# Patient Record
Sex: FEMALE | Race: BLACK OR AFRICAN AMERICAN | Employment: UNEMPLOYED | URBAN - METROPOLITAN AREA
[De-identification: names, ages, dates, MRNs, and addresses within clinical notes are randomized per-mention and may not be internally consistent; named-entity substitution may affect disease eponyms.]

---

## 2017-04-02 ENCOUNTER — APPOINTMENT (OUTPATIENT)
Dept: CT IMAGING | Age: 62
DRG: 378 | End: 2017-04-02
Attending: EMERGENCY MEDICINE
Payer: MEDICARE

## 2017-04-02 ENCOUNTER — HOSPITAL ENCOUNTER (INPATIENT)
Age: 62
LOS: 2 days | Discharge: HOME HEALTH | DRG: 378 | End: 2017-04-04
Attending: EMERGENCY MEDICINE | Admitting: INTERNAL MEDICINE
Payer: MEDICARE

## 2017-04-02 DIAGNOSIS — K92.2 GASTROINTESTINAL HEMORRHAGE, UNSPECIFIED GASTROINTESTINAL HEMORRHAGE TYPE: Primary | ICD-10-CM

## 2017-04-02 PROBLEM — Z86.79 H/O CEREBRAL ANEURYSM REPAIR: Status: ACTIVE | Noted: 2017-04-02

## 2017-04-02 PROBLEM — E11.9 TYPE 2 DIABETES MELLITUS (HCC): Status: ACTIVE | Noted: 2017-04-02

## 2017-04-02 PROBLEM — F81.9 INTELLECTUAL DELAY: Status: ACTIVE | Noted: 2017-04-02

## 2017-04-02 PROBLEM — Z98.890 H/O CEREBRAL ANEURYSM REPAIR: Status: ACTIVE | Noted: 2017-04-02

## 2017-04-02 LAB
ALBUMIN SERPL BCP-MCNC: 3.4 G/DL (ref 3.5–5)
ALBUMIN/GLOB SERPL: 0.8 {RATIO} (ref 1.1–2.2)
ALP SERPL-CCNC: 78 U/L (ref 45–117)
ALT SERPL-CCNC: 14 U/L (ref 12–78)
ANION GAP BLD CALC-SCNC: 13 MMOL/L (ref 5–15)
APPEARANCE UR: ABNORMAL
APTT PPP: 22 SEC (ref 22.1–32.5)
AST SERPL W P-5'-P-CCNC: 10 U/L (ref 15–37)
BACTERIA URNS QL MICRO: ABNORMAL /HPF
BASOPHILS # BLD AUTO: 0 K/UL (ref 0–0.1)
BASOPHILS # BLD: 0 % (ref 0–1)
BILIRUB SERPL-MCNC: 0.1 MG/DL (ref 0.2–1)
BILIRUB UR QL CFM: NEGATIVE
BUN SERPL-MCNC: 21 MG/DL (ref 6–20)
BUN/CREAT SERPL: 20 (ref 12–20)
CALCIUM SERPL-MCNC: 9.4 MG/DL (ref 8.5–10.1)
CHLORIDE SERPL-SCNC: 109 MMOL/L (ref 97–108)
CO2 SERPL-SCNC: 22 MMOL/L (ref 21–32)
COLOR UR: ABNORMAL
CREAT SERPL-MCNC: 1.03 MG/DL (ref 0.55–1.02)
EOSINOPHIL # BLD: 0 K/UL (ref 0–0.4)
EOSINOPHIL NFR BLD: 0 % (ref 0–7)
EPITH CASTS URNS QL MICRO: ABNORMAL /LPF
ERYTHROCYTE [DISTWIDTH] IN BLOOD BY AUTOMATED COUNT: 16.6 % (ref 11.5–14.5)
GLOBULIN SER CALC-MCNC: 4.4 G/DL (ref 2–4)
GLUCOSE BLD STRIP.AUTO-MCNC: 99 MG/DL (ref 65–100)
GLUCOSE SERPL-MCNC: 237 MG/DL (ref 65–100)
GLUCOSE UR STRIP.AUTO-MCNC: NEGATIVE MG/DL
HCT VFR BLD AUTO: 35.6 % (ref 35–47)
HEMOCCULT STL QL: POSITIVE
HGB BLD-MCNC: 11 G/DL (ref 11.5–16)
HGB BLD-MCNC: 8.5 G/DL (ref 11.5–16)
HGB UR QL STRIP: ABNORMAL
HYALINE CASTS URNS QL MICRO: ABNORMAL /LPF (ref 0–5)
INR PPP: 1.1 (ref 0.9–1.1)
KETONES UR QL STRIP.AUTO: NEGATIVE MG/DL
LACTATE SERPL-SCNC: 2.4 MMOL/L (ref 0.4–2)
LACTATE SERPL-SCNC: 2.8 MMOL/L (ref 0.4–2)
LACTATE SERPL-SCNC: 4.4 MMOL/L (ref 0.4–2)
LEUKOCYTE ESTERASE UR QL STRIP.AUTO: ABNORMAL
LYMPHOCYTES # BLD AUTO: 25 % (ref 12–49)
LYMPHOCYTES # BLD: 3.6 K/UL (ref 0.8–3.5)
MCH RBC QN AUTO: 26.3 PG (ref 26–34)
MCHC RBC AUTO-ENTMCNC: 30.9 G/DL (ref 30–36.5)
MCV RBC AUTO: 85.2 FL (ref 80–99)
MONOCYTES # BLD: 0.3 K/UL (ref 0–1)
MONOCYTES NFR BLD AUTO: 2 % (ref 5–13)
NEUTS SEG # BLD: 10.4 K/UL (ref 1.8–8)
NEUTS SEG NFR BLD AUTO: 73 % (ref 32–75)
NITRITE UR QL STRIP.AUTO: NEGATIVE
PH UR STRIP: 5 [PH] (ref 5–8)
PLATELET # BLD AUTO: 303 K/UL (ref 150–400)
POTASSIUM SERPL-SCNC: 4.3 MMOL/L (ref 3.5–5.1)
PROT SERPL-MCNC: 7.8 G/DL (ref 6.4–8.2)
PROT UR STRIP-MCNC: 30 MG/DL
PROTHROMBIN TIME: 10.6 SEC (ref 9–11.1)
RBC # BLD AUTO: 4.18 M/UL (ref 3.8–5.2)
RBC #/AREA URNS HPF: ABNORMAL /HPF (ref 0–5)
SERVICE CMNT-IMP: NORMAL
SODIUM SERPL-SCNC: 144 MMOL/L (ref 136–145)
SP GR UR REFRACTOMETRY: >1.03 (ref 1–1.03)
THERAPEUTIC RANGE,PTTT: ABNORMAL SECS (ref 58–77)
UA: UC IF INDICATED,UAUC: ABNORMAL
UROBILINOGEN UR QL STRIP.AUTO: 0.2 EU/DL (ref 0.2–1)
WBC # BLD AUTO: 14.4 K/UL (ref 3.6–11)
WBC URNS QL MICRO: ABNORMAL /HPF (ref 0–4)
YEAST URNS QL MICRO: PRESENT

## 2017-04-02 PROCEDURE — 85018 HEMOGLOBIN: CPT | Performed by: EMERGENCY MEDICINE

## 2017-04-02 PROCEDURE — 36430 TRANSFUSION BLD/BLD COMPNT: CPT

## 2017-04-02 PROCEDURE — 83605 ASSAY OF LACTIC ACID: CPT

## 2017-04-02 PROCEDURE — 81001 URINALYSIS AUTO W/SCOPE: CPT | Performed by: EMERGENCY MEDICINE

## 2017-04-02 PROCEDURE — 74178 CT ABD&PLV WO CNTR FLWD CNTR: CPT

## 2017-04-02 PROCEDURE — 87086 URINE CULTURE/COLONY COUNT: CPT | Performed by: EMERGENCY MEDICINE

## 2017-04-02 PROCEDURE — 65660000000 HC RM CCU STEPDOWN

## 2017-04-02 PROCEDURE — 36415 COLL VENOUS BLD VENIPUNCTURE: CPT

## 2017-04-02 PROCEDURE — P9016 RBC LEUKOCYTES REDUCED: HCPCS

## 2017-04-02 PROCEDURE — 85730 THROMBOPLASTIN TIME PARTIAL: CPT | Performed by: EMERGENCY MEDICINE

## 2017-04-02 PROCEDURE — 96360 HYDRATION IV INFUSION INIT: CPT

## 2017-04-02 PROCEDURE — 74011250636 HC RX REV CODE- 250/636: Performed by: EMERGENCY MEDICINE

## 2017-04-02 PROCEDURE — 85025 COMPLETE CBC W/AUTO DIFF WBC: CPT

## 2017-04-02 PROCEDURE — 74011250636 HC RX REV CODE- 250/636: Performed by: INTERNAL MEDICINE

## 2017-04-02 PROCEDURE — 82962 GLUCOSE BLOOD TEST: CPT

## 2017-04-02 PROCEDURE — 77030029131 HC ADMN ST IV BLD N DEHP ICUM -B

## 2017-04-02 PROCEDURE — 80053 COMPREHEN METABOLIC PANEL: CPT

## 2017-04-02 PROCEDURE — 82270 OCCULT BLOOD FECES: CPT

## 2017-04-02 PROCEDURE — 85610 PROTHROMBIN TIME: CPT | Performed by: EMERGENCY MEDICINE

## 2017-04-02 PROCEDURE — 86900 BLOOD TYPING SEROLOGIC ABO: CPT

## 2017-04-02 PROCEDURE — 83605 ASSAY OF LACTIC ACID: CPT | Performed by: EMERGENCY MEDICINE

## 2017-04-02 PROCEDURE — 74011636320 HC RX REV CODE- 636/320: Performed by: EMERGENCY MEDICINE

## 2017-04-02 PROCEDURE — 77030032490 HC SLV COMPR SCD KNE COVD -B

## 2017-04-02 PROCEDURE — 30233N1 TRANSFUSION OF NONAUTOLOGOUS RED BLOOD CELLS INTO PERIPHERAL VEIN, PERCUTANEOUS APPROACH: ICD-10-PCS | Performed by: INTERNAL MEDICINE

## 2017-04-02 PROCEDURE — 93005 ELECTROCARDIOGRAM TRACING: CPT

## 2017-04-02 PROCEDURE — 99285 EMERGENCY DEPT VISIT HI MDM: CPT

## 2017-04-02 PROCEDURE — 86920 COMPATIBILITY TEST SPIN: CPT

## 2017-04-02 RX ORDER — INSULIN LISPRO 100 [IU]/ML
INJECTION, SOLUTION INTRAVENOUS; SUBCUTANEOUS
Status: DISCONTINUED | OUTPATIENT
Start: 2017-04-02 | End: 2017-04-04 | Stop reason: HOSPADM

## 2017-04-02 RX ORDER — SODIUM CHLORIDE 0.9 % (FLUSH) 0.9 %
5-10 SYRINGE (ML) INJECTION AS NEEDED
Status: DISCONTINUED | OUTPATIENT
Start: 2017-04-02 | End: 2017-04-04 | Stop reason: HOSPADM

## 2017-04-02 RX ORDER — SODIUM CHLORIDE 0.9 % (FLUSH) 0.9 %
5-10 SYRINGE (ML) INJECTION EVERY 8 HOURS
Status: DISCONTINUED | OUTPATIENT
Start: 2017-04-02 | End: 2017-04-04 | Stop reason: HOSPADM

## 2017-04-02 RX ORDER — ACETAMINOPHEN 325 MG/1
325 TABLET ORAL
Status: DISCONTINUED | OUTPATIENT
Start: 2017-04-02 | End: 2017-04-04 | Stop reason: HOSPADM

## 2017-04-02 RX ORDER — MAGNESIUM SULFATE 100 %
4 CRYSTALS MISCELLANEOUS AS NEEDED
Status: DISCONTINUED | OUTPATIENT
Start: 2017-04-02 | End: 2017-04-04 | Stop reason: HOSPADM

## 2017-04-02 RX ORDER — SODIUM CHLORIDE 0.9 % (FLUSH) 0.9 %
10 SYRINGE (ML) INJECTION
Status: COMPLETED | OUTPATIENT
Start: 2017-04-02 | End: 2017-04-02

## 2017-04-02 RX ORDER — SODIUM CHLORIDE 9 MG/ML
50 INJECTION, SOLUTION INTRAVENOUS
Status: COMPLETED | OUTPATIENT
Start: 2017-04-02 | End: 2017-04-02

## 2017-04-02 RX ORDER — MORPHINE SULFATE 2 MG/ML
1 INJECTION, SOLUTION INTRAMUSCULAR; INTRAVENOUS
Status: DISCONTINUED | OUTPATIENT
Start: 2017-04-02 | End: 2017-04-04 | Stop reason: HOSPADM

## 2017-04-02 RX ORDER — NALOXONE HYDROCHLORIDE 0.4 MG/ML
0.4 INJECTION, SOLUTION INTRAMUSCULAR; INTRAVENOUS; SUBCUTANEOUS AS NEEDED
Status: DISCONTINUED | OUTPATIENT
Start: 2017-04-02 | End: 2017-04-04 | Stop reason: HOSPADM

## 2017-04-02 RX ORDER — ONDANSETRON 2 MG/ML
4 INJECTION INTRAMUSCULAR; INTRAVENOUS
Status: DISCONTINUED | OUTPATIENT
Start: 2017-04-02 | End: 2017-04-04 | Stop reason: HOSPADM

## 2017-04-02 RX ORDER — METOPROLOL TARTRATE 5 MG/5ML
5 INJECTION INTRAVENOUS
Status: DISCONTINUED | OUTPATIENT
Start: 2017-04-02 | End: 2017-04-04 | Stop reason: HOSPADM

## 2017-04-02 RX ORDER — DEXTROSE 50 % IN WATER (D50W) INTRAVENOUS SYRINGE
12.5-25 AS NEEDED
Status: DISCONTINUED | OUTPATIENT
Start: 2017-04-02 | End: 2017-04-04 | Stop reason: HOSPADM

## 2017-04-02 RX ORDER — SODIUM CHLORIDE 9 MG/ML
250 INJECTION, SOLUTION INTRAVENOUS AS NEEDED
Status: DISCONTINUED | OUTPATIENT
Start: 2017-04-02 | End: 2017-04-04 | Stop reason: HOSPADM

## 2017-04-02 RX ORDER — HYDROCODONE BITARTRATE AND ACETAMINOPHEN 5; 325 MG/1; MG/1
1 TABLET ORAL
Status: DISCONTINUED | OUTPATIENT
Start: 2017-04-02 | End: 2017-04-04 | Stop reason: HOSPADM

## 2017-04-02 RX ADMIN — IOPAMIDOL 100 ML: 755 INJECTION, SOLUTION INTRAVENOUS at 19:46

## 2017-04-02 RX ADMIN — SODIUM CHLORIDE 1000 ML: 900 INJECTION, SOLUTION INTRAVENOUS at 16:10

## 2017-04-02 RX ADMIN — Medication 10 ML: at 19:46

## 2017-04-02 RX ADMIN — SODIUM CHLORIDE 250 ML: 900 INJECTION, SOLUTION INTRAVENOUS at 23:40

## 2017-04-02 RX ADMIN — Medication 10 ML: at 21:16

## 2017-04-02 RX ADMIN — SODIUM CHLORIDE 50 ML/HR: 900 INJECTION, SOLUTION INTRAVENOUS at 19:46

## 2017-04-02 NOTE — ED NOTES
Patient became tachycardic after walking to restroom but quickly returned to baseline HR. MD morales.

## 2017-04-02 NOTE — ED PROVIDER NOTES
HPI Comments: Ari Zhou is a 62 y.o. female with hx CVA, DM, HTN, who presents via EMS to the ED c/o rectal bleeding x today. Per EMS, pt was found vomiting, without blood, and had a blood streaked bowel movement in the toilet noted by her live-in . Per EMS, pt normally appears pale. Pt states she recalls the incident and denies any bloody bowel movements yesterday. She notes a hx of blood in her stool, and states she is not on blood thinners. She denies hx colonoscopy. She has a hx of cerebral shunt placement in 2008, and is currently oriented and at baseline. She admits to dark stools, dizziness, lightheadedness. She specifically denies abdominal pain, chest pain, nausea, SOB. PCP: Linda Larios  Soc Hx: -tobacco, -EtOH    There are no other complaints, changes or physical findings at this time. The history is provided by the patient and the EMS personnel. Past Medical History:   Diagnosis Date    Diabetes ([address]     Hypertension     Stroke [facility])        Past Surgical History:   Procedure Laterality Date    HX ORTHOPAEDIC  1996    back surgery    NEUROLOGICAL PROCEDURE UNLISTED  2008    shunt placement         History reviewed. No pertinent family history. Social History     Social History    Marital status: SINGLE     Spouse name: N/A    Number of children: N/A    Years of education: N/A     Occupational History    Not on file. Social History Main Topics    Smoking status: Never Smoker    Smokeless tobacco: Not on file    Alcohol use No    Drug use: No    Sexual activity: Not on file     Other Topics Concern    Not on file     Social History Narrative         ALLERGIES: Review of patient's allergies indicates no known allergies. Review of Systems   Constitutional: Negative for fatigue and fever. HENT: Negative. Eyes: Negative. Respiratory: Negative for shortness of breath and wheezing.     Cardiovascular: Negative for chest pain and leg swelling. Gastrointestinal: Positive for anal bleeding, blood in stool and vomiting. Negative for abdominal pain, constipation, diarrhea and nausea. +dark stool   Endocrine: Negative. Genitourinary: Negative for difficulty urinating and dysuria. Musculoskeletal: Negative. Skin: Negative for rash. Allergic/Immunologic: Negative. Neurological: Positive for dizziness and light-headedness. Negative for weakness and numbness. Hematological: Negative. Psychiatric/Behavioral: Negative. All other systems reviewed and are negative. There were no vitals filed for this visit. Physical Exam   Constitutional: She is oriented to person, place, and time. She appears well-developed and well-nourished. No distress. HENT:   Head: Normocephalic and atraumatic. Mouth/Throat: Oropharynx is clear and moist and mucous membranes are normal.   Moist mucus membranes   Eyes: Conjunctivae and EOM are normal.   Neck: Neck supple. No JVD present. No tracheal deviation present. Cardiovascular: Regular rhythm and intact distal pulses. Tachycardia present. Exam reveals no gallop and no friction rub. No murmur heard. Pulmonary/Chest: Effort normal and breath sounds normal. No stridor. No respiratory distress. She has no wheezes. Abdominal: Soft. Bowel sounds are normal. She exhibits no distension and no mass. There is no tenderness. There is no guarding. Musculoskeletal: Normal range of motion. She exhibits no edema or tenderness. No deformity   Neurological: She is alert and oriented to person, place, and time. She has normal strength. No focal deficits   Skin: Skin is warm, dry and intact. No rash noted. There is pallor. Psychiatric: She has a normal mood and affect. Her behavior is normal. Judgment and thought content normal.   Nursing note and vitals reviewed.        MDM  Number of Diagnoses or Management Options  Diagnosis management comments: Pt presenting with BRBPR, has never had a colonoscopy and denies any hx of GI bleeding. DDx includes anemia, hemorrhoids, diverticular bleed, malignancy, kennedy. Will check labs, type and screen, lactic acid. Abdominal exam is benign. Amount and/or Complexity of Data Reviewed  Clinical lab tests: ordered and reviewed  Tests in the medicine section of CPT®: ordered and reviewed  Obtain history from someone other than the patient: yes (EMS)  Review and summarize past medical records: yes  Discuss the patient with other providers: yes (GI, hospitalist)  Independent visualization of images, tracings, or specimens: yes      ED Course       Procedures  EKG interpretation: (Preliminary)  14:19  Rhythm: sinus tachycardia; and regular . Rate (approx.): 105; Axis: normal; NJ interval: normal; QRS interval: normal ; ST/T wave: no ST changes. Procedure Note - Rectal Exam:   2:24 PM  Performed by: Koffi Denney DO  Chaperoned by: Sherrell Burroughs RN (female)  Rectal exam performed. Maroon colored stool was collected. Stool was Hemoccult tested, and found to be heme Positive. No hemorrhoids noted. The procedure took 1-15 minutes, and pt tolerated well. Written by Agustín Bernal, ED Scribe, as dictated by Koffi Denney DO.    CONSULT NOTE:   4:13 PM  Koffi Denney DO spoke with Dr. Marvin Orosco,  Specialty: GI  Discussed pt's hx, disposition, and available diagnostic and imaging results. Reviewed care plans. Consultant agrees with plans as outlined. He recommends a CT angiogram of the abdomen. Written by Agustín Bernal ED Scribe, as dictated by Koffi Denney DO.     CONSULT NOTE:   4:17 PM  Koffi Denney DO spoke with Dr. Nisa Munoz,   Specialty: Hospitalist  Discussed pt's hx, disposition, and available diagnostic and imaging results. Reviewed care plans. Consultant will evaluate pt for admission. Written by Agustín Bernal ED Scribe, as dictated by Koffi Denney DO.     Progress Note:  4:26 PM  Nurse reports that upon standing to go to the bathroom, the patient's HR winston to 140s. BP remained stable and patient denied any chest pain, sob, or lightheadedness. Upon repositioning in bed, pt's HR returned to normal. Rohit Nascimento DO    Procedure Note - Limited Bedside Ultrasound- Peripheral Line Placement   7:20 PM  Performed by: Teresita Moreno DO  Indication: need for venous access for blood work or imaging studies. Interpretation:   A peripheral venous line was placed using dynamic US guidance. Compression of vessel was performed to confirm venous placement. A 18 gauge catheter was placed in the left AC.  1 number of attempts. The procedure took 1-15 minutes, and pt tolerated well. Written by Elmer Barry ED Scribe, as dictated by Teresita Moreno DO    LABORATORY TESTS:  Recent Results (from the past 12 hour(s))   METABOLIC PANEL, COMPREHENSIVE    Collection Time: 04/02/17  2:29 PM   Result Value Ref Range    Sodium 144 136 - 145 mmol/L    Potassium 4.3 3.5 - 5.1 mmol/L    Chloride 109 (H) 97 - 108 mmol/L    CO2 22 21 - 32 mmol/L    Anion gap 13 5 - 15 mmol/L    Glucose 237 (H) 65 - 100 mg/dL    BUN 21 (H) 6 - 20 MG/DL    Creatinine 1.03 (H) 0.55 - 1.02 MG/DL    BUN/Creatinine ratio 20 12 - 20      GFR est AA >60 >60 ml/min/1.73m2    GFR est non-AA 55 (L) >60 ml/min/1.73m2    Calcium 9.4 8.5 - 10.1 MG/DL    Bilirubin, total 0.1 (L) 0.2 - 1.0 MG/DL    ALT (SGPT) 14 12 - 78 U/L    AST (SGOT) 10 (L) 15 - 37 U/L    Alk.  phosphatase 78 45 - 117 U/L    Protein, total 7.8 6.4 - 8.2 g/dL    Albumin 3.4 (L) 3.5 - 5.0 g/dL    Globulin 4.4 (H) 2.0 - 4.0 g/dL    A-G Ratio 0.8 (L) 1.1 - 2.2     CBC WITH AUTOMATED DIFF    Collection Time: 04/02/17  2:29 PM   Result Value Ref Range    WBC 14.4 (H) 3.6 - 11.0 K/uL    RBC 4.18 3.80 - 5.20 M/uL    HGB 11.0 (L) 11.5 - 16.0 g/dL    HCT 35.6 35.0 - 47.0 %    MCV 85.2 80.0 - 99.0 FL    MCH 26.3 26.0 - 34.0 PG    MCHC 30.9 30.0 - 36.5 g/dL    RDW 16.6 (H) 11.5 - 14.5 %    PLATELET 520 545 - 942 K/uL    NEUTROPHILS 73 32 - 75 %    LYMPHOCYTES 25 12 - 49 %    MONOCYTES 2 (L) 5 - 13 %    EOSINOPHILS 0 0 - 7 %    BASOPHILS 0 0 - 1 %    ABS. NEUTROPHILS 10.4 (H) 1.8 - 8.0 K/UL    ABS. LYMPHOCYTES 3.6 (H) 0.8 - 3.5 K/UL    ABS. MONOCYTES 0.3 0.0 - 1.0 K/UL    ABS. EOSINOPHILS 0.0 0.0 - 0.4 K/UL    ABS.  BASOPHILS 0.0 0.0 - 0.1 K/UL   LACTIC ACID, PLASMA    Collection Time: 04/02/17  2:29 PM   Result Value Ref Range    Lactic acid 4.4 (HH) 0.4 - 2.0 MMOL/L   PROTHROMBIN TIME + INR    Collection Time: 04/02/17  2:29 PM   Result Value Ref Range    INR 1.1 0.9 - 1.1      Prothrombin time 10.6 9.0 - 11.1 sec   PTT    Collection Time: 04/02/17  2:29 PM   Result Value Ref Range    aPTT 22.0 (L) 22.1 - 32.5 sec    aPTT, therapeutic range     58.0 - 77.0 SECS   POC FECAL OCCULT BLOOD    Collection Time: 04/02/17  2:33 PM   Result Value Ref Range    Occult blood, stool (POC) POSITIVE (A) NEG     URINALYSIS W/ REFLEX CULTURE    Collection Time: 04/02/17  2:59 PM   Result Value Ref Range    Color YELLOW/STRAW      Appearance CLOUDY (A) CLEAR      Specific gravity >1.030 (H) 1.003 - 1.030    pH (UA) 5.0 5.0 - 8.0      Protein 30 (A) NEG mg/dL    Glucose NEGATIVE  NEG mg/dL    Ketone NEGATIVE  NEG mg/dL    Blood LARGE (A) NEG      Urobilinogen 0.2 0.2 - 1.0 EU/dL    Nitrites NEGATIVE  NEG      Leukocyte Esterase SMALL (A) NEG      WBC 10-20 0 - 4 /hpf    RBC 20-50 0 - 5 /hpf    Epithelial cells MODERATE (A) FEW /lpf    Bacteria 1+ (A) NEG /hpf    UA:UC IF INDICATED URINE CULTURE ORDERED (A) CNI      Hyaline cast 2-5 0 - 5 /lpf    Yeast PRESENT (A) NEG     BILIRUBIN, CONFIRM    Collection Time: 04/02/17  2:59 PM   Result Value Ref Range    Bilirubin UA, confirm NEGATIVE  NEG     TYPE & SCREEN    Collection Time: 04/02/17  3:10 PM   Result Value Ref Range    Crossmatch Expiration 04/05/2017     ABO/Rh(D) O POSITIVE     Antibody screen NEG    LACTIC ACID, PLASMA    Collection Time: 04/02/17  3:11 PM   Result Value Ref Range Lactic acid 2.8 (HH) 0.4 - 2.0 MMOL/L       IMAGING RESULTS:  CT ABD PELV W WO CONT    (Results Pending)       MEDICATIONS GIVEN:  Medications   sodium chloride 0.9 % bolus infusion 1,000 mL (1,000 mL IntraVENous New Bag 4/2/17 1610)   0.9% sodium chloride infusion (not administered)   iopamidol (ISOVUE-370) 76 % injection 100 mL (not administered)   sodium chloride (NS) flush 10 mL (not administered)       IMPRESSION:  No diagnosis found. PLAN:  1. Admit pt to hospitalist care. Admission Note:  5:04 PM  Patient is being admitted to the hospital by Dr. Branden Cornell. The results of their tests and reasons for their admission have been discussed with them and available family. They convey agreement and understanding for the need to be admitted and for their admission diagnosis. Written by Liza Jennings, ED Scribe, as dictated by Cecelia Orona DO. This note is prepared by Liza Jennings acting as Scribe for Cecelia Orona DO. Cecelia Orona DO: The scribe's documentation has been prepared under my direction and personally reviewed by me in its entirety. I confirm that the note above accurately reflects all work, treatment, procedures, and medical decision making performed by me.

## 2017-04-02 NOTE — IP AVS SNAPSHOT
[address] [facility] 
[phone] Patient: Shannon Oconnor MRN: [id number] GMB:[birth year] You are allergic to the following No active allergies Recent Documentation OB Status Smoking Status Postmenopausal Never Smoker Emergency Contacts Name Discharge Info Relation Home Work Mobile Tyrone Player   [phone] Shai Berger  Other Relative [6] [phone] About your hospitalization You were admitted on:  April 2, 2017 You last received care in the:  [facility] 3 RENAL MEDICINE You were discharged on:  April 4, 2017 Unit phone number:  [phone] Why you were hospitalized Your primary diagnosis was:  Not on File Your diagnoses also included:  Acute Lower Gi Bleeding, Type 2 Diabetes Mellitus (Hcc), Intellectual Delay, H/O Cerebral Aneurysm Repair Providers Seen During Your Hospitalizations Provider Role Specialty Primary office phone Brennan Marshall DO Attending Provider Emergency Medicine [phone] Ayush Curran MD Attending Provider Internal Medicine [phone] Sangeeta Langley MD Attending Provider Internal Medicine [phone] Your Primary Care Physician (PCP) Primary Care Physician Office Phone Office Fax Brenda YEBOAH ** None ** ** None ** Follow-up Information Follow up With Details Comments Contact Info Hamilton Bolus In 1 week  [address] [phone] Eleuterio Klinefelter., MD In 2 weeks call office to schedule colonoscopy  [address] [facility] 
[phone] Current Discharge Medication List  
  
START taking these medications Dose & Instructions Dispensing Information Comments Morning Noon Evening Bedtime  
 ascorbic acid (vitamin C) 1,000 mg tablet Commonly known as:  VITAMIN C Your last dose was: Your next dose is:    
   
   
 Dose:  1000 mg Take 1 Tab by mouth daily. Quantity:  14 Tab Refills:  0  
     
   
   
   
  
 ferrous sulfate 325 mg (65 mg iron) tablet Your last dose was: Your next dose is:    
   
   
 Dose:  325 mg Take 1 Tab by mouth Daily (before breakfast). Quantity:  14 Tab Refills:  0  
     
   
   
   
  
 folic acid 1 mg tablet Commonly known as:  Shyam Your last dose was: Your next dose is:    
   
   
 Dose:  1 mg Take 1 Tab by mouth daily. Quantity:  14 Tab Refills:  0 CONTINUE these medications which have CHANGED Dose & Instructions Dispensing Information Comments Morning Noon Evening Bedtime  
 aspirin delayed-release 81 mg tablet What changed:  additional instructions Your last dose was: Your next dose is:    
   
   
 Dose:  81 mg Take 1 Tab by mouth daily. Start taking 4/12/2017 Quantity:  30 Tab Refills:  1 CONTINUE these medications which have NOT CHANGED Dose & Instructions Dispensing Information Comments Morning Noon Evening Bedtime HYDROcodone-acetaminophen 5-325 mg per tablet Commonly known as:  Akhil Francisco Your last dose was: Your next dose is:    
   
   
 Dose:  1 Tab Take 1 Tab by mouth every four (4) hours as needed for Pain. Max Daily Amount: 6 Tabs. Quantity:  20 Tab Refills:  0  
     
   
   
   
  
 lisinopril 5 mg tablet Commonly known as:  Diana Rodriguez Your last dose was: Your next dose is:    
   
   
 Dose:  5 mg Take 1 Tab by mouth daily. Quantity:  30 Tab Refills:  1  
     
   
   
   
  
 penicillin v potassium 500 mg tablet Commonly known as:  VEETID Your last dose was: Your next dose is:    
   
   
 Dose:  500 mg Take 1 Tab by mouth four (4) times daily. Quantity:  40 Tab Refills:  0 Where to Get Your Medications Information on where to get these meds will be given to you by the nurse or doctor. ! Ask your nurse or doctor about these medications  
  ascorbic acid (vitamin C) 1,000 mg tablet  
 aspirin delayed-release 81 mg tablet  
 ferrous sulfate 325 mg (65 mg iron) tablet  
 folic acid 1 mg tablet Discharge Instructions Patient Discharge Instructions Veva Lombard / [id number] : 1955 Admitted 2017 Discharged: 2017 DISCHARGE DIAGNOSIS:  
 
You likely were bleeding from diverticulosis  - you will need colonoscopy to be done as outpatient. Call GI office to schedule. - Hold aspirin for now. Start taking back on 2017 
                                                                      - follow with PCP in 1 week to check anemia numbers - take iron / vit C/ folic acid for 14 days for anemia. Iron can make you constipated Take Home Medications General drug facts If you have a very bad allergy, wear an allergy ID at all times. It is important that you take the medication exactly as they are prescribed. Keep your medication in the bottles provided by the pharmacist. 
Keep a list of all your drugs (prescription, natural products, vitamins, OTC) with you. Give this list to your doctor. Do not take other medications without consulting your doctor. Do not share your drugs with others and do not take anyone else's drugs. Keep all drugs out of the reach of children and pets. Most drugs may be thrown away in household trash after mixing with coffee grounds or ryanne litter and sealing in a plastic bag. Keep a list Call your doctor for help with any side effects.  If in the [place] you may also call the FDA at 7-535-RVA-5936 Talk with the doctor before starting any new drug, including OTC, natural products, or vitamins. What to do at [facility] 1. Recommended diet:high fiber diet 2. Recommended activity: Activity as tolerated and PT/OT per Home Health 3. If you experience any of the following symptoms then please call your primary care physician or return to the emergency room if you cannot get hold of your doctor:blood in stool/ dizziness/ weakness 4. Lab work: with PCP in 1 week 5. Bring these papers with you to your follow up appointments. The papers will help your doctors be sure to continue the care plan from the hospital. 
 
 
Follow-up with:  
PCP: Asael Chappell Follow-up Information Follow up With Details Comments Contact Info Asael Chappell In 1 week  [address] [facility] [phone] Velia Morrissey MD In 2 weeks call office to schedule colonoscopy  [address] [place] [address] 
[phone] Please call for your own appointment Information obtained by : 
I understand that if any problems occur once I am at home I am to contact my physician. I understand and acknowledge receipt of the instructions indicated above. Physician's or R.N.'s Signature                                                                  Date/Time Patient or Representative Signature                                                          Date/Time Discharge Orders None [facility] Announcement  We are excited to announce that we are making your provider's discharge notes available to you in [employer]. You will see these notes when they are completed and signed by the physician that discharged you from your recent hospital stay. If you have any questions or concerns about any information you see in [employer], please call the Health Information Department where you were seen or reach out to your Primary Care Provider for more information about your plan of care. Introducing [facility]! Neris Sylvie introduces [employer] patient portal. Now you can access parts of your medical record, email your doctor's office, and request medication refills online. 1. In your internet browser, go to [email or web address] 2. Click on the First Time User? Click Here link in the Sign In box. You will see the New Member Sign Up page. 3. Enter your [employer] Access Code exactly as it appears below. You will not need to use this code after youve completed the sign-up process. If you do not sign up before the expiration date, you must request a new code. · [employer] Access Code: [email or web address] Expires: 7/1/2017  2:47 PM 
 
4. Enter the last four digits of your Social Security Number (xxxx) and Date of Birth (mm/dd/yyyy) as indicated and click Submit. You will be taken to the next sign-up page. 5. Create a [employer] ID. This will be your [employer] login ID and cannot be changed, so think of one that is secure and easy to remember. 6. Create a [employer] password. You can change your password at any time. 7. Enter your Password Reset Question and Answer. This can be used at a later time if you forget your password. 8. Enter your e-mail address. You will receive e-mail notification when new information is available in [address] 9. Click Sign Up. You can now view and download portions of your medical record. 10. Click the Download Summary menu link to download a portable copy of your medical information. If you have questions, please visit the Frequently Asked Questions section of the MyChart website. Remember, MyChart is NOT to be used for urgent needs. For medical emergencies, dial 911. Now available from your iPhone and Android! General Information Please provide this summary of care documentation to your next provider. Patient Signature:  ____________________________________________________________ Date:  ____________________________________________________________  
  
Cynthea Mates Provider Signature:  ____________________________________________________________ Date:  ____________________________________________________________

## 2017-04-02 NOTE — ED NOTES
IV access was lost in CT, according to CT tech. Several attempts were made to start IV without success. MD aware of need for ultrasound guided IV placement. Patient in no distress, call bell in reach. No change to nursing assessment.

## 2017-04-02 NOTE — ED TRIAGE NOTES
Patient arrives via EMS for vomiting without blood and some rectal bleeding as observed by family members. Patient has a shunt in her brain and she is oriented to her normal baseline at this time which is to person, place, and situation. She lives at home with a live-in .

## 2017-04-02 NOTE — H&P
Hospitalist Admission Note    NAME: Veva Lombard   :  1955   MRN:  [id number]     Date/Time:  2017 4:18 PM    Patient PCP: Mari Arana  ________________________________________________________________________    My assessment of this patient's clinical condition and my plan of care is as follows. Assessment / Plan:  Acute renal failure in setting of bright red blood per rectum, and 2 gm drop in hgb which makes 22% of original blood loss. Gi consulted in ER, CTA of abdomin negative for acute bleeding but diverticulosis seen, [id number] cbc, pt type and screened, iv fluids, give one unit of blood. htn will watch carefully with iv fluid administration . Hold lisinopril for elevated cr. Metoprolol iv as needed. Type 2 dm with proteinuria, insulin sliding scale. H/o Cerebral aneurysm  with s/o  shunt and intellectual delay    H/o stroke    H/o back surgery    H/o facial swelling with subperiosteal abscess related to dental disease about the right maxillary lateral incisor 2016. Niece works and cannot keep up with tid dosing of pcn, and pt only gets it bid. Code Status:  full code. Surrogate Decision Maker: Ayaan Lazcano [phone] mother, niece is power of  [facility] [phone]    DVT Prophylaxis: scd  GI Prophylaxis: not indicated    Baseline: lives alone, but niece will be moving in. Intellect disability after cerebral aneurysm. Subjective:   CHIEF COMPLAINT: blood in stool    HISTORY OF PRESENT ILLNESS:     Silvino Borja is a 62 y.o.  female who presents with above. Pt is poor historian due to cerebral aneurysm history. Pt's niece states pt had bleeding with her bowel movement this am. Pt also had emesis during that time, with no blood. Pt denies pain, diarrhea, or previous bleeding. We were asked to admit for work up and evaluation of the above problems.      Past Medical History:   Diagnosis Date    Diabetes ([facility] [address]  Hypertension     Stroke [facility])         Past Surgical History:   Procedure Laterality Date    HX ORTHOPAEDIC  1996    back surgery    NEUROLOGICAL PROCEDURE UNLISTED  2008    shunt placement       Social History   Substance Use Topics    Smoking status: Never Smoker    Smokeless tobacco: Not on file    Alcohol use No      FMHx mom with diabetes. No Known Allergies     Prior to Admission medications    Medication Sig Start Date End Date Taking? Authorizing Provider   penicillin v potassium (VEETID) 500 mg tablet Take 1 Tab by mouth four (4) times daily. 12/28/16   Maribell Martinez MD   HYDROcodone-acetaminophen [facility]) 5-325 mg per tablet Take 1 Tab by mouth every four (4) hours as needed for Pain. Max Daily Amount: 6 Tabs. 12/28/16   Maribell Martinez MD   aspirin delayed-release 81 mg tablet Take 1 Tab by mouth daily. 11/24/16   Dav Spencer MD   lisinopril (PRINIVIL, ZESTRIL) 5 mg tablet Take 1 Tab by mouth daily. 11/24/16   Dav Spencer MD       REVIEW OF SYSTEMS:     I am not able to complete the review of systems because:    The patient is intubated and sedated    The patient has altered mental status due to his acute medical problems    The patient has baseline aphasia from prior stroke(s)   x The patient has baseline dementia and is not reliable historian    The patient is in acute medical distress and unable to provide information           Total of 12 systems reviewed as follows:       POSITIVE= underlined text  Negative = text not underlined  General:  fever, chills, sweats, generalized weakness, weight loss/gain,      loss of appetite   Eyes:    blurred vision, eye pain, loss of vision, double vision  ENT:    rhinorrhea, pharyngitis   Respiratory:   cough, sputum production, SOB, KELLER, wheezing, pleuritic pain   Cardiology:   chest pain, palpitations, orthopnea, PND, edema, syncope   Gastrointestinal:  abdominal pain , N/V, diarrhea, dysphagia, constipation, bleeding   Genitourinary: frequency, urgency, dysuria, hematuria, incontinence   Muskuloskeletal :  arthralgia, myalgia, back pain  Hematology:  easy bruising, nose or gum bleeding, lymphadenopathy   Dermatological: rash, ulceration, pruritis, color change / jaundice  Endocrine:   hot flashes or polydipsia   Neurological:  headache, dizziness, confusion, focal weakness, paresthesia,     Speech difficulties, memory loss, gait difficulty  Psychological: Feelings of anxiety, depression, agitation    Objective:   VITALS:    Visit Vitals    /90    Pulse (!) 124    Resp 15    SpO2 100%       PHYSICAL EXAM:    General:    Alert, cooperative, no distress, appears stated age, father at bedside. HEENT: Atraumatic, anicteric sclerae, pink conjunctivae  Neck:  No stridor,  symmetrical,  No masses. Lungs:   Clear to auscultation bilaterally. No Wheezing or Rhonchi. No rales. Chest wall:  No tenderness  No Accessory muscle use. Heart:   Regular  rhythm,  No  murmur  bl edema  Abdomen:   Soft, non-tender. Not distended. Bowel sounds normal  Extremities: No cyanosis. No clubbing      Capillary refill normal,  Radial pulse 2+,  DP normal  Skin:     Not pale. Not Jaundiced  No rashes   Psych:  poor insight. Not depressed. Not anxious or agitated. Neurologic: Answers some questions, follows some commands. No facial asymmetry. aphasia and poor enunciation of words at times.       _______________________________________________________________________  Care Plan discussed with:    Comments   Patient x    Family  x Niece via phone   RN x    Care Manager                    Consultant:      _______________________________________________________________________  Expected  Disposition:   Home with Family    HH/PT/OT/RN x   SNF/LTC    ROSENDO    ________________________________________________________________________  TOTAL TIME:  55Minutes    Critical Care Provided     Minutes non procedure based      Comments    x Reviewed previous records >50% of visit spent in counseling and coordination of care x Discussion with patient and family and questions answered       ________________________________________________________________________  Signed: Tanner Lovell DO    Procedures: see electronic medical records for all procedures/Xrays and details which were not copied into this note but were reviewed prior to creation of Plan. LAB DATA REVIEWED:    Recent Results (from the past 24 hour(s))   METABOLIC PANEL, COMPREHENSIVE    Collection Time: 04/02/17  2:29 PM   Result Value Ref Range    Sodium 144 136 - 145 mmol/L    Potassium 4.3 3.5 - 5.1 mmol/L    Chloride 109 (H) 97 - 108 mmol/L    CO2 22 21 - 32 mmol/L    Anion gap 13 5 - 15 mmol/L    Glucose 237 (H) 65 - 100 mg/dL    BUN 21 (H) 6 - 20 MG/DL    Creatinine 1.03 (H) 0.55 - 1.02 MG/DL    BUN/Creatinine ratio 20 12 - 20      GFR est AA >60 >60 ml/min/1.73m2    GFR est non-AA 55 (L) >60 ml/min/1.73m2    Calcium 9.4 8.5 - 10.1 MG/DL    Bilirubin, total 0.1 (L) 0.2 - 1.0 MG/DL    ALT (SGPT) 14 12 - 78 U/L    AST (SGOT) 10 (L) 15 - 37 U/L    Alk. phosphatase 78 45 - 117 U/L    Protein, total 7.8 6.4 - 8.2 g/dL    Albumin 3.4 (L) 3.5 - 5.0 g/dL    Globulin 4.4 (H) 2.0 - 4.0 g/dL    A-G Ratio 0.8 (L) 1.1 - 2.2     CBC WITH AUTOMATED DIFF    Collection Time: 04/02/17  2:29 PM   Result Value Ref Range    WBC 14.4 (H) 3.6 - 11.0 K/uL    RBC 4.18 3.80 - 5.20 M/uL    HGB 11.0 (L) 11.5 - 16.0 g/dL    HCT 35.6 35.0 - 47.0 %    MCV 85.2 80.0 - 99.0 FL    MCH 26.3 26.0 - 34.0 PG    MCHC 30.9 30.0 - 36.5 g/dL    RDW 16.6 (H) 11.5 - 14.5 %    PLATELET 556 274 - 862 K/uL    NEUTROPHILS 73 32 - 75 %    LYMPHOCYTES 25 12 - 49 %    MONOCYTES 2 (L) 5 - 13 %    EOSINOPHILS 0 0 - 7 %    BASOPHILS 0 0 - 1 %    ABS. NEUTROPHILS 10.4 (H) 1.8 - 8.0 K/UL    ABS. LYMPHOCYTES 3.6 (H) 0.8 - 3.5 K/UL    ABS. MONOCYTES 0.3 0.0 - 1.0 K/UL    ABS. EOSINOPHILS 0.0 0.0 - 0.4 K/UL    ABS.  BASOPHILS 0.0 0.0 - 0.1 K/UL   LACTIC ACID, PLASMA    Collection Time: 04/02/17  2:29 PM   Result Value Ref Range    Lactic acid 4.4 (HH) 0.4 - 2.0 MMOL/L   PROTHROMBIN TIME + INR    Collection Time: 04/02/17  2:29 PM   Result Value Ref Range    INR 1.1 0.9 - 1.1      Prothrombin time 10.6 9.0 - 11.1 sec   PTT    Collection Time: 04/02/17  2:29 PM   Result Value Ref Range    aPTT 22.0 (L) 22.1 - 32.5 sec    aPTT, therapeutic range     58.0 - 77.0 SECS   POC FECAL OCCULT BLOOD    Collection Time: 04/02/17  2:33 PM   Result Value Ref Range    Occult blood, stool (POC) POSITIVE (A) NEG     LACTIC ACID, PLASMA    Collection Time: 04/02/17  3:11 PM   Result Value Ref Range    Lactic acid 2.8 (HH) 0.4 - 2.0 MMOL/L

## 2017-04-02 NOTE — IP AVS SNAPSHOT
Current Discharge Medication List  
  
START taking these medications Dose & Instructions Dispensing Information Comments Morning Noon Evening Bedtime  
 ascorbic acid (vitamin C) 1,000 mg tablet Commonly known as:  VITAMIN C Your last dose was: Your next dose is:    
   
   
 Dose:  1000 mg Take 1 Tab by mouth daily. Quantity:  14 Tab Refills:  0  
     
   
   
   
  
 ferrous sulfate 325 mg (65 mg iron) tablet Your last dose was: Your next dose is:    
   
   
 Dose:  325 mg Take 1 Tab by mouth Daily (before breakfast). Quantity:  14 Tab Refills:  0  
     
   
   
   
  
 folic acid 1 mg tablet Commonly known as:  Shyam Your last dose was: Your next dose is:    
   
   
 Dose:  1 mg Take 1 Tab by mouth daily. Quantity:  14 Tab Refills:  0 CONTINUE these medications which have CHANGED Dose & Instructions Dispensing Information Comments Morning Noon Evening Bedtime  
 aspirin delayed-release 81 mg tablet What changed:  additional instructions Your last dose was: Your next dose is:    
   
   
 Dose:  81 mg Take 1 Tab by mouth daily. Start taking 4/12/2017 Quantity:  30 Tab Refills:  1 CONTINUE these medications which have NOT CHANGED Dose & Instructions Dispensing Information Comments Morning Noon Evening Bedtime HYDROcodone-acetaminophen 5-325 mg per tablet Commonly known as:  Nathan Marie Your last dose was: Your next dose is:    
   
   
 Dose:  1 Tab Take 1 Tab by mouth every four (4) hours as needed for Pain. Max Daily Amount: 6 Tabs. Quantity:  20 Tab Refills:  0  
     
   
   
   
  
 lisinopril 5 mg tablet Commonly known as:  Kim Erazo Your last dose was: Your next dose is:    
   
   
 Dose:  5 mg Take 1 Tab by mouth daily. Quantity:  30 Tab Refills:  1 penicillin v potassium 500 mg tablet Commonly known as:  VEETID Your last dose was: Your next dose is:    
   
   
 Dose:  500 mg Take 1 Tab by mouth four (4) times daily. Quantity:  40 Tab Refills:  0 Where to Get Your Medications Information on where to get these meds will be given to you by the nurse or doctor. ! Ask your nurse or doctor about these medications  
  ascorbic acid (vitamin C) 1,000 mg tablet  
 aspirin delayed-release 81 mg tablet  
 ferrous sulfate 325 mg (65 mg iron) tablet  
 folic acid 1 mg tablet

## 2017-04-03 LAB
ABO + RH BLD: NORMAL
ANION GAP BLD CALC-SCNC: 12 MMOL/L (ref 5–15)
ATRIAL RATE: 105 BPM
BLD PROD TYP BPU: NORMAL
BLOOD GROUP ANTIBODIES SERPL: NORMAL
BPU ID: NORMAL
BUN SERPL-MCNC: 15 MG/DL (ref 6–20)
BUN/CREAT SERPL: 22 (ref 12–20)
CALCIUM SERPL-MCNC: 8.8 MG/DL (ref 8.5–10.1)
CALCULATED P AXIS, ECG09: 74 DEGREES
CALCULATED R AXIS, ECG10: 8 DEGREES
CALCULATED T AXIS, ECG11: 52 DEGREES
CHLORIDE SERPL-SCNC: 110 MMOL/L (ref 97–108)
CO2 SERPL-SCNC: 23 MMOL/L (ref 21–32)
CREAT SERPL-MCNC: 0.68 MG/DL (ref 0.55–1.02)
CROSSMATCH RESULT,%XM: NORMAL
DIAGNOSIS, 93000: NORMAL
ERYTHROCYTE [DISTWIDTH] IN BLOOD BY AUTOMATED COUNT: 16.1 % (ref 11.5–14.5)
ERYTHROCYTE [DISTWIDTH] IN BLOOD BY AUTOMATED COUNT: 16.6 % (ref 11.5–14.5)
GLUCOSE BLD STRIP.AUTO-MCNC: 126 MG/DL (ref 65–100)
GLUCOSE BLD STRIP.AUTO-MCNC: 79 MG/DL (ref 65–100)
GLUCOSE BLD STRIP.AUTO-MCNC: 88 MG/DL (ref 65–100)
GLUCOSE BLD STRIP.AUTO-MCNC: 93 MG/DL (ref 65–100)
GLUCOSE BLD STRIP.AUTO-MCNC: 97 MG/DL (ref 65–100)
GLUCOSE SERPL-MCNC: 92 MG/DL (ref 65–100)
HCT VFR BLD AUTO: 29 % (ref 35–47)
HCT VFR BLD AUTO: 31.4 % (ref 35–47)
HGB BLD-MCNC: 10.1 G/DL (ref 11.5–16)
HGB BLD-MCNC: 9.4 G/DL (ref 11.5–16)
MAGNESIUM SERPL-MCNC: 2.1 MG/DL (ref 1.6–2.4)
MCH RBC QN AUTO: 27 PG (ref 26–34)
MCH RBC QN AUTO: 27.3 PG (ref 26–34)
MCHC RBC AUTO-ENTMCNC: 32.2 G/DL (ref 30–36.5)
MCHC RBC AUTO-ENTMCNC: 32.4 G/DL (ref 30–36.5)
MCV RBC AUTO: 84 FL (ref 80–99)
MCV RBC AUTO: 84.3 FL (ref 80–99)
P-R INTERVAL, ECG05: 138 MS
PHOSPHATE SERPL-MCNC: 2.5 MG/DL (ref 2.6–4.7)
PLATELET # BLD AUTO: 225 K/UL (ref 150–400)
PLATELET # BLD AUTO: 254 K/UL (ref 150–400)
POTASSIUM SERPL-SCNC: 3.9 MMOL/L (ref 3.5–5.1)
Q-T INTERVAL, ECG07: 360 MS
QRS DURATION, ECG06: 70 MS
QTC CALCULATION (BEZET), ECG08: 475 MS
RBC # BLD AUTO: 3.44 M/UL (ref 3.8–5.2)
RBC # BLD AUTO: 3.74 M/UL (ref 3.8–5.2)
SERVICE CMNT-IMP: ABNORMAL
SERVICE CMNT-IMP: NORMAL
SODIUM SERPL-SCNC: 145 MMOL/L (ref 136–145)
SPECIMEN EXP DATE BLD: NORMAL
STATUS OF UNIT,%ST: NORMAL
UNIT DIVISION, %UDIV: 0
VENTRICULAR RATE, ECG03: 105 BPM
WBC # BLD AUTO: 12.2 K/UL (ref 3.6–11)
WBC # BLD AUTO: 8.4 K/UL (ref 3.6–11)

## 2017-04-03 PROCEDURE — 97535 SELF CARE MNGMENT TRAINING: CPT

## 2017-04-03 PROCEDURE — 36430 TRANSFUSION BLD/BLD COMPNT: CPT

## 2017-04-03 PROCEDURE — 36415 COLL VENOUS BLD VENIPUNCTURE: CPT | Performed by: INTERNAL MEDICINE

## 2017-04-03 PROCEDURE — G8988 SELF CARE GOAL STATUS: HCPCS

## 2017-04-03 PROCEDURE — 97165 OT EVAL LOW COMPLEX 30 MIN: CPT

## 2017-04-03 PROCEDURE — G8979 MOBILITY GOAL STATUS: HCPCS

## 2017-04-03 PROCEDURE — 82962 GLUCOSE BLOOD TEST: CPT

## 2017-04-03 PROCEDURE — 97161 PT EVAL LOW COMPLEX 20 MIN: CPT

## 2017-04-03 PROCEDURE — 80048 BASIC METABOLIC PNL TOTAL CA: CPT | Performed by: INTERNAL MEDICINE

## 2017-04-03 PROCEDURE — G8978 MOBILITY CURRENT STATUS: HCPCS

## 2017-04-03 PROCEDURE — 65660000000 HC RM CCU STEPDOWN

## 2017-04-03 PROCEDURE — G8987 SELF CARE CURRENT STATUS: HCPCS

## 2017-04-03 PROCEDURE — 74011250636 HC RX REV CODE- 250/636: Performed by: INTERNAL MEDICINE

## 2017-04-03 PROCEDURE — 83735 ASSAY OF MAGNESIUM: CPT | Performed by: INTERNAL MEDICINE

## 2017-04-03 PROCEDURE — 84100 ASSAY OF PHOSPHORUS: CPT | Performed by: INTERNAL MEDICINE

## 2017-04-03 PROCEDURE — 85027 COMPLETE CBC AUTOMATED: CPT | Performed by: INTERNAL MEDICINE

## 2017-04-03 RX ADMIN — SODIUM CHLORIDE 1000 ML: 900 INJECTION, SOLUTION INTRAVENOUS at 10:36

## 2017-04-03 RX ADMIN — Medication 10 ML: at 21:35

## 2017-04-03 RX ADMIN — Medication 10 ML: at 17:34

## 2017-04-03 RX ADMIN — Medication 10 ML: at 06:00

## 2017-04-03 NOTE — PROGRESS NOTES
Hospitalist Progress Note    NAME: Ernie Lange   :  1955   MRN:  [id number]     Assessment / Plan:  Acute blood loss anemia, consider diverticular bleed as source of GI bleed  -following with GI - awaiting input for today as to next steps. Patient is hungry, tolerated NPO yesterday, has had no further bleeding therefore fed today with GI lite and has tolerated well  -CT abd = colonic diverticulosis  -rebleed will do CTA. if pinpoints source will discuss next steps with GI/IR  -PPI  -IVF  -tolerating PO at this time. -repeat lactic in AM - will follow - give IVF x1 now    Diabetes Mellitus 2 Uncontrolled  -insulin sliding scale, POC bs     H/o Cerebral aneurysm  with  shunt and intellectual delay  H/o stroke  H/o back surgery  -resume OP full care as doing once dc to home     H/o facial swelling with subperiosteal abscess related to dental disease about the right maxillary lateral incisor 2016.    -Niece works and cannot keep up with tid dosing of pcn, and pt only gets it bid. -no swelling at this time.        Code Status:  full code. Surrogate Decision Maker: Maura Lemos [phone] mother, niece is power of  Rosa Serrano [phone]     DVT Prophylaxis: scd  GI Prophylaxis: not indicated     Baseline: lives alone, but niece will be moving in. Intellect disability after cerebral aneurysm. Subjective:     Chief Complaint / Reason for Physician Visit  \"i feel great, can I eat? \". Discussed with RN events overnight. No further GI bleeding spells    Review of Systems:  Symptom Y/N Comments  Symptom Y/N Comments   Fever/Chills n   Chest Pain n    Poor Appetite y   Edema     Cough n   Abdominal Pain n    Sputum n   Joint Pain     SOB/KELLER    Pruritis/Rash     Nausea/vomit n   Tolerating PT/OT     Diarrhea n   Tolerating Diet y    Constipation    Other       Could NOT obtain due to:      Objective:     VITALS:   Last 24hrs VS reviewed since prior progress note.  Most recent are:  Patient Vitals for the past 24 hrs:   Temp Pulse Resp BP SpO2   04/03/17 0318 97.9 °F (36.6 °C) 82 18 130/89 98 %   04/03/17 0157 97.9 °F (36.6 °C) 83 18 (!) 119/91 99 %   04/03/17 0100 97.8 °F (36.6 °C) 78 18 107/71 100 %   04/03/17 0030 97.5 °F (36.4 °C) 80 18 110/76 99 %   04/03/17 0005 97.5 °F (36.4 °C) 83 18 139/90 100 %   04/02/17 2350 97.7 °F (36.5 °C) 88 16 131/86 99 %   04/02/17 2325 97.7 °F (36.5 °C) 72 16 130/85 100 %   04/02/17 2134 97.5 °F (36.4 °C) 79 16 134/85 100 %   04/02/17 2100 97.5 °F (36.4 °C) 72 16 123/90 100 %   04/02/17 2030 - - - - 99 %   04/02/17 2028 - 88 14 - 100 %   04/02/17 2000 - 83 17 148/74 100 %   04/02/17 1930 - 77 10 - 100 %   04/02/17 1915 - (!) 101 15 - 100 %   04/02/17 1900 - 82 14 115/82 100 %   04/02/17 1845 - 85 16 127/83 100 %   04/02/17 1830 - 87 14 129/84 100 %   04/02/17 1815 - 88 20 120/81 100 %   04/02/17 1800 - - - - 100 %   04/02/17 1745 - - - (!) 133/94 91 %   04/02/17 1645 - 94 15 123/90 100 %   04/02/17 1630 - 98 17 150/87 100 %   04/02/17 1623 - (!) 102 12 - 100 %   04/02/17 1622 - (!) 112 18 - 99 %   04/02/17 1621 - (!) 113 15 - 100 %   04/02/17 1620 - (!) 121 15 - 100 %   04/02/17 1619 - (!) 144 14 (!) 157/92 -   04/02/17 1600 - (!) 124 15 134/90 -   04/02/17 1545 - 75 19 120/75 -   04/02/17 1530 - 78 12 119/70 100 %   04/02/17 1515 - 89 15 - 99 %   04/02/17 1500 - 83 13 102/79 97 %   04/02/17 1445 - 84 13 127/83 96 %   04/02/17 1430 - 89 13 114/85 96 %   04/02/17 1428 97.7 °F (36.5 °C) 99 15 - 99 %   04/02/17 1426 - - - 120/85 -       Intake/Output Summary (Last 24 hours) at 04/03/17 0745  Last data filed at 04/03/17 0319   Gross per 24 hour   Intake              375 ml   Output              375 ml   Net                0 ml        PHYSICAL EXAM:  General: WD, thin chronically ill appearing bf sitting up in chair asking to eat, Alert, cooperative, no acute distress    EENT:  EOMI. Anicteric sclerae. MM dry  Resp:  CTA bilaterally, no wheezing or rales.   No accessory muscle use  CV:  Regular  rhythm,  No edema  GI:  Soft, Non distended, Non tender.  +Bowel sounds  Neurologic:  Alert and oriented X 3, normal speech  Psych:   Fair insight. Not anxious nor agitated  Skin:  no rashes or ulcers. No jaundice    Reviewed most current lab test results and cultures  YES  Reviewed most current radiology test results   YES  Review and summation of old records today    NO  Reviewed patient's current orders and MAR    YES  PMH/SH reviewed - no change compared to H&P  ________________________________________________________________________  Care Plan discussed with:    Comments   Patient x Discussed poc with patient in room. quesitons answered. 15   Family  x None in room   RN x 5   Care Manager x 5   Consultant:                       Multidiciplinary team rounds were held today with , nursing, pharmacist and clinical coordinator. Patient's plan of care was discussed; medications were reviewed and discharge planning was addressed. ________________________________________________________________________  Total NON critical care TIME:  35   Minutes    Total CRITICAL CARE TIME Spent:   Minutes non procedure based      Comments   >50% of visit spent in counseling and coordination of care x See above   ________________________________________________________________________  Procedures: see electronic medical records for all procedures/Xrays and details which were not copied into this note but were reviewed prior to creation of Plan.       LABS:  Recent Labs      04/03/17   0314  04/02/17 2001 04/02/17   1429   WBC  12.2*   --   14.4*   HGB  10.1*  8.5*  11.0*   HCT  31.4*   --   35.6   PLT  254   --   303     Recent Labs      04/03/17   0314  04/02/17   1429   NA  145  144   K  3.9  4.3   CL  110*  109*   CO2  23  22   BUN  15  21*   CREA  0.68  1.03*   GLU  92  237*   CA  8.8  9.4   MG  2.1   --    PHOS  2.5*   --      Recent Labs      04/02/17   1429   SGOT  10*   ALT  14   AP  78 TBILI  0.1*   TP  7.8   ALB  3.4*   GLOB  4.4*     Recent Labs      04/02/17   1429   INR  1.1   PTP  10.6   APTT  22.0*      No results for input(s): FE, TIBC, PSAT, FERR in the last 72 hours. No results found for: FOL, RBCF   No results for input(s): PH, PCO2, PO2 in the last 72 hours. No results for input(s): PHI, PO2I, PCO2I in the last 72 hours. No results for input(s): CPK, CKNDX, TROIQ in the last 72 hours. No lab exists for component: CPKMB  No results found for: CHOL, CHOLX, CHLST, CHOLV, HDL, LDL, DLDL, LDLC, DLDLP, TGL, TGLX, TRIGL, TRIGP, CHHD, CHHDX  Lab Results   Component Value Date/Time    Glucose (POC) 99 04/02/2017 09:50 PM    Glucose (POC) 89 11/24/2016 11:56 AM    Glucose (POC) 122 11/23/2016 09:29 PM    Glucose (POC) 87 11/23/2016 04:50 PM    Glucose (POC) 110 11/23/2016 12:08 PM     Lab Results   Component Value Date/Time    Color YELLOW/STRAW 04/02/2017 02:59 PM    Appearance CLOUDY 04/02/2017 02:59 PM    Specific gravity >1.030 04/02/2017 02:59 PM    Specific gravity 1.027 11/22/2016 01:28 PM    pH (UA) 5.0 04/02/2017 02:59 PM    Protein 30 04/02/2017 02:59 PM    Glucose NEGATIVE  04/02/2017 02:59 PM    Ketone NEGATIVE  04/02/2017 02:59 PM    Urobilinogen 0.2 04/02/2017 02:59 PM    Nitrites NEGATIVE  04/02/2017 02:59 PM    Leukocyte Esterase SMALL 04/02/2017 02:59 PM    Epithelial cells MODERATE 04/02/2017 02:59 PM    Bacteria 1+ 04/02/2017 02:59 PM    WBC 10-20 04/02/2017 02:59 PM    RBC 20-50 04/02/2017 02:59 PM       RADIOGRAPHIC STUDIES:  CXR Results  (Last 48 hours)    None          CT Results  (Last 48 hours)               04/02/17 1945  CT ABD PELV W WO CONT Final result    Impression:  IMPRESSION: Colonic diverticulosis greatest of the descending colon with mild   wall thickening. No active GI bleeding site identified. Mild amount of fluid cul-de-sac       Atherosclerotic abdominal aorta.                    Narrative:  EXAM:  CT ABD PELV W WO CONT       INDICATION: GI bleed       COMPARISON: None. CONTRAST:  100 mL of Isovue-370. TECHNIQUE:     Multislice helical CT was performed from the abdomen and pelvis without,   arterial with and venous phase with Isovue-370 contrast.  Oral contrast was not   administered. Contiguous 5 mm axial images were reconstructed and lung and soft   tissue windows were generated. Coronal and sagittal reformations were generated. CT dose reduction was achieved through use of a standardized protocol tailored   for this examination and automatic exposure control for dose modulation. FINDINGS:    LUNG BASES: Slight left basilar linear stranding   INCIDENTALLY IMAGED HEART AND MEDIASTINUM: Coronary calcification. LIVER: Unremarkable   GALLBLADDER: Unremarkable. SPLEEN: Normal size. PANCREAS: Unremarkable. ADRENALS: Unremarkable. KIDNEYS: No calculus or hydronephrosis. STOMACH: Unremarkable. SMALL BOWEL: Normal in caliber. COLON: Malrotation with the colon on the left with the cecum in the left pelvis   and with the small bowel on the right. Numerous colonic diverticula greatest in   the descending colon with mild wall thickening. No active GI bleeding site   identified   APPENDIX: Normal in configuration. PERITONEUM:  Mild amount of fluid in lower pelvis/cul-de-sac. No pneumoperitoneum. RETROPERITONEUM: Diffuse atherosclerotic plaquing of the visualized distal   thoracic aorta and abdominal aorta. No lymphadenopathy. REPRODUCTIVE ORGANS: Normal uterine size. URINARY BLADDER: Minimally distended. Unremarkable. BONES: No destructive bone lesion. ADDITIONAL COMMENTS: Left  shunt catheter with the tip in the right mid pelvis                 Echo Results  (Last 48 hours)    None          VENOUS DOPPLER results  (Last 48 hours)    None          CULTURES:    No results found for: SDES No results found for: CULT       Signed:  Raghav Walker MD

## 2017-04-03 NOTE — PROGRESS NOTES
Patient is a 63 y/o single -American female that was admited on 17 due to Gastrointestinal Hemorrage. Acute Lower GI Bleeding. PMH DMII, HTN, CVA, cerebral aneurism. Pt was seen at [facility] at the end of November due to [address].    SW visited patient bedside and reviewed demographic information. Patient alert, oriented to self and general place, slightly blunted affect, extremely soft spoken. Patient unable to state her home address or participate in any meaningful discharge planning discussion. She was able to answer some questions but had some difficulty with comprehension. Pt was very upset because her sister had passed away recently and she thought the  was today. Per the chart the emergency contact is her niece, Toño Townsend [phone]. SW received a consult to discuss discharge plans with family due to this recent loss of the sister that she lived with. Therapy is recommending [place] with 24/7 supervision due to mental disability. SW called Pt's niece, Patiencealecia Goodwinuriia to discuss what the discharge goal is for this Pt. Toño Townsend indicated that the plan is that she is going to move in and live with the Pt and help her at home as needed. Toño Townsend indicated that her Aunt passed away two weeks ago, but that her Mariam Melter was cremated. (No  today)  Toño Townsend indicated that the patient uses a walker around the home. Pt and Toño Townsend indicated to me that she was very involved in her Aunt's Care prior to the sister's death. She handled the financial information and driving to shopping/medical appointments for the Pt. Toño Townsend indicated that back in 2016 that [address] [place] [address] [address] came out for one visit and stated that they did not have a skilled need to do more visits. Toño Cary is on her way up to the hospital this afternoon to check on Pt and see how she is doing.   She seems very supportive of the patient and she will be able to transport her home in the car when Pt is ready to discharge home. CM will continue to monitor discharge plans. Care Management Interventions  PCP Verified by CM:  Yes  Transition of Care Consult (CM Consult): Discharge Planning  Discharge Durable Medical Equipment:  (Pt has a walker and a wheelchair)  Physical Therapy Consult: Yes  Occupational Therapy Consult: Yes  Current Support Network: Own Home, Lives with [address] [place] [address]

## 2017-04-03 NOTE — PROGRESS NOTES
TRANSFER - IN REPORT:    Verbal report received from Dennise Linda (name) on Celira Speaker  being received from ED (unit) for routine progression of care      Report consisted of patients Situation, Background, Assessment and   Recommendations(SBAR). Information from the following report(s) SBAR, Kardex, ED Summary, Intake/Output, MAR, Recent Results and Med Rec Status was reviewed with the receiving nurse. Opportunity for questions and clarification was provided. Assessment completed upon patients arrival to unit and care assumed.

## 2017-04-03 NOTE — CONSULTS
[address]   [address] [facility]       Name:  Irena Tenorio   MR#:  [id number]   :  1955   Account #:  [de-identified]    Date of Consultation:  2017   Date of Adm:  2017       REASON FOR CONSULTATION: Rectal bleeding. REQUESTING PHYSICIAN: Fany Hess. Michael Tariq DO    HISTORY OF PRESENT ILLNESS: The patient is a 61-year-old   woman with a history of CVA, diabetes, hypertension, who was   brought by EMS to the [facility] Department with the   complaint of rectal bleeding. Per the EMS notes, the patient was found   to have some vomiting, but no hematemesis and then had blood-  streaked bowel movement noted by her live-in family member   caretaker. The patient has had no significant abdominal pain. She has   had no further nausea or vomiting since being in the emergency   department, but she did pass a dark blood stool. She does not take   any sort of anticoagulants. She thinks she maybe had a colonoscopy   years ago, but she cannot recall who performed that. She has had a   cerebral shunt placed in  and had some subsequent altered   mental status, but this has apparently returned to her baseline. We   were asked to see her for the GI bleeding. PAST MEDICAL HISTORY: Includes the diabetes, hypertension, CVA. PAST SURGERY: Orthopedic back surgery cerebral shunt. SOCIAL HISTORY: She is not . She does not smoke and   has never smoked. Does not drink alcohol. MEDICATIONS PRIOR TO ADMISSION    1. Aspirin 81 mg daily. 2. Lisinopril 5 mg daily. REVIEW OF SYSTEMS   Otherwise negative. PHYSICAL EXAMINATION   GENERAL: Shows her to be a pleasant woman in no distress. VITAL SIGNS: Stable. She is afebrile. SKIN: There is no rash or jaundice. HEENT: Sclerae are anicteric. Oropharynx is clear. NECK: Supple, without JVD. LUNGS: Clear to auscultation.    CARDIAC: Regular rate and rhythm without murmur, gallop or rub. ABDOMEN: Soft. There are good bowel sounds. No masses,   tenderness, or hepatosplenomegaly. RECTAL: Not repeated. Occult blood by the ER doctor was positive. LABORATORY DATA: WBC 14.4, hemoglobin 11, hematocrit 35.6,   platelet count 230,971. Sodium 144, potassium 4.3, chloride 109, CO2   22, glucose 237, BUN 21, creatinine 1.03. Lactic acid level initially was   4.4, repeat is 2.8. CT scan of the abdomen is pending. IMPRESSION: Painless rectal bleeding associated with a 2 gram drop   in hemoglobin. Could be diverticular bleeding. Less likely would be   ischemic colitis. No prior history of gastrointestinal bleeding. Does take   a daily aspirin. This does not seem like an upper GI bleed. RECOMMENDATIONS   1. Keep the patient n.p.o.   2. Serial CBCs. 3. Proceed with a CT scan of the abdomen with CT angiography   technique to attempt to localize the bleeding and to look for thickening   of the colon wall. 4. Plans regarding colonoscopy will be determined following results of   CT scan. CHRIS Cast MD        / Wolf Oquendo   D:  04/02/2017   19:35   T:  04/02/2017   20:25   Job #:  [id number]

## 2017-04-03 NOTE — PROGRESS NOTES
Problem: Mobility Impaired (Adult and Pediatric)  Goal: *Acute Goals and Plan of Care (Insert Text)  Physical Therapy Goals  Initiated 4/3/2017    2. Patient will transfer from bed to chair and chair to bed with independence using the least restrictive device within 7 day(s). 3. Patient will perform sit to stand with independence within 7 day(s). 4. Patient will ambulate with independence for 300 feet with the least restrictive device within 7 day(s). 5. Patient will ascend/descend 4 stairs with 1 handrail(s) with independence within 7 day(s). PHYSICAL THERAPY EVALUATION  Patient: Lisa Vieyra (23 y.o. female)  Date: 4/3/2017  Primary Diagnosis: Acute lower GI bleeding  Intellectual delay  H/O cerebral aneurysm repair  Type 2 diabetes mellitus ([facility] [address]        Precautions:   Fall      ASSESSMENT :  Based on the objective data described below, the patient presents with generalized weakness, impaired balance and altered gait. PTA pt was living alone. Difficult to obtain history due to pt mental disability, she reported her sister lived with her but then later stated her sister was dead. She said she did not have any equipment, but then stated she used a walker at the end of the session. She was received in supine and cleared by nursing to mobility. Bed mobility was performed independently. Ambulated into the bathroom and into the deleon for a total of 200ft without AD and CGA, noted increased scissoring down the deleon and small LOB. Negotiated 4 steps with B rails and SBA using step over step pattern. She was left sitting up in the chair at the end of the session. Likely only need 1 more session to assess pt safety with RW. Recommending 24/7 care due to intellectual disability. Noted in the chart niece may be moving in with her??     Patient will benefit from skilled intervention to address the above impairments.   Patients rehabilitation potential is considered to be Good  Factors which may influence rehabilitation potential include:   [ ]         None noted  [X]         Mental ability/status  [ ]         Medical condition  [ ]         Home/family situation and support systems  [ ]         Safety awareness  [ ]         Pain tolerance/management  [ ]         Other:        PLAN :  Recommendations and Planned Interventions:  [ ]           Bed Mobility Training             [ ]    Neuromuscular Re-Education  [X]           Transfer Training                   [ ]    Orthotic/Prosthetic Training  [X]           Gait Training                         [ ]    Modalities  [X]           Therapeutic Exercises           [ ]    Edema Management/Control  [X]           Therapeutic Activities            [X]    Patient and Family Training/Education  [ ]           Other (comment):      Frequency/Duration: Patient will be followed by physical therapy  3 times a week to address goals. Discharge Recommendations: Home Health vs none with  care for safety due to mental disability  Further Equipment Recommendations for Discharge: TBD, She states she has RW (need to confirm)       SUBJECTIVE:   Patient stated my sister lives with me, no no no she is dead.       OBJECTIVE DATA SUMMARY:   HISTORY:    Past Medical History:   Diagnosis Date    Diabetes ([facility] [address]      Hypertension      Stroke [facility])       Past Surgical History:   Procedure Laterality Date    HX ORTHOPAEDIC        back surgery    NEUROLOGICAL PROCEDURE UNLISTED        shunt placement     Prior Level of Function/Home Situation: difficult historian. She reports she was living with her sister, but then she said her sister . Also stated the niece was living with her, but then moved out. She said she did not have DME, but then at the end of the session she described a walker that she was using.    Personal factors and/or comorbidities impacting plan of care:      Home Situation  Home Environment: Private residence  One/Two Story Residence: One story  Living Alone: No  Support Systems: Other (comments) (sister.)  Patient Expects to be Discharged to[de-identified] Private residence  Current DME Used/Available at Home: None     EXAMINATION/PRESENTATION/DECISION MAKING:   Critical Behavior:  Neurologic State: Alert  Orientation Level: Oriented to person, Oriented to place  Cognition: Appropriate decision making, Appropriate safety awareness, Appropriate for age attention/concentration, Follows commands     Hearing:   Auditory  Auditory Impairment: None  Skin:  WDL  Edema: WDL  Range Of Motion:  AROM: Within functional limits           PROM: Within functional limits           Strength:    Strength: Generally decreased, functional                    Tone & Sensation:   Tone: Normal              Sensation: Intact               Coordination:  Coordination: Within functional limits  Vision:      Functional Mobility:  Bed Mobility:  Rolling: Independent  Supine to Sit: Independent        Transfers:  Sit to Stand: Supervision  Stand to Sit: Supervision                       Balance:   Sitting: Intact  Standing: Impaired  Standing - Static: Good  Standing - Dynamic : Fair  Ambulation/Gait Training:  Distance (ft): 200 Feet (ft)  Assistive Device: Gait belt  Ambulation - Level of Assistance: Contact guard assistance        Gait Abnormalities: Decreased step clearance;Scissoring;Shuffling gait        Base of Support: Narrowed     Speed/Melissa: Slow                                              Stairs:  Number of Stairs Trained: 4  Stairs - Level of Assistance: Stand-by asssistance              Rail Use: Both           Functional Measure:  Tinetti test:      Sitting Balance: 1  Arises: 2  Attempts to Rise: 2  Immediate Standing Balance: 2  Standing Balance: 2  Nudged: 1  Eyes Closed: 1  Turn 360 Degrees - Continuous/Discontinuous: 0  Turn 360 Degrees - Steady/Unsteady: 0  Sitting Down: 2  Balance Score: 13  Indication of Gait: 1  R Step Length/Height: 1  L Step Length/Height: 1  R Foot Clearance: 1  L Foot Clearance: 1  Step Symmetry: 1  Step Continuity: 1  Path: 1  Trunk: 1  Walking Time: 1  Gait Score: 10  Total Score: 23         Tinetti Test and G-code impairment scale:  Percentage of Impairment CH     0%    CI     1-19% CJ     20-39% CK     40-59% CL     60-79% CM     80-99% CN      100%   Tinetti  Score 0-28 28 23-27 17-22 12-16 6-11 1-5 0          Tinetti Tool Score Risk of Falls  <19 = High Fall Risk  19-24 = Moderate Fall Risk  25-28 = Low Fall Risk  Tinetti ME. Performance-Oriented Assessment of Mobility Problems in Elderly Patients. [address]; [id number]. (Scoring Description: PT Bulletin Feb. 10, 1993)     Older adults: Liz Langley et al, 2009; n = 1000 [facility] elderly evaluated with ABC, KIRILL, ADL, and IADL)  · Mean KIRILL score for males aged 69-68 years = 26.21(3.40)  · Mean KIRILL score for females age 69-68 years = 25.16(4.30)  · Mean KIRILL score for males over 80 years = 23.29(6.02)  · Mean KIRILL score for females over 80 years = 17.20(8.32)            G codes: In compliance with CMSs Claims Based Outcome Reporting, the following G-code set was chosen for this patient based on their primary functional limitation being treated: The outcome measure chosen to determine the severity of the functional limitation was the tinetti with a score of 23/28 which was correlated with the impairment scale.       · Mobility - Walking and Moving Around:               - CURRENT STATUS:    CI - 1%-19% impaired, limited or restricted               - GOAL STATUS:           CH - 0% impaired, limited or restricted               - D/C STATUS:                       ---------------To be determined---------------      Physical Therapy Evaluation Charge Determination   History Examination Presentation Decision-Making   MEDIUM  Complexity : 1-2 comorbidities / personal factors will impact the outcome/ POC  MEDIUM Complexity : 3 Standardized tests and measures addressing body structure, function, activity limitation and / or participation in recreation  LOW Complexity : Stable, uncomplicated  LOW Complexity : FOTO score of       Based on the above components, the patient evaluation is determined to be of the following complexity level: LOW      Pain:  Pain Scale 1: Numeric (0 - 10)  Pain Intensity 1: 0              Activity Tolerance:   VSS  Please refer to the flowsheet for vital signs taken during this treatment. After treatment:   [X]         Patient left in no apparent distress sitting up in chair  [ ]         Patient left in no apparent distress in bed  [X]         Call bell left within reach  [X]         Nursing notified  [ ]         Caregiver present  [ ]         Bed alarm activated      COMMUNICATION/EDUCATION:   The patients plan of care was discussed with: Registered Nurse.  [X]         Fall prevention education was provided and the patient/caregiver indicated understanding. [ ]         Patient/family have participated as able in goal setting and plan of care. [ ]         Patient/family agree to work toward stated goals and plan of care. [ ]         Patient understands intent and goals of therapy, but is neutral about his/her participation. [X]         Patient is unable to participate in goal setting and plan of care.      Thank you for this referral.  José Luis Amin, PT, DPT   Time Calculation: 17 mins

## 2017-04-03 NOTE — PROGRESS NOTES
Primary Nurse Yuesf Rudd RN and Jagjit Saldivar RN performed a dual skin assessment on this patient No impairment noted  Nate score is 21

## 2017-04-03 NOTE — PROGRESS NOTES
Bedside and Verbal shift change report given to Leydi (oncoming nurse) by Bobby Schuler (offgoing nurse). Report included the following information SBAR, Kardex, Intake/Output, MAR, Recent Results and Med Rec Status. Zone Phone for oncoming shift:   [phone]    Shift Summary: Uneventful shift. 1 unit PRBC completed, pt stable per baseline. LDAs               Peripheral IV 04/02/17 Left Antecubital (Active)   Site Assessment Clean, dry, & intact 4/3/2017  3:19 AM   Phlebitis Assessment 0 4/3/2017  3:19 AM   Infiltration Assessment 0 4/3/2017  3:19 AM   Dressing Status Dry; Intact; Old drainage 4/3/2017  3:19 AM   Dressing Type Tape;Transparent 4/3/2017  3:19 AM   Hub Color/Line Status Green;Flushed 4/3/2017  3:19 AM                        Intake & Output   Date 04/02/17 0700 - 04/03/17 0659 04/03/17 0700 - 04/04/17 0659   Shift 9415-3049 0950-5250 24 Hour Total 4821-3128 9161-8857 24 Hour Total   I  N  T  A  K  E   P. O.  50 50         P. O.  50 50       Blood  325 325         Volume (TRANSFUSE PACKED RBC'S)  325 325       Shift Total  375 375      O  U  T  P  U  T   Urine  375 375         Urine Voided  375 375       Shift Total  375 375      NET  0 0      Weight (kg)            Last Bowel Movement Last Bowel Movement Date: 04/02/17   Glucose Checks [] N/A  [x] AC/HS  [] Q6  Concerns:   Nutrition Active Orders   Diet    DIET NPO With Sips of Clear Fluids       Consults [x]PT  [x]OT  []Speech  [x]Case Management   Cardiac Monitoring []N/A [x]Yes Expires:

## 2017-04-03 NOTE — ROUTINE PROCESS
TRANSFER - OUT REPORT:    Verbal report given to Kinjal Darnell RN (name) on [facility]  being transferred to Renal (unit) for routine progression of care       Report consisted of patients Situation, Background, Assessment and   Recommendations(SBAR). Information from the following report(s) ED Summary and MAR was reviewed with the receiving nurse. Lines:   Peripheral IV 04/02/17 Left Antecubital (Active)   Site Assessment Clean, dry, & intact 4/2/2017  7:22 PM   Phlebitis Assessment 0 4/2/2017  7:22 PM   Infiltration Assessment 0 4/2/2017  7:22 PM   Dressing Status Clean, dry, & intact 4/2/2017  7:22 PM        Opportunity for questions and clarification was provided.       Patient transported with:   Koru

## 2017-04-03 NOTE — CDMP QUERY
Dr. Toño Skinner MD :    Please clarify if this patient is being treated/managed for:    =>Acute blood loss anemia in setting of admit Hgb: 11 dropped to 8.5 requiring 1 unit PRBCs with resultant Hgb: 10.1  =>Other Explanation of clinical findings  =>Unable to Determine (no explanation of clinical findings)    The medical record reflects the following clinical findings, treatment, and risk factors:    Risk Factors: 61 BF w/hx: DM, HTN stroke  Clinical Indicators: Admitted with ARF/rectal bleeding with admit Hgb: 11 dropped to 8.5   Treatment: GI consult, 1 unit PRBCs with resultant Hgb 10.1    Please clarify and document your clinical opinion in the progress notes and discharge summary including the definitive and/or presumptive diagnosis, (suspected or probable), related to the above clinical findings. Please include clinical findings supporting your diagnosis.     Thank You,   [phone]

## 2017-04-03 NOTE — PROGRESS NOTES
Gastrointestinal Progress Note    Patient Name: Tyra Ray      : 1955      MRN: [id number]  Admit Date: 2017  Today's Date: 4/3/2017    Subjective:     Patient admitted last night for acute BRBPR. She denies abdominal pain. She has never had anything like this before, with report of large volume BRB without a BM. She has not had a BM today and last saw blood last night.     (Dr. Tae Arreola saw her in consultation in the ER and dictated the note.)    Objective:     Blood pressure 109/62, pulse 76, temperature 98.1 °F (36.7 °C), resp. rate 16, SpO2 99 %. Physical Exam:  General appearance: cooperative, no distress, appears stated age  Skin: Extremities and face reveal no rashes, jaundice or pallor  HEENT: Sclerae anicteric. Extra-occular muscles are intact. No abnormal pigmentation of the lips. Cardiovascular: Regular rate and rhythm. Respiratory: Clear breath sounds  GI: Abdomen nondistended, soft, and nontender. Normal active bowel sounds. Neurological: Gross memory appears intact. Patient is alert and oriented. Psychiatric: No anxiety or agitation.       Data Review:    Recent Results (from the past 24 hour(s))   EKG, 12 LEAD, INITIAL    Collection Time: 17  2:19 PM   Result Value Ref Range    Ventricular Rate 105 BPM    Atrial Rate 105 BPM    P-R Interval 138 ms    QRS Duration 70 ms    Q-T Interval 360 ms    QTC Calculation (Bezet) 475 ms    Calculated P Axis 74 degrees    Calculated R Axis 8 degrees    Calculated T Axis 52 degrees    Diagnosis       Sinus tachycardia  Right atrial enlargement  Junctional ST depression, probably normal  When compared with ECG of 2016 13:22,  No significant change was found     METABOLIC PANEL, COMPREHENSIVE    Collection Time: 17  2:29 PM   Result Value Ref Range    Sodium 144 136 - 145 mmol/L    Potassium 4.3 3.5 - 5.1 mmol/L    Chloride 109 (H) 97 - 108 mmol/L    CO2 22 21 - 32 mmol/L    Anion gap 13 5 - 15 mmol/L    Glucose 237 (H) 65 - 100 mg/dL    BUN 21 (H) 6 - 20 MG/DL    Creatinine 1.03 (H) 0.55 - 1.02 MG/DL    BUN/Creatinine ratio 20 12 - 20      GFR est AA >60 >60 ml/min/1.73m2    GFR est non-AA 55 (L) >60 ml/min/1.73m2    Calcium 9.4 8.5 - 10.1 MG/DL    Bilirubin, total 0.1 (L) 0.2 - 1.0 MG/DL    ALT (SGPT) 14 12 - 78 U/L    AST (SGOT) 10 (L) 15 - 37 U/L    Alk. phosphatase 78 45 - 117 U/L    Protein, total 7.8 6.4 - 8.2 g/dL    Albumin 3.4 (L) 3.5 - 5.0 g/dL    Globulin 4.4 (H) 2.0 - 4.0 g/dL    A-G Ratio 0.8 (L) 1.1 - 2.2     CBC WITH AUTOMATED DIFF    Collection Time: 04/02/17  2:29 PM   Result Value Ref Range    WBC 14.4 (H) 3.6 - 11.0 K/uL    RBC 4.18 3.80 - 5.20 M/uL    HGB 11.0 (L) 11.5 - 16.0 g/dL    HCT 35.6 35.0 - 47.0 %    MCV 85.2 80.0 - 99.0 FL    MCH 26.3 26.0 - 34.0 PG    MCHC 30.9 30.0 - 36.5 g/dL    RDW 16.6 (H) 11.5 - 14.5 %    PLATELET 820 316 - 198 K/uL    NEUTROPHILS 73 32 - 75 %    LYMPHOCYTES 25 12 - 49 %    MONOCYTES 2 (L) 5 - 13 %    EOSINOPHILS 0 0 - 7 %    BASOPHILS 0 0 - 1 %    ABS. NEUTROPHILS 10.4 (H) 1.8 - 8.0 K/UL    ABS. LYMPHOCYTES 3.6 (H) 0.8 - 3.5 K/UL    ABS. MONOCYTES 0.3 0.0 - 1.0 K/UL    ABS. EOSINOPHILS 0.0 0.0 - 0.4 K/UL    ABS.  BASOPHILS 0.0 0.0 - 0.1 K/UL   LACTIC ACID, PLASMA    Collection Time: 04/02/17  2:29 PM   Result Value Ref Range    Lactic acid 4.4 (HH) 0.4 - 2.0 MMOL/L   PROTHROMBIN TIME + INR    Collection Time: 04/02/17  2:29 PM   Result Value Ref Range    INR 1.1 0.9 - 1.1      Prothrombin time 10.6 9.0 - 11.1 sec   PTT    Collection Time: 04/02/17  2:29 PM   Result Value Ref Range    aPTT 22.0 (L) 22.1 - 32.5 sec    aPTT, therapeutic range     58.0 - 77.0 SECS   POC FECAL OCCULT BLOOD    Collection Time: 04/02/17  2:33 PM   Result Value Ref Range    Occult blood, stool (POC) POSITIVE (A) NEG     URINALYSIS W/ REFLEX CULTURE    Collection Time: 04/02/17  2:59 PM   Result Value Ref Range    Color YELLOW/STRAW      Appearance CLOUDY (A) CLEAR      Specific gravity >1.030 (H) 1.003 - 1.030    pH (UA) 5.0 5.0 - 8.0      Protein 30 (A) NEG mg/dL    Glucose NEGATIVE  NEG mg/dL    Ketone NEGATIVE  NEG mg/dL    Blood LARGE (A) NEG      Urobilinogen 0.2 0.2 - 1.0 EU/dL    Nitrites NEGATIVE  NEG      Leukocyte Esterase SMALL (A) NEG      WBC 10-20 0 - 4 /hpf    RBC 20-50 0 - 5 /hpf    Epithelial cells MODERATE (A) FEW /lpf    Bacteria 1+ (A) NEG /hpf    UA:UC IF INDICATED URINE CULTURE ORDERED (A) CNI      Hyaline cast 2-5 0 - 5 /lpf    Yeast PRESENT (A) NEG     BILIRUBIN, CONFIRM    Collection Time: 04/02/17  2:59 PM   Result Value Ref Range    Bilirubin UA, confirm NEGATIVE  NEG     TYPE & SCREEN    Collection Time: 04/02/17  3:10 PM   Result Value Ref Range    Crossmatch Expiration 04/05/2017     ABO/Rh(D) O POSITIVE     Antibody screen NEG     Unit number [id number]     Blood component type RC LR AS3,2     Unit division 00     Status of unit TRANSFUSED     Crossmatch result Compatible    LACTIC ACID, PLASMA    Collection Time: 04/02/17  3:11 PM   Result Value Ref Range    Lactic acid 2.8 (HH) 0.4 - 2.0 MMOL/L   LACTIC ACID, PLASMA    Collection Time: 04/02/17  8:01 PM   Result Value Ref Range    Lactic acid 2.4 (HH) 0.4 - 2.0 MMOL/L   HEMOGLOBIN    Collection Time: 04/02/17  8:01 PM   Result Value Ref Range    HGB 8.5 (L) 11.5 - 16.0 g/dL   GLUCOSE, POC    Collection Time: 04/02/17  9:50 PM   Result Value Ref Range    Glucose (POC) 99 65 - 100 mg/dL    Performed by George Juarez    CBC W/O DIFF    Collection Time: 04/03/17  3:14 AM   Result Value Ref Range    WBC 12.2 (H) 3.6 - 11.0 K/uL    RBC 3.74 (L) 3.80 - 5.20 M/uL    HGB 10.1 (L) 11.5 - 16.0 g/dL    HCT 31.4 (L) 35.0 - 47.0 %    MCV 84.0 80.0 - 99.0 FL    MCH 27.0 26.0 - 34.0 PG    MCHC 32.2 30.0 - 36.5 g/dL    RDW 16.1 (H) 11.5 - 14.5 %    PLATELET 772 052 - 529 K/uL   METABOLIC PANEL, BASIC    Collection Time: 04/03/17  3:14 AM   Result Value Ref Range    Sodium 145 136 - 145 mmol/L    Potassium 3.9 3.5 - 5.1 mmol/L    Chloride 110 (H) 97 - 108 mmol/L    CO2 23 21 - 32 mmol/L    Anion gap 12 5 - 15 mmol/L    Glucose 92 65 - 100 mg/dL    BUN 15 6 - 20 MG/DL    Creatinine 0.68 0.55 - 1.02 MG/DL    BUN/Creatinine ratio 22 (H) 12 - 20      GFR est AA >60 >60 ml/min/1.73m2    GFR est non-AA >60 >60 ml/min/1.73m2    Calcium 8.8 8.5 - 10.1 MG/DL   MAGNESIUM    Collection Time: 04/03/17  3:14 AM   Result Value Ref Range    Magnesium 2.1 1.6 - 2.4 mg/dL   PHOSPHORUS    Collection Time: 04/03/17  3:14 AM   Result Value Ref Range    Phosphorus 2.5 (L) 2.6 - 4.7 MG/DL   GLUCOSE, POC    Collection Time: 04/03/17  8:12 AM   Result Value Ref Range    Glucose (POC) 97 65 - 100 mg/dL    Performed by [facility], P[facility]    Collection Time: 04/03/17 12:18 PM   Result Value Ref Range    Glucose (POC) 79 65 - 100 mg/dL    Performed by Alysha [facility], POC    Collection Time: 04/03/17 12:19 PM   Result Value Ref Range    Glucose (POC) 88 65 - 100 mg/dL    Performed by Darrin Samuel        Imaging Review:    s    CT ABD PELV W WO CONT (Accession [id number]) (Order [id number])         Allergies        No Known Allergies       Result Information      Status Provider Status        Final result (Exam End: 4/2/2017  7:45 PM) Open        Study Result      EXAM: CT ABD PELV W WO CONT     INDICATION: GI bleed     COMPARISON: None.     CONTRAST: 100 mL of Isovue-370.     TECHNIQUE:   Multislice helical CT was performed from the abdomen and pelvis without,  arterial with and venous phase with Isovue-370 contrast. Oral contrast was not  administered. Contiguous 5 mm axial images were reconstructed and lung and soft  tissue windows were generated. Coronal and sagittal reformations were generated.   CT dose reduction was achieved through use of a standardized protocol tailored  for this examination and automatic exposure control for dose modulation.      FINDINGS:   LUNG BASES: Slight left basilar linear stranding  INCIDENTALLY IMAGED HEART AND MEDIASTINUM: Coronary calcification. LIVER: Unremarkable  GALLBLADDER: Unremarkable. SPLEEN: Normal size. PANCREAS: Unremarkable. ADRENALS: Unremarkable. KIDNEYS: No calculus or hydronephrosis. STOMACH: Unremarkable. SMALL BOWEL: Normal in caliber. COLON: Malrotation with the colon on the left with the cecum in the left pelvis  and with the small bowel on the right. Numerous colonic diverticula greatest in  the descending colon with mild wall thickening. No active GI bleeding site  identified  APPENDIX: Normal in configuration. PERITONEUM: Mild amount of fluid in lower pelvis/cul-de-sac. No pneumoperitoneum. RETROPERITONEUM: Diffuse atherosclerotic plaquing of the visualized distal  thoracic aorta and abdominal aorta. No lymphadenopathy. REPRODUCTIVE ORGANS: Normal uterine size. URINARY BLADDER: Minimally distended. Unremarkable. BONES: No destructive bone lesion. ADDITIONAL COMMENTS: Left  shunt catheter with the tip in the right mid pelvis       IMPRESSION: Colonic diverticulosis greatest of the descending colon with mild  wall thickening.     No active GI bleeding site identified.     Mild amount of fluid cul-de-sac     Atherosclerotic abdominal aorta. Assessment / Plan :     Rectal bleeding -- painless with blood loss anemia and mild thickening in an area of diverticula of the descending colon   - I suspect this to be diverticular in nature   - Has not bled since last night. Continue to monitor for recurrence. - Monitor H/H   - Plan will be for OP colonoscopy unless she has a recurrence while here. Patient made aware of this plan and voiced understanding.    - (Of note, malrotation of colon noted on CT)     Patient Active Hospital Problem List:   Active Problems:    Acute lower GI bleeding (4/2/2017)      Type 2 diabetes mellitus ([facility] [address] (4/2/2017)      Intellectual delay (4/2/2017)      H/O cerebral aneurysm repair (4/2/2017)        The above was discussed with Dr. Noemi Hidalgo.  Please await further input from him.

## 2017-04-04 LAB
ANION GAP BLD CALC-SCNC: 10 MMOL/L (ref 5–15)
BACTERIA SPEC CULT: NORMAL
BASOPHILS # BLD AUTO: 0 K/UL (ref 0–0.1)
BASOPHILS # BLD: 0 % (ref 0–1)
BUN SERPL-MCNC: 16 MG/DL (ref 6–20)
BUN/CREAT SERPL: 23 (ref 12–20)
CALCIUM SERPL-MCNC: 8.4 MG/DL (ref 8.5–10.1)
CC UR VC: NORMAL
CHLORIDE SERPL-SCNC: 111 MMOL/L (ref 97–108)
CO2 SERPL-SCNC: 25 MMOL/L (ref 21–32)
CREAT SERPL-MCNC: 0.7 MG/DL (ref 0.55–1.02)
EOSINOPHIL # BLD: 0.1 K/UL (ref 0–0.4)
EOSINOPHIL NFR BLD: 2 % (ref 0–7)
ERYTHROCYTE [DISTWIDTH] IN BLOOD BY AUTOMATED COUNT: 16.6 % (ref 11.5–14.5)
GLUCOSE BLD STRIP.AUTO-MCNC: 117 MG/DL (ref 65–100)
GLUCOSE BLD STRIP.AUTO-MCNC: 120 MG/DL (ref 65–100)
GLUCOSE BLD STRIP.AUTO-MCNC: 144 MG/DL (ref 65–100)
GLUCOSE BLD STRIP.AUTO-MCNC: 87 MG/DL (ref 65–100)
GLUCOSE SERPL-MCNC: 69 MG/DL (ref 65–100)
HCT VFR BLD AUTO: 26.6 % (ref 35–47)
HGB BLD-MCNC: 8.5 G/DL (ref 11.5–16)
LACTATE SERPL-SCNC: 0.7 MMOL/L (ref 0.4–2)
LYMPHOCYTES # BLD AUTO: 51 % (ref 12–49)
LYMPHOCYTES # BLD: 4 K/UL (ref 0.8–3.5)
MAGNESIUM SERPL-MCNC: 2.1 MG/DL (ref 1.6–2.4)
MCH RBC QN AUTO: 26.6 PG (ref 26–34)
MCHC RBC AUTO-ENTMCNC: 32 G/DL (ref 30–36.5)
MCV RBC AUTO: 83.4 FL (ref 80–99)
MONOCYTES # BLD: 0.5 K/UL (ref 0–1)
MONOCYTES NFR BLD AUTO: 6 % (ref 5–13)
NEUTS SEG # BLD: 3.2 K/UL (ref 1.8–8)
NEUTS SEG NFR BLD AUTO: 41 % (ref 32–75)
PHOSPHATE SERPL-MCNC: 2.4 MG/DL (ref 2.6–4.7)
PLATELET # BLD AUTO: 179 K/UL (ref 150–400)
POTASSIUM SERPL-SCNC: 3.8 MMOL/L (ref 3.5–5.1)
RBC # BLD AUTO: 3.19 M/UL (ref 3.8–5.2)
SERVICE CMNT-IMP: ABNORMAL
SERVICE CMNT-IMP: NORMAL
SERVICE CMNT-IMP: NORMAL
SODIUM SERPL-SCNC: 146 MMOL/L (ref 136–145)
WBC # BLD AUTO: 7.9 K/UL (ref 3.6–11)

## 2017-04-04 PROCEDURE — 82962 GLUCOSE BLOOD TEST: CPT

## 2017-04-04 PROCEDURE — 74011636637 HC RX REV CODE- 636/637: Performed by: INTERNAL MEDICINE

## 2017-04-04 PROCEDURE — 84100 ASSAY OF PHOSPHORUS: CPT | Performed by: INTERNAL MEDICINE

## 2017-04-04 PROCEDURE — 80048 BASIC METABOLIC PNL TOTAL CA: CPT | Performed by: INTERNAL MEDICINE

## 2017-04-04 PROCEDURE — 83605 ASSAY OF LACTIC ACID: CPT | Performed by: INTERNAL MEDICINE

## 2017-04-04 PROCEDURE — 85025 COMPLETE CBC W/AUTO DIFF WBC: CPT | Performed by: INTERNAL MEDICINE

## 2017-04-04 PROCEDURE — 36415 COLL VENOUS BLD VENIPUNCTURE: CPT | Performed by: INTERNAL MEDICINE

## 2017-04-04 PROCEDURE — 83735 ASSAY OF MAGNESIUM: CPT | Performed by: INTERNAL MEDICINE

## 2017-04-04 RX ORDER — VITAMIN E 1000 UNIT
1000 CAPSULE ORAL DAILY
Qty: 14 TAB | Refills: 0 | Status: SHIPPED | OUTPATIENT
Start: 2017-04-04 | End: 2020-01-22 | Stop reason: ALTCHOICE

## 2017-04-04 RX ORDER — ASPIRIN 81 MG/1
81 TABLET ORAL DAILY
Qty: 30 TAB | Refills: 1 | Status: SHIPPED
Start: 2017-04-04 | End: 2019-10-24

## 2017-04-04 RX ORDER — FOLIC ACID 1 MG/1
1 TABLET ORAL DAILY
Qty: 14 TAB | Refills: 0 | Status: SHIPPED | OUTPATIENT
Start: 2017-04-04 | End: 2020-01-22 | Stop reason: ALTCHOICE

## 2017-04-04 RX ORDER — LANOLIN ALCOHOL/MO/W.PET/CERES
325 CREAM (GRAM) TOPICAL
Qty: 14 TAB | Refills: 0 | Status: SHIPPED | OUTPATIENT
Start: 2017-04-04 | End: 2020-01-10 | Stop reason: SDUPTHER

## 2017-04-04 RX ADMIN — Medication 10 ML: at 14:24

## 2017-04-04 RX ADMIN — Medication 10 ML: at 06:20

## 2017-04-04 RX ADMIN — INSULIN LISPRO 2 UNITS: 100 INJECTION, SOLUTION INTRAVENOUS; SUBCUTANEOUS at 12:00

## 2017-04-04 NOTE — PROGRESS NOTES
Bedside shift change report given to Yue (oncoming nurse) by Shyann Moreau (offgoing nurse). Report included the following information SBAR, Kardex, Intake/Output, MAR and Recent Results. Zone Phone for oncoming shift:   [phone]    Shift Summary: Pt rested quietly through night. No c/o pain. LDAs               Peripheral IV 04/02/17 Left Antecubital (Active)   Site Assessment Clean, dry, & intact 4/4/2017  4:06 AM   Phlebitis Assessment 0 4/4/2017  4:06 AM   Infiltration Assessment 0 4/4/2017  4:06 AM   Dressing Status Clean, dry, & intact 4/4/2017  4:06 AM   Dressing Type Transparent;Tape 4/4/2017  4:06 AM   Hub Color/Line Status Green;Flushed 4/4/2017  4:06 AM                        Intake & Output   Date 04/03/17 0700 - 04/04/17 0659 04/04/17 0700 - 04/05/17 0659   Shift 0421-7395 1188-3809 24 Hour Total 4798-9468 2488-1343 24 Hour Total   I  N  T  A  K  E   P.O. 140 240 380         P. O. 140 240 380       Shift Total 140 240 380      O  U  T  P  U  T   Urine            Urine Occurrence(s)  1 x 1 x       Shift Total          240 380      Weight (kg)            Last Bowel Movement Last Bowel Movement Date: 04/02/17   Glucose Checks [] N/A  [x] AC/HS  [] Q6  Concerns:   Nutrition Active Orders   Diet    DIET GI LITE (POST SURGICAL)       Consults [x]PT  [x]OT  []Speech  [x]Case Management   Cardiac Monitoring []N/A [x]Yes Expires:48 hrs

## 2017-04-04 NOTE — PROGRESS NOTES
Hospitalist Progress Note    NAME: Carmelita Rodriguez   :  1955   MRN:  [id number]       Interim Hospital Summary: 62 y.o. female whom presented on 2017 with      Assessment / Plan:  Acute blood loss anemia likely due to diverticular bleed as source of GI bleed  Lactic acidosis POA, resolved   Hb baseline 11-12, admission 11.0 --> 8.5 - 10.1,9.4, 8.5   PRBC: 1 on    GI help was appreciated: no blood in stool since admission --> colonoscopy OP  Follow Hb with PCP in 1 week  DC on iron/vit C/folic acid   Diet: tolerating GI lite diet   CT: Colonic diverticulosis greatest of the descending colon with mild wall thickening. No active GI bleeding site identified. Mild amount of fluid cul-de-sac     Diabetes Mellitus 2 Uncontrolled controlled with diet   BS low side 80-90, last 144      H/o Cerebral aneurysm  with  shunt and intellectual delay  H/o stroke  H/o back surgery  -resume OP full care as doing once dc to home      H/o facial swelling with subperiosteal abscess related to dental disease about the right maxillary lateral incisor 2016.    -Niece works and cannot keep up with tid dosing of pcn, and pt only gets it bid. -no swelling at this time.          Code Status: full code. Surrogate Decision Maker: Aleksandra Reese [phone] mother, niece is power of  Ilya Odonnell [phone]  DVT Prophylaxis: SCD       Baseline: lives alone, but niece will be moving in. Intellect disability after cerebral aneurysm. Recommended Disposition: home with [facility] today  Seen by PT/OT: HHC + need / supervision      Subjective:     Chief Complaint / Reason for Physician Visit: following GI bleeding / anemia   \" I am fine\"  No blood in stool since admission  Good PO intake     Discussed with RN events overnight.      Review of Systems:  Symptom Y/N Comments  Symptom Y/N Comments   Fever/Chills n   Chest Pain n    Poor Appetite    Edema     Cough    Abdominal Pain n    Sputum    Joint Pain     SOB/KELLER n Pruritis/Rash     Nausea/vomit    Tolerating PT/OT y    Diarrhea n   Tolerating Diet y    Constipation n   Other       Could NOT obtain due to:      Objective:     VITALS:   Last 24hrs VS reviewed since prior progress note. Most recent are:  Patient Vitals for the past 24 hrs:   Temp Pulse Resp BP SpO2   04/04/17 1141 98 °F (36.7 °C) 85 18 109/71 98 %   04/04/17 0753 97.8 °F (36.6 °C) 69 16 133/89 96 %   04/04/17 0405 97.8 °F (36.6 °C) 72 16 121/69 100 %   04/03/17 2238 98.1 °F (36.7 °C) 88 16 135/70 100 %   04/03/17 1934 97.3 °F (36.3 °C) 91 16 107/70 100 %   04/03/17 1604 97.8 °F (36.6 °C) 79 16 106/58 99 %       Intake/Output Summary (Last 24 hours) at 04/04/17 1428  Last data filed at 04/04/17 1355   Gross per 24 hour   Intake              240 ml   Output              400 ml   Net             -160 ml        PHYSICAL EXAM:  General: WD, WN. Alert, cooperative, no acute distress    EENT:  EOMI. Anicteric sclerae. MMM  Resp:  CTA bilaterally, no wheezing or rales. No accessory muscle use  CV:  Regular  rhythm,  No edema  GI:  Soft, Non distended, Non tender.  +Bowel sounds  Neurologic:  Alert and oriented X 3, normal speech,   Psych:   Good insight. Not anxious nor agitated  Skin:  No rashes. No jaundice    Reviewed most current lab test results and cultures  YES  Reviewed most current radiology test results   YES  Review and summation of old records today    NO  Reviewed patient's current orders and MAR    YES  PMH/SH reviewed - no change compared to H&P  ________________________________________________________________________  Care Plan discussed with:    Comments   Patient y    Family      RN y    Care Manager y    Consultant                        Multidiciplinary team rounds were held today with , nursing, pharmacist and clinical coordinator. Patient's plan of care was discussed; medications were reviewed and discharge planning was addressed. ________________________________________________________________________  Total NON critical care TIME:  35 Minutes    Total CRITICAL CARE TIME Spent:   Minutes non procedure based      Comments   >50% of visit spent in counseling and coordination of care y Dc coordination / planning    ________________________________________________________________________  Vera Cox MD     Procedures: see electronic medical records for all procedures/Xrays and details which were not copied into this note but were reviewed prior to creation of Plan. LABS:  I reviewed today's most current labs and imaging studies.   Pertinent labs include:  Recent Labs      04/04/17   0503  04/03/17   1647  04/03/17   0314   WBC  7.9  8.4  12.2*   HGB  8.5*  9.4*  10.1*   HCT  26.6*  29.0*  31.4*   PLT  179  225  254     Recent Labs      04/04/17   0503  04/03/17   0314  04/02/17   1429   NA  146*  145  144   K  3.8  3.9  4.3   CL  111*  110*  109*   CO2  25  23  22   GLU  69  92  237*   BUN  16  15  21*   CREA  0.70  0.68  1.03*   CA  8.4*  8.8  9.4   MG  2.1  2.1   --    PHOS  2.4*  2.5*   --    ALB   --    --   3.4*   TBILI   --    --   0.1*   SGOT   --    --   10*   ALT   --    --   14   INR   --    --   1.1       Signed: Vera Cox MD

## 2017-04-04 NOTE — PROGRESS NOTES
Home Health Care Discharge Planning: [facility]  Face to Face Encounter      NAME: Iman Allen   :  1955   MRN:  [id number]     Primary Diagnosis: GI bleeding     Date of Face to Face:  2017 2:49 PM                                  Face to Face Encounter findings are related to primary reason for home care:   YES    1. I certify that the patient needs intermittent skilled nursing care, physical therapy and/or speech therapy. I will not be following this patient in the Community and Dr. Mitch Arce will be responsible for signing the [address] 2. Initial Orders for Care: Skilled Nursing, Physical Therapy and Occupational Therapy    3. I certify that this patient is homebound because of illness or injury, need the aid of supportive devices such as crutches, canes, wheelchairs, and walkers; the use of special transportation; or the assistance of another person in order to leave their place of residence. There exists a normal inability to leave home and leaving home requires a considerable and taxing effort. 4. I certify that this patient is under my care and that I had a Face-to-Face Encounter that meets the physician Face-to-Face Encounter requirements. Document the physical findings from the Face-to-Face Encounter that support the need for skilled services: Has new diagnosis that requires skilled nursing teaching and intervention , Needs skilled safety assessment and interventions  and Has new finding of weakness and altered mobility that requires skilled physical/occupational and/or speech therapy services for evaluation and interventions.      Kenyatta Mead MD  Discharging Physician  Office: [phone]  Fax:   [phone]

## 2017-04-04 NOTE — PROGRESS NOTES
Patient discharge home with family. Copy of discharge instructions, prescriptions, and all patient belongings given to patient prior to discharge. IV removed. Patient send to dischargel obby via wheelchair.

## 2017-04-04 NOTE — DISCHARGE INSTRUCTIONS
Patient Discharge Instructions    Amara Murrieta / [id number] : 1955    Admitted 2017 Discharged: 2017         DISCHARGE DIAGNOSIS:     You likely were bleeding from diverticulosis  - you will need colonoscopy to be done as outpatient. Call GI office to schedule. - Hold aspirin for now. Start taking back on 2017                                                                        - follow with PCP in 1 week to check anemia numbers                                                                         - take iron / vit C/ folic acid for 14 days for anemia. Iron can make you constipated        Take Home Medications         General drug facts     If you have a very bad allergy, wear an allergy ID at all times. It is important that you take the medication exactly as they are prescribed. Keep your medication in the bottles provided by the pharmacist.  Keep a list of all your drugs (prescription, natural products, vitamins, OTC) with you. Give this list to your doctor. Do not take other medications without consulting your doctor. Do not share your drugs with others and do not take anyone else's drugs. Keep all drugs out of the reach of children and pets. Most drugs may be thrown away in household trash after mixing with coffee grounds or ryanne litter and sealing in a plastic bag. Keep a list Call your doctor for help with any side effects. If in the [place] you may also call the FDA at 0-282-FDA-0579    Talk with the doctor before starting any new drug, including OTC, natural products, or vitamins. What to do at Home    1. Recommended diet:high fiber diet     2. Recommended activity: Activity as tolerated and PT/OT per Home Health    3.  If you experience any of the following symptoms then please call your primary care physician or return to the emergency room if you cannot get hold of your doctor:blood in stool/ dizziness/ weakness     4. Lab work: with PCP in 1 week     5. Bring these papers with you to your follow up appointments. The papers will help your doctors be sure to continue the care plan from the hospital.      Follow-up with:   PCP: Jennifer Pang  Follow-up Information     Follow up With Details Comments [facility] In 1 week  [address] [place] [address] Street      Kate Crabtree MD In 2 weeks call office to schedule colonoscopy  [address] [facility]  [phone]             Please call for your own appointment        Information obtained by :  I understand that if any problems occur once I am at home I am to contact my physician. I understand and acknowledge receipt of the instructions indicated above.                                                                                                                                            Physician's or R.N.'s Signature                                                                  Date/Time                                                                                                                                              Patient or Representative Signature                                                          Date/Time

## 2017-04-04 NOTE — PROGRESS NOTES
GI PROGRESS NOTE    NAME:             Ernie Lange   :              1955   MRN:              [id number]   Admit Date:     2017  Todays Date:  2017      Subjective:           No further rectal bleeding. Eating solid food. No abd pain.     Medications-reviewed     Current Facility-Administered Medications   Medication Dose Route Frequency    HYDROcodone-acetaminophen (NORCO) 5-325 mg per tablet 1 Tab  1 Tab Oral Q4H PRN    sodium chloride (NS) flush 5-10 mL  5-10 mL IntraVENous Q8H    sodium chloride (NS) flush 5-10 mL  5-10 mL IntraVENous PRN    acetaminophen (TYLENOL) tablet 325 mg  325 mg Oral Q4H PRN    morphine injection 1 mg  1 mg IntraVENous Q4H PRN    naloxone (NARCAN) injection 0.4 mg  0.4 mg IntraVENous PRN    ondansetron (ZOFRAN) injection 4 mg  4 mg IntraVENous Q4H PRN    metoprolol (LOPRESSOR) injection 5 mg  5 mg IntraVENous Q6H PRN    insulin lispro (HUMALOG) injection   SubCUTAneous AC&HS    glucose chewable tablet 16 g  4 Tab Oral PRN    dextrose (D50W) injection syrg 12.5-25 g  12.5-25 g IntraVENous PRN    glucagon (GLUCAGEN) injection 1 mg  1 mg IntraMUSCular PRN    0.9% sodium chloride infusion 250 mL  250 mL IntraVENous PRN        Objective:   Patient Vitals for the past 8 hrs:   BP Temp Pulse Resp SpO2   17 0753 133/89 97.8 °F (36.6 °C) 69 16 96 %   17 0405 121/69 97.8 °F (36.6 °C) 72 16 100 %     701 -  1900  In: -   Out: 300 [Urine:300]  1901 -  0700  In: 755 [P.O.:430]  Out: 375 [Urine:375]    EXAM:     NEURO-a&o   HEENT-wnl   LUNGS-clear   COR-regular rate and rhythym   ABD-soft , no tenderness, no rebound, good bs        LABS:  Recent Labs      17   0503  17   1647   WBC  7.9  8.4   HGB  8.5*  9.4*   HCT  26.6*  29.0*   PLT  179  225     Recent Labs      17   0503  17   0314  17   1429   NA  146*  145  144   K  3.8  3.9  4.3   CL  111*  110*  109*   CO2  25  23  22   BUN  16  15  21*   CREA  0.70 0. 68  1.03*   GLU  69  92  237*   CA  8.4*  8.8  9.4   MG  2.1  2.1   --    PHOS  2.4*  2.5*   --      Recent Labs      04/02/17   1429   SGOT  10*   AP  78   TBILI  0.1*   TP  7.8   ALB  3.4*   GLOB  4.4*   ALT  14     Recent Labs      04/02/17   1429   INR  1.1   PTP  10.6   APTT  22.0*      No results for input(s): FE, TIBC, PSAT, FERR in the last 72 hours. No results found for: FOL, RBCF                         Assessment:   Rectal bleeding -- painless with blood loss anemia and mild thickening in an area of diverticula of the descending colon  - I suspect this to be diverticular in nature  - Has not bled since admission  - Plan will be for OP colonoscopy unless she has a recurrence while here. Patient made aware of this plan and voiced understanding. Active Problems:    Acute lower GI bleeding (4/2/2017)      Type 2 diabetes mellitus ([facility] [address] (4/2/2017)      Intellectual delay (4/2/2017)      H/O cerebral aneurysm repair (4/2/2017)        Plan:   1.  Outpatient colonoscopy

## 2017-04-04 NOTE — DISCHARGE SUMMARY
Hospitalist Discharge Summary     Patient ID:  Deanna Caballero  [id number]  62 y.o.  1955    PCP on record: Park Grene    Admit date: 4/2/2017  Discharge date and time: 4/4/2017      DISCHARGE DIAGNOSIS:    Acute blood loss anemia likely due to diverticular bleed as source of GI bleed  Lactic acidosis POA, resolved   Diabetes Mellitus 2 Uncontrolled controlled with diet   H/o Cerebral aneurysm 2007 with  shunt and intellectual delay  H/o stroke  H/o back surgery  H/o facial swelling with subperiosteal abscess related to dental disease about the right maxillary lateral incisor 12/2016. Code Status: full code. CONSULTATIONS:  IP CONSULT TO GASTROENTEROLOGY    Excerpted HPI from H&P of [facility], DO:    HISTORY OF PRESENT ILLNESS:   Tho Donahue is a 62 y.o.  female who presents with above. Pt is poor historian due to cerebral aneurysm history. Pt's niece states pt had bleeding with her bowel movement this am. Pt also had emesis during that time, with no blood. Pt denies pain, diarrhea, or previous bleeding.      We were asked to admit for work up and evaluation of the above problems.        ______________________________________________________________________  DISCHARGE SUMMARY/HOSPITAL COURSE:  for full details see H&P, daily progress notes, labs, consult notes. Acute blood loss anemia likely due to diverticular bleed as source of GI bleed  Lactic acidosis POA, resolved   Hb baseline 11-12, admission 11.0 --> 8.5, 10.1,9.4, 8.5 ( had IVF yesterday - may be contributing to lower numbers today, hemodynamically stable).    PRBC: 1 on 4/02   GI help was appreciated: no blood in stool since admission --> colonoscopy OP  Follow Hb with PCP in 1 week  DC on iron/vit C/folic acid   Holding ASA, restart 4/12 ( 10 days off from the last bleeding)   Diet: tolerating GI lite diet   CT: Colonic diverticulosis greatest of the descending colon with mild wall thickening. No active GI bleeding site identified. Mild amount of fluid cul-de-sac      Diabetes Mellitus 2 Uncontrolled controlled with diet   BS low side 80-90, last 144      H/o Cerebral aneurysm 2007 with  shunt and intellectual delay  H/o stroke  H/o back surgery  -resume OP full care as doing once dc to home      H/o facial swelling with subperiosteal abscess related to dental disease about the right maxillary lateral incisor 12/2016.    -Niece works and cannot keep up with tid dosing of pcn, and pt only gets it bid. -no swelling at this time.          Code Status: full code. Surrogate Decision Maker: Muara Lemos [phone] mother, niece is power of  Rosa Serrano [phone]  DVT Prophylaxis: SCD       Baseline: lives alone, but niece will be moving in. Intellect disability after cerebral aneurysm. Recommended Disposition: home with Jose Snowden today  Seen by PT/OT: HHC + need 24/7 supervision         _______________________________________________________________________  Patient seen and examined by me on discharge day. PHYSICAL EXAM:  General: WD, WN. Alert, cooperative, no acute distress    EENT:  EOMI. Anicteric sclerae. MMM  Resp:  CTA bilaterally, no wheezing or rales. No accessory muscle use  CV:  Regular rhythm,  No edema  GI:  Soft, Non distended, Non tender.  +Bowel sounds  Neurologic:  Alert and oriented X 3, normal speech,   Psych:   Good insight. Not anxious nor agitated  Skin:  No rashes. No jaundice  _______________________________________________________________________  DISCHARGE MEDICATIONS:   Current Discharge Medication List      START taking these medications    Details   ferrous sulfate 325 mg (65 mg iron) tablet Take 1 Tab by mouth Daily (before breakfast). Qty: 14 Tab, Refills: 0      ascorbic acid, vitamin C, (VITAMIN C) 1,000 mg tablet Take 1 Tab by mouth daily. Qty: 14 Tab, Refills: 0      folic acid (FOLVITE) 1 mg tablet Take 1 Tab by mouth daily.   Qty: 14 Tab, Refills: 0 CONTINUE these medications which have CHANGED    Details   aspirin delayed-release 81 mg tablet Take 1 Tab by mouth daily. Start taking 4/12/2017  Qty: 30 Tab, Refills: 1         CONTINUE these medications which have NOT CHANGED    Details   penicillin v potassium (VEETID) 500 mg tablet Take 1 Tab by mouth four (4) times daily. Qty: 40 Tab, Refills: 0      HYDROcodone-acetaminophen (NORCO) 5-325 mg per tablet Take 1 Tab by mouth every four (4) hours as needed for Pain. Max Daily Amount: 6 Tabs. Qty: 20 Tab, Refills: 0      lisinopril (PRINIVIL, ZESTRIL) 5 mg tablet Take 1 Tab by mouth daily. Qty: 30 Tab, Refills: 1             My Recommended Diet, Activity, Wound Care, and follow-up labs are listed in the patient's Discharge Insturctions which I have personally completed and reviewed.     _______________________________________________________________________  DISPOSITION:    Home with Family:    Home with HH/PT/OT/RN: y   SNF/LTC:    ROSENDO:    OTHER:        Condition at Discharge:  Stable  _______________________________________________________________________  Follow up with:   PCP : Heather Thapa  Follow-up Information     Follow up With Details Comments [facility] In 1 week  [facility] AT [place] [address] [address]      Elsi Cintron MD In 2 weeks call office to schedule colonoscopy  [address]  [phone]                Total time in minutes spent coordinating this discharge (includes going over instructions, follow-up, prescriptions, and preparing report for sign off to her PCP) :  > 30  minutes    Signed:  Arminda Jackson MD

## 2017-04-04 NOTE — PROGRESS NOTES
MD anticipates d/c to home today with AT [place] [address] for nursing, PT, and OT. FOC//IM on chart. 4/6/17 10a  AT [place] [address] can not follow as pt is a [facility] pt. They have sent the orders to [address] [place] [address].

## 2017-04-05 VITALS
SYSTOLIC BLOOD PRESSURE: 127 MMHG | RESPIRATION RATE: 18 BRPM | OXYGEN SATURATION: 98 % | TEMPERATURE: 98.3 F | HEART RATE: 84 BPM | DIASTOLIC BLOOD PRESSURE: 74 MMHG

## 2017-12-22 ENCOUNTER — APPOINTMENT (OUTPATIENT)
Dept: CT IMAGING | Age: 62
End: 2017-12-22
Attending: EMERGENCY MEDICINE
Payer: MEDICARE

## 2017-12-22 ENCOUNTER — HOSPITAL ENCOUNTER (EMERGENCY)
Age: 62
Discharge: HOME | End: 2017-12-22
Attending: EMERGENCY MEDICINE | Admitting: EMERGENCY MEDICINE
Payer: MEDICARE

## 2017-12-22 ENCOUNTER — APPOINTMENT (OUTPATIENT)
Dept: GENERAL RADIOLOGY | Age: 62
End: 2017-12-22
Attending: EMERGENCY MEDICINE
Payer: MEDICARE

## 2017-12-22 VITALS
DIASTOLIC BLOOD PRESSURE: 90 MMHG | BODY MASS INDEX: 25.43 KG/M2 | TEMPERATURE: 98.4 F | HEART RATE: 68 BPM | WEIGHT: 162 LBS | RESPIRATION RATE: 11 BRPM | HEIGHT: 67 IN | SYSTOLIC BLOOD PRESSURE: 184 MMHG | OXYGEN SATURATION: 100 %

## 2017-12-22 DIAGNOSIS — R73.9 HYPERGLYCEMIA: ICD-10-CM

## 2017-12-22 DIAGNOSIS — R10.9 ACUTE ABDOMINAL PAIN: Primary | ICD-10-CM

## 2017-12-22 LAB
ALBUMIN SERPL-MCNC: 3.5 G/DL (ref 3.5–5)
ALBUMIN/GLOB SERPL: 0.8 {RATIO} (ref 1.1–2.2)
ALP SERPL-CCNC: 126 U/L (ref 45–117)
ALT SERPL-CCNC: 16 U/L (ref 12–78)
ANION GAP SERPL CALC-SCNC: 6 MMOL/L (ref 5–15)
APPEARANCE UR: CLEAR
APTT PPP: 29.6 SEC (ref 22.1–32.5)
AST SERPL-CCNC: 9 U/L (ref 15–37)
ATRIAL RATE: 91 BPM
BACTERIA URNS QL MICRO: NEGATIVE /HPF
BASOPHILS # BLD: 0 K/UL (ref 0–0.1)
BASOPHILS NFR BLD: 0 % (ref 0–1)
BILIRUB SERPL-MCNC: 0.4 MG/DL (ref 0.2–1)
BILIRUB UR QL: NEGATIVE
BUN SERPL-MCNC: 9 MG/DL (ref 6–20)
BUN/CREAT SERPL: 9 (ref 12–20)
CALCIUM SERPL-MCNC: 8.7 MG/DL (ref 8.5–10.1)
CALCULATED P AXIS, ECG09: 58 DEGREES
CALCULATED R AXIS, ECG10: -18 DEGREES
CALCULATED T AXIS, ECG11: 37 DEGREES
CHLORIDE SERPL-SCNC: 98 MMOL/L (ref 97–108)
CO2 SERPL-SCNC: 31 MMOL/L (ref 21–32)
COLOR UR: ABNORMAL
CREAT SERPL-MCNC: 0.96 MG/DL (ref 0.55–1.02)
DIAGNOSIS, 93000: NORMAL
EOSINOPHIL # BLD: 0.1 K/UL (ref 0–0.4)
EOSINOPHIL NFR BLD: 2 % (ref 0–7)
EPITH CASTS URNS QL MICRO: ABNORMAL /LPF
ERYTHROCYTE [DISTWIDTH] IN BLOOD BY AUTOMATED COUNT: 16.1 % (ref 11.5–14.5)
GLOBULIN SER CALC-MCNC: 4.2 G/DL (ref 2–4)
GLUCOSE BLD STRIP.AUTO-MCNC: 213 MG/DL (ref 65–100)
GLUCOSE SERPL-MCNC: 323 MG/DL (ref 65–100)
GLUCOSE UR STRIP.AUTO-MCNC: 500 MG/DL
HCT VFR BLD AUTO: 41.7 % (ref 35–47)
HGB BLD-MCNC: 13.1 G/DL (ref 11.5–16)
HGB UR QL STRIP: ABNORMAL
INR PPP: 1 (ref 0.9–1.1)
KETONES UR QL STRIP.AUTO: NEGATIVE MG/DL
LACTATE SERPL-SCNC: 1.6 MMOL/L (ref 0.4–2)
LEUKOCYTE ESTERASE UR QL STRIP.AUTO: NEGATIVE
LIPASE SERPL-CCNC: 103 U/L (ref 73–393)
LYMPHOCYTES # BLD: 2.5 K/UL (ref 0.8–3.5)
LYMPHOCYTES NFR BLD: 35 % (ref 12–49)
MAGNESIUM SERPL-MCNC: 1.8 MG/DL (ref 1.6–2.4)
MCH RBC QN AUTO: 26.2 PG (ref 26–34)
MCHC RBC AUTO-ENTMCNC: 31.4 G/DL (ref 30–36.5)
MCV RBC AUTO: 83.4 FL (ref 80–99)
MONOCYTES # BLD: 0.4 K/UL (ref 0–1)
MONOCYTES NFR BLD: 5 % (ref 5–13)
NEUTS SEG # BLD: 4.3 K/UL (ref 1.8–8)
NEUTS SEG NFR BLD: 58 % (ref 32–75)
NITRITE UR QL STRIP.AUTO: NEGATIVE
P-R INTERVAL, ECG05: 146 MS
PH UR STRIP: 6 [PH] (ref 5–8)
PLATELET # BLD AUTO: 239 K/UL (ref 150–400)
POTASSIUM SERPL-SCNC: 3.1 MMOL/L (ref 3.5–5.1)
PROT SERPL-MCNC: 7.7 G/DL (ref 6.4–8.2)
PROT UR STRIP-MCNC: 100 MG/DL
PROTHROMBIN TIME: 10.3 SEC (ref 9–11.1)
Q-T INTERVAL, ECG07: 390 MS
QRS DURATION, ECG06: 86 MS
QTC CALCULATION (BEZET), ECG08: 479 MS
RBC # BLD AUTO: 5 M/UL (ref 3.8–5.2)
RBC #/AREA URNS HPF: ABNORMAL /HPF (ref 0–5)
SERVICE CMNT-IMP: ABNORMAL
SODIUM SERPL-SCNC: 135 MMOL/L (ref 136–145)
SP GR UR REFRACTOMETRY: 1.02 (ref 1–1.03)
THERAPEUTIC RANGE,PTTT: NORMAL SECS (ref 58–77)
TROPONIN I SERPL-MCNC: <0.04 NG/ML
UA: UC IF INDICATED,UAUC: ABNORMAL
UROBILINOGEN UR QL STRIP.AUTO: 0.2 EU/DL (ref 0.2–1)
VENTRICULAR RATE, ECG03: 91 BPM
WBC # BLD AUTO: 7.3 K/UL (ref 3.6–11)
WBC URNS QL MICRO: ABNORMAL /HPF (ref 0–4)

## 2017-12-22 PROCEDURE — 84484 ASSAY OF TROPONIN QUANT: CPT | Performed by: EMERGENCY MEDICINE

## 2017-12-22 PROCEDURE — 96360 HYDRATION IV INFUSION INIT: CPT

## 2017-12-22 PROCEDURE — 74011250636 HC RX REV CODE- 250/636: Performed by: EMERGENCY MEDICINE

## 2017-12-22 PROCEDURE — 74011636320 HC RX REV CODE- 636/320: Performed by: EMERGENCY MEDICINE

## 2017-12-22 PROCEDURE — 99285 EMERGENCY DEPT VISIT HI MDM: CPT

## 2017-12-22 PROCEDURE — 36415 COLL VENOUS BLD VENIPUNCTURE: CPT | Performed by: EMERGENCY MEDICINE

## 2017-12-22 PROCEDURE — 74177 CT ABD & PELVIS W/CONTRAST: CPT

## 2017-12-22 PROCEDURE — 82962 GLUCOSE BLOOD TEST: CPT

## 2017-12-22 PROCEDURE — 85610 PROTHROMBIN TIME: CPT | Performed by: EMERGENCY MEDICINE

## 2017-12-22 PROCEDURE — 93005 ELECTROCARDIOGRAM TRACING: CPT

## 2017-12-22 PROCEDURE — 83605 ASSAY OF LACTIC ACID: CPT | Performed by: EMERGENCY MEDICINE

## 2017-12-22 PROCEDURE — 80053 COMPREHEN METABOLIC PANEL: CPT | Performed by: EMERGENCY MEDICINE

## 2017-12-22 PROCEDURE — 85025 COMPLETE CBC W/AUTO DIFF WBC: CPT | Performed by: EMERGENCY MEDICINE

## 2017-12-22 PROCEDURE — 96361 HYDRATE IV INFUSION ADD-ON: CPT

## 2017-12-22 PROCEDURE — 81001 URINALYSIS AUTO W/SCOPE: CPT | Performed by: EMERGENCY MEDICINE

## 2017-12-22 PROCEDURE — 83690 ASSAY OF LIPASE: CPT | Performed by: EMERGENCY MEDICINE

## 2017-12-22 PROCEDURE — 71010 XR CHEST PORT: CPT

## 2017-12-22 PROCEDURE — 85730 THROMBOPLASTIN TIME PARTIAL: CPT | Performed by: EMERGENCY MEDICINE

## 2017-12-22 PROCEDURE — 83735 ASSAY OF MAGNESIUM: CPT | Performed by: EMERGENCY MEDICINE

## 2017-12-22 RX ORDER — SODIUM CHLORIDE 0.9 % (FLUSH) 0.9 %
10 SYRINGE (ML) INJECTION
Status: COMPLETED | OUTPATIENT
Start: 2017-12-22 | End: 2017-12-22

## 2017-12-22 RX ORDER — SODIUM CHLORIDE 9 MG/ML
50 INJECTION, SOLUTION INTRAVENOUS
Status: COMPLETED | OUTPATIENT
Start: 2017-12-22 | End: 2017-12-22

## 2017-12-22 RX ADMIN — SODIUM CHLORIDE 1000 ML: 900 INJECTION, SOLUTION INTRAVENOUS at 16:11

## 2017-12-22 RX ADMIN — SODIUM CHLORIDE 50 ML/HR: 900 INJECTION, SOLUTION INTRAVENOUS at 15:21

## 2017-12-22 RX ADMIN — Medication 10 ML: at 15:21

## 2017-12-22 RX ADMIN — IOPAMIDOL 100 ML: 755 INJECTION, SOLUTION INTRAVENOUS at 15:21

## 2017-12-22 RX ADMIN — SODIUM CHLORIDE 1000 ML: 900 INJECTION, SOLUTION INTRAVENOUS at 13:16

## 2017-12-22 NOTE — ED NOTES
Discharge instructions reviewed with pt. Discharge instructions given to pt per Dr. Courtney Ball. Pt able to return/verbalize discharge instructions. Copy of discharge instructions given. Pt condition stable, respiratory status within normal limits, neuro status intact. Pt out of ER via [facility] with ED tech to niece's awaiting car.

## 2017-12-22 NOTE — DISCHARGE INSTRUCTIONS
Abdominal Pain: Care Instructions  Your Care Instructions    Abdominal pain has many possible causes. Some aren't serious and get better on their own in a few days. Others need more testing and treatment. If your pain continues or gets worse, you need to be rechecked and may need more tests to find out what is wrong. You may need surgery to correct the problem. Don't ignore new symptoms, such as fever, nausea and vomiting, urination problems, pain that gets worse, and dizziness. These may be signs of a more serious problem. Your doctor may have recommended a follow-up visit in the next 8 to 12 hours. If you are not getting better, you may need more tests or treatment. The doctor has checked you carefully, but problems can develop later. If you notice any problems or new symptoms, get medical treatment right away. Follow-up care is a key part of your treatment and safety. Be sure to make and go to all appointments, and call your doctor if you are having problems. It's also a good idea to know your test results and keep a list of the medicines you take. How can you care for yourself at home? · Rest until you feel better. · To prevent dehydration, drink plenty of fluids, enough so that your urine is light yellow or clear like water. Choose water and other caffeine-free clear liquids until you feel better. If you have kidney, heart, or liver disease and have to limit fluids, talk with your doctor before you increase the amount of fluids you drink. · If your stomach is upset, eat mild foods, such as rice, dry toast or crackers, bananas, and applesauce. Try eating several small meals instead of two or three large ones. · Wait until 48 hours after all symptoms have gone away before you have spicy foods, alcohol, and drinks that contain caffeine. · Do not eat foods that are high in fat. · Avoid anti-inflammatory medicines such as aspirin, ibuprofen (Advil, Motrin), and naproxen (Aleve).  These can cause stomach upset. Talk to your doctor if you take daily aspirin for another health problem. When should you call for help? Call 911 anytime you think you may need emergency care. For example, call if:  ? · You passed out (lost consciousness). ? · You pass maroon or very bloody stools. ? · You vomit blood or what looks like coffee grounds. ? · You have new, severe belly pain. ?Call your doctor now or seek immediate medical care if:  ? · Your pain gets worse, especially if it becomes focused in one area of your belly. ? · You have a new or higher fever. ? · Your stools are black and look like tar, or they have streaks of blood. ? · You have unexpected vaginal bleeding. ? · You have symptoms of a urinary tract infection. These may include:  ¨ Pain when you urinate. ¨ Urinating more often than usual.  ¨ Blood in your urine. ? · You are dizzy or lightheaded, or you feel like you may faint. ? Watch closely for changes in your health, and be sure to contact your doctor if:  ? · You are not getting better after 1 day (24 hours). Where can you learn more? Go to http://ozielVixely Incmichael.info/. Enter W826 in the search box to learn more about \"Abdominal Pain: Care Instructions. \"  Current as of: March 20, 2017  Content Version: 11.4  © 2754-6827 [employer]. Care instructions adapted under license by [employer] (which disclaims liability or warranty for this information). If you have questions about a medical condition or this instruction, always ask your healthcare professional. [place] [address] any warranty or liability for your use of this information. Learning About High Blood Sugar  What is high blood sugar? Your body turns the food you eat into glucose (sugar), which it uses for energy. But if your body isn't able to use the sugar right away, it can build up in your blood and lead to high blood sugar.   When the amount of sugar in your blood stays too high for too much of the time, you may have diabetes. Diabetes is a disease that can cause serious health problems. The good news is that lifestyle changes may help you get your blood sugar back to normal and avoid or delay diabetes. What causes high blood sugar? Sugar (glucose) can build up in your blood if you:  · Are overweight. · Have a family history of diabetes. · Take certain medicines, such as steroids. What are the symptoms? Having high blood sugar may not cause any symptoms at all. Or it may make you feel very thirsty or very hungry. You may also urinate more often than usual, have blurry vision, or lose weight without trying. How is high blood sugar treated? You can take steps to lower your blood sugar level if you understand what makes it get higher. Your doctor may want you to learn how to test your blood sugar level at home. Then you can see how illness, stress, or different kinds of food or medicine raise or lower your blood sugar level. Other tests may be needed to see if you have diabetes. How can you prevent high blood sugar? · Watch your weight. If you're overweight, losing just a small amount of weight may help. Reducing fat around your waist is most important. · Limit the amount of calories, sweets, and unhealthy fat you eat. Ask your doctor if a dietitian can help you. A registered dietitian can help you create meal plans that fit your lifestyle. · Get at least 30 minutes of exercise on most days of the week. Exercise helps control your blood sugar. It also helps you maintain a healthy weight. Walking is a good choice. You also may want to do other activities, such as running, swimming, cycling, or playing tennis or team sports. · If your doctor prescribed medicines, take them exactly as prescribed. Call your doctor if you think you are having a problem with your medicine. You will get more details on the specific medicines your doctor prescribes.   Follow-up care is a key part of your treatment and safety. Be sure to make and go to all appointments, and call your doctor if you are having problems. It's also a good idea to know your test results and keep a list of the medicines you take. Where can you learn more? Go to http://oziel-michael.info/. Enter O108 in the search box to learn more about \"Learning About High Blood Sugar. \"  Current as of: March 13, 2017  Content Version: 11.4  © 9236-5895 [employer]. Care instructions adapted under license by [employer] (which disclaims liability or warranty for this information). If you have questions about a medical condition or this instruction, always ask your healthcare professional. [address] any warranty or liability for your use of this information.

## 2017-12-22 NOTE — ED PROVIDER NOTES
EMERGENCY DEPARTMENT HISTORY AND PHYSICAL EXAM      Date: 12/22/2017  Patient Name: Trinity Salcedo    History of Presenting Illness     Chief Complaint   Patient presents with    Flank Pain     Pt. complains of right flank pain x2 days, brought by EMS. Denies any N/V/D. History Provided By: Patient    HPI: Trinity Salcedo, 65 y.o. female with PMHx significant for HTN, DM, and stroke, presents via EMS to the ED with cc of an acute onset of constant, sharp right flank pain radiating to the right sided abdomen worsening since yesterday afternoon. The pt reports no associated sx. She expresses that her discomfort is exacerbated with movement and is unsure if she took any medication for her sx PTA. She denies any exacerbating or relieving factors. The pt denies any h/o similar sx and denies any h/o kidney stones or gallstones. She denies any fevers, chills, chest pain, SOB, nausea, vomiting, diarrhea, dysuria, or hematuria. History is limited due to pt's baseline mental status from a h/o stroke. PCP: Estrella Hernandez    There are no other complaints, changes, or physical findings at this time. Current Outpatient Prescriptions   Medication Sig Dispense Refill    aspirin delayed-release 81 mg tablet Take 1 Tab by mouth daily. Start taking 4/12/2017 30 Tab 1    ferrous sulfate 325 mg (65 mg iron) tablet Take 1 Tab by mouth Daily (before breakfast). 14 Tab 0    ascorbic acid, vitamin C, (VITAMIN C) 1,000 mg tablet Take 1 Tab by mouth daily. 14 Tab 0    folic acid (FOLVITE) 1 mg tablet Take 1 Tab by mouth daily. 14 Tab 0    penicillin v potassium (VEETID) 500 mg tablet Take 1 Tab by mouth four (4) times daily. 40 Tab 0    HYDROcodone-acetaminophen (NORCO) 5-325 mg per tablet Take 1 Tab by mouth every four (4) hours as needed for Pain. Max Daily Amount: 6 Tabs. 20 Tab 0    lisinopril (PRINIVIL, ZESTRIL) 5 mg tablet Take 1 Tab by mouth daily.  30 Tab 1       Past History     Past Medical History:  Past Medical History:   Diagnosis Date    Diabetes ([facility] [address]     Hypertension     Stroke [facility])        Past Surgical History:  Past Surgical History:   Procedure Laterality Date    HX ORTHOPAEDIC  1996    back surgery    NEUROLOGICAL PROCEDURE UNLISTED  2008    shunt placement       Family History:  No family history on file. Social History:  Social History   Substance Use Topics    Smoking status: Never Smoker    Smokeless tobacco: Not on file    Alcohol use No       Allergies:  No Known Allergies      Review of Systems   Review of Systems   Constitutional: Negative. Negative for chills and fever. HENT: Negative. Negative for congestion and rhinorrhea. Respiratory: Negative. Negative for cough, chest tightness, shortness of breath and wheezing. Cardiovascular: Negative. Negative for chest pain and palpitations. Gastrointestinal: Positive for abdominal pain (right sided ). Negative for constipation, diarrhea, nausea and vomiting. Endocrine: Negative. Genitourinary: Positive for flank pain (right ). Negative for decreased urine volume, dysuria, hematuria and pelvic pain. Musculoskeletal: Negative for back pain and neck pain. Skin: Negative. Negative for color change, pallor and rash. Neurological: Negative. Negative for dizziness, seizures, weakness, numbness and headaches. Hematological: Negative. Negative for adenopathy. Psychiatric/Behavioral: Negative. All other systems reviewed and are negative. Physical Exam   Physical Exam   Constitutional: She is oriented to person, place, and time. She appears well-developed and well-nourished. No distress. HENT:   Head: Normocephalic and atraumatic. Mouth/Throat: No oropharyngeal exudate. Eyes: Conjunctivae are normal. Pupils are equal, round, and reactive to light. Right eye exhibits no discharge. Left eye exhibits no discharge. No scleral icterus. Neck: Normal range of motion. Neck supple. No JVD present.  No tracheal deviation present. No thyromegaly present. Cardiovascular: Normal rate, regular rhythm, normal heart sounds and intact distal pulses. Exam reveals no gallop and no friction rub. No murmur heard. Pulmonary/Chest: Effort normal and breath sounds normal. No stridor. No respiratory distress. She has no wheezes. She has no rales. She exhibits no tenderness. Abdominal: Soft. Bowel sounds are normal. She exhibits no distension and no mass. There is tenderness. There is no rebound and no guarding. Musculoskeletal: Normal range of motion. Neurological: She is alert and oriented to person, place, and time. She displays normal reflexes. No cranial nerve deficit. She exhibits normal muscle tone. Coordination normal.   Skin: Skin is warm. No rash noted. She is not diaphoretic. No pallor. Vitals reviewed. Diagnostic Study Results     Labs -     Recent Results (from the past 12 hour(s))   EKG, 12 LEAD, INITIAL    Collection Time: 12/22/17  1:09 PM   Result Value Ref Range    Ventricular Rate 91 BPM    Atrial Rate 91 BPM    P-R Interval 146 ms    QRS Duration 86 ms    Q-T Interval 390 ms    QTC Calculation (Bezet) 479 ms    Calculated P Axis 58 degrees    Calculated R Axis -18 degrees    Calculated T Axis 37 degrees    Diagnosis       Normal sinus rhythm    Confirmed by Sondra Mclain MD, [place] ([id number]) on 12/22/2017 3:45:50 PM     CBC WITH AUTOMATED DIFF    Collection Time: 12/22/17  1:12 PM   Result Value Ref Range    WBC 7.3 3.6 - 11.0 K/uL    RBC 5.00 3.80 - 5.20 M/uL    HGB 13.1 11.5 - 16.0 g/dL    HCT 41.7 35.0 - 47.0 %    MCV 83.4 80.0 - 99.0 FL    MCH 26.2 26.0 - 34.0 PG    MCHC 31.4 30.0 - 36.5 g/dL    RDW 16.1 (H) 11.5 - 14.5 %    PLATELET 018 983 - 977 K/uL    NEUTROPHILS 58 32 - 75 %    LYMPHOCYTES 35 12 - 49 %    MONOCYTES 5 5 - 13 %    EOSINOPHILS 2 0 - 7 %    BASOPHILS 0 0 - 1 %    ABS. NEUTROPHILS 4.3 1.8 - 8.0 K/UL    ABS. LYMPHOCYTES 2.5 0.8 - 3.5 K/UL    ABS. MONOCYTES 0.4 0.0 - 1.0 K/UL    ABS. EOSINOPHILS 0.1 0.0 - 0.4 K/UL    ABS. BASOPHILS 0.0 0.0 - 0.1 K/UL   METABOLIC PANEL, COMPREHENSIVE    Collection Time: 12/22/17  1:12 PM   Result Value Ref Range    Sodium 135 (L) 136 - 145 mmol/L    Potassium 3.1 (L) 3.5 - 5.1 mmol/L    Chloride 98 97 - 108 mmol/L    CO2 31 21 - 32 mmol/L    Anion gap 6 5 - 15 mmol/L    Glucose 323 (H) 65 - 100 mg/dL    BUN 9 6 - 20 MG/DL    Creatinine 0.96 0.55 - 1.02 MG/DL    BUN/Creatinine ratio 9 (L) 12 - 20      GFR est AA >60 >60 ml/min/1.73m2    GFR est non-AA 59 (L) >60 ml/min/1.73m2    Calcium 8.7 8.5 - 10.1 MG/DL    Bilirubin, total 0.4 0.2 - 1.0 MG/DL    ALT (SGPT) 16 12 - 78 U/L    AST (SGOT) 9 (L) 15 - 37 U/L    Alk.  phosphatase 126 (H) 45 - 117 U/L    Protein, total 7.7 6.4 - 8.2 g/dL    Albumin 3.5 3.5 - 5.0 g/dL    Globulin 4.2 (H) 2.0 - 4.0 g/dL    A-G Ratio 0.8 (L) 1.1 - 2.2     LIPASE    Collection Time: 12/22/17  1:12 PM   Result Value Ref Range    Lipase 103 73 - 393 U/L   URINALYSIS W/ REFLEX CULTURE    Collection Time: 12/22/17  1:12 PM   Result Value Ref Range    Color YELLOW/STRAW      Appearance CLEAR CLEAR      Specific gravity 1.020 1.003 - 1.030      pH (UA) 6.0 5.0 - 8.0      Protein 100 (A) NEG mg/dL    Glucose 500 (A) NEG mg/dL    Ketone NEGATIVE  NEG mg/dL    Bilirubin NEGATIVE  NEG      Blood TRACE (A) NEG      Urobilinogen 0.2 0.2 - 1.0 EU/dL    Nitrites NEGATIVE  NEG      Leukocyte Esterase NEGATIVE  NEG      WBC 0-4 0 - 4 /hpf    RBC 0-5 0 - 5 /hpf    Epithelial cells FEW FEW /lpf    Bacteria NEGATIVE  NEG /hpf    UA:UC IF INDICATED CULTURE NOT INDICATED BY UA RESULT CNI     LACTIC ACID    Collection Time: 12/22/17  1:12 PM   Result Value Ref Range    Lactic acid 1.6 0.4 - 2.0 MMOL/L   TROPONIN I    Collection Time: 12/22/17  1:12 PM   Result Value Ref Range    Troponin-I, Qt. <0.04 <0.05 ng/mL   PROTHROMBIN TIME + INR    Collection Time: 12/22/17  1:12 PM   Result Value Ref Range    INR 1.0 0.9 - 1.1      Prothrombin time 10.3 9.0 - 11.1 sec PTT    Collection Time: 12/22/17  1:12 PM   Result Value Ref Range    aPTT 29.6 22.1 - 32.5 sec    aPTT, therapeutic range     58.0 - 77.0 SECS   MAGNESIUM    Collection Time: 12/22/17  1:12 PM   Result Value Ref Range    Magnesium 1.8 1.6 - 2.4 mg/dL   GLUCOSE, POC    Collection Time: 12/22/17  6:11 PM   Result Value Ref Range    Glucose (POC) 213 (H) 65 - 100 mg/dL    Performed by Jean Worley        Radiologic Studies -   CT Results  (Last 48 hours)               12/22/17 1521  CT ABD PELV W CONT Final result    Impression:  IMPRESSION:   Unremarkable CT through the abdomen and pelvis. Narrative:  EXAM:  CT ABD PELV W CONT       INDICATION: right abdominal pain  right flank pain for 2 days       COMPARISON: April 2017       CONTRAST:  100 mL of Isovue-370. TECHNIQUE:    Following the uneventful intravenous administration of contrast, thin axial   images were obtained through the abdomen and pelvis. Coronal and sagittal   reconstructions were generated. Oral contrast was not administered. CT dose   reduction was achieved through use of a standardized protocol tailored for this   examination and automatic exposure control for dose modulation. FINDINGS:    LUNG BASES: Clear. INCIDENTALLY IMAGED HEART AND MEDIASTINUM: Unremarkable. LIVER: No mass or biliary dilatation. GALLBLADDER: Unremarkable. SPLEEN: No mass. PANCREAS: No mass or ductal dilatation. ADRENALS: Unremarkable. KIDNEYS: No mass, calculus, or hydronephrosis. STOMACH: Unremarkable. SMALL BOWEL: No dilatation or wall thickening. COLON: No dilatation or wall thickening. APPENDIX: Unremarkable. PERITONEUM: No ascites or pneumoperitoneum. RETROPERITONEUM: No lymphadenopathy or aortic aneurysm. REPRODUCTIVE ORGANS: Normal   URINARY BLADDER: No mass or calculus. BONES: No destructive bone lesion.    ADDITIONAL COMMENTS: N/A               CXR Results  (Last 48 hours)               12/22/17 1412  XR CHEST PORT Final result    Impression:  IMPRESSION: No acute cardiopulmonary disease. Narrative:  INDICATION: Abdominal pain, flank pain for 2 days. Portable AP upright view of the chest.       Direct comparison made to prior chest x-ray dated November 2016. Cardiomediastinal silhouette is stable. Lungs are clear bilaterally. Pleural   spaces are normal. Osseous structures are intact. Catheter tubing overlies the   left hemithorax. Medical Decision Making   I am the first provider for this patient. I reviewed the vital signs, available nursing notes, past medical history, past surgical history, family history and social history. Vital Signs-Reviewed the patient's vital signs. Patient Vitals for the past 12 hrs:   Temp Pulse Resp BP SpO2   12/22/17 1828 - - - - 100 %   12/22/17 1644 - 68 11 184/90 99 %   12/22/17 1615 - 69 14 (!) 159/95 99 %   12/22/17 1600 - 70 18 (!) 180/98 98 %   12/22/17 1545 - 71 13 170/79 99 %   12/22/17 1531 - 83 14 - 100 %   12/22/17 1530 - - - 162/80 -   12/22/17 1315 - 88 20 154/83 99 %   12/22/17 1303 98.4 °F (36.9 °C) 91 10 157/90 99 %       Pulse Oximetry Analysis - 99% on room air    Cardiac Monitor:   Rate: 91 bpm  Rhythm: Normal Sinus Rhythm      EKG interpretation: (Preliminary)  1309  Rhythm: normal sinus rhythm; and regular . Rate (approx.): 91; Axis: normal; ME interval: normal; QRS interval: normal ; ST/T wave: normal; Other findings: normal.  Written by SANJAY Shea, as dictated by Janes Quezada MD.    Records Reviewed: Old Medical Records    Provider Notes (Medical Decision Making):     DDx: coronary syndrome, biliary colic, renal colic, bowel obstruction, colitis, appendicitis, ovarian, torsion/cyst, Aortic aneurysm. Impression Plan: 65year old with prior stroke to the ER with persistent right sided abdominal discomfort.  Somewhat of a poor historian due to prior stroke, denies chest pain, fevers, nausea, vomiting. Will obtain labs and probable CT vs ultrasound pending lab results. ED Course:   Initial assessment performed. The patients presenting problems have been discussed, and they are in agreement with the care plan formulated and outlined with them. I have encouraged them to ask questions as they arise throughout their visit. Progress Notes:     SIGN OUT:  2:28 PM  Patient's presentation, labs/imaging and plan of care was reviewed with Brennan Garcia MD as part of sign out. They will wait for CT to results then disposition as seen best as part of the plan discussed with the patient. Brennan Garcia MD's assistance in completion of this plan is greatly appreciated but it should be noted that I will be the provider of record for this patient. Written by Heron Beavers, ED Scribe as dictated by Janes Quezada MD     5:45 PM  Pain has resolved. Written by King Martin ED Scribe as dictated by Curtis Cook. Patricia Garcia MD      Disposition:  DISCHARGE NOTE:  6:44 PM  Pt has been reexamined. Pt has no new complaints, changes, or physical findings. Care plan outlined and precautions discussed. All available results reviewed with pt. All medications reviewed with pt. All of pts questions and concerns addressed. Pt agrees to f/u as instructed and agrees to return to ED upon further deterioration. Pt is ready to go home. Written by King Martin ED Scribe as dictated by Curtis Cook. Patricia Garcia MD      PLAN:  1. Discharge Medication List as of 12/22/2017  6:31 PM        2. Follow-up Information     Follow up With Details Comments [address] [facility] Call in 2 days As needed, If symptoms worsen [address] [address]          Return to ED if worse     Diagnosis     Clinical Impression:   1. Acute abdominal pain    2. Hyperglycemia        Attestations:    Attestation: This note is prepared by Roberto Beavers, acting as Scribe for Juancarlos Alexey Sam MD.      Valentin Dumont MD: The scribe's documentation has been prepared under my direction and personally reviewed by me in its entirety. I confirm that the note above accurately reflects all work, treatment, procedures, and medical decision making performed by me.

## 2017-12-22 NOTE — ED NOTES
Pt incontinent of urine. Pericare performed. Full linen change performed. Pt dressed in paper scrubs/clothes. Remaining belongings placed in patient belonging bag.

## 2017-12-22 NOTE — ED NOTES
Dale Doty called regarding the patient at this time. She states she is the POA, and just wanted to make sure we had the correct contact information for her. It was verified that she is listed as the emergency contact. She asked to be called once known if pt is being discharged or admitted, as she will be the pt's ride home if discharged.

## 2017-12-22 NOTE — ED NOTES
Bedside and Verbal shift change report given to [address] [facility] (oncoming nurse) by Jonah Govea (offgoing nurse). Report included the following information SBAR, ED Summary, Intake/Output, MAR and Recent Results. Pt on monitor x 3. Call bell in reach. Pt updated on plan of care; awaiting CT results. Pt returning from CT.

## 2019-10-17 ENCOUNTER — HOSPITAL ENCOUNTER (EMERGENCY)
Age: 64
Discharge: HOME | End: 2019-10-18
Attending: EMERGENCY MEDICINE | Admitting: EMERGENCY MEDICINE
Payer: MEDICARE

## 2019-10-17 DIAGNOSIS — R73.9 HYPERGLYCEMIA: Primary | ICD-10-CM

## 2019-10-17 LAB
APPEARANCE UR: CLEAR
BACTERIA URNS QL MICRO: NEGATIVE /HPF
BASOPHILS # BLD: 0 K/UL (ref 0–0.1)
BASOPHILS NFR BLD: 0 % (ref 0–1)
BILIRUB UR QL: NEGATIVE
COLOR UR: ABNORMAL
DIFFERENTIAL METHOD BLD: ABNORMAL
EOSINOPHIL # BLD: 0.1 K/UL (ref 0–0.4)
EOSINOPHIL NFR BLD: 1 % (ref 0–7)
EPITH CASTS URNS QL MICRO: ABNORMAL /LPF
ERYTHROCYTE [DISTWIDTH] IN BLOOD BY AUTOMATED COUNT: 16.6 % (ref 11.5–14.5)
GLUCOSE BLD STRIP.AUTO-MCNC: 297 MG/DL (ref 65–100)
GLUCOSE BLD STRIP.AUTO-MCNC: 401 MG/DL (ref 65–100)
GLUCOSE BLD STRIP.AUTO-MCNC: 500 MG/DL (ref 65–100)
GLUCOSE UR STRIP.AUTO-MCNC: >1000 MG/DL
HCT VFR BLD AUTO: 46 % (ref 35–47)
HGB BLD-MCNC: 14.1 G/DL (ref 11.5–16)
HGB UR QL STRIP: ABNORMAL
HYALINE CASTS URNS QL MICRO: ABNORMAL /LPF (ref 0–5)
IMM GRANULOCYTES # BLD AUTO: 0 K/UL (ref 0–0.04)
IMM GRANULOCYTES NFR BLD AUTO: 0 % (ref 0–0.5)
KETONES UR QL STRIP.AUTO: NEGATIVE MG/DL
LACTATE SERPL-SCNC: 2.7 MMOL/L (ref 0.4–2)
LEUKOCYTE ESTERASE UR QL STRIP.AUTO: NEGATIVE
LYMPHOCYTES # BLD: 3.4 K/UL (ref 0.8–3.5)
LYMPHOCYTES NFR BLD: 32 % (ref 12–49)
MCH RBC QN AUTO: 26.7 PG (ref 26–34)
MCHC RBC AUTO-ENTMCNC: 30.7 G/DL (ref 30–36.5)
MCV RBC AUTO: 87.1 FL (ref 80–99)
MONOCYTES # BLD: 0.6 K/UL (ref 0–1)
MONOCYTES NFR BLD: 6 % (ref 5–13)
NEUTS SEG # BLD: 6.5 K/UL (ref 1.8–8)
NEUTS SEG NFR BLD: 61 % (ref 32–75)
NITRITE UR QL STRIP.AUTO: NEGATIVE
NRBC # BLD: 0 K/UL (ref 0–0.01)
NRBC BLD-RTO: 0 PER 100 WBC
PH UR STRIP: 6.5 [PH] (ref 5–8)
PLATELET # BLD AUTO: 219 K/UL (ref 150–400)
PMV BLD AUTO: 10.9 FL (ref 8.9–12.9)
PROT UR STRIP-MCNC: 100 MG/DL
RBC # BLD AUTO: 5.28 M/UL (ref 3.8–5.2)
RBC #/AREA URNS HPF: ABNORMAL /HPF (ref 0–5)
SERVICE CMNT-IMP: ABNORMAL
SP GR UR REFRACTOMETRY: 1.02 (ref 1–1.03)
UA: UC IF INDICATED,UAUC: ABNORMAL
UROBILINOGEN UR QL STRIP.AUTO: 0.2 EU/DL (ref 0.2–1)
WBC # BLD AUTO: 10.6 K/UL (ref 3.6–11)
WBC URNS QL MICRO: ABNORMAL /HPF (ref 0–4)

## 2019-10-17 PROCEDURE — 99285 EMERGENCY DEPT VISIT HI MDM: CPT

## 2019-10-17 PROCEDURE — 81001 URINALYSIS AUTO W/SCOPE: CPT

## 2019-10-17 PROCEDURE — 85025 COMPLETE CBC W/AUTO DIFF WBC: CPT

## 2019-10-17 PROCEDURE — 74011250636 HC RX REV CODE- 250/636: Performed by: EMERGENCY MEDICINE

## 2019-10-17 PROCEDURE — 82962 GLUCOSE BLOOD TEST: CPT

## 2019-10-17 PROCEDURE — 83605 ASSAY OF LACTIC ACID: CPT

## 2019-10-17 PROCEDURE — 74011636637 HC RX REV CODE- 636/637: Performed by: EMERGENCY MEDICINE

## 2019-10-17 PROCEDURE — 96374 THER/PROPH/DIAG INJ IV PUSH: CPT

## 2019-10-17 PROCEDURE — 36415 COLL VENOUS BLD VENIPUNCTURE: CPT

## 2019-10-17 PROCEDURE — 96361 HYDRATE IV INFUSION ADD-ON: CPT

## 2019-10-17 RX ADMIN — HUMAN INSULIN 10 UNITS: 100 INJECTION, SOLUTION SUBCUTANEOUS at 22:03

## 2019-10-17 RX ADMIN — SODIUM CHLORIDE 1000 ML: 900 INJECTION, SOLUTION INTRAVENOUS at 22:03

## 2019-10-18 ENCOUNTER — APPOINTMENT (OUTPATIENT)
Dept: CT IMAGING | Age: 64
DRG: 064 | End: 2019-10-18
Attending: NURSE PRACTITIONER
Payer: MEDICARE

## 2019-10-18 ENCOUNTER — APPOINTMENT (OUTPATIENT)
Dept: CT IMAGING | Age: 64
End: 2019-10-18
Attending: EMERGENCY MEDICINE
Payer: MEDICARE

## 2019-10-18 ENCOUNTER — HOSPITAL ENCOUNTER (INPATIENT)
Age: 64
LOS: 6 days | Discharge: NURSING, REHAB OR LONG-TERM CARE | DRG: 064 | End: 2019-10-24
Attending: STUDENT IN AN ORGANIZED HEALTH CARE EDUCATION/TRAINING PROGRAM | Admitting: FAMILY MEDICINE
Payer: MEDICARE

## 2019-10-18 ENCOUNTER — HOSPITAL ENCOUNTER (EMERGENCY)
Age: 64
Discharge: OTHER INSTITUTION | End: 2019-10-18
Attending: EMERGENCY MEDICINE
Payer: MEDICARE

## 2019-10-18 VITALS
TEMPERATURE: 97.8 F | DIASTOLIC BLOOD PRESSURE: 84 MMHG | HEART RATE: 102 BPM | WEIGHT: 175 LBS | OXYGEN SATURATION: 99 % | HEIGHT: 66 IN | RESPIRATION RATE: 16 BRPM | SYSTOLIC BLOOD PRESSURE: 155 MMHG | BODY MASS INDEX: 28.12 KG/M2

## 2019-10-18 VITALS
OXYGEN SATURATION: 98 % | SYSTOLIC BLOOD PRESSURE: 122 MMHG | HEART RATE: 100 BPM | WEIGHT: 172.84 LBS | TEMPERATURE: 97.5 F | HEIGHT: 66 IN | RESPIRATION RATE: 16 BRPM | BODY MASS INDEX: 27.78 KG/M2 | DIASTOLIC BLOOD PRESSURE: 73 MMHG

## 2019-10-18 DIAGNOSIS — I62.9 INTRACRANIAL HEMORRHAGE (HCC): Primary | ICD-10-CM

## 2019-10-18 DIAGNOSIS — I62.9 INTRACRANIAL BLEEDING (HCC): Primary | ICD-10-CM

## 2019-10-18 DIAGNOSIS — I10 HYPERTENSION, UNSPECIFIED TYPE: ICD-10-CM

## 2019-10-18 PROBLEM — I61.9 ICH (INTRACEREBRAL HEMORRHAGE) (HCC): Status: ACTIVE | Noted: 2019-10-18

## 2019-10-18 LAB
ALBUMIN SERPL-MCNC: 3.9 G/DL (ref 3.5–5)
ALBUMIN/GLOB SERPL: 0.8 {RATIO} (ref 1.1–2.2)
ALP SERPL-CCNC: 150 U/L (ref 45–117)
ALT SERPL-CCNC: 18 U/L (ref 12–78)
ANION GAP BLD CALC-SCNC: 14 MMOL/L (ref 10–20)
ANION GAP SERPL CALC-SCNC: 9 MMOL/L (ref 5–15)
APPEARANCE UR: CLEAR
ASPIRIN TEST, ASPIRN: 415 ARU
AST SERPL-CCNC: 4 U/L (ref 15–37)
ATRIAL RATE: 84 BPM
BACTERIA URNS QL MICRO: NEGATIVE /HPF
BASOPHILS # BLD: 0 K/UL (ref 0–0.1)
BASOPHILS NFR BLD: 0 % (ref 0–1)
BILIRUB SERPL-MCNC: 0.3 MG/DL (ref 0.2–1)
BILIRUB UR QL: NEGATIVE
BUN BLD-MCNC: 20 MG/DL (ref 9–20)
BUN SERPL-MCNC: 18 MG/DL (ref 6–20)
BUN/CREAT SERPL: 14 (ref 12–20)
CA-I BLD-MCNC: 1.2 MMOL/L (ref 1.12–1.32)
CALCIUM SERPL-MCNC: 10 MG/DL (ref 8.5–10.1)
CALCULATED P AXIS, ECG09: 64 DEGREES
CALCULATED R AXIS, ECG10: -31 DEGREES
CALCULATED T AXIS, ECG11: 54 DEGREES
CHLORIDE BLD-SCNC: 105 MMOL/L (ref 98–107)
CHLORIDE SERPL-SCNC: 108 MMOL/L (ref 97–108)
CO2 BLD-SCNC: 32 MMOL/L (ref 21–32)
CO2 SERPL-SCNC: 27 MMOL/L (ref 21–32)
COLOR UR: ABNORMAL
COMMENT, HOLDF: NORMAL
COMMENT, HOLDF: NORMAL
CREAT BLD-MCNC: 0.8 MG/DL (ref 0.6–1.3)
CREAT SERPL-MCNC: 1.26 MG/DL (ref 0.55–1.02)
DIAGNOSIS, 93000: NORMAL
DIFFERENTIAL METHOD BLD: ABNORMAL
EOSINOPHIL # BLD: 0.1 K/UL (ref 0–0.4)
EOSINOPHIL NFR BLD: 1 % (ref 0–7)
EPITH CASTS URNS QL MICRO: ABNORMAL /LPF
ERYTHROCYTE [DISTWIDTH] IN BLOOD BY AUTOMATED COUNT: 16.7 % (ref 11.5–14.5)
EST. AVERAGE GLUCOSE BLD GHB EST-MCNC: 226 MG/DL
GLOBULIN SER CALC-MCNC: 4.8 G/DL (ref 2–4)
GLUCOSE BLD STRIP.AUTO-MCNC: 147 MG/DL (ref 65–100)
GLUCOSE BLD STRIP.AUTO-MCNC: 166 MG/DL (ref 65–100)
GLUCOSE BLD STRIP.AUTO-MCNC: 296 MG/DL (ref 65–100)
GLUCOSE BLD STRIP.AUTO-MCNC: 391 MG/DL (ref 65–100)
GLUCOSE BLD-MCNC: 354 MG/DL (ref 65–100)
GLUCOSE SERPL-MCNC: 315 MG/DL (ref 65–100)
GLUCOSE UR STRIP.AUTO-MCNC: >1000 MG/DL
HBA1C MFR BLD: 9.5 % (ref 4.2–6.3)
HCT VFR BLD AUTO: 46.8 % (ref 35–47)
HCT VFR BLD CALC: 47 % (ref 35–47)
HGB BLD-MCNC: 13.8 G/DL (ref 11.5–16)
HGB UR QL STRIP: NEGATIVE
HYALINE CASTS URNS QL MICRO: ABNORMAL /LPF (ref 0–5)
IMM GRANULOCYTES # BLD AUTO: 0 K/UL (ref 0–0.04)
IMM GRANULOCYTES NFR BLD AUTO: 0 % (ref 0–0.5)
INR PPP: 1 (ref 0.9–1.1)
KETONES UR QL STRIP.AUTO: ABNORMAL MG/DL
LACTATE BLD-SCNC: 1.78 MMOL/L (ref 0.4–2)
LEUKOCYTE ESTERASE UR QL STRIP.AUTO: NEGATIVE
LYMPHOCYTES # BLD: 2.5 K/UL (ref 0.8–3.5)
LYMPHOCYTES NFR BLD: 20 % (ref 12–49)
MCH RBC QN AUTO: 26 PG (ref 26–34)
MCHC RBC AUTO-ENTMCNC: 29.5 G/DL (ref 30–36.5)
MCV RBC AUTO: 88.1 FL (ref 80–99)
MONOCYTES # BLD: 0.6 K/UL (ref 0–1)
MONOCYTES NFR BLD: 5 % (ref 5–13)
NEUTS SEG # BLD: 9.2 K/UL (ref 1.8–8)
NEUTS SEG NFR BLD: 74 % (ref 32–75)
NITRITE UR QL STRIP.AUTO: NEGATIVE
NRBC # BLD: 0 K/UL (ref 0–0.01)
NRBC BLD-RTO: 0 PER 100 WBC
P-R INTERVAL, ECG05: 144 MS
PH UR STRIP: 6 [PH] (ref 5–8)
PLATELET # BLD AUTO: 226 K/UL (ref 150–400)
PMV BLD AUTO: 11.5 FL (ref 8.9–12.9)
POTASSIUM BLD-SCNC: 3.9 MMOL/L (ref 3.5–5.1)
POTASSIUM SERPL-SCNC: 3.7 MMOL/L (ref 3.5–5.1)
PROT SERPL-MCNC: 8.7 G/DL (ref 6.4–8.2)
PROT UR STRIP-MCNC: 30 MG/DL
PROTHROMBIN TIME: 10.3 SEC (ref 9–11.1)
Q-T INTERVAL, ECG07: 388 MS
QRS DURATION, ECG06: 82 MS
QTC CALCULATION (BEZET), ECG08: 458 MS
RBC # BLD AUTO: 5.31 M/UL (ref 3.8–5.2)
RBC #/AREA URNS HPF: ABNORMAL /HPF (ref 0–5)
SALICYLATES SERPL-MCNC: <1.7 MG/DL (ref 2.8–20)
SAMPLES BEING HELD,HOLD: NORMAL
SAMPLES BEING HELD,HOLD: NORMAL
SERVICE CMNT-IMP: ABNORMAL
SODIUM BLD-SCNC: 146 MMOL/L (ref 136–145)
SODIUM SERPL-SCNC: 144 MMOL/L (ref 136–145)
SP GR UR REFRACTOMETRY: 1.03 (ref 1–1.03)
UA: UC IF INDICATED,UAUC: ABNORMAL
UROBILINOGEN UR QL STRIP.AUTO: 1 EU/DL (ref 0.2–1)
VENTRICULAR RATE, ECG03: 84 BPM
WBC # BLD AUTO: 12.4 K/UL (ref 3.6–11)
WBC URNS QL MICRO: ABNORMAL /HPF (ref 0–4)

## 2019-10-18 PROCEDURE — 99283 EMERGENCY DEPT VISIT LOW MDM: CPT

## 2019-10-18 PROCEDURE — 82962 GLUCOSE BLOOD TEST: CPT

## 2019-10-18 PROCEDURE — 83605 ASSAY OF LACTIC ACID: CPT

## 2019-10-18 PROCEDURE — 85025 COMPLETE CBC W/AUTO DIFF WBC: CPT

## 2019-10-18 PROCEDURE — 74011250636 HC RX REV CODE- 250/636: Performed by: STUDENT IN AN ORGANIZED HEALTH CARE EDUCATION/TRAINING PROGRAM

## 2019-10-18 PROCEDURE — 70450 CT HEAD/BRAIN W/O DYE: CPT

## 2019-10-18 PROCEDURE — 96365 THER/PROPH/DIAG IV INF INIT: CPT

## 2019-10-18 PROCEDURE — 74011000258 HC RX REV CODE- 258: Performed by: EMERGENCY MEDICINE

## 2019-10-18 PROCEDURE — 80053 COMPREHEN METABOLIC PANEL: CPT

## 2019-10-18 PROCEDURE — 85576 BLOOD PLATELET AGGREGATION: CPT

## 2019-10-18 PROCEDURE — 36415 COLL VENOUS BLD VENIPUNCTURE: CPT

## 2019-10-18 PROCEDURE — 96375 TX/PRO/DX INJ NEW DRUG ADDON: CPT

## 2019-10-18 PROCEDURE — 80307 DRUG TEST PRSMV CHEM ANLYZR: CPT

## 2019-10-18 PROCEDURE — 83036 HEMOGLOBIN GLYCOSYLATED A1C: CPT

## 2019-10-18 PROCEDURE — 97116 GAIT TRAINING THERAPY: CPT

## 2019-10-18 PROCEDURE — 99285 EMERGENCY DEPT VISIT HI MDM: CPT

## 2019-10-18 PROCEDURE — 96374 THER/PROPH/DIAG INJ IV PUSH: CPT

## 2019-10-18 PROCEDURE — 65660000000 HC RM CCU STEPDOWN

## 2019-10-18 PROCEDURE — 74011000250 HC RX REV CODE- 250: Performed by: EMERGENCY MEDICINE

## 2019-10-18 PROCEDURE — 93005 ELECTROCARDIOGRAM TRACING: CPT

## 2019-10-18 PROCEDURE — 74011636637 HC RX REV CODE- 636/637: Performed by: FAMILY MEDICINE

## 2019-10-18 PROCEDURE — 74011000250 HC RX REV CODE- 250: Performed by: STUDENT IN AN ORGANIZED HEALTH CARE EDUCATION/TRAINING PROGRAM

## 2019-10-18 PROCEDURE — 80047 BASIC METABLC PNL IONIZED CA: CPT

## 2019-10-18 PROCEDURE — 97161 PT EVAL LOW COMPLEX 20 MIN: CPT

## 2019-10-18 PROCEDURE — 81001 URINALYSIS AUTO W/SCOPE: CPT

## 2019-10-18 PROCEDURE — 92610 EVALUATE SWALLOWING FUNCTION: CPT

## 2019-10-18 PROCEDURE — 85610 PROTHROMBIN TIME: CPT

## 2019-10-18 PROCEDURE — 97530 THERAPEUTIC ACTIVITIES: CPT

## 2019-10-18 RX ORDER — SODIUM CHLORIDE 0.9 % (FLUSH) 0.9 %
5-40 SYRINGE (ML) INJECTION AS NEEDED
Status: DISCONTINUED | OUTPATIENT
Start: 2019-10-18 | End: 2019-10-24 | Stop reason: HOSPADM

## 2019-10-18 RX ORDER — INSULIN LISPRO 100 [IU]/ML
5 INJECTION, SOLUTION INTRAVENOUS; SUBCUTANEOUS ONCE
Status: COMPLETED | OUTPATIENT
Start: 2019-10-18 | End: 2019-10-18

## 2019-10-18 RX ORDER — MAGNESIUM SULFATE 100 %
4 CRYSTALS MISCELLANEOUS AS NEEDED
Status: DISCONTINUED | OUTPATIENT
Start: 2019-10-18 | End: 2019-10-24 | Stop reason: HOSPADM

## 2019-10-18 RX ORDER — DEXTROSE MONOHYDRATE 100 MG/ML
0-250 INJECTION, SOLUTION INTRAVENOUS AS NEEDED
Status: DISCONTINUED | OUTPATIENT
Start: 2019-10-18 | End: 2019-10-24 | Stop reason: HOSPADM

## 2019-10-18 RX ORDER — INSULIN LISPRO 100 [IU]/ML
INJECTION, SOLUTION INTRAVENOUS; SUBCUTANEOUS
Status: DISCONTINUED | OUTPATIENT
Start: 2019-10-18 | End: 2019-10-24 | Stop reason: HOSPADM

## 2019-10-18 RX ORDER — SODIUM CHLORIDE 0.9 % (FLUSH) 0.9 %
5-40 SYRINGE (ML) INJECTION EVERY 8 HOURS
Status: DISCONTINUED | OUTPATIENT
Start: 2019-10-18 | End: 2019-10-24 | Stop reason: HOSPADM

## 2019-10-18 RX ORDER — LISINOPRIL 20 MG/1
20 TABLET ORAL DAILY
COMMUNITY
End: 2019-10-24

## 2019-10-18 RX ORDER — LABETALOL HYDROCHLORIDE 5 MG/ML
10 INJECTION, SOLUTION INTRAVENOUS
Status: COMPLETED | OUTPATIENT
Start: 2019-10-18 | End: 2019-10-18

## 2019-10-18 RX ADMIN — LABETALOL HYDROCHLORIDE 10 MG: 5 INJECTION INTRAVENOUS at 05:23

## 2019-10-18 RX ADMIN — Medication 10 ML: at 22:01

## 2019-10-18 RX ADMIN — SODIUM CHLORIDE 10 MG/HR: 900 INJECTION, SOLUTION INTRAVENOUS at 04:40

## 2019-10-18 RX ADMIN — INSULIN LISPRO 10 UNITS: 100 INJECTION, SOLUTION INTRAVENOUS; SUBCUTANEOUS at 14:18

## 2019-10-18 RX ADMIN — SODIUM CHLORIDE 15 MG/HR: 900 INJECTION, SOLUTION INTRAVENOUS at 04:50

## 2019-10-18 RX ADMIN — SODIUM CHLORIDE 5 MG/HR: 900 INJECTION, SOLUTION INTRAVENOUS at 04:26

## 2019-10-18 RX ADMIN — INSULIN LISPRO 5 UNITS: 100 INJECTION, SOLUTION INTRAVENOUS; SUBCUTANEOUS at 14:17

## 2019-10-18 RX ADMIN — SODIUM CHLORIDE 15 MG/HR: 900 INJECTION, SOLUTION INTRAVENOUS at 06:37

## 2019-10-18 NOTE — LETTER
[address] 
\[facility] [place] [facility]\"" EMERGENCY DEPT 
[address] [facility] [id number] [phone] Work/School Note Date: 10/18/2019 To Whom It May concern: Ms. Parish Lerner accompanied her family member, Ms. Renay Angeles who was seen and treated today in the emergency room by the following provider(s): 
Attending Provider: Efrain Fabian MD. Please excuse Ms. Pacheco on 10/18/19.  
 
Sincerely, 
 
 
 
 
Daron Rubinstein, MD

## 2019-10-18 NOTE — PROGRESS NOTES
Admission Medication Reconciliation:    Information obtained from:  Rx Query, electronic prescriptions  RxQuery data available¹:  YES    Comments/Recommendations: Updated PTA meds/reviewed patient's allergies. 1)  Patient was unable to provide history due to her current condition. Her last [facility] encounter was in 2017. Please consider this medication reconciliation to be in progress. 2)  Medication changes (since last review): Added  - none    Adjusted  - lisinopril 20 mg daily (versus 5 mg daily)    Removed  - hydrocodone-APAP, penicillin VK >> short term, electronic prescriptions from 2017     [facility] benefit data reflects medications filled and processed through the patient's insurance, however   this data does NOT capture whether the medication was picked up or is currently being taken by the patient. Allergies:  Patient has no known allergies. Significant PMH/Disease States:   Past Medical History:   Diagnosis Date    Diabetes ([facility] [address]     Hypertension     Stroke [facility])      Chief Complaint for this Admission:    Chief Complaint   Patient presents with    Altered mental status     Prior to Admission Medications:   Prior to Admission Medications   Prescriptions Last Dose Informant Patient Reported? Taking?   ascorbic acid, vitamin C, (VITAMIN C) 1,000 mg tablet   No No   Sig: Take 1 Tab by mouth daily. aspirin delayed-release 81 mg tablet   No No   Sig: Take 1 Tab by mouth daily. Start taking 4/12/2017   ferrous sulfate 325 mg (65 mg iron) tablet   No No   Sig: Take 1 Tab by mouth Daily (before breakfast). folic acid (FOLVITE) 1 mg tablet   No No   Sig: Take 1 Tab by mouth daily. lisinopril (PRINIVIL, ZESTRIL) 20 mg tablet   Yes Yes   Sig: Take 20 mg by mouth daily.       Facility-Administered Medications: None       Please contact the main inpatient pharmacy with any questions or concerns at [phone] and we will direct you to the clinical pharmacist covering this patient's care while in-house.    ELOINA GarciaD

## 2019-10-18 NOTE — ED NOTES
Call completed to Darlene Conte RN. Informed Zeina Philippe that the patient's platelet function analysis  was NOT drawn and her Type & Screen hemolyzed. Zeina Philippe RN verbalized an understanding.

## 2019-10-18 NOTE — ED NOTES
TRANSFER - OUT REPORT:    Verbal report given to [facility], RN (name) on Brigida Galindo  being transferred to [facility] ER (unit) for urgent transfer       Report consisted of patients Situation, Background, Assessment and   Recommendations(SBAR). Information from the following report(s) SBAR, Kardex, ED Summary and Recent Results was reviewed with the receiving nurse. Lines:   Peripheral IV 10/18/19 Right Antecubital (Active)   Site Assessment Clean, dry, & intact 10/18/2019  4:11 AM   Phlebitis Assessment 0 10/18/2019  4:11 AM   Infiltration Assessment 0 10/18/2019  4:11 AM   Dressing Status Clean, dry, & intact 10/18/2019  4:11 AM   Dressing Type Tape;Transparent 10/18/2019  4:11 AM   Hub Color/Line Status Pink;Flushed;Patent 10/18/2019  4:11 AM   Action Taken Blood drawn 10/18/2019  4:11 AM       Peripheral IV 10/18/19 Right Hand (Active)   Site Assessment Clean, dry, & intact 10/18/2019  4:55 AM   Phlebitis Assessment 0 10/18/2019  4:55 AM   Infiltration Assessment 0 10/18/2019  4:55 AM   Dressing Status Clean, dry, & intact 10/18/2019  4:55 AM   Dressing Type Transparent;Tape 10/18/2019  4:55 AM   Hub Color/Line Status Pink 10/18/2019  4:55 AM       Peripheral IV 10/18/19 Left Forearm (Active)   Site Assessment Clean, dry, & intact 10/18/2019  4:59 AM   Phlebitis Assessment 0 10/18/2019  4:59 AM   Infiltration Assessment 0 10/18/2019  4:59 AM   Dressing Status Clean, dry, & intact 10/18/2019  4:59 AM   Dressing Type Tape;Transparent 10/18/2019  4:59 AM   Hub Color/Line Status Pink 10/18/2019  4:59 AM        Opportunity for questions and clarification was provided.       Patient transported with:   Monitor  Registered Nurse

## 2019-10-18 NOTE — LETTER
[address] 
[facility] EMERGENCY DEPT 
[address] [facility] [place] [address] 
[phone] Work/School Note Date: 10/18/2019 To Whom It May concern: 
Luís Heredia accompanied his family member, Ms. Mikey Saucedo who was seen and treated today in the emergency room by the following provider(s): 
Attending Provider: Negrito Carrasco MD. Please excuse Miki Lincoln from school on 10/18/19.  
 
Sincerely, 
 
 
 
 
Silke Kyle MD

## 2019-10-18 NOTE — PROGRESS NOTES
Orders received, chart reviewed and patient evaluated by physical therapy. Pending progression with skilled acute physical therapy, recommend:  Therapy up to 5 days/week in SNF setting or intensive home health therapy program pending caregiver support in the home. Recommend with nursing patient to complete as able in order to maintain strength, endurance and independence: OOB to chair 3x/day and ambulating with contact guard assist. Thank you for your assistance. Full evaluation to follow.

## 2019-10-18 NOTE — ROUTINE PROCESS
TRANSFER - OUT REPORT: 
 
Verbal report given to Alex(name) on Malick Ruvalcaba  being transferred to 6S(unit) for routine progression of care Report consisted of patients Situation, Background, Assessment and  
Recommendations(SBAR). Information from the following report(s) ED Summary and Recent Results was reviewed with the receiving nurse. Lines:  
Peripheral IV 10/18/19 Right Antecubital (Active) Site Assessment Clean, dry, & intact 10/18/2019  7:07 PM  
Phlebitis Assessment 0 10/18/2019  7:07 PM  
Infiltration Assessment 0 10/18/2019  7:07 PM  
Dressing Status Clean, dry, & intact 10/18/2019  7:07 PM  
Dressing Type Transparent 10/18/2019  7:07 PM  
Alcohol Cap Used No 10/18/2019  7:07 PM  
  
 
Opportunity for questions and clarification was provided. Patient transported with: 
 Monitor Registered Nurse

## 2019-10-18 NOTE — ED PROVIDER NOTES
EMERGENCY DEPARTMENT HISTORY AND PHYSICAL EXAM     ----------------------------------------------------------------------------  Please note that this dictation was completed with [employer], the computer voice recognition software. Quite often unanticipated grammatical, syntax, homophones, and other interpretive errors are inadvertently transcribed by the computer software. Please disregard these errors. Please excuse any errors that have escaped final proofreading  ----------------------------------------------------------------------------      Date: 10/17/2019  Patient Name: Archana Hernandez    History of Presenting Illness     Chief Complaint   Patient presents with    High Blood Sugar     Per ems patient is coming from home. Pt states neice gave her Metformin tonight and then had birthday cake. Glucose 543. Pt denies n/v/d/cp/sob/numb/tingling. History Provided By:  Patient and EMS    HPI: Archana Hernandez is a 62 y.o. female, with significant pmhx of Hypertension, diabetes, previous stroke, who presents via EMS to the ED with c/o high blood sugar. EMS patient was coming from home where she lives with her niece who informed them that she gave her her metformin this evening after having eaten a birthday cake at her blood sugar was 543. Patient has no complaints. Patient specifically denies any associated fevers, chills, nausea, vomiting, diarrhea, abd pain, CP, SOB, urinary sxs, changes in BM, or headache. Social Hx: denies tobacco  + EtOH , denies Illicit Drugs    There are no other complaints, changes, or physical findings at this time. PCP: Hamida Wong M    No Known Allergies    Current Outpatient Medications   Medication Sig Dispense Refill    aspirin delayed-release 81 mg tablet Take 1 Tab by mouth daily. Start taking 4/12/2017 30 Tab 1    ferrous sulfate 325 mg (65 mg iron) tablet Take 1 Tab by mouth Daily (before breakfast).  14 Tab 0    ascorbic acid, vitamin C, (VITAMIN C) 1,000 mg tablet Take 1 Tab by mouth daily. 14 Tab 0    folic acid (FOLVITE) 1 mg tablet Take 1 Tab by mouth daily. 14 Tab 0    penicillin v potassium (VEETID) 500 mg tablet Take 1 Tab by mouth four (4) times daily. 40 Tab 0    HYDROcodone-acetaminophen (NORCO) 5-325 mg per tablet Take 1 Tab by mouth every four (4) hours as needed for Pain. Max Daily Amount: 6 Tabs. 20 Tab 0    lisinopril (PRINIVIL, ZESTRIL) 5 mg tablet Take 1 Tab by mouth daily. 30 Tab 1       Past History     Past Medical History:  Past Medical History:   Diagnosis Date    Diabetes ([facility] [address]     Hypertension     [facility]roke [facility])        Past Surgical History:  Past Surgical History:   Procedure Laterality Date    HX ORTHOPAEDIC  1996    back surgery    NEUROLOGICAL PROCEDURE UNLISTED  2008    shunt placement       Family History:  History reviewed. No pertinent family history. Social History:  Social History     Tobacco Use    Smoking status: Never Smoker    Smokeless tobacco: Never Used   Substance Use Topics    Alcohol use: No    Drug use: No       Allergies:  No Known Allergies      Review of Systems   Review of Systems   Constitutional: Positive for chills. Negative for fever. Eyes: Negative. Respiratory: Negative. Negative for shortness of breath. Cardiovascular: Negative for chest pain. Gastrointestinal: Negative for abdominal pain, nausea and vomiting. Endocrine: Negative. Genitourinary: Negative. Negative for difficulty urinating, dysuria and hematuria. Musculoskeletal: Negative. Skin: Negative. Neurological: Negative. Psychiatric/Behavioral: Negative for suicidal ideas. All other systems reviewed and are negative. Physical Exam   Physical Exam   Constitutional: She appears well-developed and well-nourished. No distress. HENT:   Head: Normocephalic and atraumatic. Nose: Nose normal.   Eyes: Conjunctivae and EOM are normal. No scleral icterus. Neck: Normal range of motion. No tracheal deviation present. Cardiovascular: Normal rate, regular rhythm, normal heart sounds and intact distal pulses. Exam reveals no friction rub. No murmur heard. Pulmonary/Chest: Effort normal and breath sounds normal. No stridor. No respiratory distress. She has no wheezes. She has no rales. Abdominal: Soft. Bowel sounds are normal. She exhibits no distension. There is no tenderness. There is no rebound. Musculoskeletal: Normal range of motion. She exhibits no tenderness. Neurological: She is alert. She has normal strength. No cranial nerve deficit or sensory deficit. Coordination and gait normal. GCS eye subscore is 4. GCS verbal subscore is 5. GCS motor subscore is 6. Skin: Skin is warm and dry. No rash noted. She is not diaphoretic. Psychiatric: She has a normal mood and affect. Her behavior is normal. Judgment and thought content normal. Her speech is delayed. Cognition and memory are normal.   Nursing note and vitals reviewed.         Diagnostic Study Results     Labs -     Recent Results (from the past 12 hour(s))   GLUCOSE, POC    Collection Time: 10/17/19  9:36 PM   Result Value Ref Range    Glucose (POC) 500 (H) 65 - 100 mg/dL    Performed by [facility]    URINALYSIS W/ REFLEX CULTURE    Collection Time: 10/17/19  9:47 PM   Result Value Ref Range    Color YELLOW/STRAW      Appearance CLEAR CLEAR      Specific gravity 1.025 1.003 - 1.030      pH (UA) 6.5 5.0 - 8.0      Protein 100 (A) NEG mg/dL    Glucose >1,000 (A) NEG mg/dL    Ketone NEGATIVE  NEG mg/dL    Bilirubin NEGATIVE  NEG      Blood TRACE (A) NEG      Urobilinogen 0.2 0.2 - 1.0 EU/dL    Nitrites NEGATIVE  NEG      Leukocyte Esterase NEGATIVE  NEG      WBC 0-4 0 - 4 /hpf    RBC 0-5 0 - 5 /hpf    Epithelial cells FEW FEW /lpf    Bacteria NEGATIVE  NEG /hpf    UA:UC IF INDICATED CULTURE NOT INDICATED BY UA RESULT CNI      Hyaline cast 0-2 0 - 5 /lpf   CBC WITH AUTOMATED DIFF    Collection Time: 10/17/19 10:02 PM   Result Value Ref Range    WBC 10.6 3.6 - 11.0 K/uL    RBC 5.28 (H) 3.80 - 5.20 M/uL    HGB 14.1 11.5 - 16.0 g/dL    HCT 46.0 35.0 - 47.0 %    MCV 87.1 80.0 - 99.0 FL    MCH 26.7 26.0 - 34.0 PG    MCHC 30.7 30.0 - 36.5 g/dL    RDW 16.6 (H) 11.5 - 14.5 %    PLATELET 279 211 - 200 K/uL    MPV 10.9 8.9 - 12.9 FL    NRBC 0.0 0  WBC    ABSOLUTE NRBC 0.00 0.00 - 0.01 K/uL    NEUTROPHILS 61 32 - 75 %    LYMPHOCYTES 32 12 - 49 %    MONOCYTES 6 5 - 13 %    EOSINOPHILS 1 0 - 7 %    BASOPHILS 0 0 - 1 %    IMMATURE GRANULOCYTES 0 0.0 - 0.5 %    ABS. NEUTROPHILS 6.5 1.8 - 8.0 K/UL    ABS. LYMPHOCYTES 3.4 0.8 - 3.5 K/UL    ABS. MONOCYTES 0.6 0.0 - 1.0 K/UL    ABS. EOSINOPHILS 0.1 0.0 - 0.4 K/UL    ABS. BASOPHILS 0.0 0.0 - 0.1 K/UL    ABS. IMM. GRANS. 0.0 0.00 - 0.04 K/UL    DF AUTOMATED     LACTIC ACID    Collection Time: 10/17/19 10:02 PM   Result Value Ref Range    Lactic acid 2.7 (HH) 0.4 - 2.0 MMOL/L   GLUCOSE, POC    Collection Time: 10/17/19 10:34 PM   Result Value Ref Range    Glucose (POC) 401 (H) 65 - 100 mg/dL    [facility]    GLUCOSE, POC    Collection Time: 10/17/19 11:17 PM   Result Value Ref Range    Glucose (POC) 297 (H) 65 - 100 mg/dL    [facility]    METABOLIC PANEL, COMPREHENSIVE    Collection Time: 10/18/19 12:28 AM   Result Value Ref Range    Sodium 144 136 - 145 mmol/L    Potassium 3.7 3.5 - 5.1 mmol/L    Chloride 108 97 - 108 mmol/L    CO2 27 21 - 32 mmol/L    Anion gap 9 5 - 15 mmol/L    Glucose 315 (H) 65 - 100 mg/dL    BUN 18 6 - 20 MG/DL    Creatinine 1.26 (H) 0.55 - 1.02 MG/DL    BUN/Creatinine ratio 14 12 - 20      GFR est AA 52 (L) >60 ml/min/1.73m2    GFR est non-AA 43 (L) >60 ml/min/1.73m2    Calcium 10.0 8.5 - 10.1 MG/DL    Bilirubin, total 0.3 0.2 - 1.0 MG/DL    ALT (SGPT) 18 12 - 78 U/L    AST (SGOT) 4 (L) 15 - 37 U/L    Alk.  phosphatase 150 (H) 45 - 117 U/L    Protein, total 8.7 (H) 6.4 - 8.2 g/dL    Albumin 3.9 3.5 - 5.0 g/dL    Globulin 4.8 (H) 2.0 - 4.0 g/dL    A-G Ratio 0.8 (L) 1.1 - 2.2         Radiologic Studies -   No orders to display     CT Results  (Last 48 hours)    None        CXR Results  (Last 48 hours)    None            Medical Decision Making   I am the first provider for this patient. I reviewed the vital signs, available nursing notes, past medical history, past surgical history, family history and social history. Vital Signs-Reviewed the patient's vital signs. Patient Vitals for the past 12 hrs:   Temp Pulse Resp BP SpO2   10/18/19 0043  (!) 102 16 155/84 99 %   10/17/19 2345  90 12 (!) 171/100 96 %   10/17/19 2245  89 10 153/88 97 %   10/17/19 2244  87 16 153/88 97 %   10/17/19 2219  93 16 (!) 178/98 96 %   10/17/19 2215  94 9 (!) 178/98 96 %   10/17/19 2155 97.8 °F (36.6 °C) 94 16 (!) 154/105 96 %       Pulse Oximetry Analysis - 99% on RA    Cardiac Monitor:   Rate: 89 bpm  Rhythm: nsr    Records Reviewed: Nursing Notes, Old Medical Records, Previous Radiology Studies and Previous Laboratory Studies    Provider Notes (Medical Decision Making):     DDX:  Hyperglycemia, DKA, dehydration, lecture light abnormality, UTI    Plan:  Labs, UA, IV fluids, insulin, Accu-Cheks    Impression:  hyperglycemia    ED Course:   Initial assessment performed. The patients presenting problems have been discussed, and they are in agreement with the care plan formulated and outlined with them. I have encouraged them to ask questions as they arise throughout their visit. I reviewed our electronic medical record system for any past medical records that were available that may contribute to the patients current condition, the nursing notes and and vital signs from today's visit    Nursing notes will be reviewed as they become available in realtime while the pt has been in the ED. Rob Guzman MD    I personally reviewed pt's imaging. Official read by radiology noted above.   Rob Guzman MD    PROGRESS NOTE  7135 PM  Per nursing staff, sonam was contacted to ensure patient's baseline mental status. Per nursing niece reported patient has ongoing issues with repetitive and altered speech due to previous strokes in the past.  Niece denied concerns for altered behavior prior to EMS to bring patient to the department. Everette Kim MD      1:37 AM  Progress note:  Pt noted to be feeling better, continues to have no complaints, able to answer questions appropriately,  ready for discharge. Discussed lab findings with pt specifically noting no DKA. Pt will follow up with PCP as instructed. All questions have been answered, pt voiced understanding and agreement with plan. Specific return precautions provided in addition to instructions for pt to return to the ED immediately should sx worsen at any time. Everette Kim MD           Critical Care Time:     none      Diagnosis     Clinical Impression:   1. Hyperglycemia        PLAN:  1. Current Discharge Medication List        2. Follow-up Information     Follow up With Specialties Details Why Contact Yara Nieves  Schedule an appointment as soon as possible for a visit in 2 days  [address] [place] [address]          Return to ED if worse     Disposition:  1:38 AM  The patient's results have been reviewed with family and/or caregiver. They verbally convey their understanding and agreement of the patient's signs, symptoms, diagnosis, treatment and prognosis and additionally agree to follow up as recommended in the discharge instructions or to return to the Emergency Room should the patient's condition change prior to their follow-up appointment. The family and/or caregiver verbally agrees with the care-plan and all of their questions have been answered.  The discharge instructions have also been provided to the them with educational information regarding the patient's diagnosis as well a list of reasons why the patient would want to return to the ER prior to their follow-up appointment should their condition change. Timmy Villafana MD            This note will not be viewable in [address]

## 2019-10-18 NOTE — ED NOTES
Assumed care of patient at this time. Received report from [place], [facility].

## 2019-10-18 NOTE — ED NOTES
Niece states aunt is doing better than she was at home. Patient cannot answer any orientation questions since as month or age per NIHSS. Patient cannot identify what was occurring in the picture. Could only identify chair, key, and feather on NIHSS.   She could state phrases by staff verbalizing phrases first.  Slight slurring noted in speech but unknown baseline since niece left soon after arrival.

## 2019-10-18 NOTE — PROGRESS NOTES
Speech Pathology bedside swallow evaluation/discharge  Patient: Bette Sanchez (48 y.o. female)  Date: 10/18/2019  Primary Diagnosis: ICH (intracerebral hemorrhage) ([facility]) [I61.9]  Intracranial hemorrhage ([facility] [address] [I62.9]       Precautions: n/a       ASSESSMENT :  Based on the objective data described below, the patient presents with functional oropharyngeal phases of swallow. No overt difficulty appreciated, nor overt s/s of aspiration noted. Recommend pt continue on regular diet and thin liquids. Pt with somewhat decreased intelligibility, but suspect this is 2/2 to poor dentition over a dysarthria. Pt also with a baseline dementia. Therefore, no further acute SLP needs anticipated. Will sign-off. Please reconsult should SLP be of any assistance. PLAN :  Recommendations:  --regular diet/thin liquids  --general aspiration precautions  --SLP to sign-off  Discharge Recommendations: None     SUBJECTIVE:   Patient stated, \"I think I'm good. OBJECTIVE:     Past Medical History:   Diagnosis Date    Diabetes ([facility]     Hypertension     Stroke [facility])      Past Surgical History:   Procedure Laterality Date    HX ORTHOPAEDIC  1996    back surgery    NEUROLOGICAL PROCEDURE UNLISTED  2008    shunt placement     Diet prior to admission: Regular diet/thin liquids  Current Diet:  Regular diet/thin liquids  Cognitive and Communication Status:  Neurologic State: Alert  Orientation Level: Oriented to person, Oriented to place  Cognition: Follows commands  Perception: Appears intact  Perseveration: No perseveration noted  Safety/Judgement: Lack of insight into deficits  Oral Assessment:  Oral Assessment  Labial: No impairment  Dentition: Limited;Natural  Oral Hygiene: oral mucosa moist and clear of secretions  Lingual: No impairment  Velum: No impairment  Mandible: No impairment  P.O. Trials:  Patient Position: upright in bed  Vocal quality prior to P.O.: No impairment  Consistency Presented: Solid; Thin liquid  How Presented: Self-fed/presented;Straw;Cup/sip     Bolus Acceptance: No impairment  Bolus Formation/Control: No impairment     Propulsion: No impairment  Oral Residue: None  Initiation of Swallow: No impairment  Laryngeal Elevation: Functional  Aspiration Signs/Symptoms: None  Pharyngeal Phase Characteristics: No impairment, issues, or problems                    NOMS:   The NOMS functional outcome measure was used to quantify this patient's level of swallowing impairment. Based on the NOMS, the patient was determined to be at level 7 for swallow function     NOMS Swallowing Levels:  Level 1 (CN): NPO  Level 2 (CM): NPO but takes consistency in therapy  Level 3 (CL): Takes less than 50% of nutrition p.o. and continues with nonoral feedings; and/or safe with mod cues; and/or max diet restriction  Level 4 (CK): Safe swallow but needs mod cues; and/or mod diet restriction; and/or still requires some nonoral feeding/supplements  Level 5 (CJ): Safe swallow with min diet restriction; and/or needs min cues  Level 6 (CI): Independent with p.o.; rare cues; usually self cues; may need to avoid some foods or needs extra time  Level 7 ([address] [place] [address]): Independent for all p.o.  BRIANDA. (2003). National Outcomes Measurement System (NOMS): Adult Speech-Language Pathology User's Guide. Pain:  Pain Scale 1: Numeric (0 - 10)  Pain Intensity 1: 0     After treatment:   [] Patient left in no apparent distress sitting up in chair  [] Patient left in no apparent distress in bed  [x] Call bell left within reach  [x] Nursing notified  [] Caregiver present  [] Bed alarm activated    COMMUNICATION/EDUCATION:   The patients plan of care including findings, recommendations, and recommended diet changes were discussed with: Registered Nurse.    [] Posted safety precautions in patient's room. [x] Patient/family have participated as able and agree with findings and recommendations.   [] Patient is unable to participate in plan of care at this time.    Thank you for this referral.  Kirk Bhat, SLP  Time Calculation: 10 mins

## 2019-10-18 NOTE — PROGRESS NOTES
SLP Contact Note    SLP evaluation complete. Pt cleared for regular diet/thin liquids. Full evaluation note to follow.       Thank you,  PATRICK IsabelEd, [id number] [facility]  Speech-Language Pathologist

## 2019-10-18 NOTE — ED TRIAGE NOTES
Niece concerned with her mother's gait and she was picking at [employer] and could not know where her room was. Last time baseline 1800 10/17/2019. Niece left immediately after receiving work and school notes.

## 2019-10-18 NOTE — H&P
History & Physical    Date of admission: 10/18/2019    Patient name: Danay Polo  MRN: [id number]  YOB: 1955  Age: 62 y.o. Primary care provider:  Herberth Coppola     Source of Information: medical records. Chief complain: confusion    History of present illness  Danay Polo is a 62 y.o. female with a history of cerebral aneurysm and shunt in 2007 as well as multiple infarts in the past who presents with confusion and mental status changes. The patient has dementia at baseline and is a poor historian and history was obtained from review of medical records. She presented to [facility] for hyperglyemia. She was later discharged, but presented again with the above complaint of confusion and disorientation as well as abnormal gait. Ct scan done showed an acute right parietal intracranial hematoma as described with midline shift, probable small right subdural hematoma. She was placed on nicardipine drip for better blood pressure management. She was evaluated by neurosurgery and they recommended to continue conservative management and no surgical intervention at this time. The patient at this time looks comfortable, and has no complaints at this time. Past Medical History:   Diagnosis Date    Diabetes ([address]     Hypertension     Stroke [facility])       Past Surgical History:   Procedure Laterality Date    HX ORTHOPAEDIC  1996    back surgery    NEUROLOGICAL PROCEDURE UNLISTED  2008    shunt placement     Prior to Admission medications    Medication Sig Start Date End Date Taking? Authorizing Provider   aspirin delayed-release 81 mg tablet Take 1 Tab by mouth daily. Start taking 4/12/2017 4/4/17   Salty Lang MD   ferrous sulfate 325 mg (65 mg iron) tablet Take 1 Tab by mouth Daily (before breakfast).  4/4/17   Salty Lang MD   ascorbic acid, vitamin C, (VITAMIN C) 1,000 mg tablet Take 1 Tab by mouth daily. 4/4/17   Marika Mitchell MD   folic acid (FOLVITE) 1 mg tablet Take 1 Tab by mouth daily. 4/4/17   Marika Mitchell MD   penicillin v potassium (VEETID) 500 mg tablet Take 1 Tab by mouth four (4) times daily. 12/28/16   Deana Jewell MD   HYDROcodone-acetaminophen [facility]) 5-325 mg per tablet Take 1 Tab by mouth every four (4) hours as needed for Pain. Max Daily Amount: 6 Tabs. 12/28/16   Deana Jewell MD   lisinopril (PRINIVIL, ZESTRIL) 5 mg tablet Take 1 Tab by mouth daily. 11/24/16   Denise Joe MD     No Known Allergies   No family history on file. Family history reviewed not obtained due to patient confusion. Social history  Patient resides    Independently    x  With family care      Assisted living      SNF    Ambulates  x  Independently      With cane       Assisted walker         Alcohol history   x  None     Social     Chronic   Smoking history  x  None     Former smoker     Current smoker     Social History     Tobacco Use   Smoking Status Never Smoker   Smokeless Tobacco Never Used       Code status    Full code     DNR/DNI   x     Code status discussed with the patient/caregivers. Prior    Review of systems    A comprehensive review of systems was negative except for that written in the History of Present Illness. Physical Examination   Visit Vitals  /64   Pulse (!) 102   Temp 98.6 °F (37 °C)   Resp 16   SpO2 99%          O2 Device: Room air    General:  Alert, cooperative, no distress   Head:  Normocephalic, without obvious abnormality, atraumatic   Eyes:  Conjunctivae/corneas clear. PERRL, EOMs intact   E/N/M/T: Nares normal. Septum midline.  No nasal drainage or sinus tenderness  Lips, mucosa, and tongue normal   Teeth and gums normal  Clear oropharynx   Neck: Normal appearance and movements, symmetrical, trachea midline  No palpable adenopathy  No thyroid enlargement, tenderness or nodules  No carotid bruit   Normal JVP Lungs:   Symmetrical chest expansion and respiratory effort  Clear to auscultation bilaterally   Chest wall:  No tenderness or deformity   Heart:  Regular rhythm   Sounds normal; no murmur, click, rub or gallop   Abdomen:   Soft, no tenderness  Bowel sounds normal  No masses or hepatosplenomegaly  No hernias present   Extremities: Extremities normal, atraumatic  No cyanosis or edema  No DVT signs   Pulses 2+ and symmetric all extremities   Skin: No rashes or ulcers   Musculo-      skeletal: Gait not tested  Normal symmetry, ROM, strength and tone   Psych: Alert           Data Review      24 Hour Results:  Recent Results (from the past 24 hour(s))   GLUCOSE, POC    Collection Time: 10/17/19  9:36 PM   Result Value Ref Range    Glucose (POC) 500 (H) 65 - 100 mg/dL    Performed by Deonte Donnelly [facility]    URINALYSIS W/ REFLEX CULTURE    Collection Time: 10/17/19  9:47 PM   Result Value Ref Range    Color YELLOW/STRAW      Appearance CLEAR CLEAR      Specific gravity 1.025 1.003 - 1.030      pH (UA) 6.5 5.0 - 8.0      Protein 100 (A) NEG mg/dL    Glucose >1,000 (A) NEG mg/dL    Ketone NEGATIVE  NEG mg/dL    Bilirubin NEGATIVE  NEG      Blood TRACE (A) NEG      Urobilinogen 0.2 0.2 - 1.0 EU/dL    Nitrites NEGATIVE  NEG      Leukocyte Esterase NEGATIVE  NEG      WBC 0-4 0 - 4 /hpf    RBC 0-5 0 - 5 /hpf    Epithelial cells FEW FEW /lpf    Bacteria NEGATIVE  NEG /hpf    UA:UC IF INDICATED CULTURE NOT INDICATED BY UA RESULT CNI      Hyaline cast 0-2 0 - 5 /lpf   CBC WITH AUTOMATED DIFF    Collection Time: 10/17/19 10:02 PM   Result Value Ref Range    WBC 10.6 3.6 - 11.0 K/uL    RBC 5.28 (H) 3.80 - 5.20 M/uL    HGB 14.1 11.5 - 16.0 g/dL    HCT 46.0 35.0 - 47.0 %    MCV 87.1 80.0 - 99.0 FL    MCH 26.7 26.0 - 34.0 PG    MCHC 30.7 30.0 - 36.5 g/dL    RDW 16.6 (H) 11.5 - 14.5 %    PLATELET 948 720 - 024 K/uL    MPV 10.9 8.9 - 12.9 FL    NRBC 0.0 0  WBC    ABSOLUTE NRBC 0.00 0.00 - 0.01 K/uL    NEUTROPHILS 61 32 - 75 % LYMPHOCYTES 32 12 - 49 %    MONOCYTES 6 5 - 13 %    EOSINOPHILS 1 0 - 7 %    BASOPHILS 0 0 - 1 %    IMMATURE GRANULOCYTES 0 0.0 - 0.5 %    ABS. NEUTROPHILS 6.5 1.8 - 8.0 K/UL    ABS. LYMPHOCYTES 3.4 0.8 - 3.5 K/UL    ABS. MONOCYTES 0.6 0.0 - 1.0 K/UL    ABS. EOSINOPHILS 0.1 0.0 - 0.4 K/UL    ABS. BASOPHILS 0.0 0.0 - 0.1 K/UL    ABS. IMM. GRANS. 0.0 0.00 - 0.04 K/UL    DF AUTOMATED     LACTIC ACID    Collection Time: 10/17/19 10:02 PM   Result Value Ref Range    Lactic acid 2.7 (HH) 0.4 - 2.0 MMOL/L   GLUCOSE, POC    Collection Time: 10/17/19 10:34 PM   Result Value Ref Range    Glucose (POC) 401 (H) 65 - 100 mg/dL    Performed by [facility]    GLUCOSE, POC    Collection Time: 10/17/19 11:17 PM   Result Value Ref Range    Glucose (POC) 297 (H) 65 - 100 mg/dL    Performed by [facility]    METABOLIC PANEL, COMPREHENSIVE    Collection Time: 10/18/19 12:28 AM   Result Value Ref Range    Sodium 144 136 - 145 mmol/L    Potassium 3.7 3.5 - 5.1 mmol/L    Chloride 108 97 - 108 mmol/L    CO2 27 21 - 32 mmol/L    Anion gap 9 5 - 15 mmol/L    Glucose 315 (H) 65 - 100 mg/dL    BUN 18 6 - 20 MG/DL    Creatinine 1.26 (H) 0.55 - 1.02 MG/DL    BUN/Creatinine ratio 14 12 - 20      GFR est AA 52 (L) >60 ml/min/1.73m2    GFR est non-AA 43 (L) >60 ml/min/1.73m2    Calcium 10.0 8.5 - 10.1 MG/DL    Bilirubin, total 0.3 0.2 - 1.0 MG/DL    ALT (SGPT) 18 12 - 78 U/L    AST (SGOT) 4 (L) 15 - 37 U/L    Alk.  phosphatase 150 (H) 45 - 117 U/L    Protein, total 8.7 (H) 6.4 - 8.2 g/dL    Albumin 3.9 3.5 - 5.0 g/dL    Globulin 4.8 (H) 2.0 - 4.0 g/dL    A-G Ratio 0.8 (L) 1.1 - 2.2     EKG, 12 LEAD, INITIAL    Collection Time: 10/18/19  4:14 AM   Result Value Ref Range    Ventricular Rate 84 BPM    Atrial Rate 84 BPM    P-R Interval 144 ms    QRS Duration 82 ms    Q-T Interval 388 ms    QTC Calculation (Bezet) 458 ms    Calculated P Axis 64 degrees    Calculated R Axis -31 degrees    Calculated T Axis 54 degrees    Diagnosis Normal sinus rhythm  Left axis deviation  When compared with ECG of 22-DEC-2017 13:09,  No significant change was found     SAMPLES BEING HELD    Collection Time: 10/18/19  4:17 AM   Result Value Ref Range    SAMPLES BEING HELD RD PST     COMMENT        Add-on orders for these samples will be processed based on acceptable specimen integrity and analyte stability, which may vary by analyte. PROTHROMBIN TIME + INR    Collection Time: 10/18/19  4:17 AM   Result Value Ref Range    INR 1.0 0.9 - 1.1      Prothrombin time 10.3 9.0 - 11.1 sec   CBC WITH AUTOMATED DIFF    Collection Time: 10/18/19  4:17 AM   Result Value Ref Range    WBC 12.4 (H) 3.6 - 11.0 K/uL    RBC 5.31 (H) 3.80 - 5.20 M/uL    HGB 13.8 11.5 - 16.0 g/dL    HCT 46.8 35.0 - 47.0 %    MCV 88.1 80.0 - 99.0 FL    MCH 26.0 26.0 - 34.0 PG    MCHC 29.5 (L) 30.0 - 36.5 g/dL    RDW 16.7 (H) 11.5 - 14.5 %    PLATELET 875 878 - 221 K/uL    MPV 11.5 8.9 - 12.9 FL    NRBC 0.0 0  WBC    ABSOLUTE NRBC 0.00 0.00 - 0.01 K/uL    NEUTROPHILS 74 32 - 75 %    LYMPHOCYTES 20 12 - 49 %    MONOCYTES 5 5 - 13 %    EOSINOPHILS 1 0 - 7 %    BASOPHILS 0 0 - 1 %    IMMATURE GRANULOCYTES 0 0.0 - 0.5 %    ABS. NEUTROPHILS 9.2 (H) 1.8 - 8.0 K/UL    ABS. LYMPHOCYTES 2.5 0.8 - 3.5 K/UL    ABS. MONOCYTES 0.6 0.0 - 1.0 K/UL    ABS. EOSINOPHILS 0.1 0.0 - 0.4 K/UL    ABS. BASOPHILS 0.0 0.0 - 0.1 K/UL    ABS. IMM.  GRANS. 0.0 0.00 - 0.04 K/UL    DF AUTOMATED     SALICYLATE    Collection Time: 10/18/19  4:17 AM   Result Value Ref Range    Salicylate level <5.5 (L) 2.8 - 20.0 MG/DL   URINALYSIS W/ REFLEX CULTURE    Collection Time: 10/18/19  5:16 AM   Result Value Ref Range    Color YELLOW/STRAW      Appearance CLEAR CLEAR      Specific gravity 1.028 1.003 - 1.030      pH (UA) 6.0 5.0 - 8.0      Protein 30 (A) NEG mg/dL    Glucose >1,000 (A) NEG mg/dL    Ketone TRACE (A) NEG mg/dL    Bilirubin NEGATIVE  NEG      Blood NEGATIVE  NEG      Urobilinogen 1.0 0.2 - 1.0 EU/dL    Nitrites NEGATIVE  NEG      Leukocyte Esterase NEGATIVE  NEG      WBC 0-4 0 - 4 /hpf    RBC 0-5 0 - 5 /hpf    Epithelial cells FEW FEW /lpf    Bacteria NEGATIVE  NEG /hpf    UA:UC IF INDICATED CULTURE NOT INDICATED BY UA RESULT CNI      Hyaline cast 0-2 0 - 5 /lpf   ASPIRIN TEST    Collection Time: 10/18/19  6:26 AM   Result Value Ref Range    Aspirin test 415 ARU   SAMPLES BEING HELD    Collection Time: 10/18/19  6:26 AM   Result Value Ref Range    SAMPLES BEING HELD 1LAV 1PST 1BLUE 1RED     COMMENT        Add-on orders for these samples will be processed based on acceptable specimen integrity and analyte stability, which may vary by analyte. Recent Labs     10/18/19  0417 10/17/19  2202   WBC 12.4* 10.6   HGB 13.8 14.1   HCT 46.8 46.0    219     Recent Labs     10/18/19  0417 10/18/19  0028   NA  --  144   K  --  3.7   CL  --  108   CO2  --  27   GLU  --  315*   BUN  --  18   CREA  --  1.26*   CA  --  10.0   ALB  --  3.9   TBILI  --  0.3   SGOT  --  4*   ALT  --  18   INR 1.0  --        Imaging  Ct Head Wo Cont    Result Date: 10/18/2019  EXAM: CT HEAD WO CONT INDICATION: Confusion/delirium, altered LOC, unexplained; ams COMPARISON: 12/28/2016. CONTRAST: None. TECHNIQUE: Unenhanced CT of the head was performed using 5 mm images. Brain and bone windows were generated. CT dose reduction was achieved through use of a standardized protocol tailored for this examination and automatic exposure control for dose modulation. FINDINGS: There is a left frontal ventriculostomy tube in place which terminates in the region of the midline. In the right parietal lobe, there is a new intracranial hyperdense bleed that measures 2.3 x 3.7 x 3.9 cm (axial image 20 and coronal image 47) = 16.6 mL. This has a surrounding small hypodense area. In addition, there is a small L shaped hyperdense area along the falx that measures up to 4.2 mm in width (coronal image 58 and axial image 20).  There is a bowing of the falx from right to left of 4.3 mm. Periventricular white matter hypodensity is seen. IMPRESSION: Acute right parietal intracranial hematoma as described with midline shift Probable small right subdural hematoma Underlying mild white matter disease This result was verbally relayed by me to Dr. Adelita Ruth at [phone] hours. [id number]         Assessment and Plan   Acute intracranial hemorrhage with midline shift  -neurosurgery consulted, no surgical intervention  -on cardene gtt for better blood pressure control  -frequent neurochecks  -hold aspirin    Hypertension  -on cardene gtt  -cont to monitor  -will transition to oral medications   -cont lisinopril    Type 2 diabetes  -check A1C  -sliding scale insulin  -hold metformin      Diet: diabetic  Activity: OOB with assist  DVT prophylaxis: SCD       Signed by:  Tia Deras MD    October 18, 2019 at 7:45 AM

## 2019-10-18 NOTE — DISCHARGE INSTRUCTIONS
Patient Education        Learning About High Blood Sugar  What is high blood sugar? Your body turns the food you eat into glucose (sugar), which it uses for energy. But if your body isn't able to use the sugar right away, it can build up in your blood and lead to high blood sugar. When the amount of sugar in your blood stays too high for too much of the time, you may have diabetes. Diabetes is a disease that can cause serious health problems. The good news is that lifestyle changes may help you get your blood sugar back to normal and avoid or delay diabetes. What causes high blood sugar? Sugar (glucose) can build up in your blood if you:  · Are overweight. · Have a family history of diabetes. · Take certain medicines, such as steroids. What are the symptoms? Having high blood sugar may not cause any symptoms at all. Or it may make you feel very thirsty or very hungry. You may also urinate more often than usual, have blurry vision, or lose weight without trying. How is high blood sugar treated? You can take steps to lower your blood sugar level if you understand what makes it get higher. Your doctor may want you to learn how to test your blood sugar level at home. Then you can see how illness, stress, or different kinds of food or medicine raise or lower your blood sugar level. Other tests may be needed to see if you have diabetes. How can you prevent high blood sugar? · Watch your weight. If you're overweight, losing just a small amount of weight may help. Reducing fat around your waist is most important. · Limit the amount of calories, sweets, and unhealthy fat you eat. Ask your doctor if a dietitian can help you. A registered dietitian can help you create meal plans that fit your lifestyle. · Get at least 30 minutes of exercise on most days of the week. Exercise helps control your blood sugar. It also helps you maintain a healthy weight. Walking is a good choice.  You also may want to do other activities, such as running, swimming, cycling, or playing tennis or team sports. · If your doctor prescribed medicines, take them exactly as prescribed. Call your doctor if you think you are having a problem with your medicine. You will get more details on the specific medicines your doctor prescribes. Follow-up care is a key part of your treatment and safety. Be sure to make and go to all appointments, and call your doctor if you are having problems. It's also a good idea to know your test results and keep a list of the medicines you take. Where can you learn more? Go to http://oziel-michael.info/. Enter O108 in the search box to learn more about \"Learning About High Blood Sugar. \"  Current as of: April 16, 2019  Content Version: 12.2  © 6674-4004 [employer]. Care instructions adapted under license by [employer] (which disclaims liability or warranty for this information). If you have questions about a medical condition or this instruction, always ask your healthcare professional. [address] any warranty or liability for your use of this information.

## 2019-10-18 NOTE — CONSULTS
Neurosurgery     Subjective:     Delmar Concepcion is a 62 y.o.  female who presents with confusion, elevated BP, DKA. The patient presented to the [facility] ER yesterday after her blood sugar levels at home were elevated in the 300-400 range. Upon presentation to the ER, she was 590. Her niece reports that she is not on insulin at home and was taken off of her Metformin. The niece gave her a dose of Metformin before going to the ER. The patient was then discharged back home after her blood sugars came down into the 300 range. She reportedly dressed herself in the ER and was able to transfer herself to a wheelchair. The patient's niece said when she got home, the patient was confused and off balance. She called the ER and the patient returned. A Head CT was completed and revealed an ICH in the right parietal lobe. The patient had elevated blood pressures in the ER with her systolics in the 053B. The patient transferred to the [facility] ER. She remains confused but pleasant in the ER. Never complained of headache per the niece. The niece reports she gives the patient her BP meds everyday. The patient is on a baby aspirin daily for stroke prevention. Of note, the patient has a history of an aneurysm rupture treated at [facility] in 2007. She had a  shunt placed during that admission as well for hydrocephalus due to her SAH. Past Medical History:   Diagnosis Date    Diabetes ([address]     Hypertension     Stroke [facility])       Past Surgical History:   Procedure Laterality Date    HX ORTHOPAEDIC  1996    back surgery    NEUROLOGICAL PROCEDURE UNLISTED  2008    shunt placement     No family history on file. Social History     Tobacco Use    Smoking status: Never Smoker    Smokeless tobacco: Never Used   Substance Use Topics    Alcohol use: No       Prior to Admission medications    Medication Sig Start Date End Date Taking? Authorizing Provider   lisinopril (PRINIVIL, ZESTRIL) 20 mg tablet Take 20 mg by mouth daily.    Yes Provider, Historical   aspirin delayed-release 81 mg tablet Take 1 Tab by mouth daily. Start taking 4/12/2017 4/4/17   Gillian Strong MD   ferrous sulfate 325 mg (65 mg iron) tablet Take 1 Tab by mouth Daily (before breakfast). 4/4/17   Gillian Strong MD   ascorbic acid, vitamin C, (VITAMIN C) 1,000 mg tablet Take 1 Tab by mouth daily. 4/4/17   Gillian Strong MD   folic acid (FOLVITE) 1 mg tablet Take 1 Tab by mouth daily. 4/4/17   Gillian Strong MD     No Known Allergies     Review of Systems:  ROS negative except for what is mentioned in the HPI. Objective:       Physical Exam:     Gen: Pt is A&OX3, NAD  Blood pressure 133/85, pulse 93, temperature 98.2 °F (36.8 °C), resp. rate 16, SpO2 95 %. Awake, alert. Oriented to self. Able to tell me she is in a hospital, but not which one. She is unable to tell me the city or state. Cannot tell me the year. PERRL. EOMI. Face symmetric. Tongue midline. PEDROZA spontaneously. LLE slightly weaker than RLE. Negative drift. Gait deferred. Sensation intact    CT head without contrast on 10/18/19 shows acute right parietal intracranial hematoma as described with midline shift Probable small right subdural hematoma.  Underlying mild white matter disease      Data Review:   Recent Results (from the past 24 hour(s))   GLUCOSE, POC    Collection Time: 10/17/19  9:36 PM   Result Value Ref Range    Glucose (POC) 500 (H) 65 - 100 mg/dL    Performed by [facility]oe [facility]    URINALYSIS W/ REFLEX CULTURE    Collection Time: 10/17/19  9:47 PM   Result Value Ref Range    Color YELLOW/STRAW      Appearance CLEAR CLEAR      Specific gravity 1.025 1.003 - 1.030      pH (UA) 6.5 5.0 - 8.0      Protein 100 (A) NEG mg/dL    Glucose >1,000 (A) NEG mg/dL    Ketone NEGATIVE  NEG mg/dL    Bilirubin NEGATIVE  NEG      Blood TRACE (A) NEG      Urobilinogen 0.2 0.2 - 1.0 EU/dL    Nitrites NEGATIVE  NEG      Leukocyte Esterase NEGATIVE  NEG      WBC 0-4 0 - 4 /hpf    RBC 0-5 0 - 5 /hpf    Epithelial cells FEW FEW /lpf    Bacteria NEGATIVE  NEG /hpf    UA:UC IF INDICATED CULTURE NOT INDICATED BY UA RESULT CNI      Hyaline cast 0-2 0 - 5 /lpf   CBC WITH AUTOMATED DIFF    Collection Time: 10/17/19 10:02 PM   Result Value Ref Range    WBC 10.6 3.6 - 11.0 K/uL    RBC 5.28 (H) 3.80 - 5.20 M/uL    HGB 14.1 11.5 - 16.0 g/dL    HCT 46.0 35.0 - 47.0 %    MCV 87.1 80.0 - 99.0 FL    MCH 26.7 26.0 - 34.0 PG    MCHC 30.7 30.0 - 36.5 g/dL    RDW 16.6 (H) 11.5 - 14.5 %    PLATELET 784 663 - 699 K/uL    MPV 10.9 8.9 - 12.9 FL    NRBC 0.0 0  WBC    ABSOLUTE NRBC 0.00 0.00 - 0.01 K/uL    NEUTROPHILS 61 32 - 75 %    LYMPHOCYTES 32 12 - 49 %    MONOCYTES 6 5 - 13 %    EOSINOPHILS 1 0 - 7 %    BASOPHILS 0 0 - 1 %    IMMATURE GRANULOCYTES 0 0.0 - 0.5 %    ABS. NEUTROPHILS 6.5 1.8 - 8.0 K/UL    ABS. LYMPHOCYTES 3.4 0.8 - 3.5 K/UL    ABS. MONOCYTES 0.6 0.0 - 1.0 K/UL    ABS. EOSINOPHILS 0.1 0.0 - 0.4 K/UL    ABS. BASOPHILS 0.0 0.0 - 0.1 K/UL    ABS. IMM.  GRANS. 0.0 0.00 - 0.04 K/UL    DF AUTOMATED     LACTIC ACID    Collection Time: 10/17/19 10:02 PM   Result Value Ref Range    Lactic acid 2.7 (HH) 0.4 - 2.0 MMOL/L   GLUCOSE, POC    Collection Time: 10/17/19 10:34 PM   Result Value Ref Range    Glucose (POC) 401 (H) 65 - 100 mg/dL    [facility]    GLUCOSE, POC    Collection Time: 10/17/19 11:17 PM   Result Value Ref Range    Glucose (POC) 297 (H) 65 - 100 mg/dL    Performed by [facility]    METABOLIC PANEL, COMPREHENSIVE    Collection Time: 10/18/19 12:28 AM   Result Value Ref Range    Sodium 144 136 - 145 mmol/L    Potassium 3.7 3.5 - 5.1 mmol/L    Chloride 108 97 - 108 mmol/L    CO2 27 21 - 32 mmol/L    Anion gap 9 5 - 15 mmol/L    Glucose 315 (H) 65 - 100 mg/dL    BUN 18 6 - 20 MG/DL    Creatinine 1.26 (H) 0.55 - 1.02 MG/DL    BUN/Creatinine ratio 14 12 - 20      GFR est AA 52 (L) >60 ml/min/1.73m2    GFR est non-AA 43 (L) >60 ml/min/1.73m2    Calcium 10.0 8.5 - 10.1 MG/DL    Bilirubin, total 0.3 0.2 - 1.0 MG/DL    ALT (SGPT) 18 12 - 78 U/L    AST (SGOT) 4 (L) 15 - 37 U/L    Alk. phosphatase 150 (H) 45 - 117 U/L    Protein, total 8.7 (H) 6.4 - 8.2 g/dL    Albumin 3.9 3.5 - 5.0 g/dL    Globulin 4.8 (H) 2.0 - 4.0 g/dL    A-G Ratio 0.8 (L) 1.1 - 2.2     GLUCOSE, POC    Collection Time: 10/18/19  4:00 AM   Result Value Ref Range    Glucose (POC) 296 (H) 65 - 100 mg/dL    Performed by Carolyn Crowley (RN)    [address]    Collection Time: 10/18/19  4:12 AM   Result Value Ref Range    Calcium, ionized (POC) 1.20 1. 12 - 1.32 mmol/L    Sodium (POC) 146 (H) 136 - 145 mmol/L    Potassium (POC) 3.9 3.5 - 5.1 mmol/L    Chloride (POC) 105 98 - 107 mmol/L    CO2 (POC) 32 21 - 32 mmol/L    Anion gap (POC) 14 10 - 20 mmol/L    Glucose (POC) 354 (H) 65 - 100 mg/dL    BUN (POC) 20 9 - 20 mg/dL    Creatinine (POC) 0.8 0.6 - 1.3 mg/dL    GFRAA, POC >60 >60 ml/min/1.73m2    GFRNA, POC >60 >60 ml/min/1.73m2    Hematocrit (POC) 47 35.0 - 47.0 %    Comment Comment Not Indicated. POC LACTIC ACID    Collection Time: 10/18/19  4:13 AM   Result Value Ref Range    Lactic Acid (POC) 1.78 0.40 - 2.00 mmol/L   EKG, 12 LEAD, INITIAL    Collection Time: 10/18/19  4:14 AM   Result Value Ref Range    Ventricular Rate 84 BPM    Atrial Rate 84 BPM    P-R Interval 144 ms    QRS Duration 82 ms    Q-T Interval 388 ms    QTC Calculation (Bezet) 458 ms    Calculated P Axis 64 degrees    Calculated R Axis -31 degrees    Calculated T Axis 54 degrees    Diagnosis       Normal sinus rhythm  Left axis deviation  When compared with ECG of 22-DEC-2017 13:09,  No significant change was found     SAMPLES BEING HELD    Collection Time: 10/18/19  4:17 AM   Result Value Ref Range    SAMPLES BEING HELD RD PST     COMMENT        Add-on orders for these samples will be processed based on acceptable specimen integrity and analyte stability, which may vary by analyte.    PROTHROMBIN TIME + INR    Collection Time: 10/18/19  4:17 AM Result Value Ref Range    INR 1.0 0.9 - 1.1      Prothrombin time 10.3 9.0 - 11.1 sec   CBC WITH AUTOMATED DIFF    Collection Time: 10/18/19  4:17 AM   Result Value Ref Range    WBC 12.4 (H) 3.6 - 11.0 K/uL    RBC 5.31 (H) 3.80 - 5.20 M/uL    HGB 13.8 11.5 - 16.0 g/dL    HCT 46.8 35.0 - 47.0 %    MCV 88.1 80.0 - 99.0 FL    MCH 26.0 26.0 - 34.0 PG    MCHC 29.5 (L) 30.0 - 36.5 g/dL    RDW 16.7 (H) 11.5 - 14.5 %    PLATELET 121 563 - 852 K/uL    MPV 11.5 8.9 - 12.9 FL    NRBC 0.0 0  WBC    ABSOLUTE NRBC 0.00 0.00 - 0.01 K/uL    NEUTROPHILS 74 32 - 75 %    LYMPHOCYTES 20 12 - 49 %    MONOCYTES 5 5 - 13 %    EOSINOPHILS 1 0 - 7 %    BASOPHILS 0 0 - 1 %    IMMATURE GRANULOCYTES 0 0.0 - 0.5 %    ABS. NEUTROPHILS 9.2 (H) 1.8 - 8.0 K/UL    ABS. LYMPHOCYTES 2.5 0.8 - 3.5 K/UL    ABS. MONOCYTES 0.6 0.0 - 1.0 K/UL    ABS. EOSINOPHILS 0.1 0.0 - 0.4 K/UL    ABS. BASOPHILS 0.0 0.0 - 0.1 K/UL    ABS. IMM.  GRANS. 0.0 0.00 - 0.04 K/UL    DF AUTOMATED     SALICYLATE    Collection Time: 10/18/19  4:17 AM   Result Value Ref Range    Salicylate level <4.2 (L) 2.8 - 20.0 MG/DL   URINALYSIS W/ REFLEX CULTURE    Collection Time: 10/18/19  5:16 AM   Result Value Ref Range    Color YELLOW/STRAW      Appearance CLEAR CLEAR      Specific gravity 1.028 1.003 - 1.030      pH (UA) 6.0 5.0 - 8.0      Protein 30 (A) NEG mg/dL    Glucose >1,000 (A) NEG mg/dL    Ketone TRACE (A) NEG mg/dL    Bilirubin NEGATIVE  NEG      Blood NEGATIVE  NEG      Urobilinogen 1.0 0.2 - 1.0 EU/dL    Nitrites NEGATIVE  NEG      Leukocyte Esterase NEGATIVE  NEG      WBC 0-4 0 - 4 /hpf    RBC 0-5 0 - 5 /hpf    Epithelial cells FEW FEW /lpf    Bacteria NEGATIVE  NEG /hpf    UA:UC IF INDICATED CULTURE NOT INDICATED BY UA RESULT CNI      Hyaline cast 0-2 0 - 5 /lpf   ASPIRIN TEST    Collection Time: 10/18/19  6:26 AM   Result Value Ref Range    Aspirin test 415 ARU   SAMPLES BEING HELD    Collection Time: 10/18/19  6:26 AM   Result Value Ref Range    SAMPLES BEING HELD 1LAV 1PST 1BLUE 1RED     COMMENT        Add-on orders for these samples will be processed based on acceptable specimen integrity and analyte stability, which may vary by analyte. GLUCOSE, POC    Collection Time: 10/18/19 11:20 AM   Result Value Ref Range    Glucose (POC) 391 (H) 65 - 100 mg/dL    Performed by Waldo Marie          Assessment:     Active Problems:    ICH (intracerebral hemorrhage) ([facility] [address] (10/18/2019)      Intracranial hemorrhage ([address] (10/18/2019)        Plan:   1. Right parietal ICH   - No surgical intervention at this time   - neuro checks   - Repeat head CT at 1200   - PT/OT evals   - good BP control  2. Brain compression with cerebral edema   - due to #1   - steroids not indicated   - plans as above  3. Acute metabolic encephalopathy superimposed on dementia   - Multi-factorial. Likely related to #1, 4, 5   - supportive care  4. DM type 2 with hyperglycemia   - Hospitalist to address   - SSI and accu-checks  5.  HTN   - SBP<140   - Cardene PRN   - Restart home meds as appropriate   - Hospitalist to follow    Plan d/w with Dr. Jonathan Blanc, ER nursesonam at bedside     Signed By: Gutierrez Lynne NP     October 18, 2019

## 2019-10-18 NOTE — ED NOTES
Dr. Murali Cruz has reviewed discharge instructions with the patient. The patient verbalized understanding. Patient discharged to waiting room in a wheelchair in no distress. Patient awaiting niece to pick her up. Nurse did call for niece transportation.

## 2019-10-18 NOTE — PROGRESS NOTES
Problem: Mobility Impaired (Adult and Pediatric)  Goal: *Acute Goals and Plan of Care (Insert Text)  Description    FUNCTIONAL STATUS PRIOR TO ADMISSION: Patient was independent and active without use of DME.    HOME SUPPORT PRIOR TO ADMISSION: The patient lived with her niece but did not require assist for mobility. Physical Therapy Goals  Initiated 10/18/2019  1. Patient will move from supine to sit and sit to supine , scoot up and down and roll side to side in bed with independence within 7 day(s). 2.  Patient will transfer from bed to chair and chair to bed with modified independence using the least restrictive device within 7 day(s). 3.  Patient will perform sit to stand with modified independence within 7 day(s). 4.  Patient will ambulate with independence for 150 feet with the least restrictive device within 7 day(s). 5.  Patient will ascend/descend 7 stairs with handrail(s) with modified independence within 7 day(s). 6.  Patient will improve Fiore Balance score by 7 points within 7 days. Outcome: Not Met     PHYSICAL THERAPY EVALUATION- NEURO POPULATION  Patient: Mitchel Castillo (22 y.o. female)  Date: 10/18/2019  Primary Diagnosis: ICH (intracerebral hemorrhage) ([facility]) [I61.9]  [facility] [I62.9]        Precautions: fall         ASSESSMENT  Based on the objective data described below, the patient presents with impaired balance, decreased attention to the left, gait impairment, and memory deficit (niece reports impairment is worse as compared with patient's baseline dementia) s/p right parietal intracranial hematoma and probable small right subdural hematoma. Patient's niece was present during this visit and voiced patient's gait and memory are not her baseline. Responses to questions asked were not always accurate with patient answering \"yes\" 100% of the time and giving inaccurate information (as verified by her niece).         Current Level of Function Impacting Discharge (mobility/balance): Assistance needed to walk to prevent falls    Functional Outcome Measure: The patient scored Total: 21/56 on the Fiore Balance Assessment which is indicative of high fall risk. Other factors to consider for discharge: Niece lives with her. Patient will benefit from skilled therapy intervention to address the above noted impairments. PLAN :  Recommendations and Planned Interventions: bed mobility training, transfer training, gait training, neuromuscular re-education, patient and family training/education and therapeutic activities      Frequency/Duration: Patient will be followed by physical therapy:  5 times a week to address goals. Recommendation for discharge: (in order for the patient to meet his/her long term goals)  Therapy up to 5 days/week in SNF setting or intensive home health therapy program pending caregiver availability    This discharge recommendation:  Has not yet been discussed the attending provider and/or case management    Equipment recommendations for successful discharge (if) home: TBD - none anticiipated          SUBJECTIVE:   Patient stated yes.     OBJECTIVE DATA SUMMARY:   HISTORY:    Past Medical History:   Diagnosis Date    Diabetes ([facility]     Hypertension     Stroke [facility])      Past Surgical History:   Procedure Laterality Date    HX ORTHOPAEDIC  1996    back surgery    NEUROLOGICAL PROCEDURE UNLISTED  2008    shunt placement       Home Situation  Home Environment: Private residence  # Steps to Enter: 7  Rails to Enter: Yes  Hand Rails : Bilateral  Wheelchair Ramp: No  One/Two Story Residence: One story  Living Alone: No  Support Systems: Family member(s)  Patient Expects to be Discharged to[de-identified] Unknown  Current DME Used/Available at Home: Grab bars  Tub or Shower Type: Tub/Shower combination    EXAMINATION/PRESENTATION/DECISION MAKING:   Critical Behavior:  Neurologic State: Alert  Orientation Level: Oriented to person, Disoriented to place, Disoriented to situation, Disoriented to time(disoriented to birth date)  Cognition: Decreased command following  Safety/Judgement: Awareness of environment, Lack of insight into deficits  Hearing: Auditory  Auditory Impairment: None    Range Of Motion:  AROM: Within functional limits                       Strength:    Strength: Generally decreased, functional                    Tone & Sensation:   Tone: Normal              Sensation: Intact(light touch BLE)               Coordination:  Coordination: Generally decreased, functional  Vision:   Tracking: Able to track stimulus in all quadrants w/o difficulty  Diplopia: No  Acuity: Able to read clock/calendar on wall without difficulty(uanble to read name badge - does not have glasses w/ her)  Corrective Lenses: Glasses  Functional Mobility:  Bed Mobility:  Rolling: Supervision  Supine to Sit: Supervision  Sit to Supine: Supervision  Scooting: Supervision  Transfers:  Sit to Stand: Contact guard assistance  Stand to Sit: Contact guard assistance                       Balance:   Sitting: Intact; Without support  Standing: Impaired; Without support  Standing - Static: Fair  Standing - Dynamic : Fair  Ambulation/Gait Training:  Distance (ft): 100 Feet (ft)     Ambulation - Level of Assistance: Minimal assistance;Assist x1;Contact guard assistance     Gait Description (WDL): Exceptions to WDL  Gait Abnormalities: Decreased step clearance; Path deviations              Speed/Melissa: Slow  Step Length: Left shortened;Right shortened        Interventions: Manual cues; Safety awareness training;Verbal cues; Tactile cues; Visual/Demos          Functional Measure  Fiore Balance Test:    Sitting to Standing: 3  Standing Unsupported: 3  Sitting with Back Unsupported: 4  Standing to Sittin  Transfers: 2  Standing Unsupported with Eyes Closed: 0  Standing Unsupported with Feet Together: 1  Reach Forward with Outstretched Arm: 1   Object: 0  Turn to Look Over Shoulders: 3  Turn 360 Degrees: 0  Alternate Foot on Step/Stool: 0  Standing Unsupported One Foot in Front: 0  Stand on One Le  Total: 21/56         56=Maximum possible score;   0-20=High fall risk  21-40=Moderate fall risk   41-56=Low fall risk        Physical Therapy Evaluation Charge Determination   History Examination Presentation Decision-Making   MEDIUM  Complexity : 1-2 comorbidities / personal factors will impact the outcome/ POC  LOW Complexity : 1-2 Standardized tests and measures addressing body structure, function, activity limitation and / or participation in recreation  MEDIUM Complexity : Evolving with changing characteristics  LOW Complexity : FOTO score of       Based on the above components, the patient evaluation is determined to be of the following complexity level: LOW     Pain Rating:  Voiced no pain    Activity Tolerance:   Fair  Please refer to the flowsheet for vital signs taken during this treatment. After treatment patient left in no apparent distress:   Supine in bed, Call bell within reach, Caregiver / family present and side rails x2     COMMUNICATION/EDUCATION:   The patients plan of care was discussed with: Registered Nurse. Fall prevention education was provided and the patient/caregiver indicated understanding., Patient/family have participated as able in goal setting and plan of care. and Patient/family agree to work toward stated goals and plan of care.     Thank you for this referral.  Jayshree Howard, PT   Time Calculation: 46 mins

## 2019-10-18 NOTE — CONSULTS
60yo with history of cerebral aneurysm and shunt in 2007, and history of multiple infarcts presented early this am to ED with complaints of mental status changes. She was seen in ED yesterday evening for hyperglycemia after eating birthday cake. Workup this am included a CT scan that showed right parietal ICH. She does have HTN and takes a baby ASA daily. She was transferred to [facility] for further care. No surgical intervention at this time. Agree with admission to ICU for hourly neuro checks, BP control for SBP<140, hold aspirin and all other anticoagulants, repeat CT noon or sooner if neurologic decline. Will follow with you. Thank you for this consultation.

## 2019-10-18 NOTE — ED NOTES
Gave bedside report regarding, SBAR, MAR, and plan of care to [facility], RN. Transfered care of patient to RN.

## 2019-10-18 NOTE — ED NOTES
Critical Transport Team at bedside. Patient transferred to their care. Patient en route to [facility] for continuance of care.

## 2019-10-18 NOTE — ED PROVIDER NOTES
Patient is a 61-year-old female recently diagnosed with a intraparenchymal hemorrhage who was transferred from [facility] [address]  EMS reports no issues during transport. She is on a nicardipine drip with systolic blood pressures below 140s, however, she did receive 10 mg of labetalol about 45 minutes ago. Patient reports no headache or pain anywhere only mild confusion. Labs, imaging and H&P from previous ED visit reviewed. Past Medical History:   Diagnosis Date    Diabetes ([facility] [address]     Hypertension     Stroke [facility])        Past Surgical History:   Procedure Laterality Date    HX ORTHOPAEDIC  1996    back surgery    NEUROLOGICAL PROCEDURE UNLISTED  2008    shunt placement         No family history on file.     Social History     Socioeconomic History    Marital status: SINGLE     Spouse name: Not on file    Number of children: Not on file    Years of education: Not on file    Highest education level: Not on file   Occupational History    Not on file   Social Needs    Financial resource strain: Not on file    Food insecurity:     Worry: Not on file     Inability: Not on file    Transportation needs:     Medical: Not on file     Non-medical: Not on file   Tobacco Use    Smoking status: Never Smoker    Smokeless tobacco: Never Used   Substance and Sexual Activity    Alcohol use: No    Drug use: No    Sexual activity: Not on file   Lifestyle    Physical activity:     Days per week: Not on file     Minutes per session: Not on file    Stress: Not on file   Relationships    Social connections:     Talks on phone: Not on file     Gets together: Not on file     Attends Shinto service: Not on file     Active member of club or organization: Not on file     Attends meetings of clubs or organizations: Not on file     Relationship status: Not on file    Intimate partner violence:     Fear of current or ex partner: Not on file     Emotionally abused: Not on file     Physically abused: Not on file Forced sexual activity: Not on file   Other Topics Concern    Not on file   Social History Narrative    Not on file         ALLERGIES: Patient has no known allergies. Review of Systems   Respiratory: Negative for shortness of breath. Cardiovascular: Negative for chest pain. Gastrointestinal: Negative for vomiting. Neurological: Negative for headaches. Psychiatric/Behavioral: Positive for confusion. All other systems reviewed and are negative. There were no vitals filed for this visit. Physical Exam   Constitutional: She appears well-developed. No distress. HENT:   Head: Normocephalic and atraumatic. Eyes: Conjunctivae and EOM are normal.   Neck: Normal range of motion. Neck supple. Cardiovascular: Normal rate, regular rhythm and normal heart sounds. Pulmonary/Chest: Effort normal and breath sounds normal. No respiratory distress. Abdominal: Soft. There is no tenderness. There is no guarding. Musculoskeletal: Normal range of motion. She exhibits no edema. Neurological: She is alert. No cranial nerve deficit or sensory deficit. She exhibits normal muscle tone. 4/5 strength in LLE. 5/5 in all other extremities. Speech clear, no aphasia. Skin: Skin is warm and dry. MDM       Procedures    Will alert Dr. Radha Betancourt, neurosurgeon on-call, to patient's arrival.    6:20 AM  Spoke with Dr. Radha Betancourt. Hospitalist Krish for Admission  6:20 AM    ED Room Number: VV02/82  Patient Name and age:  Archana Hernandez 62 y.o.  female  Working Diagnosis:   1. Intracranial hemorrhage ([address]      Readmission: no  Isolation Requirements:  no  Recommended Level of Care:  ICU  Code Status:  Full Code  Department:[facility] Adult ED - 21   Other:  Transfer from [facility], Dr. Garcia Speaker consulted, BP control, repeat scan at noon, aspirin test. Not surgical candidate.

## 2019-10-19 LAB
ANION GAP SERPL CALC-SCNC: 5 MMOL/L (ref 5–15)
BUN SERPL-MCNC: 12 MG/DL (ref 6–20)
BUN/CREAT SERPL: 13 (ref 12–20)
CALCIUM SERPL-MCNC: 9.2 MG/DL (ref 8.5–10.1)
CHLORIDE SERPL-SCNC: 105 MMOL/L (ref 97–108)
CO2 SERPL-SCNC: 30 MMOL/L (ref 21–32)
CREAT SERPL-MCNC: 0.9 MG/DL (ref 0.55–1.02)
ERYTHROCYTE [DISTWIDTH] IN BLOOD BY AUTOMATED COUNT: 16.4 % (ref 11.5–14.5)
EST. AVERAGE GLUCOSE BLD GHB EST-MCNC: 214 MG/DL
GLUCOSE BLD STRIP.AUTO-MCNC: 185 MG/DL (ref 65–100)
GLUCOSE BLD STRIP.AUTO-MCNC: 236 MG/DL (ref 65–100)
GLUCOSE BLD STRIP.AUTO-MCNC: 255 MG/DL (ref 65–100)
GLUCOSE BLD STRIP.AUTO-MCNC: 266 MG/DL (ref 65–100)
GLUCOSE SERPL-MCNC: 234 MG/DL (ref 65–100)
HBA1C MFR BLD: 9.1 % (ref 4.2–6.3)
HCT VFR BLD AUTO: 44.5 % (ref 35–47)
HGB BLD-MCNC: 13.3 G/DL (ref 11.5–16)
MCH RBC QN AUTO: 26.1 PG (ref 26–34)
MCHC RBC AUTO-ENTMCNC: 29.9 G/DL (ref 30–36.5)
MCV RBC AUTO: 87.3 FL (ref 80–99)
NRBC # BLD: 0 K/UL (ref 0–0.01)
NRBC BLD-RTO: 0 PER 100 WBC
PLATELET # BLD AUTO: 204 K/UL (ref 150–400)
PMV BLD AUTO: 10.9 FL (ref 8.9–12.9)
POTASSIUM SERPL-SCNC: 3.5 MMOL/L (ref 3.5–5.1)
RBC # BLD AUTO: 5.1 M/UL (ref 3.8–5.2)
SERVICE CMNT-IMP: ABNORMAL
SODIUM SERPL-SCNC: 140 MMOL/L (ref 136–145)
WBC # BLD AUTO: 12.2 K/UL (ref 3.6–11)

## 2019-10-19 PROCEDURE — 82962 GLUCOSE BLOOD TEST: CPT

## 2019-10-19 PROCEDURE — 74011000250 HC RX REV CODE- 250: Performed by: NURSE PRACTITIONER

## 2019-10-19 PROCEDURE — 83036 HEMOGLOBIN GLYCOSYLATED A1C: CPT

## 2019-10-19 PROCEDURE — 85027 COMPLETE CBC AUTOMATED: CPT

## 2019-10-19 PROCEDURE — 36415 COLL VENOUS BLD VENIPUNCTURE: CPT

## 2019-10-19 PROCEDURE — 65660000000 HC RM CCU STEPDOWN

## 2019-10-19 PROCEDURE — 74011250636 HC RX REV CODE- 250/636: Performed by: NURSE PRACTITIONER

## 2019-10-19 PROCEDURE — 51798 US URINE CAPACITY MEASURE: CPT

## 2019-10-19 PROCEDURE — 74011250637 HC RX REV CODE- 250/637: Performed by: FAMILY MEDICINE

## 2019-10-19 PROCEDURE — 74011250636 HC RX REV CODE- 250/636: Performed by: FAMILY MEDICINE

## 2019-10-19 PROCEDURE — 80048 BASIC METABOLIC PNL TOTAL CA: CPT

## 2019-10-19 PROCEDURE — 74011636637 HC RX REV CODE- 636/637: Performed by: FAMILY MEDICINE

## 2019-10-19 RX ORDER — LISINOPRIL 20 MG/1
20 TABLET ORAL DAILY
Status: DISCONTINUED | OUTPATIENT
Start: 2019-10-19 | End: 2019-10-20

## 2019-10-19 RX ORDER — HYDRALAZINE HYDROCHLORIDE 20 MG/ML
10 INJECTION INTRAMUSCULAR; INTRAVENOUS
Status: DISCONTINUED | OUTPATIENT
Start: 2019-10-19 | End: 2019-10-20

## 2019-10-19 RX ORDER — LABETALOL HYDROCHLORIDE 5 MG/ML
10 INJECTION, SOLUTION INTRAVENOUS ONCE
Status: COMPLETED | OUTPATIENT
Start: 2019-10-19 | End: 2019-10-19

## 2019-10-19 RX ORDER — AMLODIPINE BESYLATE 5 MG/1
5 TABLET ORAL DAILY
Status: DISCONTINUED | OUTPATIENT
Start: 2019-10-19 | End: 2019-10-20

## 2019-10-19 RX ORDER — SODIUM CHLORIDE 9 MG/ML
100 INJECTION, SOLUTION INTRAVENOUS CONTINUOUS
Status: DISCONTINUED | OUTPATIENT
Start: 2019-10-19 | End: 2019-10-24 | Stop reason: HOSPADM

## 2019-10-19 RX ADMIN — INSULIN LISPRO 2 UNITS: 100 INJECTION, SOLUTION INTRAVENOUS; SUBCUTANEOUS at 12:51

## 2019-10-19 RX ADMIN — Medication 10 ML: at 21:30

## 2019-10-19 RX ADMIN — Medication 10 ML: at 21:29

## 2019-10-19 RX ADMIN — INSULIN LISPRO 7 UNITS: 100 INJECTION, SOLUTION INTRAVENOUS; SUBCUTANEOUS at 17:02

## 2019-10-19 RX ADMIN — INSULIN LISPRO 2 UNITS: 100 INJECTION, SOLUTION INTRAVENOUS; SUBCUTANEOUS at 21:39

## 2019-10-19 RX ADMIN — HYDRALAZINE HYDROCHLORIDE 10 MG: 20 INJECTION INTRAMUSCULAR; INTRAVENOUS at 21:47

## 2019-10-19 RX ADMIN — HYDRALAZINE HYDROCHLORIDE 10 MG: 20 INJECTION INTRAMUSCULAR; INTRAVENOUS at 03:06

## 2019-10-19 RX ADMIN — SODIUM CHLORIDE 75 ML/HR: 900 INJECTION, SOLUTION INTRAVENOUS at 10:09

## 2019-10-19 RX ADMIN — Medication 10 ML: at 17:03

## 2019-10-19 RX ADMIN — INSULIN LISPRO 7 UNITS: 100 INJECTION, SOLUTION INTRAVENOUS; SUBCUTANEOUS at 08:13

## 2019-10-19 RX ADMIN — SODIUM CHLORIDE 100 ML/HR: 900 INJECTION, SOLUTION INTRAVENOUS at 21:29

## 2019-10-19 RX ADMIN — LISINOPRIL 20 MG: 20 TABLET ORAL at 17:44

## 2019-10-19 RX ADMIN — AMLODIPINE BESYLATE 5 MG: 5 TABLET ORAL at 17:44

## 2019-10-19 RX ADMIN — Medication 10 ML: at 06:41

## 2019-10-19 RX ADMIN — LABETALOL HYDROCHLORIDE 10 MG: 5 INJECTION INTRAVENOUS at 08:06

## 2019-10-19 NOTE — PROGRESS NOTES
Problem: Diabetes Self-Management  Goal: *Disease process and treatment process  Description  Define diabetes and identify own type of diabetes; list 3 options for treating diabetes. Outcome: Progressing Towards Goal  Goal: *Incorporating nutritional management into lifestyle  Description  Describe effect of type, amount and timing of food on blood glucose; list 3 methods for planning meals. Outcome: Progressing Towards Goal  Goal: *Incorporating physical activity into lifestyle  Description  State effect of exercise on blood glucose levels. Outcome: Progressing Towards Goal  Goal: *Developing strategies to promote health/change behavior  Description  Define the ABC's of diabetes; identify appropriate screenings, schedule and personal plan for screenings. Outcome: Progressing Towards Goal  Goal: *Using medications safely  Description  State effect of diabetes medications on diabetes; name diabetes medication taking, action and side effects. Outcome: Progressing Towards Goal  Goal: *Monitoring blood glucose, interpreting and using results  Description  Identify recommended blood glucose targets  and personal targets. Outcome: Progressing Towards Goal  Goal: *Prevention, detection, treatment of acute complications  Description  List symptoms of hyper- and hypoglycemia; describe how to treat low blood sugar and actions for lowering  high blood glucose level. Outcome: Progressing Towards Goal  Goal: *Prevention, detection and treatment of chronic complications  Description  Define the natural course of diabetes and describe the relationship of blood glucose levels to long term complications of diabetes.   Outcome: Progressing Towards Goal  Goal: *Developing strategies to address psychosocial issues  Description  Describe feelings about living with diabetes; identify support needed and support network  Outcome: Progressing Towards Goal  Goal: *Insulin pump training  Outcome: Progressing Towards Goal  Goal: *Sick day guidelines  Outcome: Progressing Towards Goal  Goal: *Patient Specific Goal (EDIT GOAL, INSERT TEXT)  Outcome: Progressing Towards Goal     Problem: Patient Education: Go to Patient Education Activity  Goal: Patient/Family Education  Outcome: Progressing Towards Goal     Problem: Falls - Risk of  Goal: *Absence of Falls  Description  Document Jordin Kramer Fall Risk and appropriate interventions in the flowsheet. Outcome: Progressing Towards Goal  Note:   Fall Risk Interventions:  Mobility Interventions: Bed/chair exit alarm    Mentation Interventions: Bed/chair exit alarm, Door open when patient unattended    Medication Interventions: Bed/chair exit alarm    Elimination Interventions: Call light in reach, Bed/chair exit alarm              Problem: Patient Education: Go to Patient Education Activity  Goal: Patient/Family Education  Outcome: Progressing Towards Goal     Problem: Pressure Injury - Risk of  Goal: *Prevention of pressure injury  Description  Document Nate Scale and appropriate interventions in the flowsheet. Outcome: Progressing Towards Goal  Note:   Pressure Injury Interventions:             Activity Interventions: Pressure redistribution bed/mattress(bed type)    Mobility Interventions: Pressure redistribution bed/mattress (bed type)    Nutrition Interventions: Discuss nutritional consult with provider    Friction and Shear Interventions: Minimize layers                Problem: Patient Education: Go to Patient Education Activity  Goal: Patient/Family Education  Outcome: Progressing Towards Goal

## 2019-10-19 NOTE — PROGRESS NOTES
Problem: Hemorrhagic Stroke: Discharge Outcomes  Goal: *Verbalizes anxiety and depression are reduced or absent  Outcome: Progressing Towards Goal  Goal: *Verbalize understanding of risk factor modification(Stroke Metric)  Outcome: Progressing Towards Goal  Goal: *Optimal pain control at patient's stated goal  Outcome: Progressing Towards Goal  Goal: *Hemodynamically stable  Outcome: Progressing Towards Goal  Goal: *Absence of aspiration pneumonia  Outcome: Progressing Towards Goal  Goal: *Aware of needed dietary changes  Outcome: Progressing Towards Goal  Goal: *Verbalizes understanding and describes medication purposes and frequencies  Outcome: Progressing Towards Goal  Goal: *Tolerating diet  Outcome: Progressing Towards Goal  Goal: *Absence of signs and symptoms of DVT  Outcome: Progressing Towards Goal  Goal: *Absence of aspiration  Outcome: Progressing Towards Goal  Goal: *Progressive mobility and function  Outcome: Progressing Towards Goal  Goal: *Home safety concerns addressed  Outcome: Progressing Towards Goal     Problem: Hemorrhagic Stroke:  3-24 hours  Goal: Off Pathway (Use only if patient is Off Pathway)  10/19/2019 0852 by Hunter Mujica RN  Outcome: Progressing Towards Goal  10/19/2019 0851 by Hunter Mujica RN  Outcome: Progressing Towards Goal  Goal: Activity/Safety  10/19/2019 0852 by Hunter Mujica RN  Outcome: Progressing Towards Goal  10/19/2019 0851 by Hunter Mujica RN  Outcome: Progressing Towards Goal  Goal: Consults, if ordered  10/19/2019 0852 by Hunter Mujica RN  Outcome: Progressing Towards Goal  10/19/2019 0851 by Hunter Mujica RN  Outcome: Progressing Towards Goal  Goal: Diagnostic Test/Procedures  10/19/2019 0852 by Hunter Mujica RN  Outcome: Progressing Towards Goal  10/19/2019 0851 by Hunter Mujica RN  Outcome: Progressing Towards Goal  Goal: Nutrition/Diet  10/19/2019 0852 by Hunter Mujica RN  Outcome: Progressing Towards Goal  10/19/2019 0851 by Hong Huang Dee Jewell RN  Outcome: Progressing Towards Goal  Goal: Discharge Planning  10/19/2019 0852 by Xenia Jimenez RN  Outcome: Progressing Towards Goal  10/19/2019 0851 by Xenia Jimenez RN  Outcome: Progressing Towards Goal  Goal: Medications  10/19/2019 0852 by Xenia Jimenez RN  Outcome: Progressing Towards Goal  10/19/2019 0851 by Xenia Jimenez RN  Outcome: Progressing Towards Goal  Goal: Respiratory  10/19/2019 0852 by Xenia Jimenez RN  Outcome: Progressing Towards Goal  10/19/2019 0851 by Xenia Jimenez RN  Outcome: Progressing Towards Goal  Goal: Treatments/Interventions/Procedures  10/19/2019 0852 by Xenia Jimenez RN  Outcome: Progressing Towards Goal  10/19/2019 0851 by Xenia Jimenez RN  Outcome: Progressing Towards Goal  Goal: Psychosocial  10/19/2019 0852 by Xenia Jimenez RN  Outcome: Progressing Towards Goal  10/19/2019 0851 by Xenia Jimenez RN  Outcome: Progressing Towards Goal  Goal: *Hemodynamically stable  10/19/2019 0852 by Xenia Jimenez RN  Outcome: Progressing Towards Goal  10/19/2019 0851 by Xenia Jimenez RN  Outcome: Progressing Towards Goal  Goal: *Verbalizes anxiety and depression are reduced or absent  10/19/2019 0852 by Xenia Jimenez RN  Outcome: Progressing Towards Goal  10/19/2019 0851 by Xenia Jimenez RN  Outcome: Progressing Towards Goal  Goal: *Absence of aspiration  10/19/2019 0852 by Xenia Jimenez RN  Outcome: Progressing Towards Goal  10/19/2019 0851 by Xenia Jimenez RN  Outcome: Progressing Towards Goal  Goal: *Absence of signs and symptoms of DVT  10/19/2019 0852 by Xenia Jimenez RN  Outcome: Progressing Towards Goal  10/19/2019 0851 by Xenia Jimenez RN  Outcome: Progressing Towards Goal  Goal: *Optimal pain control at patient's stated goal  10/19/2019 0852 by Xenia Jimenez RN  Outcome: Progressing Towards Goal  10/19/2019 0851 by Xenia Jimenez RN  Outcome: Progressing Towards Goal  Goal: *Tolerating diet  10/19/2019 0852 by Severa Hugger Oren Hilario RN  Outcome: Progressing Towards Goal  10/19/2019 0851 by Jose Jarrell RN  Outcome: Progressing Towards Goal  Goal: *Progressive mobility and function (eg: ADL's)  10/19/2019 0852 by Jose Jarrell RN  Outcome: Progressing Towards Goal  10/19/2019 0851 by Jose Jarrell RN  Outcome: Progressing Towards Goal  Goal: *Rehabilitation readiness  10/19/2019 0852 by Jose Jarrell RN  Outcome: Progressing Towards Goal  10/19/2019 0851 by Jose Jarrell RN  Outcome: Progressing Towards Goal

## 2019-10-19 NOTE — PROGRESS NOTES
Transition of Care Plan:        · Admitted under Hospitalist Service  · Neurosurgery Consulted, no surgical intervention at this time  · CT of Head - Acute right parietal intracranial hematoma with midline shift, probable small right subdural hematoma  · PT/OT/SLP - will review recommendations and discuss with sonam  · [address] needs to be addressed  · CM discussed role in providing support for discharge planning  · CM consulted noted for possible home health vs rehab       Reason for Admission:   Transferred from [facility] for higher level of care:  Neurosurgery consult, CT of head - Acute right parietal intracranial hematoma with midline shift, probable small right subdural hematoma, Critical Care Transport set up and arrived at [facility]/ED on [address] due to hypertension. Diagnosis:  Intracranial hemorrhage                  RRAT Score:     14/0/2               Do you (patient/family) have any concerns for transition/discharge? Sonam met with [facility], she lives with patient and is caregiver. Met with patient, speech appeared slurred and delayed, however she smiled throughout interview. She answered YES to all questions asked, noted history of baseline -  Dementia. Unable to reach nidiane however she had been at the hospital  throughout the night into  AM.  Noted she discharged from [facility] and returned when niece noted change in mental status and gait. Transferred to [facility]/ED after CT resulted. Plan for utilizing home health: To be determined    Current Advanced Directive/Advance Care Plan:   No advance care plan on file, sonam is NOK, FULL Code    NOK:  Nancy Ott [address]     Payor Source:  [facility] HMO              Care Management Interventions  PCP Verified by CM:  Yes  Transition of Care Consult (CM Consult): Discharge Planning(RRAT 14/0/2   CM consulted for possible home health vs placement)  MyChart Signup: No  Discharge Durable Medical Equipment: No  Health Maintenance Reviewed: Yes  Physical Therapy Consult: Yes  Occupational Therapy Consult: Yes  Speech Therapy Consult: Yes  Current Support Network: Lives with Caregiver(Niece is caretaker, she met with [facility],)  Plan discussed with Pt/Family/Caregiver: Yes    Care Management will continue to follow for discharge needs.     Shlomo Olivera RN, BSN, [facility]  ED Care Management  [phone]

## 2019-10-19 NOTE — PROGRESS NOTES
Neurosurgery  Awake, alert, not very oriented. Self, hospital.    Daniel Veliz, Probably has left field cut. Moving all extremities    Repeat head CT yesterday at noon stable.     Will repeat tomorrow    Continue supportive care

## 2019-10-19 NOTE — PROGRESS NOTES
Hospitalist Progress Note  Lluvia Parnell MD  Answering service: [phone] OR [phone] from in house phone        Date of Service:  10/19/2019  NAME:  Archana Hernandez  :  1955  MRN:  [id number]      Admission Summary:   62 y.o. female with a history of cerebral aneurysm and shunt in  as well as multiple infarts in the past who presents with confusion and mental status changes. Ct scan done showed an acute right parietal intracranial hematoma as described with midline shift, probable small right subdural hematoma    Interval history / Subjective:    Pt has no complaints today, but niece states that she seems more sleepy today. Pt passed eval by speech therapy yesterday, but nurse noticed that she started coughing after drinking water. Spoke with niece, and she states that pt always coughs when drinking water, but can eat and drink other foods normally. Will have nurse repeat bedside swallow with apple juice. Pt currently NPO.       Assessment & Plan:     Acute intracranial hemorrhage with midline shift  -repeat CT scan stable  -neurosurgery consulted, no surgical intervention  -hydralazine prn for BP control  -repeat CT tomorrow  -frequent neurochecks  -hold aspirin     Hypertension  -keep SBP<140  -on prn hydralazine  -cont to monitor  -will transition to oral medications if she passes bedside speech eval     Type 2 diabetes  -check A1C  -sliding scale insulin  -hold metformin      Code status: full  DVT prophylaxis: scd    Care Plan discussed with: Patient/Family  Disposition: Home w/Family, SNF/LTC and TBD     Hospital Problems  Date Reviewed: 2017          Codes Class Noted POA    ICH (intracerebral hemorrhage) ([facility] [address] ICD-10-CM: I61.9  ICD-9-CM: 064  10/18/2019 Unknown        Intracranial hemorrhage ([facility]) ICD-10-CM: I62.9  ICD-9-CM: 432.9  10/18/2019 Unknown                Review of Systems:   A comprehensive review of systems was negative except for that written in the HPI. Vital Signs:    Last 24hrs VS reviewed since prior progress note. Most recent are:  Visit Vitals  /81 (BP 1 Location: Left arm, BP Patient Position: At rest)   Pulse (!) 102   Temp 98.3 °F (36.8 °C)   Resp 14   Ht 5' 8\" (1.727 m)   Wt 49.5 kg (109 lb 2 oz)   SpO2 98%   Breastfeeding? No   BMI 16.59 kg/m²         Intake/Output Summary (Last 24 hours) at 10/19/2019 1200  Last data filed at 10/19/2019 0813  Gross per 24 hour   Intake    Output 1575 ml   Net -1575 ml        Physical Examination:             Constitutional:  No acute distress, cooperative, pleasant    ENT:  Oral mucous moist, oropharynx benign. Resp:  CTA bilaterally. No wheezing/rhonchi/rales. No accessory muscle use   CV:  Regular rhythm, normal rate, no murmurs, gallops, rubs    GI:  Soft, non distended, non tender. normoactive bowel sounds, no hepatosplenomegaly     Musculoskeletal:  No edema, warm, 2+ pulses throughout    Neurologic:  Moves all extremities. AAOx3, CN II-XII reviewed     Psych:  Good insight, Not anxious nor agitated. Data Review:    Review and/or order of clinical lab test      Labs:     Recent Labs     10/19/19  0241 10/18/19  0417   WBC 12.2* 12.4*   HGB 13.3 13.8   HCT 44.5 46.8    226     Recent Labs     10/19/19  0241 10/18/19  0028    144   K 3.5 3.7    108   CO2 30 27   BUN 12 18   CREA 0.90 1.26*   * 315*   CA 9.2 10.0     Recent Labs     10/18/19  0028   SGOT 4*   ALT 18   *   TBILI 0.3   TP 8.7*   ALB 3.9   GLOB 4.8*     Recent Labs     10/18/19  0417   INR 1.0   PTP 10.3      No results for input(s): FE, TIBC, PSAT, FERR in the last 72 hours. No results found for: FOL, RBCF   No results for input(s): PH, PCO2, PO2 in the last 72 hours. No results for input(s): CPK, CKNDX, TROIQ in the last 72 hours.     No lab exists for component: CPKMB  No results found for: CHOL, CHOLX, CHLST, CHOLV, HDL, HDLP, LDL, LDLC, DLDLP, TGLX, TRIGL, [facility], [facility]  Lab Results   Component Value Date/Time    Glucose (POC) 255 (H) 10/19/2019 07:47 AM    Glucose (POC) 147 (H) 10/18/2019 09:51 PM    Glucose (POC) 166 (H) 10/18/2019 06:57 PM    Glucose (POC) 391 (H) 10/18/2019 11:20 AM    Glucose (POC) 354 (H) 10/18/2019 04:12 AM    Glucose (POC) 296 (H) 10/18/2019 04:00 AM     Lab Results   Component Value Date/Time    Color YELLOW/STRAW 10/18/2019 05:16 AM    Appearance CLEAR 10/18/2019 05:16 AM    Specific gravity 1.028 10/18/2019 05:16 AM    Specific gravity 1.020 12/22/2017 01:12 PM    pH (UA) 6.0 10/18/2019 05:16 AM    Protein 30 (A) 10/18/2019 05:16 AM    Glucose >1,000 (A) 10/18/2019 05:16 AM    Ketone TRACE (A) 10/18/2019 05:16 AM    Bilirubin NEGATIVE  10/18/2019 05:16 AM    Urobilinogen 1.0 10/18/2019 05:16 AM    Nitrites NEGATIVE  10/18/2019 05:16 AM    Leukocyte Esterase NEGATIVE  10/18/2019 05:16 AM    Epithelial cells FEW 10/18/2019 05:16 AM    Bacteria NEGATIVE  10/18/2019 05:16 AM    WBC 0-4 10/18/2019 05:16 AM    RBC 0-5 10/18/2019 05:16 AM         Medications Reviewed:     Current Facility-Administered Medications   Medication Dose Route Frequency    hydrALAZINE (APRESOLINE) 20 mg/mL injection 10 mg  10 mg IntraVENous Q6H PRN    influenza vaccine 2019-20 (6 mos+)(PF) (FLUARIX/FLULAVAL/FLUZONE QUAD) injection 0.5 mL  0.5 mL IntraMUSCular PRIOR TO DISCHARGE    0.9% sodium chloride infusion  75 mL/hr IntraVENous CONTINUOUS    niCARdipine (CARDENE) 25 mg in 0.9% sodium chloride 250 mL infusion  0-15 mg/hr IntraVENous TITRATE    sodium chloride (NS) flush 5-40 mL  5-40 mL IntraVENous Q8H    sodium chloride (NS) flush 5-40 mL  5-40 mL IntraVENous PRN    glucose chewable tablet 16 g  4 Tab Oral PRN    glucagon (GLUCAGEN) injection 1 mg  1 mg IntraMUSCular PRN    dextrose 10% infusion 0-250 mL  0-250 mL IntraVENous PRN    insulin lispro (HUMALOG) injection   SubCUTAneous AC&HS ______________________________________________________________________  EXPECTED LENGTH OF STAY: - - -  ACTUAL LENGTH OF STAY:          Keshia Fuentes MD

## 2019-10-20 ENCOUNTER — APPOINTMENT (OUTPATIENT)
Dept: CT IMAGING | Age: 64
DRG: 064 | End: 2019-10-20
Attending: SPECIALIST
Payer: MEDICARE

## 2019-10-20 LAB
ANION GAP SERPL CALC-SCNC: 8 MMOL/L (ref 5–15)
BASOPHILS # BLD: 0 K/UL (ref 0–0.1)
BASOPHILS NFR BLD: 0 % (ref 0–1)
BUN SERPL-MCNC: 11 MG/DL (ref 6–20)
BUN/CREAT SERPL: 14 (ref 12–20)
CALCIUM SERPL-MCNC: 9 MG/DL (ref 8.5–10.1)
CHLORIDE SERPL-SCNC: 102 MMOL/L (ref 97–108)
CO2 SERPL-SCNC: 23 MMOL/L (ref 21–32)
CREAT SERPL-MCNC: 0.79 MG/DL (ref 0.55–1.02)
DIFFERENTIAL METHOD BLD: ABNORMAL
EOSINOPHIL # BLD: 0 K/UL (ref 0–0.4)
EOSINOPHIL NFR BLD: 0 % (ref 0–7)
ERYTHROCYTE [DISTWIDTH] IN BLOOD BY AUTOMATED COUNT: 16.5 % (ref 11.5–14.5)
GLUCOSE BLD STRIP.AUTO-MCNC: 222 MG/DL (ref 65–100)
GLUCOSE BLD STRIP.AUTO-MCNC: 229 MG/DL (ref 65–100)
GLUCOSE BLD STRIP.AUTO-MCNC: 273 MG/DL (ref 65–100)
GLUCOSE BLD STRIP.AUTO-MCNC: 278 MG/DL (ref 65–100)
GLUCOSE SERPL-MCNC: 238 MG/DL (ref 65–100)
HCT VFR BLD AUTO: 44.4 % (ref 35–47)
HGB BLD-MCNC: 13.3 G/DL (ref 11.5–16)
IMM GRANULOCYTES # BLD AUTO: 0 K/UL (ref 0–0.04)
IMM GRANULOCYTES NFR BLD AUTO: 0 % (ref 0–0.5)
LYMPHOCYTES # BLD: 3.7 K/UL (ref 0.8–3.5)
LYMPHOCYTES NFR BLD: 34 % (ref 12–49)
MCH RBC QN AUTO: 26.3 PG (ref 26–34)
MCHC RBC AUTO-ENTMCNC: 30 G/DL (ref 30–36.5)
MCV RBC AUTO: 87.9 FL (ref 80–99)
MONOCYTES # BLD: 0.7 K/UL (ref 0–1)
MONOCYTES NFR BLD: 6 % (ref 5–13)
NEUTS SEG # BLD: 6.5 K/UL (ref 1.8–8)
NEUTS SEG NFR BLD: 60 % (ref 32–75)
NRBC # BLD: 0 K/UL (ref 0–0.01)
NRBC BLD-RTO: 0 PER 100 WBC
PLATELET # BLD AUTO: 166 K/UL (ref 150–400)
PMV BLD AUTO: 11.9 FL (ref 8.9–12.9)
POTASSIUM SERPL-SCNC: 4.1 MMOL/L (ref 3.5–5.1)
RBC # BLD AUTO: 5.05 M/UL (ref 3.8–5.2)
SERVICE CMNT-IMP: ABNORMAL
SODIUM SERPL-SCNC: 133 MMOL/L (ref 136–145)
WBC # BLD AUTO: 11 K/UL (ref 3.6–11)

## 2019-10-20 PROCEDURE — 74011250636 HC RX REV CODE- 250/636: Performed by: NURSE PRACTITIONER

## 2019-10-20 PROCEDURE — 36415 COLL VENOUS BLD VENIPUNCTURE: CPT

## 2019-10-20 PROCEDURE — 82962 GLUCOSE BLOOD TEST: CPT

## 2019-10-20 PROCEDURE — 74011250636 HC RX REV CODE- 250/636: Performed by: FAMILY MEDICINE

## 2019-10-20 PROCEDURE — 77030038269 HC DRN EXT URIN PURWCK BARD -A

## 2019-10-20 PROCEDURE — 65660000000 HC RM CCU STEPDOWN

## 2019-10-20 PROCEDURE — 70450 CT HEAD/BRAIN W/O DYE: CPT

## 2019-10-20 PROCEDURE — 97530 THERAPEUTIC ACTIVITIES: CPT

## 2019-10-20 PROCEDURE — 74011250637 HC RX REV CODE- 250/637: Performed by: FAMILY MEDICINE

## 2019-10-20 PROCEDURE — 74011636637 HC RX REV CODE- 636/637: Performed by: FAMILY MEDICINE

## 2019-10-20 PROCEDURE — 85025 COMPLETE CBC W/AUTO DIFF WBC: CPT

## 2019-10-20 PROCEDURE — 97165 OT EVAL LOW COMPLEX 30 MIN: CPT

## 2019-10-20 PROCEDURE — 80048 BASIC METABOLIC PNL TOTAL CA: CPT

## 2019-10-20 RX ORDER — AMLODIPINE BESYLATE 5 MG/1
10 TABLET ORAL DAILY
Status: DISCONTINUED | OUTPATIENT
Start: 2019-10-21 | End: 2019-10-24 | Stop reason: HOSPADM

## 2019-10-20 RX ORDER — AMLODIPINE BESYLATE 5 MG/1
5 TABLET ORAL ONCE
Status: COMPLETED | OUTPATIENT
Start: 2019-10-20 | End: 2019-10-20

## 2019-10-20 RX ORDER — HYDRALAZINE HYDROCHLORIDE 20 MG/ML
10 INJECTION INTRAMUSCULAR; INTRAVENOUS
Status: DISCONTINUED | OUTPATIENT
Start: 2019-10-20 | End: 2019-10-24

## 2019-10-20 RX ORDER — LISINOPRIL 20 MG/1
20 TABLET ORAL ONCE
Status: COMPLETED | OUTPATIENT
Start: 2019-10-20 | End: 2019-10-20

## 2019-10-20 RX ORDER — CALCIUM CARBONATE 200(500)MG
200 TABLET,CHEWABLE ORAL
Status: DISCONTINUED | OUTPATIENT
Start: 2019-10-20 | End: 2019-10-24 | Stop reason: HOSPADM

## 2019-10-20 RX ORDER — LISINOPRIL 20 MG/1
40 TABLET ORAL DAILY
Status: DISCONTINUED | OUTPATIENT
Start: 2019-10-21 | End: 2019-10-24 | Stop reason: HOSPADM

## 2019-10-20 RX ADMIN — Medication 10 ML: at 05:08

## 2019-10-20 RX ADMIN — LISINOPRIL 20 MG: 20 TABLET ORAL at 08:25

## 2019-10-20 RX ADMIN — INSULIN LISPRO 4 UNITS: 100 INJECTION, SOLUTION INTRAVENOUS; SUBCUTANEOUS at 22:57

## 2019-10-20 RX ADMIN — INSULIN LISPRO 4 UNITS: 100 INJECTION, SOLUTION INTRAVENOUS; SUBCUTANEOUS at 17:47

## 2019-10-20 RX ADMIN — HYDRALAZINE HYDROCHLORIDE 10 MG: 20 INJECTION INTRAMUSCULAR; INTRAVENOUS at 06:16

## 2019-10-20 RX ADMIN — Medication 10 ML: at 22:57

## 2019-10-20 RX ADMIN — AMLODIPINE BESYLATE 5 MG: 5 TABLET ORAL at 08:25

## 2019-10-20 RX ADMIN — AMLODIPINE BESYLATE 5 MG: 5 TABLET ORAL at 09:56

## 2019-10-20 RX ADMIN — HYDRALAZINE HYDROCHLORIDE 10 MG: 20 INJECTION INTRAMUSCULAR; INTRAVENOUS at 17:51

## 2019-10-20 RX ADMIN — HYDRALAZINE HYDROCHLORIDE 10 MG: 20 INJECTION INTRAMUSCULAR; INTRAVENOUS at 23:01

## 2019-10-20 RX ADMIN — LISINOPRIL 20 MG: 20 TABLET ORAL at 09:55

## 2019-10-20 RX ADMIN — SODIUM CHLORIDE 100 ML/HR: 900 INJECTION, SOLUTION INTRAVENOUS at 05:08

## 2019-10-20 RX ADMIN — Medication 10 ML: at 12:11

## 2019-10-20 RX ADMIN — INSULIN LISPRO 7 UNITS: 100 INJECTION, SOLUTION INTRAVENOUS; SUBCUTANEOUS at 12:09

## 2019-10-20 RX ADMIN — INSULIN LISPRO 4 UNITS: 100 INJECTION, SOLUTION INTRAVENOUS; SUBCUTANEOUS at 08:24

## 2019-10-20 NOTE — PROGRESS NOTES
Problem: Diabetes Self-Management  Goal: *Disease process and treatment process  Description  Define diabetes and identify own type of diabetes; list 3 options for treating diabetes. Outcome: Progressing Towards Goal  Goal: *Incorporating nutritional management into lifestyle  Description  Describe effect of type, amount and timing of food on blood glucose; list 3 methods for planning meals. Outcome: Progressing Towards Goal  Goal: *Incorporating physical activity into lifestyle  Description  State effect of exercise on blood glucose levels. Outcome: Progressing Towards Goal  Goal: *Developing strategies to promote health/change behavior  Description  Define the ABC's of diabetes; identify appropriate screenings, schedule and personal plan for screenings. Outcome: Progressing Towards Goal  Goal: *Using medications safely  Description  State effect of diabetes medications on diabetes; name diabetes medication taking, action and side effects. Outcome: Progressing Towards Goal  Goal: *Monitoring blood glucose, interpreting and using results  Description  Identify recommended blood glucose targets  and personal targets. Outcome: Progressing Towards Goal  Goal: *Prevention, detection, treatment of acute complications  Description  List symptoms of hyper- and hypoglycemia; describe how to treat low blood sugar and actions for lowering  high blood glucose level. Outcome: Progressing Towards Goal  Goal: *Prevention, detection and treatment of chronic complications  Description  Define the natural course of diabetes and describe the relationship of blood glucose levels to long term complications of diabetes.   Outcome: Progressing Towards Goal  Goal: *Developing strategies to address psychosocial issues  Description  Describe feelings about living with diabetes; identify support needed and support network  Outcome: Progressing Towards Goal  Goal: *Insulin pump training  Outcome: Progressing Towards Goal  Goal: *Sick day guidelines  Outcome: Progressing Towards Goal  Goal: *Patient Specific Goal (EDIT GOAL, INSERT TEXT)  Outcome: Progressing Towards Goal     Problem: Patient Education: Go to Patient Education Activity  Goal: Patient/Family Education  Outcome: Progressing Towards Goal     Problem: Falls - Risk of  Goal: *Absence of Falls  Description  Document Val [place] Fall Risk and appropriate interventions in the flowsheet. Outcome: Progressing Towards Goal  Note:   Fall Risk Interventions:  Mobility Interventions: Bed/chair exit alarm, OT consult for ADLs, PT Consult for mobility concerns    Mentation Interventions: Bed/chair exit alarm, Door open when patient unattended    Medication Interventions: Bed/chair exit alarm    Elimination Interventions: Call light in reach, Bed/chair exit alarm, Toileting schedule/hourly rounds              Problem: Patient Education: Go to Patient Education Activity  Goal: Patient/Family Education  Outcome: Progressing Towards Goal     Problem: Pressure Injury - Risk of  Goal: *Prevention of pressure injury  Description  Document Nate Scale and appropriate interventions in the flowsheet. Outcome: Progressing Towards Goal  Note:   Pressure Injury Interventions:             Activity Interventions: Assess need for specialty bed    Mobility Interventions: Assess need for specialty bed    Nutrition Interventions: Document food/fluid/supplement intake    Friction and Shear Interventions: HOB 30 degrees or less                Problem: Patient Education: Go to Patient Education Activity  Goal: Patient/Family Education  Outcome: Progressing Towards Goal     Problem: Patient Education: Go to Patient Education Activity  Goal: Patient/Family Education  Outcome: Progressing Towards Goal     Problem: Hemorrhagic Stroke:  3-24 hours  Goal: Off Pathway (Use only if patient is Off Pathway)  Outcome: Resolved/Met  Goal: Activity/Safety  Outcome: Resolved/Met  Goal: Consults, if ordered  Outcome: Resolved/Met  Goal: Diagnostic Test/Procedures  Outcome: Resolved/Met  Goal: Nutrition/Diet  Outcome: Resolved/Met  Goal: Discharge Planning  Outcome: Resolved/Met  Goal: Medications  Outcome: Resolved/Met  Goal: Respiratory  Outcome: Resolved/Met  Goal: Treatments/Interventions/Procedures  Outcome: Resolved/Met  Goal: Psychosocial  Outcome: Resolved/Met  Goal: *Hemodynamically stable  Outcome: Resolved/Met  Goal: *Verbalizes anxiety and depression are reduced or absent  Outcome: Progressing Towards Goal  Goal: *Absence of aspiration  Outcome: Resolved/Met  Goal: *Absence of signs and symptoms of DVT  Outcome: Resolved/Met  Goal: *Optimal pain control at patient's stated goal  Outcome: Resolved/Met  Goal: *Tolerating diet  Outcome: Resolved/Met  Goal: *Progressive mobility and function (eg: ADL's)  Outcome: Resolved/Met  Goal: *Rehabilitation readiness  Outcome: Resolved/Met     Problem: Hemorrhagic Stroke: Day 2  Goal: Off Pathway (Use only if patient is Off Pathway)  Outcome: Progressing Towards Goal  Goal: Activity/Safety  Outcome: Progressing Towards Goal  Goal: Consults, if ordered  Outcome: Progressing Towards Goal  Goal: Diagnostic Test/Procedures  Outcome: Progressing Towards Goal  Goal: Nutrition/Diet  Outcome: Progressing Towards Goal  Goal: Medications  Outcome: Progressing Towards Goal  Goal: Respiratory  Outcome: Progressing Towards Goal  Goal: Treatments/Interventions/Procedures  Outcome: Progressing Towards Goal  Goal: Psychosocial  Outcome: Progressing Towards Goal  Goal: *Hemodynamically stable  Outcome: Progressing Towards Goal  Goal: *Verbalizes anxiety and depression are reduced or absent  Outcome: Progressing Towards Goal  Goal: *Absence of aspiration  Outcome: Progressing Towards Goal  Goal: *Absence of signs and symptoms of DVT  Outcome: Progressing Towards Goal  Goal: *Optimal pain control at patient's stated goal  Outcome: Progressing Towards Goal  Goal: *Tolerating diet  Outcome: Progressing Towards Goal  Goal: *Progressive mobility and function  Outcome: Progressing Towards Goal  Goal: *Rehabilitation readiness  Outcome: Progressing Towards Goal     Problem: Hemorrhagic Stroke: Discharge Outcomes  Goal: *Verbalizes anxiety and depression are reduced or absent  Outcome: Progressing Towards Goal  Goal: *Verbalize understanding of risk factor modification(Stroke Metric)  Outcome: Progressing Towards Goal  Goal: *Optimal pain control at patient's stated goal  Outcome: Progressing Towards Goal  Goal: *Hemodynamically stable  Outcome: Progressing Towards Goal  Goal: *Absence of aspiration pneumonia  Outcome: Progressing Towards Goal  Goal: *Aware of needed dietary changes  Outcome: Progressing Towards Goal  Goal: *Verbalizes understanding and describes medication purposes and frequencies  Outcome: Progressing Towards Goal  Goal: *Tolerating diet  Outcome: Progressing Towards Goal  Goal: *Absence of signs and symptoms of DVT  Outcome: Progressing Towards Goal  Goal: *Absence of aspiration  Outcome: Progressing Towards Goal  Goal: *Progressive mobility and function  Outcome: Progressing Towards Goal  Goal: *Home safety concerns addressed  Outcome: Progressing Towards Goal

## 2019-10-20 NOTE — PROGRESS NOTES
Problem: Self Care Deficits Care Plan (Adult)  Goal: *Acute Goals and Plan of Care (Insert Text)  Description    FUNCTIONAL STATUS PRIOR TO ADMISSION: Independent PTA     HOME SUPPORT: The patient lived with niece but did not require assist.    Occupational Therapy Goals  Initiated 10/20/2019  1. Patient will perform grooming with supervision/set-up within 7 day(s). 2.  Patient will perform bathing with supervision/set-up within 7 day(s). 3.  Patient will perform self-feeding with independence within 7 day(s). 4.  Patient will perform toilet transfers with modified independence within 7 day(s). 5.  Patient will perform all aspects of toileting with independence within 7 day(s). Outcome: Progressing Towards Goal   OCCUPATIONAL THERAPY EVALUATION  Patient: Magda Bahena (26 y.o. female)  Date: 10/20/2019  Primary Diagnosis: ICH (intracerebral hemorrhage) ([facility]) [I61.9]  Intracranial hemorrhage ([facility] [address] [I62.9]        Precautions: fall       ASSESSMENT  Based on the objective data described below, the patient presents with overall decreased command following, seemingly L inattention/visual field cut, decreased motor planning, confusion, and decreased balance following admission. Received in bed, required increased prompts and cues to come to sitting EOB with min A. Once EOB, demonstrated strength in UE bilaterally, required total A for donning socks. Attempted stand x 1 and patient returning to sitting. Attempted 2nd stand for chair transfer. Patient's knee buckling and difficulty motor planning to get to chair, HR also rising to 140s (90s prior to activity). Returned supine and HR returning to 80s, BP stable throughout. Unable to fully assess visual changes due to command following. Niece at bedside and stating that patient was independent PTA.      Current Level of Function Impacting Discharge (ADLs/self-care): total A lower body ADL, min A functional transfers,mod A feeding, min A standing ADL with knee buckling    Functional Outcome Measure: The patient scored 40/100 on the Barthel Index outcome measure which is indicative of moderate ADL impairment. Other factors to consider for discharge: lives with niece, PTA was independent with ADL. Patient will benefit from skilled therapy intervention to address the above noted impairments. PLAN :  Recommendations and Planned Interventions: self care training, functional mobility training, balance training, therapeutic activities, endurance activities, patient education and home safety training    Frequency/Duration: Patient will be followed by occupational therapy 5 times a week to address goals. Recommendation for discharge: (in order for the patient to meet his/her long term goals)  Therapy up to 5 days/week in SNF setting    This discharge recommendation:  Has not yet been discussed the attending provider and/or case management    Equipment recommendations for successful discharge (if) home: bedside commode       SUBJECTIVE:   Patient stated I'm okay.     OBJECTIVE DATA SUMMARY:   HISTORY:   Past Medical History:   Diagnosis Date    Diabetes ([facility] [address]     Hypertension     Stroke [facility])      Past Surgical History:   Procedure Laterality Date    HX ORTHOPAEDIC  1996    back surgery    NEUROLOGICAL PROCEDURE UNLISTED  2008    shunt placement       Expanded or extensive additional review of patient history:     Home Situation  Home Environment: Private residence  # Steps to Enter: 7  Rails to Enter: Yes  Hand Rails : Bilateral  Wheelchair Ramp: No  One/Two Story Residence: One story  Living Alone: No  Support Systems: Family member(s)  Patient Expects to be Discharged to[de-identified] Unknown  Current DME Used/Available at Home: (none)  Tub or Shower Type: Tub/Shower combination      EXAMINATION OF PERFORMANCE DEFICITS:  Cognitive/Behavioral Status:  Neurologic State: Alert  Orientation Level: Oriented to person;Oriented to place  Cognition: Follows commands Skin: intact    Edema: none ntoed    Hearing: Auditory  Auditory Impairment: None    Vision/Perceptual:                                     Range of Motion:    AROM: Generally decreased, functional                         Strength:    Strength: Generally decreased, functional                Coordination:  Coordination: Generally decreased, functional            Tone & Sensation:    Tone: Normal                         Balance:  Sitting: Impaired  Sitting - Static: Good (unsupported)  Sitting - Dynamic: Fair (occasional)  Standing: Impaired  Standing - Static: Fair  Standing - Dynamic : Fair    Functional Mobility and Transfers for ADLs:  Bed Mobility:  Rolling: Minimum assistance  Supine to Sit: Minimum assistance  Sit to Supine: Minimum assistance  Scooting: Minimum assistance    Transfers:  Sit to Stand: Minimum assistance  Stand to Sit: Minimum assistance  Toilet Transfer : Moderate assistance(infer)    ADL Assessment:       Oral Facial Hygiene/Grooming: Setup;Supervision         Upper Body Dressing: Supervision;Setup    Lower Body Dressing: Minimum assistance(tailor sit but posterior lean)    Toileting: Minimum assistance(infer)                                Therapeutic Exercise:     Functional Measure:  Barthel Index:    Bathin  Bladder: 5  Bowels: 5  Groomin  Dressin  Feedin  Mobility: 5  Stairs: 0  Toilet Use: 5  Transfer (Bed to Chair and Back): 5  Total: 40/100        The Barthel ADL Index: Guidelines  1. The index should be used as a record of what a patient does, not as a record of what a patient could do. 2. The main aim is to establish degree of independence from any help, physical or verbal, however minor and for whatever reason. 3. The need for supervision renders the patient not independent. 4. A patient's performance should be established using the best available evidence.  Asking the patient, friends/relatives and nurses are the usual sources, but direct observation and common sense are also important. However direct testing is not needed. 5. Usually the patient's performance over the preceding 24-48 hours is important, but occasionally longer periods will be relevant. 6. Middle categories imply that the patient supplies over 50 per cent of the effort. 7. Use of aids to be independent is allowed. Danni Parekh., Barthel, D.W. ([id number]). Functional evaluation: the Barthel Index. [address] (14)2. SONALI Kelly Maybell Castilla., Suad Chairez., [place], [address] (1999). Measuring the change indisability after inpatient rehabilitation; comparison of the responsiveness of the Barthel Index and Functional [place] Measure. Journal of Neurology, Neurosurgery, and Psychiatry, 66(4), 701-785. SAMEERA Velazquez, KSENIA Pulido, & Myra Mitchell MBeverleyA. (2004.) Assessment of post-stroke quality of life in cost-effectiveness studies: The usefulness of the Barthel Index and the EuroQoL-5D. Quality of Life Research, 15, 591-62         Occupational Therapy Evaluation Charge Determination   History Examination Decision-Making   LOW Complexity : Brief history review  LOW Complexity : 1-3 performance deficits relating to physical, cognitive , or psychosocial skils that result in activity limitations and / or participation restrictions  LOW Complexity : No comorbidities that affect functional and no verbal or physical assistance needed to complete eval tasks       Based on the above components, the patient evaluation is determined to be of the following complexity level: LOW   Pain Rating:      Activity Tolerance:   Good  Please refer to the flowsheet for vital signs taken during this treatment. After treatment patient left in no apparent distress:    Supine in bed, call sonam yu at bedside    COMMUNICATION/EDUCATION:   The patients plan of care was discussed with: Registered Nurse. Patient/family have participated as able in goal setting and plan of care.     This patients plan of care is appropriate for delegation to [facility].     Thank you for this referral.  Rik Parents  Time Calculation: 30 mins

## 2019-10-20 NOTE — PROGRESS NOTES
Neurosurgery  Awake, alert, not very oriented.  Self, hospital.    Mabel Arizmendilon, Probably has left field cut, but keeps moving head to see my hands    Moving all extremities    Repeat head CT today stable        Continue supportive care    PT/Ot ,   ? rehab

## 2019-10-20 NOTE — PROGRESS NOTES
Noted pt has CT order this AM and no orders for travel. Spoke with Dr. James Valdivia by phone and travel orders obtained.

## 2019-10-20 NOTE — PROGRESS NOTES
Hospitalist Progress Note  Delon Philip MD  Answering service: [phone] from in house phone        Date of Service:  10/20/2019  NAME:  Malick Ruvalcaba  :  1955  MRN:  [id number]      Admission Summary:   62 y.o. female with a history of cerebral aneurysm and shunt in  as well as multiple infarts in the past who presents with confusion and mental status changes. Ct scan done showed an acute right parietal intracranial hematoma as described with midline shift, probable small right subdural hematoma    Interval history / Subjective:    Pt has no complaints today. Appropriately answers questions. Pt will most likely need placement. Assessment & Plan:     Acute intracranial hemorrhage with midline shift  -repeat CT scan 10/20/2019 stable  -neurosurgery consulted, no surgical intervention  -lisinopril, norvasc, prn hydralazine for BP control  -frequent neurochecks  -hold aspirin     Hypertension  -keep SBP<140  -increase lisinopril to 40mg daily, increase norvasc to 10mg dialy  -cont prn hydralazine  -cont to monitor     Type 2 diabetes  -A1C 9.1  -sliding scale insulin  -hold metformin      Code status: full  DVT prophylaxis: [address] discussed with: Patient/Family  Disposition: Home w/Family, SNF/LTC and TBD     Hospital Problems  Date Reviewed: 2017          Codes Class Noted POA    ICH (intracerebral hemorrhage) ([facility] [address] ICD-10-CM: I61.9  ICD-9-CM: 616  10/18/2019 Unknown        Intracranial hemorrhage ([facility]) ICD-10-CM: I62.9  ICD-9-CM: 432.9  10/18/2019 Unknown                Review of Systems:   A comprehensive review of systems was negative except for that written in the HPI. Vital Signs:    Last 24hrs VS reviewed since prior progress note. Most recent are:  Visit Vitals  BP (!) 166/92   Pulse 82   Temp 98.7 °F (37.1 °C)   Resp 21   Ht 5' 8\" (1.727 m)   Wt 44.4 kg (97 lb 14.2 oz)   SpO2 99%   Breastfeeding? No   BMI 14.88 kg/m²         Intake/Output Summary (Last 24 hours) at 10/20/2019 0854  Last data filed at 10/20/2019 0645  Gross per 24 hour   Intake 1400 ml   Output 610 ml   Net 790 ml        Physical Examination:             Constitutional:  No acute distress, cooperative, pleasant    ENT:  Oral mucous moist, oropharynx benign. Resp:  CTA bilaterally. No wheezing/rhonchi/rales. No accessory muscle use   CV:  Regular rhythm, normal rate, no murmurs, gallops, rubs    GI:  Soft, non distended, non tender. normoactive bowel sounds, no hepatosplenomegaly     Musculoskeletal:  No edema, warm, 2+ pulses throughout    Neurologic:  Moves all extremities. AAOx3, CN II-XII reviewed     Psych:  Good insight, Not anxious nor agitated. Data Review:    Review and/or order of clinical lab test      Labs:     Recent Labs     10/20/19  0458 10/19/19  0241   WBC 11.0 12.2*   HGB 13.3 13.3   HCT 44.4 44.5    204     Recent Labs     10/20/19  0458 10/19/19  0241 10/18/19  0028   * 140 144   K 4.1 3.5 3.7    105 108   CO2 23 30 27   BUN 11 12 18   CREA 0.79 0.90 1.26*   * 234* 315*   CA 9.0 9.2 10.0     Recent Labs     10/18/19  0028   SGOT 4*   ALT 18   *   TBILI 0.3   TP 8.7*   ALB 3.9   GLOB 4.8*     Recent Labs     10/18/19  0417   INR 1.0   PTP 10.3      No results for input(s): FE, TIBC, PSAT, FERR in the last 72 hours. No results found for: FOL, RBCF   No results for input(s): PH, PCO2, PO2 in the last 72 hours. No results for input(s): CPK, CKNDX, TROIQ in the last 72 hours.     No lab exists for component: CPKMB  No results found for: CHOL, CHOLX, CHLST, CHOLV, HDL, HDLP, LDL, LDLC, DLDLP, TGLX, TRIGL, TRIGP, CHHD, CHHDX  Lab Results   Component Value Date/Time    Glucose (POC) 222 (H) 10/20/2019 07:30 AM    Glucose (POC) 236 (H) 10/19/2019 09:27 PM    Glucose (POC) 266 (H) 10/19/2019 04:24 PM    Glucose (POC) 185 (H) 10/19/2019 12:46 PM    Glucose (POC) 255 (H) 10/19/2019 07:47 AM Lab Results   Component Value Date/Time    Color YELLOW/STRAW 10/18/2019 05:16 AM    Appearance CLEAR 10/18/2019 05:16 AM    Specific gravity 1.028 10/18/2019 05:16 AM    Specific gravity 1.020 12/22/2017 01:12 PM    pH (UA) 6.0 10/18/2019 05:16 AM    Protein 30 (A) 10/18/2019 05:16 AM    Glucose >1,000 (A) 10/18/2019 05:16 AM    Ketone TRACE (A) 10/18/2019 05:16 AM    Bilirubin NEGATIVE  10/18/2019 05:16 AM    Urobilinogen 1.0 10/18/2019 05:16 AM    Nitrites NEGATIVE  10/18/2019 05:16 AM    Leukocyte Esterase NEGATIVE  10/18/2019 05:16 AM    Epithelial cells FEW 10/18/2019 05:16 AM    Bacteria NEGATIVE  10/18/2019 05:16 AM    WBC 0-4 10/18/2019 05:16 AM    RBC 0-5 10/18/2019 05:16 AM         Medications Reviewed:     Current Facility-Administered Medications   Medication Dose Route Frequency    [START ON 10/21/2019] amLODIPine (NORVASC) tablet 10 mg  10 mg Oral DAILY    amLODIPine (NORVASC) tablet 5 mg  5 mg Oral ONCE    [START ON 10/21/2019] lisinopril (PRINIVIL, ZESTRIL) tablet 40 mg  40 mg Oral DAILY    lisinopril (PRINIVIL, ZESTRIL) tablet 20 mg  20 mg Oral ONCE    hydrALAZINE (APRESOLINE) 20 mg/mL injection 10 mg  10 mg IntraVENous Q6H PRN    influenza vaccine 2019-20 (6 mos+)(PF) (FLUARIX/FLULAVAL/FLUZONE QUAD) injection 0.5 mL  0.5 mL IntraMUSCular PRIOR TO DISCHARGE    0.9% sodium chloride infusion  100 mL/hr IntraVENous CONTINUOUS    sodium chloride (NS) flush 5-40 mL  5-40 mL IntraVENous Q8H    sodium chloride (NS) flush 5-40 mL  5-40 mL IntraVENous PRN    glucose chewable tablet 16 g  4 Tab Oral PRN    glucagon (GLUCAGEN) injection 1 mg  1 mg IntraMUSCular PRN    dextrose 10% infusion 0-250 mL  0-250 mL IntraVENous PRN    insulin lispro (HUMALOG) injection   SubCUTAneous AC&HS     ______________________________________________________________________  EXPECTED LENGTH OF STAY: - - -  ACTUAL LENGTH OF STAY:          2                 Sean Desouza MD

## 2019-10-21 LAB
ANION GAP SERPL CALC-SCNC: 7 MMOL/L (ref 5–15)
BASOPHILS # BLD: 0 K/UL (ref 0–0.1)
BASOPHILS NFR BLD: 0 % (ref 0–1)
BUN SERPL-MCNC: 12 MG/DL (ref 6–20)
BUN/CREAT SERPL: 14 (ref 12–20)
CALCIUM SERPL-MCNC: 8.4 MG/DL (ref 8.5–10.1)
CHLORIDE SERPL-SCNC: 106 MMOL/L (ref 97–108)
CO2 SERPL-SCNC: 23 MMOL/L (ref 21–32)
CREAT SERPL-MCNC: 0.83 MG/DL (ref 0.55–1.02)
DIFFERENTIAL METHOD BLD: ABNORMAL
EOSINOPHIL # BLD: 0.1 K/UL (ref 0–0.4)
EOSINOPHIL NFR BLD: 1 % (ref 0–7)
ERYTHROCYTE [DISTWIDTH] IN BLOOD BY AUTOMATED COUNT: 16.2 % (ref 11.5–14.5)
GLUCOSE BLD STRIP.AUTO-MCNC: 214 MG/DL (ref 65–100)
GLUCOSE BLD STRIP.AUTO-MCNC: 221 MG/DL (ref 65–100)
GLUCOSE BLD STRIP.AUTO-MCNC: 229 MG/DL (ref 65–100)
GLUCOSE BLD STRIP.AUTO-MCNC: 299 MG/DL (ref 65–100)
GLUCOSE SERPL-MCNC: 238 MG/DL (ref 65–100)
HCT VFR BLD AUTO: 39.9 % (ref 35–47)
HGB BLD-MCNC: 12.3 G/DL (ref 11.5–16)
IMM GRANULOCYTES # BLD AUTO: 0 K/UL (ref 0–0.04)
IMM GRANULOCYTES NFR BLD AUTO: 0 % (ref 0–0.5)
LYMPHOCYTES # BLD: 3.4 K/UL (ref 0.8–3.5)
LYMPHOCYTES NFR BLD: 33 % (ref 12–49)
MCH RBC QN AUTO: 26.5 PG (ref 26–34)
MCHC RBC AUTO-ENTMCNC: 30.8 G/DL (ref 30–36.5)
MCV RBC AUTO: 86 FL (ref 80–99)
MONOCYTES # BLD: 0.8 K/UL (ref 0–1)
MONOCYTES NFR BLD: 8 % (ref 5–13)
NEUTS SEG # BLD: 6 K/UL (ref 1.8–8)
NEUTS SEG NFR BLD: 58 % (ref 32–75)
NRBC # BLD: 0 K/UL (ref 0–0.01)
NRBC BLD-RTO: 0 PER 100 WBC
PLATELET # BLD AUTO: 171 K/UL (ref 150–400)
PMV BLD AUTO: 11.6 FL (ref 8.9–12.9)
POTASSIUM SERPL-SCNC: 3.3 MMOL/L (ref 3.5–5.1)
RBC # BLD AUTO: 4.64 M/UL (ref 3.8–5.2)
SERVICE CMNT-IMP: ABNORMAL
SODIUM SERPL-SCNC: 136 MMOL/L (ref 136–145)
WBC # BLD AUTO: 10.3 K/UL (ref 3.6–11)

## 2019-10-21 PROCEDURE — 74011250637 HC RX REV CODE- 250/637: Performed by: FAMILY MEDICINE

## 2019-10-21 PROCEDURE — 85025 COMPLETE CBC W/AUTO DIFF WBC: CPT

## 2019-10-21 PROCEDURE — 74011250636 HC RX REV CODE- 250/636: Performed by: FAMILY MEDICINE

## 2019-10-21 PROCEDURE — 65660000000 HC RM CCU STEPDOWN

## 2019-10-21 PROCEDURE — 77030038269 HC DRN EXT URIN PURWCK BARD -A

## 2019-10-21 PROCEDURE — 97116 GAIT TRAINING THERAPY: CPT

## 2019-10-21 PROCEDURE — 97535 SELF CARE MNGMENT TRAINING: CPT

## 2019-10-21 PROCEDURE — 74011636637 HC RX REV CODE- 636/637: Performed by: FAMILY MEDICINE

## 2019-10-21 PROCEDURE — 82962 GLUCOSE BLOOD TEST: CPT

## 2019-10-21 PROCEDURE — 80048 BASIC METABOLIC PNL TOTAL CA: CPT

## 2019-10-21 PROCEDURE — 36415 COLL VENOUS BLD VENIPUNCTURE: CPT

## 2019-10-21 RX ORDER — POTASSIUM CHLORIDE 750 MG/1
40 TABLET, FILM COATED, EXTENDED RELEASE ORAL
Status: COMPLETED | OUTPATIENT
Start: 2019-10-21 | End: 2019-10-21

## 2019-10-21 RX ORDER — METOPROLOL SUCCINATE 25 MG/1
25 TABLET, EXTENDED RELEASE ORAL DAILY
Status: DISCONTINUED | OUTPATIENT
Start: 2019-10-21 | End: 2019-10-23

## 2019-10-21 RX ADMIN — SODIUM CHLORIDE 100 ML/HR: 900 INJECTION, SOLUTION INTRAVENOUS at 03:01

## 2019-10-21 RX ADMIN — METOPROLOL SUCCINATE 25 MG: 25 TABLET, EXTENDED RELEASE ORAL at 10:01

## 2019-10-21 RX ADMIN — Medication 10 ML: at 14:58

## 2019-10-21 RX ADMIN — INSULIN LISPRO 7 UNITS: 100 INJECTION, SOLUTION INTRAVENOUS; SUBCUTANEOUS at 12:08

## 2019-10-21 RX ADMIN — AMLODIPINE BESYLATE 10 MG: 5 TABLET ORAL at 08:40

## 2019-10-21 RX ADMIN — INSULIN LISPRO 4 UNITS: 100 INJECTION, SOLUTION INTRAVENOUS; SUBCUTANEOUS at 17:34

## 2019-10-21 RX ADMIN — LISINOPRIL 40 MG: 20 TABLET ORAL at 08:35

## 2019-10-21 RX ADMIN — INSULIN LISPRO 4 UNITS: 100 INJECTION, SOLUTION INTRAVENOUS; SUBCUTANEOUS at 08:36

## 2019-10-21 RX ADMIN — POTASSIUM CHLORIDE 40 MEQ: 750 TABLET, FILM COATED, EXTENDED RELEASE ORAL at 08:35

## 2019-10-21 RX ADMIN — Medication 10 ML: at 21:31

## 2019-10-21 RX ADMIN — SODIUM CHLORIDE 100 ML/HR: 900 INJECTION, SOLUTION INTRAVENOUS at 15:43

## 2019-10-21 RX ADMIN — INSULIN LISPRO 2 UNITS: 100 INJECTION, SOLUTION INTRAVENOUS; SUBCUTANEOUS at 21:32

## 2019-10-21 RX ADMIN — Medication 10 ML: at 06:53

## 2019-10-21 NOTE — PROGRESS NOTES
Problem: Diabetes Self-Management  Goal: *Disease process and treatment process  Description  Define diabetes and identify own type of diabetes; list 3 options for treating diabetes. Outcome: Progressing Towards Goal     Problem: Falls - Risk of  Goal: *Absence of Falls  Description  Document Kal Bravo Fall Risk and appropriate interventions in the flowsheet.   Outcome: Progressing Towards Goal  Note:   Fall Risk Interventions:  Mobility Interventions: Bed/chair exit alarm    Mentation Interventions: Bed/chair exit alarm, Door open when patient unattended, More frequent rounding    Medication Interventions: Bed/chair exit alarm, Patient to call before getting OOB, Teach patient to arise slowly    Elimination Interventions: Call light in reach, Bed/chair exit alarm, Toileting schedule/hourly rounds              Problem: Hemorrhagic Stroke: Day 5 through Discharge  Goal: Activity/Safety  Outcome: Progressing Towards Goal  Goal: Diagnostic Test/Procedures  Outcome: Progressing Towards Goal  Goal: Nutrition/Diet  Outcome: Progressing Towards Goal  Goal: Respiratory  Outcome: Progressing Towards Goal  Goal: *Hemodynamically stable  Outcome: Progressing Towards Goal  Goal: *Verbalizes anxiety and depression are reduced or absent  Outcome: Progressing Towards Goal  Goal: *Absence of aspiration  Outcome: Progressing Towards Goal  Goal: *Absence of signs and symptoms of DVT  Outcome: Progressing Towards Goal  Goal: *Optimal pain control at patient's stated goal  Outcome: Progressing Towards Goal  Goal: *Tolerating diet  Outcome: Progressing Towards Goal  Goal: *Progressive mobility and function  Outcome: Progressing Towards Goal

## 2019-10-21 NOTE — PROGRESS NOTES
Problem: Diabetes Self-Management  Goal: *Disease process and treatment process  Description  Define diabetes and identify own type of diabetes; list 3 options for treating diabetes. Outcome: Progressing Towards Goal  Goal: *Incorporating nutritional management into lifestyle  Description  Describe effect of type, amount and timing of food on blood glucose; list 3 methods for planning meals. Outcome: Progressing Towards Goal  Goal: *Incorporating physical activity into lifestyle  Description  State effect of exercise on blood glucose levels. Outcome: Progressing Towards Goal  Goal: *Developing strategies to promote health/change behavior  Description  Define the ABC's of diabetes; identify appropriate screenings, schedule and personal plan for screenings. Outcome: Progressing Towards Goal  Goal: *Using medications safely  Description  State effect of diabetes medications on diabetes; name diabetes medication taking, action and side effects. Outcome: Progressing Towards Goal  Goal: *Monitoring blood glucose, interpreting and using results  Description  Identify recommended blood glucose targets  and personal targets. Outcome: Progressing Towards Goal  Goal: *Prevention, detection, treatment of acute complications  Description  List symptoms of hyper- and hypoglycemia; describe how to treat low blood sugar and actions for lowering  high blood glucose level. Outcome: Progressing Towards Goal  Goal: *Prevention, detection and treatment of chronic complications  Description  Define the natural course of diabetes and describe the relationship of blood glucose levels to long term complications of diabetes.   Outcome: Progressing Towards Goal  Goal: *Developing strategies to address psychosocial issues  Description  Describe feelings about living with diabetes; identify support needed and support network  Outcome: Progressing Towards Goal  Goal: *Insulin pump training  Outcome: Progressing Towards Goal  Goal: *Sick day guidelines  Outcome: Progressing Towards Goal  Goal: *Patient Specific Goal (EDIT GOAL, INSERT TEXT)  Outcome: Progressing Towards Goal     Problem: Patient Education: Go to Patient Education Activity  Goal: Patient/Family Education  Outcome: Progressing Towards Goal     Problem: Falls - Risk of  Goal: *Absence of Falls  Description  Document Liang Finn Fall Risk and appropriate interventions in the flowsheet. Outcome: Progressing Towards Goal  Note:   Fall Risk Interventions:  Mobility Interventions: Bed/chair exit alarm    Mentation Interventions: Bed/chair exit alarm    Medication Interventions: Bed/chair exit alarm    Elimination Interventions: Bed/chair exit alarm, Call light in reach              Problem: Patient Education: Go to Patient Education Activity  Goal: Patient/Family Education  Outcome: Progressing Towards Goal     Problem: Pressure Injury - Risk of  Goal: *Prevention of pressure injury  Description  Document Nate Scale and appropriate interventions in the flowsheet.   Outcome: Progressing Towards Goal  Note:   Pressure Injury Interventions:  Sensory Interventions: Assess changes in LOC, Assess need for specialty bed, Discuss PT/OT consult with provider, Float heels, Keep linens dry and wrinkle-free, Maintain/enhance activity level, Minimize linen layers    Moisture Interventions: Absorbent underpads, Internal/External urinary devices    Activity Interventions: Increase time out of bed    Mobility Interventions: Assess need for specialty bed, PT/OT evaluation    Nutrition Interventions: Document food/fluid/supplement intake    Friction and Shear Interventions: Lift sheet, HOB 30 degrees or less, Minimize layers                Problem: Patient Education: Go to Patient Education Activity  Goal: Patient/Family Education  Outcome: Progressing Towards Goal     Problem: Patient Education: Go to Patient Education Activity  Goal: Patient/Family Education  Outcome: Progressing Towards Goal     Problem: Hemorrhagic Stroke:  3-24 hours  Goal: *Verbalizes anxiety and depression are reduced or absent  Outcome: Resolved/Met     Problem: Hemorrhagic Stroke: Day 2  Goal: Off Pathway (Use only if patient is Off Pathway)  Outcome: Resolved/Met  Goal: Activity/Safety  Outcome: Resolved/Met  Goal: Consults, if ordered  Outcome: Progressing Towards Goal  Goal: Diagnostic Test/Procedures  Outcome: Resolved/Met  Goal: Nutrition/Diet  Outcome: Resolved/Met  Goal: Medications  Outcome: Resolved/Met  Goal: Respiratory  Outcome: Progressing Towards Goal  Goal: Treatments/Interventions/Procedures  Outcome: Resolved/Met  Goal: Psychosocial  Outcome: Resolved/Met  Goal: *Hemodynamically stable  Outcome: Resolved/Met  Goal: *Verbalizes anxiety and depression are reduced or absent  Outcome: Resolved/Met  Goal: *Absence of aspiration  Outcome: Resolved/Met  Goal: *Absence of signs and symptoms of DVT  Outcome: Resolved/Met  Goal: *Optimal pain control at patient's stated goal  Outcome: Resolved/Met  Goal: *Tolerating diet  Outcome: Resolved/Met  Goal: *Progressive mobility and function  Outcome: Resolved/Met  Goal: *Rehabilitation readiness  Outcome: Resolved/Met     Problem: Hemorrhagic Stroke: Day 3  Goal: Off Pathway (Use only if patient is Off Pathway)  Outcome: Progressing Towards Goal  Goal: Activity/Safety  Outcome: Progressing Towards Goal  Goal: Consults, if ordered  Outcome: Progressing Towards Goal  Goal: Diagnostic Test/Procedures  Outcome: Progressing Towards Goal  Goal: Nutrition/Diet  Outcome: Progressing Towards Goal  Goal: Medications  Outcome: Progressing Towards Goal  Goal: Respiratory  Outcome: Progressing Towards Goal  Goal: Treatments/Interventions/Procedures  Outcome: Progressing Towards Goal  Goal: Psychosocial  Outcome: Progressing Towards Goal  Goal: *Hemodynamically stable  Outcome: Progressing Towards Goal  Goal: *Verbalizes anxiety and depression are reduced or absent  Outcome: Progressing Towards Goal  Goal: *Absence of aspiration  Outcome: Progressing Towards Goal  Goal: *Absence of signs and symptoms of DVT  Outcome: Progressing Towards Goal  Goal: *Optimal pain control at patient's stated goal  Outcome: Progressing Towards Goal  Goal: *Tolerating diet  Outcome: Progressing Towards Goal  Goal: *Progressive mobility and function  Outcome: Progressing Towards Goal  Goal: *Rehabilitation readiness  Outcome: Progressing Towards Goal     Problem: Hemorrhagic Stroke: Discharge Outcomes  Goal: *Verbalizes anxiety and depression are reduced or absent  Outcome: Progressing Towards Goal  Goal: *Verbalize understanding of risk factor modification(Stroke Metric)  Outcome: Progressing Towards Goal  Goal: *Optimal pain control at patient's stated goal  Outcome: Progressing Towards Goal  Goal: *Hemodynamically stable  Outcome: Progressing Towards Goal  Goal: *Absence of aspiration pneumonia  Outcome: Progressing Towards Goal  Goal: *Aware of needed dietary changes  Outcome: Progressing Towards Goal  Goal: *Verbalizes understanding and describes medication purposes and frequencies  Outcome: Progressing Towards Goal  Goal: *Tolerating diet  Outcome: Progressing Towards Goal  Goal: *Absence of signs and symptoms of DVT  Outcome: Progressing Towards Goal  Goal: *Absence of aspiration  Outcome: Progressing Towards Goal  Goal: *Progressive mobility and function  Outcome: Progressing Towards Goal  Goal: *Home safety concerns addressed  Outcome: Progressing Towards Goal

## 2019-10-21 NOTE — DIABETES MGMT
[facility]    DTC Progress Note    Recommendations/ Comments: Chart review for hyperglycemia - BG's 229 mg/dL - 299 mg/dL      If appropriate, please consider   Addition of Amaryl 2 mg daily    Current hospital DM medication:   Lispro resistant correction scale    Chart reviewed on [facility]er[facility] Pacheco. Patient is a 62 y.o. female with known DM. No DM listed in PTA      A1c:   Lab Results   Component Value Date/Time    Hemoglobin A1c 9.1 (H) 10/19/2019 02:41 AM    Hemoglobin A1c 9.5 (H) 10/18/2019 06:26 AM       Recent Glucose Results:   Lab Results   Component Value Date/Time     (H) 10/21/2019 03:20 AM    GLUCPOC 299 (H) 10/21/2019 11:56 AM    GLUCPOC 229 (H) 10/21/2019 08:28 AM    GLUCPOC 278 (H) 10/20/2019 10:17 PM        Lab Results   Component Value Date/Time    Creatinine 0.83 10/21/2019 03:20 AM     Estimated Creatinine Clearance: 48.7 mL/min (based on SCr of 0.83 mg/dL). Active Orders   Diet    DIET DIABETIC CONSISTENT CARB Regular        PO intake:   Patient Vitals for the past 72 hrs:   % Diet Eaten   10/21/19 0800 100 %       Will continue to follow as needed.     Thank you  Esequiel Hancock RN, CDE        Time spent: 4 min

## 2019-10-21 NOTE — ROUTINE PROCESS
Bedside and Verbal shift change report given to Angela Padgett F.RN  And Angelina VERNON (oncoming nurse) by Vincenzo Habermann RN (offgoing nurse). Report included the following information SBAR, MAR and Cardiac Rhythm NSR/ST.

## 2019-10-21 NOTE — PROGRESS NOTES
Problem: Self Care Deficits Care Plan (Adult)  Goal: *Acute Goals and Plan of Care (Insert Text)  Description    FUNCTIONAL STATUS PRIOR TO ADMISSION: Independent PTA     HOME SUPPORT: The patient lived with niece but did not require assist.    Occupational Therapy Goals  Initiated 10/20/2019  1. Patient will perform grooming with supervision/set-up within 7 day(s). 2.  Patient will perform bathing with supervision/set-up within 7 day(s). 3.  Patient will perform self-feeding with independence within 7 day(s). 4.  Patient will perform toilet transfers with modified independence within 7 day(s). 5.  Patient will perform all aspects of toileting with independence within 7 day(s). Outcome: Progressing Towards Goal    OCCUPATIONAL THERAPY TREATMENT  Patient: Eliceo Kahn (50 y.o. female)  Date: 10/21/2019  Diagnosis: ICH (intracerebral hemorrhage) ([facility]) [I61.9]  Intracranial hemorrhage ([address] [I62.9] <principal problem not specified>       Precautions:  Fall; Chair/bed alarm  Chart, occupational therapy assessment, plan of care, and goals were reviewed. ASSESSMENT  Patient continues with skilled OT services and is progressing towards goals. She was able to demonstrate improved command following during interventions. She was able to complete LB dressing activities with CG but does need cues to sequence dressing tasks. Pt does appear to have LIDIA visual inattention needing cues to scan to her environment during mobility to improve safety with functional mobility but visual tracking when assessed is intact. Per old notes, ? If patient has intellectual disability at baseline as not noted chart review. Pt at increased fall risk due to decreased cognitive statues and visual inattention while mobilizing. Recommend discharge with 24 hour assistance and if family unable to provide needed assistance, she may require discharge to rehab facility.      Current Level of Function Impacting Discharge (ADLs): CG with maximal cues for visual attention and sequencing activities. Other factors to consider for discharge: Sisters ability to provide care for her at discharge. PLAN :  Patient continues to benefit from skilled intervention to address the above impairments. Continue treatment per established plan of care. to address goals. Recommend with staff: OOB to chair for all activities    Recommend next OT session: grooming activities standing at sink, toileting, bathing    Recommendation for discharge: (in order for the patient to meet his/her long term goals)  Therapy up to 5 days/week in SNF setting or an intensive home health therapy program    This discharge recommendation:  Has been made in collaboration with the attending provider and/or case management    IF patient discharges home will need the following DME: May require shower chair but recommend assessment by [address] [place] [address] if discharged home       SUBJECTIVE:   Patient stated I am felling alright.     OBJECTIVE DATA SUMMARY:   Cognitive/Behavioral Status:  Neurologic State: Alert  Orientation Level: Oriented to person;Disoriented to time;Disoriented to situation;Disoriented to place  Cognition: Follows commands  Perception: Cues to attend left visual field;Cues to attend right visual field(r/l discrimination intact)  Perseveration: No perseveration noted  Safety/Judgement: Decreased insight into deficits; Decreased awareness of need for safety    Functional Mobility and Transfers for ADLs:  Bed Mobility:  Supine to Sit: Stand-by assistance; Additional time  Scooting: Stand-by assistance; Additional time    Transfers:  Sit to Stand: Contact guard assistance          Balance:  Sitting: Intact  Standing: Impaired  Standing - Static: Constant support; Fair  Standing - Dynamic : Fair    ADL Intervention:       Grooming  Grooming Assistance: Supervision  Washing Face: Supervision  Brushing Teeth: Supervision                   Lower Body Dressing Assistance  Dressing Assistance: Contact guard assistance(verbal cues to sequence activity)  Socks: Contact guard assistance         Cognitive Retraining  Safety/Judgement: Decreased insight into deficits; Decreased awareness of need for safety    Pain:  No pain reported    Activity Tolerance:   Good  Please refer to the flowsheet for vital signs taken during this treatment. After treatment patient left in no apparent distress:   Sitting in chair, Call bell within reach, Bed / chair alarm activated and she has difficulty recalling how to use call be to ring for assistance     COMMUNICATION/COLLABORATION:   The patients plan of care was discussed with: Physical Therapist and Registered Nurse. Patient was educated regarding Her deficit(s) of decreased visual attention with activities and decreased visual scanning during functional tasks as this relates to Her diagnosis of ICH. She demonstrated Guarded understanding as evidenced by intellectual disability.     Anisa Muhammad OT  Time Calculation: 25 mins

## 2019-10-21 NOTE — PROGRESS NOTES
Bedside shift change report given to Isatu Carrion RN (oncoming nurse) by Minerva Medrano RN (offgoing nurse). Report included the following information SBAR, Kardex and Intake/Output.

## 2019-10-21 NOTE — PROGRESS NOTES
Problem: Mobility Impaired (Adult and Pediatric)  Goal: *Acute Goals and Plan of Care (Insert Text)  Description    FUNCTIONAL STATUS PRIOR TO ADMISSION: Patient was independent and active without use of DME.    HOME SUPPORT PRIOR TO ADMISSION: The patient lived with her niece but did not require assist for mobility. Physical Therapy Goals  Initiated 10/18/2019  1. Patient will move from supine to sit and sit to supine , scoot up and down and roll side to side in bed with independence within 7 day(s). 2.  Patient will transfer from bed to chair and chair to bed with modified independence using the least restrictive device within 7 day(s). 3.  Patient will perform sit to stand with modified independence within 7 day(s). 4.  Patient will ambulate with independence for 150 feet with the least restrictive device within 7 day(s). 5.  Patient will ascend/descend 7 stairs with handrail(s) with modified independence within 7 day(s). 6.  Patient will improve Fiore Balance score by 7 points within 7 days. Outcome: Progressing Towards Goal   PHYSICAL THERAPY TREATMENT  Patient: Delmar Concepcion (20 y.o. female)  Date: 10/21/2019  Diagnosis: ICH (intracerebral hemorrhage) ([facility]) [I61.9]  Intracranial hemorrhage ([facility] [I62.9] <principal problem not specified>       Precautions:  Falls  Chart, physical therapy assessment, plan of care and goals were reviewed. ASSESSMENT  Patient continues with skilled PT services and is progressing towards goals. Pt continues to demonstrate decreased functional mobility, impaired balance and gait, and inattention L and R during gait training, ?intellectual disability. Pt tolerated session well. Pt oriented to self only. Pt completed gait training in the hallway with CGA and min A at times during turns and obstacle avoidance. Pt needed max verbal cues to focus on the therapiss/IV pole to follow back to the room as she had difficulty following  \"turn left/right\" verbal cues.  Pt will continue to benefit from PT to progress mobility as tolerated. Recommend d/c to SNF to continue therapy efforts. Current Level of Function Impacting Discharge (mobility/balance): min A gait training x 100 feet with max verbal cues for directions         PLAN :  Patient continues to benefit from skilled intervention to address the above impairments. Continue treatment per established plan of care. to address goals. Recommendation for discharge: (in order for the patient to meet his/her long term goals)  Therapy up to 5 days/week in SNF setting         SUBJECTIVE:   Patient stated I like to watch stories.     OBJECTIVE DATA SUMMARY:   Critical Behavior:  Neurologic State: Alert  Orientation Level: Oriented to person, Disoriented to time, Disoriented to situation, Disoriented to place  Cognition: Follows commands  Safety/Judgement: Decreased insight into deficits, Decreased awareness of need for safety  Functional Mobility Training:  Bed Mobility:     Supine to Sit: Stand-by assistance; Additional time     Scooting: Stand-by assistance; Additional time        Transfers:  Sit to Stand: Contact guard assistance  Stand to Sit: Contact guard assistance             Balance:  Sitting: Intact  Standing: Impaired  Standing - Static: Constant support; Fair  Standing - Dynamic : Fair  Ambulation/Gait Training:  Distance (ft): 100 Feet (ft)     Ambulation - Level of Assistance: Minimal assistance        Gait Abnormalities: Decreased step clearance; Path deviations;Trunk sway increased(L and R inattention)        Base of Support: Widened     Speed/Melissa: Pace decreased (<100 feet/min)  Step Length: Left shortened;Right shortened    Activity Tolerance:   Good  Please refer to the flowsheet for vital signs taken during this treatment.     After treatment patient left in no apparent distress:   Sitting in chair, Call bell within reach and Bed / chair alarm activated    COMMUNICATION/COLLABORATION:   The patients plan of care was discussed with: Occupational Therapist and Registered Nurse    Mignon Klinefelter, PT, DPT   Time Calculation: 17 mins

## 2019-10-21 NOTE — PROGRESS NOTES
10/21/19 1001   Vital Signs   Temp 98.8 °F (37.1 °C)   Temp Source Oral   Pulse (Heart Rate) (!) 124   Heart Rate Source Monitor   Cardiac Rhythm Sinus Tach   Resp Rate 17   O2 Sat (%) 97 %   Level of Consciousness Alert   /73   BP 1 Method Automatic   BP 1 Location Right arm   BP Patient Position At rest   MEWS Score 3     Pt has a MEWS of 3 due to elevated HR. Metoprolol XR given. Will notify provider.

## 2019-10-21 NOTE — PROGRESS NOTES
Transitions of Care Plan  Recommendations are for SNF rehab  Met with patient and caregiver niece Kenzie Pearson [phone]  They would like referral sent to [facility]  Referral sent via [employer]. Madeline Krishnamurthy RN CRM  Ext [phone] has excepted patient for admission. Patient  is managed by [facility]. Spoke with patient's LASHELL Noland and updated her. Patient and niece are in agreement with plan. Will reach out to [facility], liaison from [address] to begin authorization.

## 2019-10-21 NOTE — PROGRESS NOTES
Hospitalist Progress Note  Mike Burton MD  Answering service: [phone] from in house phone        Date of Service:  10/21/2019  NAME:  Suyapa Mirza  :  1955  MRN:  [id number]      Admission Summary:   62 y.o. female with a history of cerebral aneurysm and shunt in  as well as multiple infarts in the past who presents with confusion and mental status changes. Ct scan done showed an acute right parietal intracranial hematoma as described with midline shift, probable small right subdural hematoma    Interval history / Subjective:    Pt has no complaints today. Appropriately answers questions. Pt needs placement to SNF     Assessment & Plan:     Acute intracranial hemorrhage with midline shift  -repeat CT scan 10/20/2019 stable  -neurosurgery consulted, no surgical intervention  -lisinopril, norvasc, prn hydralazine for BP control  -frequent neurochecks  -hold aspirin     Hypertension  -keep SBP<140  -increase lisinopril to 40mg daily, increase norvasc to 10mg daily  -add metoprolol succinate 25mg daily  -cont prn hydralazine  -cont to monitor     Type 2 diabetes  -A1C 9.1  -sliding scale insulin  -hold metformin      Code status: full  DVT prophylaxis: [address] discussed with: Patient/Family  Disposition: Home w/Family, SNF/LTC and TBD     Hospital Problems  Date Reviewed: 2017          Codes Class Noted POA    ICH (intracerebral hemorrhage) ([facility] ICD-10-CM: I61.9  ICD-9-CM: 876  10/18/2019 Unknown        Intracranial hemorrhage ([facility]) ICD-10-CM: I62.9  ICD-9-CM: 432.9  10/18/2019 Unknown                Review of Systems:   A comprehensive review of systems was negative except for that written in the HPI. Vital Signs:    Last 24hrs VS reviewed since prior progress note.  Most recent are:  Visit Vitals  /74 (BP 1 Location: Right arm, BP Patient Position: At rest)   Pulse 91   Temp 98.9 °F (37.2 °C)   Resp 23   Ht 5' 8\" (1.727 m)   Wt 44.5 kg (98 lb 1.7 oz)   SpO2 96%   Breastfeeding? No   BMI 14.92 kg/m²         Intake/Output Summary (Last 24 hours) at 10/21/2019 0829  Last data filed at 10/21/2019 0645  Gross per 24 hour   Intake    Output 1200 ml   Net -1200 ml        Physical Examination:             Constitutional:  No acute distress, cooperative, pleasant    ENT:  Oral mucous moist, oropharynx benign. Resp:  CTA bilaterally. No wheezing/rhonchi/rales. No accessory muscle use   CV:  Regular rhythm, normal rate, no murmurs, gallops, rubs    GI:  Soft, non distended, non tender. normoactive bowel sounds, no hepatosplenomegaly     Musculoskeletal:  No edema, warm, 2+ pulses throughout    Neurologic:  Moves all extremities. AAOx3, CN II-XII reviewed     Psych:  Good insight, Not anxious nor agitated. Data Review:    Review and/or order of clinical lab test      Labs:     Recent Labs     10/21/19  0320 10/20/19  0458   WBC 10.3 11.0   HGB 12.3 13.3   HCT 39.9 44.4    166     Recent Labs     10/21/19  0320 10/20/19  0458 10/19/19  0241    133* 140   K 3.3* 4.1 3.5    102 105   CO2 23 23 30   BUN 12 11 12   CREA 0.83 0.79 0.90   * 238* 234*   CA 8.4* 9.0 9.2     No results for input(s): SGOT, GPT, ALT, AP, TBIL, TBILI, TP, ALB, GLOB, GGT, AML, LPSE in the last 72 hours. No lab exists for component: AMYP, HLPSE  No results for input(s): INR, PTP, APTT, INREXT, INREXT in the last 72 hours. No results for input(s): FE, TIBC, PSAT, FERR in the last 72 hours. No results found for: FOL, RBCF   No results for input(s): PH, PCO2, PO2 in the last 72 hours. No results for input(s): CPK, CKNDX, TROIQ in the last 72 hours.     No lab exists for component: CPKMB  No results found for: CHOL, CHOLX, CHLST, CHOLV, HDL, HDLP, LDL, LDLC, DLDLP, TGLX, TRIGL, TRIGP, CHHD, CHHDX  Lab Results   Component Value Date/Time    Glucose (POC) 278 (H) 10/20/2019 10:17 PM    Glucose (POC) 229 (H) 10/20/2019 04:46 PM    Glucose (POC) 273 (H) 10/20/2019 11:54 AM    Glucose (POC) 222 (H) 10/20/2019 07:30 AM    Glucose (POC) 236 (H) 10/19/2019 09:27 PM     Lab Results   Component Value Date/Time    Color YELLOW/STRAW 10/18/2019 05:16 AM    Appearance CLEAR 10/18/2019 05:16 AM    Specific gravity 1.028 10/18/2019 05:16 AM    Specific gravity 1.020 12/22/2017 01:12 PM    pH (UA) 6.0 10/18/2019 05:16 AM    Protein 30 (A) 10/18/2019 05:16 AM    Glucose >1,000 (A) 10/18/2019 05:16 AM    Ketone TRACE (A) 10/18/2019 05:16 AM    Bilirubin NEGATIVE  10/18/2019 05:16 AM    Urobilinogen 1.0 10/18/2019 05:16 AM    Nitrites NEGATIVE  10/18/2019 05:16 AM    Leukocyte Esterase NEGATIVE  10/18/2019 05:16 AM    Epithelial cells FEW 10/18/2019 05:16 AM    Bacteria NEGATIVE  10/18/2019 05:16 AM    WBC 0-4 10/18/2019 05:16 AM    RBC 0-5 10/18/2019 05:16 AM         Medications Reviewed:     Current Facility-Administered Medications   Medication Dose Route Frequency    potassium chloride SR (KLOR-CON 10) tablet 40 mEq  40 mEq Oral NOW    amLODIPine (NORVASC) tablet 10 mg  10 mg Oral DAILY    lisinopril (PRINIVIL, ZESTRIL) tablet 40 mg  40 mg Oral DAILY    hydrALAZINE (APRESOLINE) 20 mg/mL injection 10 mg  10 mg IntraVENous Q6H PRN    calcium carbonate (TUMS) chewable tablet 200 mg [elemental]  200 mg Oral TID PRN    influenza vaccine 2019-20 (6 mos+)(PF) (FLUARIX/FLULAVAL/FLUZONE QUAD) injection 0.5 mL  0.5 mL IntraMUSCular PRIOR TO DISCHARGE    0.9% sodium chloride infusion  100 mL/hr IntraVENous CONTINUOUS    sodium chloride (NS) flush 5-40 mL  5-40 mL IntraVENous Q8H    sodium chloride (NS) flush 5-40 mL  5-40 mL IntraVENous PRN    glucose chewable tablet 16 g  4 Tab Oral PRN    glucagon (GLUCAGEN) injection 1 mg  1 mg IntraMUSCular PRN    dextrose 10% infusion 0-250 mL  0-250 mL IntraVENous PRN    insulin lispro (HUMALOG) injection   SubCUTAneous AC&HS ______________________________________________________________________  EXPECTED LENGTH OF STAY: - - -  ACTUAL LENGTH OF STAY:          Isabella Donovan MD

## 2019-10-22 LAB
ANION GAP SERPL CALC-SCNC: 4 MMOL/L (ref 5–15)
BASOPHILS # BLD: 0 K/UL (ref 0–0.1)
BASOPHILS NFR BLD: 0 % (ref 0–1)
BUN SERPL-MCNC: 11 MG/DL (ref 6–20)
BUN/CREAT SERPL: 14 (ref 12–20)
CALCIUM SERPL-MCNC: 9 MG/DL (ref 8.5–10.1)
CHLORIDE SERPL-SCNC: 106 MMOL/L (ref 97–108)
CO2 SERPL-SCNC: 25 MMOL/L (ref 21–32)
CREAT SERPL-MCNC: 0.76 MG/DL (ref 0.55–1.02)
DIFFERENTIAL METHOD BLD: ABNORMAL
EOSINOPHIL # BLD: 0.2 K/UL (ref 0–0.4)
EOSINOPHIL NFR BLD: 2 % (ref 0–7)
ERYTHROCYTE [DISTWIDTH] IN BLOOD BY AUTOMATED COUNT: 15.9 % (ref 11.5–14.5)
GLUCOSE BLD STRIP.AUTO-MCNC: 204 MG/DL (ref 65–100)
GLUCOSE BLD STRIP.AUTO-MCNC: 212 MG/DL (ref 65–100)
GLUCOSE BLD STRIP.AUTO-MCNC: 245 MG/DL (ref 65–100)
GLUCOSE BLD STRIP.AUTO-MCNC: 277 MG/DL (ref 65–100)
GLUCOSE SERPL-MCNC: 200 MG/DL (ref 65–100)
HCT VFR BLD AUTO: 40.3 % (ref 35–47)
HGB BLD-MCNC: 12.5 G/DL (ref 11.5–16)
IMM GRANULOCYTES # BLD AUTO: 0 K/UL (ref 0–0.04)
IMM GRANULOCYTES NFR BLD AUTO: 0 % (ref 0–0.5)
LYMPHOCYTES # BLD: 2.9 K/UL (ref 0.8–3.5)
LYMPHOCYTES NFR BLD: 35 % (ref 12–49)
MCH RBC QN AUTO: 26.5 PG (ref 26–34)
MCHC RBC AUTO-ENTMCNC: 31 G/DL (ref 30–36.5)
MCV RBC AUTO: 85.6 FL (ref 80–99)
MONOCYTES # BLD: 0.7 K/UL (ref 0–1)
MONOCYTES NFR BLD: 8 % (ref 5–13)
NEUTS SEG # BLD: 4.6 K/UL (ref 1.8–8)
NEUTS SEG NFR BLD: 55 % (ref 32–75)
NRBC # BLD: 0 K/UL (ref 0–0.01)
NRBC BLD-RTO: 0 PER 100 WBC
PLATELET # BLD AUTO: 181 K/UL (ref 150–400)
PMV BLD AUTO: 11.3 FL (ref 8.9–12.9)
POTASSIUM SERPL-SCNC: 3.5 MMOL/L (ref 3.5–5.1)
RBC # BLD AUTO: 4.71 M/UL (ref 3.8–5.2)
RBC MORPH BLD: ABNORMAL
RBC MORPH BLD: ABNORMAL
SERVICE CMNT-IMP: ABNORMAL
SODIUM SERPL-SCNC: 135 MMOL/L (ref 136–145)
WBC # BLD AUTO: 8.4 K/UL (ref 3.6–11)

## 2019-10-22 PROCEDURE — 65660000000 HC RM CCU STEPDOWN

## 2019-10-22 PROCEDURE — 85025 COMPLETE CBC W/AUTO DIFF WBC: CPT

## 2019-10-22 PROCEDURE — 77030038269 HC DRN EXT URIN PURWCK BARD -A

## 2019-10-22 PROCEDURE — 74011636637 HC RX REV CODE- 636/637: Performed by: FAMILY MEDICINE

## 2019-10-22 PROCEDURE — 74011250636 HC RX REV CODE- 250/636: Performed by: FAMILY MEDICINE

## 2019-10-22 PROCEDURE — 97116 GAIT TRAINING THERAPY: CPT

## 2019-10-22 PROCEDURE — 36415 COLL VENOUS BLD VENIPUNCTURE: CPT

## 2019-10-22 PROCEDURE — 97535 SELF CARE MNGMENT TRAINING: CPT

## 2019-10-22 PROCEDURE — 82962 GLUCOSE BLOOD TEST: CPT

## 2019-10-22 PROCEDURE — 74011250637 HC RX REV CODE- 250/637: Performed by: FAMILY MEDICINE

## 2019-10-22 PROCEDURE — 80048 BASIC METABOLIC PNL TOTAL CA: CPT

## 2019-10-22 RX ADMIN — HYDRALAZINE HYDROCHLORIDE 10 MG: 20 INJECTION INTRAMUSCULAR; INTRAVENOUS at 17:27

## 2019-10-22 RX ADMIN — HYDRALAZINE HYDROCHLORIDE 10 MG: 20 INJECTION INTRAMUSCULAR; INTRAVENOUS at 05:56

## 2019-10-22 RX ADMIN — Medication 10 ML: at 05:57

## 2019-10-22 RX ADMIN — LISINOPRIL 40 MG: 20 TABLET ORAL at 08:18

## 2019-10-22 RX ADMIN — AMLODIPINE BESYLATE 10 MG: 5 TABLET ORAL at 08:18

## 2019-10-22 RX ADMIN — Medication 10 ML: at 14:03

## 2019-10-22 RX ADMIN — INSULIN LISPRO 2 UNITS: 100 INJECTION, SOLUTION INTRAVENOUS; SUBCUTANEOUS at 21:36

## 2019-10-22 RX ADMIN — INSULIN LISPRO 7 UNITS: 100 INJECTION, SOLUTION INTRAVENOUS; SUBCUTANEOUS at 17:23

## 2019-10-22 RX ADMIN — METOPROLOL SUCCINATE 25 MG: 25 TABLET, EXTENDED RELEASE ORAL at 08:18

## 2019-10-22 RX ADMIN — SODIUM CHLORIDE 100 ML/HR: 900 INJECTION, SOLUTION INTRAVENOUS at 21:31

## 2019-10-22 RX ADMIN — Medication 10 ML: at 21:31

## 2019-10-22 RX ADMIN — INSULIN LISPRO 4 UNITS: 100 INJECTION, SOLUTION INTRAVENOUS; SUBCUTANEOUS at 08:18

## 2019-10-22 RX ADMIN — INSULIN LISPRO 4 UNITS: 100 INJECTION, SOLUTION INTRAVENOUS; SUBCUTANEOUS at 12:28

## 2019-10-22 NOTE — PROGRESS NOTES
Problem: Self Care Deficits Care Plan (Adult)  Goal: *Acute Goals and Plan of Care (Insert Text)  Description    FUNCTIONAL STATUS PRIOR TO ADMISSION: Independent PTA     HOME SUPPORT: The patient lived with niece but did not require assist.    Occupational Therapy Goals  Initiated 10/20/2019  1. Patient will perform grooming with supervision/set-up within 7 day(s). 2.  Patient will perform bathing with supervision/set-up within 7 day(s). 3.  Patient will perform self-feeding with independence within 7 day(s). 4.  Patient will perform toilet transfers with modified independence within 7 day(s). 5.  Patient will perform all aspects of toileting with independence within 7 day(s). Outcome: Progressing Towards Goal     OCCUPATIONAL THERAPY TREATMENT  Patient: Delmar Concepcion (54 y.o. female)  Date: 10/22/2019  Diagnosis: ICH (intracerebral hemorrhage) ([facility]) [I61.9]  Intracranial hemorrhage ([address] [I62.9] <principal problem not specified>       Precautions:    Chart, occupational therapy assessment, plan of care, and goals were reviewed. ASSESSMENT  Patient continues with skilled OT services and is progressing towards goals. Pt received in bed and agreeable to progress OOB to bathroom for toileting and grooming activities. She was better able to manage her environment in the small spaces provided. Pt needing minimal cues to scan environment for safety. She located all ADL items for grooming. Pt returned to chair and provided complete setup (cutting food, opening containers) for her lunch. Pt is able to feed herself unassisted. Recommend OOB to chair for all meals with complete setup. She will require 24 hour support and assistance at discharge. Unclear where she lives as pt states she lives with her sister . No family present. If family is unable to provided needed assistance at home she will require discharge to SNF setting.       Current Level of Function Impacting Discharge (ADLs): min A    Other factors to consider for discharge: cognitive statud         PLAN :  Patient continues to benefit from skilled intervention to address the above impairments. Continue treatment per established plan of care. to address goals. Recommend with staff: OOB to chair for all meals, targeted toileting to help with continence, meal setup to maximize independence    Recommend next OT session: bathing, dressing, grooming    Recommendation for discharge: (in order for the patient to meet his/her long term goals)  Therapy up to 5 days/week in SNF setting or an intensive home health therapy program    This discharge recommendation:  Has been made in collaboration with the attending provider and/or case management    IF patient discharges home will need the following DME: none       SUBJECTIVE:   Patient stated I am feeling alright.     OBJECTIVE DATA SUMMARY:   Cognitive/Behavioral Status:  Neurologic State: Alert  Orientation Level: Oriented to person;Disoriented to time;Disoriented to situation;Disoriented to place  Cognition: Follows commands; Impaired decision making  Perception: Cues to attend left visual field;Cues to attend right visual field  Perseveration: No perseveration noted  Safety/Judgement: Decreased awareness of need for safety;Decreased awareness of need for assistance;Decreased awareness of environment    Functional Mobility and Transfers for ADLs:  Bed Mobility:  Supine to Sit: Contact guard assistance  Sit to Supine: (remained in chair)    Transfers:  Sit to Stand: Contact guard assistance  Functional Transfers  Bathroom Mobility: Minimum assistance  Toilet Transfer : Minimum assistance       Balance:  Sitting: Intact  Standing: Impaired; Without support  Standing - Static: Constant support; Fair  Standing - Dynamic : Fair    ADL Intervention:  Feeding  Feeding Assistance: Supervision(provided complete setup for feeding activities)  Container Management: Total assistance (dependent)  Cutting Food: Total assistance (dependent)  Utensil Management: Supervision  Food to Mouth: Supervision  Drink to Mouth: Supervision                             Toileting  Toileting Assistance: Moderate assistance  Bladder Hygiene: Moderate assistance  Clothing Management: Moderate assistance  Cues: Verbal cues provided    Cognitive Retraining  Safety/Judgement: Decreased awareness of need for safety;Decreased awareness of need for assistance;Decreased awareness of environment    Pain:  No pain reported during intervention    Activity Tolerance:   Good  Please refer to the flowsheet for vital signs taken during this treatment.     After treatment patient left in no apparent distress:   Sitting in chair, Call bell within reach and Bed / chair alarm activated    COMMUNICATION/COLLABORATION:   The patients plan of care was discussed with: Physical Therapist and Registered Nurse    Vikki Reilly OT  Time Calculation: 30 mins

## 2019-10-22 NOTE — PROGRESS NOTES
Hospitalist Progress Note  Vinh Vasquez MD  Answering service: [phone] from in house phone        Date of Service:  10/22/2019  NAME:  Hermann Chiang  :  1955  MRN:  [id number]      Admission Summary:     A 62 y. o. female with a history of cerebral aneurysm and shunt in  as well as multiple infarcts in the past who presents with confusion and mental status changes.  CT scan done showed an acute right parietal intracranial hematoma as described with midline shift, probable small right subdural hematoma    Interval history / Subjective:     Per her niece at bedside patient is more aware today, has short memory problem after she has the  shunt in      Assessment & Plan:     Acute intracranial hemorrhage with midline shift  -repeat CT scan 10/20/2019 stable  -neurosurgery consulted, no surgical intervention  -on lisinopril, norvasc, prn hydralazine for BP control  -frequent neurochecks  -hold aspirin  -PT/OT on board     Hypertension  -keep SBP<140  -increase lisinopril to 40mg daily, increase norvasc to 10mg daily, added metoprolol succinate 25mg daily, BP normal  -cont prn hydralazine  -cont to monitor     Type 2 diabetes  -A1C 9.1  -sliding scale insulin  -hold metformin for now     Hx of Cerebral aneurysm s/p  shunt in   -has short memory problems otherwise, stable  -continue supportive care    Code status: Full Code  DVT prophylaxis: SNF    Care Plan discussed with: Patient/Family and Nurse  Disposition: SNF Rehab, [place] [address] [address]   Niece, Roslyn Kumar, [phone] at bedside, questions answered      Hospital Problems  Date Reviewed: 2017          Codes Class Noted POA    ICH (intracerebral hemorrhage) ([facility] [address] ICD-10-CM: I61.9  ICD-9-CM: 156  10/18/2019 Unknown        Intracranial hemorrhage ([facility] [address] ICD-10-CM: I62.9  ICD-9-CM: 432.9  10/18/2019 Unknown              Vital Signs:    Last 24hrs VS reviewed since prior progress note. Most recent are:  Visit Vitals  /66 (BP 1 Location: Right arm, BP Patient Position: At rest)   Pulse 100   Temp 98.1 °F (36.7 °C)   Resp 14   Ht 5' 8\" (1.727 m)   Wt 40.8 kg (89 lb 15.2 oz)   SpO2 100%   Breastfeeding? No   BMI 13.68 kg/m²         Intake/Output Summary (Last 24 hours) at 10/22/2019 1006  Last data filed at 10/22/2019 0428  Gross per 24 hour   Intake 250 ml   Output 1200 ml   Net -950 ml        Physical Examination:             Constitutional:  No acute distress, cooperative, pleasant    ENT:  Oral mucous moist, oropharynx benign. Resp:  CTA bilaterally. No wheezing/rhonchi/rales. No accessory muscle use   CV:  Regular rhythm, normal rate, no murmurs, gallops, rubs    GI:  Soft, non distended, non tender. normoactive bowel sounds, no hepatosplenomegaly     Musculoskeletal:  No edema    Neurologic:  Conscious and alert, oriented to place and person, moves all extremities, CN II-XII reviewed     Skin:  Good turgor, no rashes or ulcers       Data Review:    Review and/or order of clinical lab test  Review and/or order of tests in the radiology section of CPT  Review and/or order of tests in the medicine section of CPT      Labs:     Recent Labs     10/22/19  0139 10/21/19  0320   WBC 8.4 10.3   HGB 12.5 12.3   HCT 40.3 39.9    171     Recent Labs     10/22/19  0139 10/21/19  0320 10/20/19  0458   * 136 133*   K 3.5 3.3* 4.1    106 102   CO2 25 23 23   BUN 11 12 11   CREA 0.76 0.83 0.79   * 238* 238*   CA 9.0 8.4* 9.0     No results for input(s): SGOT, GPT, ALT, AP, TBIL, TBILI, TP, ALB, GLOB, GGT, AML, LPSE in the last 72 hours. No lab exists for component: AMYP, HLPSE  No results for input(s): INR, PTP, APTT, INREXT in the last 72 hours. No results for input(s): FE, TIBC, PSAT, FERR in the last 72 hours. No results found for: FOL, RBCF   No results for input(s): PH, PCO2, PO2 in the last 72 hours.   No results for input(s): CPK, CKNDX, TROIQ in the last 72 hours.     No lab exists for component: CPKMB  No results found for: CHOL, CHOLX, CHLST, CHOLV, HDL, HDLP, LDL, LDLC, DLDLP, TGLX, TRIGL, TRIGP, CHHD, CHHDX  Lab Results   Component Value Date/Time    Glucose (POC) 204 (H) 10/22/2019 07:27 AM    Glucose (POC) 214 (H) 10/21/2019 08:27 PM    Glucose (POC) 221 (H) 10/21/2019 04:45 PM    Glucose (POC) 299 (H) 10/21/2019 11:56 AM    Glucose (POC) 229 (H) 10/21/2019 08:28 AM     Lab Results   Component Value Date/Time    Color YELLOW/STRAW 10/18/2019 05:16 AM    Appearance CLEAR 10/18/2019 05:16 AM    Specific gravity 1.028 10/18/2019 05:16 AM    Specific gravity 1.020 12/22/2017 01:12 PM    pH (UA) 6.0 10/18/2019 05:16 AM    Protein 30 (A) 10/18/2019 05:16 AM    Glucose >1,000 (A) 10/18/2019 05:16 AM    Ketone TRACE (A) 10/18/2019 05:16 AM    Bilirubin NEGATIVE  10/18/2019 05:16 AM    Urobilinogen 1.0 10/18/2019 05:16 AM    Nitrites NEGATIVE  10/18/2019 05:16 AM    Leukocyte Esterase NEGATIVE  10/18/2019 05:16 AM    Epithelial cells FEW 10/18/2019 05:16 AM    Bacteria NEGATIVE  10/18/2019 05:16 AM    WBC 0-4 10/18/2019 05:16 AM    RBC 0-5 10/18/2019 05:16 AM         Medications Reviewed:     Current Facility-Administered Medications   Medication Dose Route Frequency    metoprolol succinate (TOPROL-XL) XL tablet 25 mg  25 mg Oral DAILY    amLODIPine (NORVASC) tablet 10 mg  10 mg Oral DAILY    lisinopril (PRINIVIL, ZESTRIL) tablet 40 mg  40 mg Oral DAILY    hydrALAZINE (APRESOLINE) 20 mg/mL injection 10 mg  10 mg IntraVENous Q6H PRN    calcium carbonate (TUMS) chewable tablet 200 mg [elemental]  200 mg Oral TID PRN    influenza vaccine 2019-20 (6 mos+)(PF) (FLUARIX/FLULAVAL/FLUZONE QUAD) injection 0.5 mL  0.5 mL IntraMUSCular PRIOR TO DISCHARGE    0.9% sodium chloride infusion  100 mL/hr IntraVENous CONTINUOUS    sodium chloride (NS) flush 5-40 mL  5-40 mL IntraVENous Q8H    sodium chloride (NS) flush 5-40 mL  5-40 mL IntraVENous PRN    glucose chewable tablet 16 g  4 Tab Oral PRN    glucagon (GLUCAGEN) injection 1 mg  1 mg IntraMUSCular PRN    dextrose 10% infusion 0-250 mL  0-250 mL IntraVENous PRN    insulin lispro (HUMALOG) injection   SubCUTAneous AC&HS     ______________________________________________________________________  EXPECTED LENGTH OF STAY: 4d 9h  ACTUAL LENGTH OF STAY:          4                 Casi Kramer MD

## 2019-10-22 NOTE — PROGRESS NOTES
Problem: Patient Education: Go to Patient Education Activity  Goal: Patient/Family Education  Outcome: Progressing Towards Goal     Problem: Diabetes Self-Management  Goal: *Disease process and treatment process  Description  Define diabetes and identify own type of diabetes; list 3 options for treating diabetes. Outcome: Progressing Towards Goal  Goal: *Incorporating nutritional management into lifestyle  Description  Describe effect of type, amount and timing of food on blood glucose; list 3 methods for planning meals. Outcome: Progressing Towards Goal  Goal: *Incorporating physical activity into lifestyle  Description  State effect of exercise on blood glucose levels. Outcome: Progressing Towards Goal  Goal: *Developing strategies to promote health/change behavior  Description  Define the ABC's of diabetes; identify appropriate screenings, schedule and personal plan for screenings. Outcome: Progressing Towards Goal  Goal: *Using medications safely  Description  State effect of diabetes medications on diabetes; name diabetes medication taking, action and side effects. Outcome: Progressing Towards Goal  Goal: *Monitoring blood glucose, interpreting and using results  Description  Identify recommended blood glucose targets  and personal targets. Outcome: Progressing Towards Goal  Goal: *Prevention, detection, treatment of acute complications  Description  List symptoms of hyper- and hypoglycemia; describe how to treat low blood sugar and actions for lowering  high blood glucose level. Outcome: Progressing Towards Goal  Goal: *Prevention, detection and treatment of chronic complications  Description  Define the natural course of diabetes and describe the relationship of blood glucose levels to long term complications of diabetes.   Outcome: Progressing Towards Goal  Goal: *Developing strategies to address psychosocial issues  Description  Describe feelings about living with diabetes; identify support needed and support network  Outcome: Progressing Towards Goal  Goal: *Insulin pump training  Outcome: Progressing Towards Goal  Goal: *Sick day guidelines  Outcome: Progressing Towards Goal  Goal: *Patient Specific Goal (EDIT GOAL, INSERT TEXT)  Outcome: Progressing Towards Goal     Problem: Patient Education: Go to Patient Education Activity  Goal: Patient/Family Education  Outcome: Progressing Towards Goal     Problem: Falls - Risk of  Goal: *Absence of Falls  Description  Document Radha Valladares Fall Risk and appropriate interventions in the flowsheet. Outcome: Progressing Towards Goal  Note:   Fall Risk Interventions:  Mobility Interventions: Assess mobility with egress test, Bed/chair exit alarm, Communicate number of staff needed for ambulation/transfer, Patient to call before getting OOB, PT Consult for mobility concerns, PT Consult for assist device competence, Utilize walker, cane, or other assistive device, Utilize gait belt for transfers/ambulation    Mentation Interventions: Adequate sleep, hydration, pain control, Bed/chair exit alarm, Door open when patient unattended, More frequent rounding, Increase mobility, Reorient patient, Toileting rounds, Update white board    Medication Interventions: Bed/chair exit alarm, Evaluate medications/consider consulting pharmacy, Patient to call before getting OOB, Teach patient to arise slowly    Elimination Interventions: Bed/chair exit alarm, Call light in reach, Patient to call for help with toileting needs, Stay With Me (per policy), Toileting schedule/hourly rounds              Problem: Patient Education: Go to Patient Education Activity  Goal: Patient/Family Education  Outcome: Progressing Towards Goal     Problem: Pressure Injury - Risk of  Goal: *Prevention of pressure injury  Description  Document Nate Scale and appropriate interventions in the flowsheet.   Outcome: Progressing Towards Goal  Note:   Pressure Injury Interventions:  Sensory Interventions: Assess changes in LOC, Check visual cues for pain, Float heels, Keep linens dry and wrinkle-free, Minimize linen layers, Pressure redistribution bed/mattress (bed type)    Moisture Interventions: Absorbent underpads, Apply protective barrier, creams and emollients, Check for incontinence Q2 hours and as needed, Internal/External urinary devices, Limit adult briefs, Minimize layers, Moisture barrier    Activity Interventions: Increase time out of bed, Pressure redistribution bed/mattress(bed type), PT/OT evaluation    Mobility Interventions: Float heels, HOB 30 degrees or less, Pressure redistribution bed/mattress (bed type), PT/OT evaluation    Nutrition Interventions: Document food/fluid/supplement intake    Friction and Shear Interventions: HOB 30 degrees or less, Lift sheet, Minimize layers                Problem: Patient Education: Go to Patient Education Activity  Goal: Patient/Family Education  Outcome: Progressing Towards Goal     Problem: Hemorrhagic Stroke: Day 5 through Discharge  Goal: Activity/Safety  Outcome: Progressing Towards Goal  Goal: Consults, if ordered  Outcome: Progressing Towards Goal  Goal: Diagnostic Test/Procedures  Outcome: Progressing Towards Goal  Goal: Nutrition/Diet  Outcome: Progressing Towards Goal  Goal: Medications  Outcome: Progressing Towards Goal  Goal: Respiratory  Outcome: Progressing Towards Goal  Goal: Treatments/Interventions/Procedures  Outcome: Progressing Towards Goal  Goal: Psychosocial  Outcome: Progressing Towards Goal  Goal: *Hemodynamically stable  Outcome: Progressing Towards Goal  Goal: *Verbalizes anxiety and depression are reduced or absent  Outcome: Progressing Towards Goal  Goal: *Absence of aspiration  Outcome: Progressing Towards Goal  Goal: *Absence of signs and symptoms of DVT  Outcome: Progressing Towards Goal  Goal: *Optimal pain control at patient's stated goal  Outcome: Progressing Towards Goal  Goal: *Tolerating diet  Outcome: Progressing Towards Goal  Goal: *Progressive mobility and function  Outcome: Progressing Towards Goal  Goal: *Rehabilitation readiness  Outcome: Progressing Towards Goal     Problem: Hemorrhagic Stroke: Discharge Outcomes  Goal: *Verbalizes anxiety and depression are reduced or absent  Outcome: Progressing Towards Goal  Goal: *Verbalize understanding of risk factor modification(Stroke Metric)  Outcome: Progressing Towards Goal  Goal: *Optimal pain control at patient's stated goal  Outcome: Progressing Towards Goal  Goal: *Hemodynamically stable  Outcome: Progressing Towards Goal  Goal: *Absence of aspiration pneumonia  Outcome: Progressing Towards Goal  Goal: *Aware of needed dietary changes  Outcome: Progressing Towards Goal  Goal: *Verbalizes understanding and describes medication purposes and frequencies  Outcome: Progressing Towards Goal  Goal: *Tolerating diet  Outcome: Progressing Towards Goal  Goal: *Absence of signs and symptoms of DVT  Outcome: Progressing Towards Goal  Goal: *Absence of aspiration  Outcome: Progressing Towards Goal  Goal: *Progressive mobility and function  Outcome: Progressing Towards Goal  Goal: *Home safety concerns addressed  Outcome: Progressing Towards Goal

## 2019-10-22 NOTE — CDMP QUERY
Query 1 of 1 Patient admitted with ICH, noted to have BMI < 19%. If possible, please document in progress notes and d/c summary if you are evaluating and/or treating any of the following: 
 
=>Underweight 
=>Cachexia 
=>Failure to Thrive 
=>Other explanation of clinical findings =>Clinically Undetermined (no explanation for clinical findings) The medical record reflects the following: 
   Risk Factors: dementia Clinical Indicators: Bed weight - BMI 13.7-16.6%; Ht 5'8\"   89lb Treatment: carb consistent diet Please clarify and document your clinical opinion in the progress notes and discharge summary, including the definitive and or presumptive diagnosis, (suspected or probable), related to the above clinical findings. Please include clinical findings supporting your diagnosis. Thank you, Barry Vicente RN, BSN, CCM Clinical  
[facility] 
[phone]

## 2019-10-22 NOTE — CDMP QUERY
Query 1 of 1 The diagnosis of encephalopathy has been documented for this patient who has a history of dementia. Additional documentation is needed to support the diagnosis of encephalopathy in the setting of dementia. Please include this documentation in the progress notes and discharge summary. 
 
=> Metabolic Encephalopathy superimposed on dementia (provide clinical indicators to support encephalopathy diagnosis) 
=> Worsening Dementia 
=> Other, please specify 
=> Clinically unable to determine The medical record reflects the following: 
 
Risk Factors: hx dementia, ICH Clinical Indicators:  
ED note 10/18 \"confusion\";  
PT note 10/18 \"dec attention. ..memory deficit (sonam reports impairment is worse than baseline dementia); Not documentation of requiring inc level of nursing care;  
H&P- \"alert, cooperative\" Hosp PN 10/19, 10/20, 10/21 \"appropriately answers questions. Chris Russo A+Ox 3\"; Treatment: neurochecks Best Practice Documentation Includes: 
* What is the patient's baseline mental status, and how does it compare to current mental status? * Have the patient's behaviors changed? Are they experiencing any hallucinations, sensory misperceptions, hypervigilance, disturbed sleep/wake cycle, or paranoia? * Is the patient requiring a level of nursing care or medications that have been increased since their admission? * Is the patient experiencing any new motor abnormalities (tremor, asterixis, myoclonus, etc.) Thank you, Christina Carranza, RN, BSN, [facility] Clinical  
[facility] 
[phone]

## 2019-10-22 NOTE — PROGRESS NOTES
Bedside and Verbal shift change report given to León King RN (oncoming nurse) by Rochelle Rob RN (offgoing nurse). Report included the following information SBAR, Kardex, MAR, Recent Results and Cardiac Rhythm NSR (SBP Goal <140).

## 2019-10-22 NOTE — PROGRESS NOTES
Plans of care discussed in IDR. CM contacted [facility] liaison to follow-up on authorization. CM notified that authorization not started at this time; liaison notified that previous CM to begin authorization. CM contacted Tila Brown RN CM to follow-up on authorization; CM notified Tila Brown does not begin authorization and per protocol SNF to begin authorization. CM notified Kaela; liaison starting authorization today. CM will continue to follow.      Kristen Petersen RN, BSN  Care Management Department

## 2019-10-22 NOTE — PROGRESS NOTES
Problem: Mobility Impaired (Adult and Pediatric)  Goal: *Acute Goals and Plan of Care (Insert Text)  Description    FUNCTIONAL STATUS PRIOR TO ADMISSION: Patient was independent and active without use of DME.    HOME SUPPORT PRIOR TO ADMISSION: The patient lived with her niece but did not require assist for mobility. Physical Therapy Goals  Initiated 10/18/2019  1. Patient will move from supine to sit and sit to supine , scoot up and down and roll side to side in bed with independence within 7 day(s). 2.  Patient will transfer from bed to chair and chair to bed with modified independence using the least restrictive device within 7 day(s). 3.  Patient will perform sit to stand with modified independence within 7 day(s). 4.  Patient will ambulate with independence for 150 feet with the least restrictive device within 7 day(s). 5.  Patient will ascend/descend 7 stairs with handrail(s) with modified independence within 7 day(s). 6.  Patient will improve Fiore Balance score by 7 points within 7 days. Outcome: Progressing Towards Goal   PHYSICAL THERAPY TREATMENT  Patient: Kristopher Rivas (84 y.o. female)  Date: 10/22/2019  Diagnosis: ICH (intracerebral hemorrhage) ([facility]) [I61.9]  [facility] [I62.9] <principal problem not specified>       Precautions:    Chart, physical therapy assessment, plan of care and goals were reviewed. ASSESSMENT  Patient continues with skilled PT services and is progressing towards goals. Pt continues to be limited by generalized weakness, decreased functional mobility, impaired balance and gait, ? Visual impairment versus inattention. Pt completed gait training with constant min A and MAX verbal cues to avoid obstacles. Pt would run into obstacles in the hallway despite being able to verbalize that she sees them. Pt needed mod assist to redirect path. Pt unable to successfully identify and locate her room number after leaving the hallway.  Pt remains at risk increased risk for falls, is functioning below baseline mobility status and will benefit from SNF placement upon discharge. Current Level of Function Impacting Discharge (mobility/balance): min A during gait training in straight path with no obstacles; needs MAX verbal cues and mod assist to redirect pt to avoid running into obstacles       PLAN :  Patient continues to benefit from skilled intervention to address the above impairments. Continue treatment per established plan of care. to address goals. Recommendation for discharge: (in order for the patient to meet his/her long term goals)  Therapy up to 5 days/week in SNF setting    This discharge recommendation:  Has been made in collaboration with the attending provider and/or case management         SUBJECTIVE:   Patient stated I'm okay.     OBJECTIVE DATA SUMMARY:   Critical Behavior:  Neurologic State: Alert, Eyes open spontaneously, Confused  Orientation Level: Oriented to person, Disoriented to place, Disoriented to situation, Disoriented to time(\"in a hospital, not sure which one\")  Cognition: Follows commands, Memory loss, Poor safety awareness  Safety/Judgement: Decreased insight into deficits, Decreased awareness of need for safety  Functional Mobility Training:  Bed Mobility:                    Transfers:  Sit to Stand: Contact guard assistance  Stand to Sit: Contact guard assistance                             Balance:  Sitting: Intact  Standing: Impaired; Without support  Standing - Static: Constant support; Fair  Standing - Dynamic : Fair  Ambulation/Gait Training:  Distance (ft): 45 Feet (ft)     Ambulation - Level of Assistance: Minimal assistance(max verbal cues/max A to redirect to avoid obstalces)        Gait Abnormalities: Decreased step clearance; Path deviations;Trunk sway increased        Base of Support: Widened     Speed/Melissa: Slow  Step Length: Right shortened;Left shortened      Activity Tolerance:   Good  Please refer to the flowsheet for vital signs taken during this treatment.     After treatment patient left in no apparent distress:   Sitting in chair, Call bell within reach and Bed / chair alarm activated    COMMUNICATION/COLLABORATION:   The patients plan of care was discussed with: Occupational Therapist and Registered Nurse    Akshat Lindo PT, DPT   Time Calculation: 20 mins

## 2019-10-22 NOTE — PROGRESS NOTES
NUTRITION brief         Pt's BMI appears to be very low. Visited with patient, caregiver at bedside & RN in room. Rechecked wt  however, bed scale appears grossly inaccurate. Weights documented for the last several days are incorrect. Pt appears well nourished, eating well with care giver, does need to be fed. Ate 100% of B'fast this morning. Brief nutrition focused physical exam shows no signs of malnutrition. Asked therapy to reset the bed scale when they work with the patient.      Wt Readings from Last 10 Encounters:   10/22/19 40.8 kg (89 lb 15.2 oz)   10/18/19 78.4 kg (172 lb 13.5 oz)   10/17/19 79.4 kg (175 lb)   12/22/17 73.5 kg (162 lb)   12/28/16 58.3 kg (128 lb 8 oz)   11/24/16 58.3 kg (128 lb 8.5 oz)   09/03/14 63 kg (139 lb)   ]  Patient Vitals for the past 168 hrs:   % Diet Eaten   10/21/19 1200 75 %   10/21/19 0800 100 %       Chan Mendoza, RD, MS, CDE

## 2019-10-22 NOTE — DIABETES MGMT
[facility]    [facility] Progress Note    Recommendations/ Comments: Chart review for hyperglycemia - BG's 204-245 mg/dL over the last 24 hours. If appropriate, please consider   Addition of Amaryl 2 mg daily    Current hospital DM medication:   Lispro resistant correction scale    Chart reviewed on Neris Pacheco. Patient is a 62 y.o. female with known DM. No DM listed in PTA      A1c:   Lab Results   Component Value Date/Time    Hemoglobin A1c 9.1 (H) 10/19/2019 02:41 AM    Hemoglobin A1c 9.5 (H) 10/18/2019 06:26 AM       Recent Glucose Results:   Lab Results   Component Value Date/Time     (H) 10/22/2019 01:39 AM    GLUCPOC 245 (H) 10/22/2019 11:35 AM    GLUCPOC 204 (H) 10/22/2019 07:27 AM    GLUCPOC 214 (H) 10/21/2019 08:27 PM        Lab Results   Component Value Date/Time    Creatinine 0.76 10/22/2019 01:39 AM     Estimated Creatinine Clearance: 48.8 mL/min (based on SCr of 0.76 mg/dL). Active Orders   Diet    DIET DIABETIC CONSISTENT CARB Regular        PO intake:   Patient Vitals for the past 72 hrs:   % Diet Eaten   10/21/19 1200 75 %   10/21/19 0800 100 %       Will continue to follow as needed.     Thank you  Power Bryson MS, RN, CDE    Time spent: 4 min

## 2019-10-23 LAB
ANION GAP SERPL CALC-SCNC: 6 MMOL/L (ref 5–15)
BUN SERPL-MCNC: 10 MG/DL (ref 6–20)
BUN/CREAT SERPL: 14 (ref 12–20)
CALCIUM SERPL-MCNC: 9.3 MG/DL (ref 8.5–10.1)
CHLORIDE SERPL-SCNC: 105 MMOL/L (ref 97–108)
CO2 SERPL-SCNC: 26 MMOL/L (ref 21–32)
CREAT SERPL-MCNC: 0.74 MG/DL (ref 0.55–1.02)
ERYTHROCYTE [DISTWIDTH] IN BLOOD BY AUTOMATED COUNT: 15.9 % (ref 11.5–14.5)
GLUCOSE BLD STRIP.AUTO-MCNC: 193 MG/DL (ref 65–100)
GLUCOSE BLD STRIP.AUTO-MCNC: 213 MG/DL (ref 65–100)
GLUCOSE BLD STRIP.AUTO-MCNC: 230 MG/DL (ref 65–100)
GLUCOSE BLD STRIP.AUTO-MCNC: 281 MG/DL (ref 65–100)
GLUCOSE SERPL-MCNC: 193 MG/DL (ref 65–100)
HCT VFR BLD AUTO: 41.3 % (ref 35–47)
HGB BLD-MCNC: 12.9 G/DL (ref 11.5–16)
MCH RBC QN AUTO: 26.7 PG (ref 26–34)
MCHC RBC AUTO-ENTMCNC: 31.2 G/DL (ref 30–36.5)
MCV RBC AUTO: 85.3 FL (ref 80–99)
NRBC # BLD: 0 K/UL (ref 0–0.01)
NRBC BLD-RTO: 0 PER 100 WBC
PLATELET # BLD AUTO: 185 K/UL (ref 150–400)
PMV BLD AUTO: 10.7 FL (ref 8.9–12.9)
POTASSIUM SERPL-SCNC: 3.5 MMOL/L (ref 3.5–5.1)
RBC # BLD AUTO: 4.84 M/UL (ref 3.8–5.2)
SERVICE CMNT-IMP: ABNORMAL
SODIUM SERPL-SCNC: 137 MMOL/L (ref 136–145)
WBC # BLD AUTO: 8.7 K/UL (ref 3.6–11)

## 2019-10-23 PROCEDURE — 74011250637 HC RX REV CODE- 250/637: Performed by: FAMILY MEDICINE

## 2019-10-23 PROCEDURE — 82962 GLUCOSE BLOOD TEST: CPT

## 2019-10-23 PROCEDURE — 74011636637 HC RX REV CODE- 636/637: Performed by: FAMILY MEDICINE

## 2019-10-23 PROCEDURE — 85027 COMPLETE CBC AUTOMATED: CPT

## 2019-10-23 PROCEDURE — 80048 BASIC METABOLIC PNL TOTAL CA: CPT

## 2019-10-23 PROCEDURE — 36415 COLL VENOUS BLD VENIPUNCTURE: CPT

## 2019-10-23 PROCEDURE — 97535 SELF CARE MNGMENT TRAINING: CPT

## 2019-10-23 PROCEDURE — 74011250636 HC RX REV CODE- 250/636: Performed by: FAMILY MEDICINE

## 2019-10-23 PROCEDURE — 65660000000 HC RM CCU STEPDOWN

## 2019-10-23 PROCEDURE — 97116 GAIT TRAINING THERAPY: CPT

## 2019-10-23 RX ORDER — METOPROLOL SUCCINATE 50 MG/1
50 TABLET, EXTENDED RELEASE ORAL DAILY
Status: DISCONTINUED | OUTPATIENT
Start: 2019-10-24 | End: 2019-10-24 | Stop reason: HOSPADM

## 2019-10-23 RX ADMIN — SODIUM CHLORIDE 100 ML/HR: 900 INJECTION, SOLUTION INTRAVENOUS at 09:11

## 2019-10-23 RX ADMIN — Medication 10 ML: at 14:01

## 2019-10-23 RX ADMIN — METOPROLOL SUCCINATE 25 MG: 25 TABLET, EXTENDED RELEASE ORAL at 08:03

## 2019-10-23 RX ADMIN — SODIUM CHLORIDE 100 ML/HR: 900 INJECTION, SOLUTION INTRAVENOUS at 22:08

## 2019-10-23 RX ADMIN — AMLODIPINE BESYLATE 10 MG: 5 TABLET ORAL at 08:03

## 2019-10-23 RX ADMIN — Medication 10 ML: at 05:55

## 2019-10-23 RX ADMIN — INSULIN LISPRO 4 UNITS: 100 INJECTION, SOLUTION INTRAVENOUS; SUBCUTANEOUS at 08:03

## 2019-10-23 RX ADMIN — Medication 10 ML: at 22:00

## 2019-10-23 RX ADMIN — LISINOPRIL 40 MG: 20 TABLET ORAL at 08:03

## 2019-10-23 RX ADMIN — INSULIN LISPRO 7 UNITS: 100 INJECTION, SOLUTION INTRAVENOUS; SUBCUTANEOUS at 17:33

## 2019-10-23 RX ADMIN — HYDRALAZINE HYDROCHLORIDE 10 MG: 20 INJECTION INTRAMUSCULAR; INTRAVENOUS at 05:52

## 2019-10-23 RX ADMIN — HYDRALAZINE HYDROCHLORIDE 10 MG: 20 INJECTION INTRAMUSCULAR; INTRAVENOUS at 22:23

## 2019-10-23 RX ADMIN — INSULIN LISPRO 4 UNITS: 100 INJECTION, SOLUTION INTRAVENOUS; SUBCUTANEOUS at 11:54

## 2019-10-23 NOTE — PROGRESS NOTES
Problem: Self Care Deficits Care Plan (Adult)  Goal: *Acute Goals and Plan of Care (Insert Text)  Description    FUNCTIONAL STATUS PRIOR TO ADMISSION: Independent PTA     HOME SUPPORT: The patient lived with niece but did not require assist.    Occupational Therapy Goals  Initiated 10/20/2019  1. Patient will perform grooming with supervision/set-up within 7 day(s). 2.  Patient will perform bathing with supervision/set-up within 7 day(s). 3.  Patient will perform self-feeding with independence within 7 day(s). 4.  Patient will perform toilet transfers with modified independence within 7 day(s). 5.  Patient will perform all aspects of toileting with independence within 7 day(s). Outcome: Progressing Towards Goal    OCCUPATIONAL THERAPY TREATMENT  Patient: Mitchel Castillo (60 y.o. female)  Date: 10/23/2019  Diagnosis: ICH (intracerebral hemorrhage) ([facility]) [I61.9]  Intracranial hemorrhage ([facility] [address] [I62.9] <principal problem not specified>       Precautions:    Chart, occupational therapy assessment, plan of care, and goals were reviewed. ASSESSMENT  Patient continues with skilled OT services and is progressing towards goals. Pt's niece present during intervention and providing insight into baseline function. She is typically oriented to person and place. She ambulates independently without use of AD. She does help with light IADL activities but does not complete any cooking tasks. She does dress and bathe herself. She does wear pull ups at home but she is typically continent. Pt continues with decreased visual perceptual skills continuing to require cues to attend to visual field to prevent falling. This date, she required more extensive cues for left visual field deficits. However, with tracking, she can continue to track with both eyes without difficulty. This indicates more problems with visual processing as opposed to visual acuity.   Pt was able to progress with grooming but again to needs cues to attend to activity, sequence activity, and terminate activity. Niece reporting she is not able to meet her needs for home as she is unable to provide 24 hour supervision and assistance. Pt will do best in rehab setting to continue to progress with independence with ADL activities. Current Level of Function Impacting Discharge (ADLs): mod A for toileting; please do no use purewick    Other factors to consider for discharge: Cognitive status         PLAN :  Patient continues to benefit from skilled intervention to address the above impairments. Continue treatment per established plan of care. to address goals. Recommend with staff: OOB to chair for all meals and toileting every 2 hours to help with continence    Recommend next OT session: dressing activities, grooming, bathing, toileting    Recommendation for discharge: (in order for the patient to meet his/her long term goals)  Therapy up to 5 days/week in SNF setting or an intensive home health therapy program    This discharge recommendation:  Has been made in collaboration with the attending provider and/or case management    IF patient discharges home will need the following DME: shower chair       SUBJECTIVE:   Patient stated My niece is here.     OBJECTIVE DATA SUMMARY:   Cognitive/Behavioral Status:  Neurologic State: Alert  Orientation Level: Oriented to person;Disoriented to time;Disoriented to situation;Disoriented to place  Cognition: Follows commands  Perception: Cues to attend left visual field;Cues to attend right visual field  Perseveration: No perseveration noted  Safety/Judgement: Decreased awareness of environment;Decreased awareness of need for assistance;Decreased awareness of need for safety;Decreased insight into deficits    Functional Mobility and Transfers for ADLs:  Bed Mobility:  Supine to Sit: Contact guard assistance  Sit to Supine: (remained in chair)    Transfers:  Sit to Stand: Contact guard assistance  Functional Transfers  Bathroom Mobility: Minimum assistance  Toilet Transfer : Minimum assistance  Bed to Chair: Contact guard assistance    Balance:  Sitting: Intact  Sitting - Static: Good (unsupported)  Sitting - Dynamic: Fair (occasional)  Standing: Impaired; With support  Standing - Static: Fair  Standing - Dynamic : Fair    ADL Intervention:    Neris completed self toileting with OT. She continues with use of purewick but can transfer and from bathroom with CG/Min A overall. Pt soiled. Pt assisted to clean up and provided with dry brief. Pt transitioned to the sink for grooming. She was able to complete grooming but again needing assistance to initiate, sequence, and terminate activities due to perseveration's with oral care. Pt then returned to chair in room. Encouraged active visual scanning but she was unable to locate her chair in the left visual field. She had great difficulty locating her chair needing both verbal and tactile cues to locate the chair. Feeding  Feeding Assistance: Supervision(needing cues to attend to self feeding activities)  Container Management: Total assistance (dependent)  Cutting Food: Total assistance (dependent)    Grooming  Grooming Assistance: Supervision(cues to initiate and terminate all activities)  Washing Face: Supervision  Washing Hands: Supervision  Brushing Teeth: Supervision                        Toileting  Toileting Assistance: Moderate assistance  Bladder Hygiene: Moderate assistance  Clothing Management: Moderate assistance  Cues: Verbal cues provided    Cognitive Retraining  Safety/Judgement: Decreased awareness of environment;Decreased awareness of need for assistance;Decreased awareness of need for safety;Decreased insight into deficits    Pain:  No pain reported    Activity Tolerance:   Good BP within parameters, 119/72 pulse 77  Please refer to the flowsheet for vital signs taken during this treatment.     After treatment patient left in no apparent distress:   Sitting in chair, Call bell within reach, Bed / chair alarm activated and Caregiver / family present    COMMUNICATION/COLLABORATION:   The patients plan of care was discussed with: Physical Therapist and Registered Nurse    Mario Stevens OT  Time Calculation: 30 mins

## 2019-10-23 NOTE — PROGRESS NOTES
Problem: Patient Education: Go to Patient Education Activity  Goal: Patient/Family Education  Outcome: Progressing Towards Goal     Problem: Diabetes Self-Management  Goal: *Disease process and treatment process  Description  Define diabetes and identify own type of diabetes; list 3 options for treating diabetes. Outcome: Progressing Towards Goal  Goal: *Incorporating nutritional management into lifestyle  Description  Describe effect of type, amount and timing of food on blood glucose; list 3 methods for planning meals. Outcome: Progressing Towards Goal  Goal: *Incorporating physical activity into lifestyle  Description  State effect of exercise on blood glucose levels. Outcome: Progressing Towards Goal  Goal: *Developing strategies to promote health/change behavior  Description  Define the ABC's of diabetes; identify appropriate screenings, schedule and personal plan for screenings. Outcome: Progressing Towards Goal  Goal: *Using medications safely  Description  State effect of diabetes medications on diabetes; name diabetes medication taking, action and side effects. Outcome: Progressing Towards Goal  Goal: *Monitoring blood glucose, interpreting and using results  Description  Identify recommended blood glucose targets  and personal targets. Outcome: Progressing Towards Goal  Goal: *Prevention, detection, treatment of acute complications  Description  List symptoms of hyper- and hypoglycemia; describe how to treat low blood sugar and actions for lowering  high blood glucose level. Outcome: Progressing Towards Goal  Goal: *Prevention, detection and treatment of chronic complications  Description  Define the natural course of diabetes and describe the relationship of blood glucose levels to long term complications of diabetes.   Outcome: Progressing Towards Goal  Goal: *Developing strategies to address psychosocial issues  Description  Describe feelings about living with diabetes; identify support needed and support network  Outcome: Progressing Towards Goal  Goal: *Insulin pump training  Outcome: Progressing Towards Goal  Goal: *Sick day guidelines  Outcome: Progressing Towards Goal  Goal: *Patient Specific Goal (EDIT GOAL, INSERT TEXT)  Outcome: Progressing Towards Goal     Problem: Patient Education: Go to Patient Education Activity  Goal: Patient/Family Education  Outcome: Progressing Towards Goal     Problem: Falls - Risk of  Goal: *Absence of Falls  Description  Document Drew Cantorener Fall Risk and appropriate interventions in the flowsheet. Outcome: Progressing Towards Goal  Note:   Fall Risk Interventions:  Mobility Interventions: Communicate number of staff needed for ambulation/transfer, Bed/chair exit alarm, Patient to call before getting OOB, PT Consult for mobility concerns, PT Consult for assist device competence    Mentation Interventions: Bed/chair exit alarm, Adequate sleep, hydration, pain control, Door open when patient unattended, Evaluate medications/consider consulting pharmacy, More frequent rounding, Increase mobility, Reorient patient, Toileting rounds, Update white board    Medication Interventions: Assess postural VS orthostatic hypotension, Bed/chair exit alarm, Evaluate medications/consider consulting pharmacy, Patient to call before getting OOB, Teach patient to arise slowly    Elimination Interventions: Bed/chair exit alarm, Call light in reach, Patient to call for help with toileting needs, Toileting schedule/hourly rounds              Problem: Patient Education: Go to Patient Education Activity  Goal: Patient/Family Education  Outcome: Progressing Towards Goal     Problem: Pressure Injury - Risk of  Goal: *Prevention of pressure injury  Description  Document Nate Scale and appropriate interventions in the flowsheet.   Outcome: Progressing Towards Goal  Note:   Pressure Injury Interventions:  Sensory Interventions: Assess need for specialty bed, Assess changes in LOC, Avoid rigorous massage over bony prominences, Discuss PT/OT consult with provider, Keep linens dry and wrinkle-free, Maintain/enhance activity level, Minimize linen layers, Monitor skin under medical devices, Pad between skin to skin, Pressure redistribution bed/mattress (bed type)    Moisture Interventions: Apply protective barrier, creams and emollients, Absorbent underpads, Check for incontinence Q2 hours and as needed, Maintain skin hydration (lotion/cream), Offer toileting Q_hr, Minimize layers    Activity Interventions: Increase time out of bed, Pressure redistribution bed/mattress(bed type), PT/OT evaluation    Mobility Interventions: Float heels, HOB 30 degrees or less, PT/OT evaluation, Pressure redistribution bed/mattress (bed type)    Nutrition Interventions: Document food/fluid/supplement intake    Friction and Shear Interventions: HOB 30 degrees or less, Lift sheet, Minimize layers                Problem: Patient Education: Go to Patient Education Activity  Goal: Patient/Family Education  Outcome: Progressing Towards Goal     Problem: Patient Education: Go to Patient Education Activity  Goal: Patient/Family Education  Outcome: Progressing Towards Goal     Problem: Hemorrhagic Stroke: Day 5 through Discharge  Goal: Activity/Safety  Outcome: Progressing Towards Goal  Goal: Consults, if ordered  Outcome: Progressing Towards Goal  Goal: Diagnostic Test/Procedures  Outcome: Progressing Towards Goal  Goal: Nutrition/Diet  Outcome: Progressing Towards Goal  Goal: Medications  Outcome: Progressing Towards Goal  Goal: Respiratory  Outcome: Progressing Towards Goal  Goal: Treatments/Interventions/Procedures  Outcome: Progressing Towards Goal  Goal: Psychosocial  Outcome: Progressing Towards Goal  Goal: *Hemodynamically stable  Outcome: Progressing Towards Goal  Goal: *Verbalizes anxiety and depression are reduced or absent  Outcome: Progressing Towards Goal  Goal: *Absence of aspiration  Outcome: Progressing Towards Goal  Goal: *Absence of signs and symptoms of DVT  Outcome: Progressing Towards Goal  Goal: *Optimal pain control at patient's stated goal  Outcome: Progressing Towards Goal  Goal: *Tolerating diet  Outcome: Progressing Towards Goal  Goal: *Progressive mobility and function  Outcome: Progressing Towards Goal  Goal: *Rehabilitation readiness  Outcome: Progressing Towards Goal     Problem: Hemorrhagic Stroke: Discharge Outcomes  Goal: *Verbalizes anxiety and depression are reduced or absent  Outcome: Progressing Towards Goal  Goal: *Verbalize understanding of risk factor modification(Stroke Metric)  Outcome: Progressing Towards Goal  Goal: *Optimal pain control at patient's stated goal  Outcome: Progressing Towards Goal  Goal: *Hemodynamically stable  Outcome: Progressing Towards Goal  Goal: *Absence of aspiration pneumonia  Outcome: Progressing Towards Goal  Goal: *Aware of needed dietary changes  Outcome: Progressing Towards Goal  Goal: *Verbalizes understanding and describes medication purposes and frequencies  Outcome: Progressing Towards Goal  Goal: *Tolerating diet  Outcome: Progressing Towards Goal  Goal: *Absence of signs and symptoms of DVT  Outcome: Progressing Towards Goal  Goal: *Absence of aspiration  Outcome: Progressing Towards Goal  Goal: *Progressive mobility and function  Outcome: Progressing Towards Goal  Goal: *Home safety concerns addressed  Outcome: Progressing Towards Goal     Problem: Patient Education: Go to Patient Education Activity  Goal: Patient/Family Education  Outcome: Progressing Towards Goal

## 2019-10-23 NOTE — PROGRESS NOTES
Problem: Diabetes Self-Management  Goal: *Disease process and treatment process  Description  Define diabetes and identify own type of diabetes; list 3 options for treating diabetes. Outcome: Progressing Towards Goal  Goal: *Incorporating nutritional management into lifestyle  Description  Describe effect of type, amount and timing of food on blood glucose; list 3 methods for planning meals. Outcome: Progressing Towards Goal  Goal: *Incorporating physical activity into lifestyle  Description  State effect of exercise on blood glucose levels. Outcome: Progressing Towards Goal  Goal: *Developing strategies to promote health/change behavior  Description  Define the ABC's of diabetes; identify appropriate screenings, schedule and personal plan for screenings. Outcome: Progressing Towards Goal  Goal: *Using medications safely  Description  State effect of diabetes medications on diabetes; name diabetes medication taking, action and side effects. Outcome: Progressing Towards Goal  Goal: *Monitoring blood glucose, interpreting and using results  Description  Identify recommended blood glucose targets  and personal targets. Outcome: Progressing Towards Goal  Goal: *Prevention, detection, treatment of acute complications  Description  List symptoms of hyper- and hypoglycemia; describe how to treat low blood sugar and actions for lowering  high blood glucose level. Outcome: Progressing Towards Goal  Goal: *Prevention, detection and treatment of chronic complications  Description  Define the natural course of diabetes and describe the relationship of blood glucose levels to long term complications of diabetes.   Outcome: Progressing Towards Goal  Goal: *Developing strategies to address psychosocial issues  Description  Describe feelings about living with diabetes; identify support needed and support network  Outcome: Progressing Towards Goal     Problem: Patient Education: Go to Patient Education Activity  Goal: Patient/Family Education  Outcome: Progressing Towards Goal     Problem: Falls - Risk of  Goal: *Absence of Falls  Description  Document Will Crisostomowater Fall Risk and appropriate interventions in the flowsheet. Outcome: Progressing Towards Goal  Note:   Fall Risk Interventions:  Mobility Interventions: Communicate number of staff needed for ambulation/transfer, Bed/chair exit alarm, Patient to call before getting OOB, PT Consult for mobility concerns, PT Consult for assist device competence    Mentation Interventions: Bed/chair exit alarm, Adequate sleep, hydration, pain control, Door open when patient unattended, Evaluate medications/consider consulting pharmacy, More frequent rounding, Increase mobility, Reorient patient, Toileting rounds, Update white board    Medication Interventions: Assess postural VS orthostatic hypotension, Bed/chair exit alarm, Evaluate medications/consider consulting pharmacy, Patient to call before getting OOB, Teach patient to arise slowly    Elimination Interventions: Bed/chair exit alarm, Call light in reach, Patient to call for help with toileting needs, Toileting schedule/hourly rounds              Problem: Patient Education: Go to Patient Education Activity  Goal: Patient/Family Education  Outcome: Progressing Towards Goal     Problem: Pressure Injury - Risk of  Goal: *Prevention of pressure injury  Description  Document Nate Scale and appropriate interventions in the flowsheet.   Outcome: Progressing Towards Goal  Note:   Pressure Injury Interventions:  Sensory Interventions: Assess need for specialty bed, Assess changes in LOC, Avoid rigorous massage over bony prominences, Discuss PT/OT consult with provider, Keep linens dry and wrinkle-free, Maintain/enhance activity level, Minimize linen layers, Monitor skin under medical devices, Pad between skin to skin, Pressure redistribution bed/mattress (bed type)    Moisture Interventions: Apply protective barrier, creams and emollients, Absorbent underpads, Check for incontinence Q2 hours and as needed, Maintain skin hydration (lotion/cream), Offer toileting Q_hr, Minimize layers    Activity Interventions: Increase time out of bed, Pressure redistribution bed/mattress(bed type), PT/OT evaluation    Mobility Interventions: Float heels, HOB 30 degrees or less, PT/OT evaluation, Pressure redistribution bed/mattress (bed type)    Nutrition Interventions: Document food/fluid/supplement intake    Friction and Shear Interventions: HOB 30 degrees or less, Lift sheet, Minimize layers                Problem: Patient Education: Go to Patient Education Activity  Goal: Patient/Family Education  Outcome: Progressing Towards Goal     Problem: Patient Education: Go to Patient Education Activity  Goal: Patient/Family Education  Outcome: Progressing Towards Goal     Problem: Hemorrhagic Stroke: Day 5 through Discharge  Goal: Off Pathway (Use only if patient is Off Pathway)  Outcome: Progressing Towards Goal  Goal: Activity/Safety  Outcome: Progressing Towards Goal  Goal: Consults, if ordered  Outcome: Progressing Towards Goal  Goal: Diagnostic Test/Procedures  Outcome: Progressing Towards Goal  Goal: Nutrition/Diet  Outcome: Progressing Towards Goal  Goal: Medications  Outcome: Progressing Towards Goal  Goal: Respiratory  Outcome: Progressing Towards Goal  Goal: Treatments/Interventions/Procedures  Outcome: Progressing Towards Goal  Goal: Psychosocial  Outcome: Progressing Towards Goal  Goal: *Hemodynamically stable  Outcome: Progressing Towards Goal  Goal: *Verbalizes anxiety and depression are reduced or absent  Outcome: Progressing Towards Goal  Goal: *Absence of aspiration  Outcome: Progressing Towards Goal  Goal: *Absence of signs and symptoms of DVT  Outcome: Progressing Towards Goal  Goal: *Optimal pain control at patient's stated goal  Outcome: Progressing Towards Goal  Goal: *Tolerating diet  Outcome: Progressing Towards Goal  Goal: *Progressive mobility and function  Outcome: Progressing Towards Goal  Goal: *Rehabilitation readiness  Outcome: Progressing Towards Goal     Problem: Hemorrhagic Stroke: Discharge Outcomes  Goal: *Verbalizes anxiety and depression are reduced or absent  Outcome: Progressing Towards Goal  Goal: *Verbalize understanding of risk factor modification(Stroke Metric)  Outcome: Progressing Towards Goal  Goal: *Optimal pain control at patient's stated goal  Outcome: Progressing Towards Goal  Goal: *Hemodynamically stable  Outcome: Progressing Towards Goal  Goal: *Absence of aspiration pneumonia  Outcome: Progressing Towards Goal  Goal: *Aware of needed dietary changes  Outcome: Progressing Towards Goal  Goal: *Verbalizes understanding and describes medication purposes and frequencies  Outcome: Progressing Towards Goal  Goal: *Tolerating diet  Outcome: Progressing Towards Goal  Goal: *Absence of signs and symptoms of DVT  Outcome: Progressing Towards Goal  Goal: *Absence of aspiration  Outcome: Progressing Towards Goal  Goal: *Progressive mobility and function  Outcome: Progressing Towards Goal  Goal: *Home safety concerns addressed  Outcome: Progressing Towards Goal

## 2019-10-23 NOTE — PROGRESS NOTES
Problem: Diabetes Self-Management  Goal: *Disease process and treatment process  Description  Define diabetes and identify own type of diabetes; list 3 options for treating diabetes. 10/23/2019 0030 by Sofía MONSIVAIS  Outcome: Progressing Towards Goal  10/22/2019 2306 by Sofía MONSIVAIS  Outcome: Progressing Towards Goal  Goal: *Incorporating nutritional management into lifestyle  Description  Describe effect of type, amount and timing of food on blood glucose; list 3 methods for planning meals. 10/23/2019 0030 by Mich Powers  Outcome: Progressing Towards Goal  10/22/2019 2306 by Sofía MONSIVAIS  Outcome: Progressing Towards Goal  Goal: *Incorporating physical activity into lifestyle  Description  State effect of exercise on blood glucose levels. 10/23/2019 0030 by Sofía MONSIVAIS  Outcome: Progressing Towards Goal  10/22/2019 2306 by Sofía MONSIVAIS  Outcome: Progressing Towards Goal  Goal: *Developing strategies to promote health/change behavior  Description  Define the ABC's of diabetes; identify appropriate screenings, schedule and personal plan for screenings. 10/23/2019 0030 by Sofía MONSIVAIS  Outcome: Progressing Towards Goal  10/22/2019 2306 by Sofía MONSIVAIS  Outcome: Progressing Towards Goal  Goal: *Using medications safely  Description  State effect of diabetes medications on diabetes; name diabetes medication taking, action and side effects. 10/23/2019 0030 by Mich Powers  Outcome: Progressing Towards Goal  10/22/2019 2306 by Sofía MONSIVAIS  Outcome: Progressing Towards Goal  Goal: *Monitoring blood glucose, interpreting and using results  Description  Identify recommended blood glucose targets  and personal targets.   10/23/2019 0030 by Sofía MONSIVAIS  Outcome: Progressing Towards Goal  10/22/2019 2306 by Sofía MONSIVAIS  Outcome: Progressing Towards Goal  Goal: *Prevention, detection, treatment of acute complications  Description  List symptoms of hyper- and hypoglycemia; describe how to treat low blood sugar and actions for lowering  high blood glucose level. 10/23/2019 0030 by Jerome MONSIVAIS  Outcome: Progressing Towards Goal  10/22/2019 2306 by Jerome MONSIVAIS  Outcome: Progressing Towards Goal  Goal: *Prevention, detection and treatment of chronic complications  Description  Define the natural course of diabetes and describe the relationship of blood glucose levels to long term complications of diabetes. 10/23/2019 0030 by Jerome MONSIVAIS  Outcome: Progressing Towards Goal  10/22/2019 2306 by Prashant Winters  Outcome: Progressing Towards Goal  Goal: *Developing strategies to address psychosocial issues  Description  Describe feelings about living with diabetes; identify support needed and support network  10/23/2019 0030 by Jerome MONSIVAIS  Outcome: Progressing Towards Goal  10/22/2019 2306 by Jerome MONSIVAIS  Outcome: Progressing Towards Goal  Goal: *Patient Specific Goal (EDIT GOAL, INSERT TEXT)  Outcome: Progressing Towards Goal     Problem: Patient Education: Go to Patient Education Activity  Goal: Patient/Family Education  10/23/2019 0030 by Jerome MONSIVAIS  Outcome: Progressing Towards Goal  10/22/2019 2306 by Jerome MONSIVAIS  Outcome: Progressing Towards Goal     Problem: Falls - Risk of  Goal: *Absence of Falls  Description  Document Roshni Dear Fall Risk and appropriate interventions in the flowsheet.   10/23/2019 0030 by Jerome MONSIVAIS  Outcome: Progressing Towards Goal  Note:   Fall Risk Interventions:  Mobility Interventions: Communicate number of staff needed for ambulation/transfer, Bed/chair exit alarm, Patient to call before getting OOB, PT Consult for mobility concerns, PT Consult for assist device competence    Mentation Interventions: Bed/chair exit alarm, Adequate sleep, hydration, pain control, Door open when patient unattended, Evaluate medications/consider consulting pharmacy, More frequent rounding, Increase mobility, Reorient patient, Toileting rounds, Update white board    Medication Interventions: Assess postural VS orthostatic hypotension, Bed/chair exit alarm, Evaluate medications/consider consulting pharmacy, Patient to call before getting OOB, Teach patient to arise slowly    Elimination Interventions: Bed/chair exit alarm, Call light in reach, Patient to call for help with toileting needs, Toileting schedule/hourly rounds           10/22/2019 2306 by Chikis MONSIVAIS  Outcome: Progressing Towards Goal  Note:   Fall Risk Interventions:  Mobility Interventions: Communicate number of staff needed for ambulation/transfer, Bed/chair exit alarm, Patient to call before getting OOB, PT Consult for mobility concerns, PT Consult for assist device competence    Mentation Interventions: Bed/chair exit alarm, Adequate sleep, hydration, pain control, Door open when patient unattended, Evaluate medications/consider consulting pharmacy, More frequent rounding, Increase mobility, Reorient patient, Toileting rounds, Update white board    Medication Interventions: Assess postural VS orthostatic hypotension, Bed/chair exit alarm, Evaluate medications/consider consulting pharmacy, Patient to call before getting OOB, Teach patient to arise slowly    Elimination Interventions: Bed/chair exit alarm, Call light in reach, Patient to call for help with toileting needs, Toileting schedule/hourly rounds              Problem: Patient Education: Go to Patient Education Activity  Goal: Patient/Family Education  10/23/2019 0030 by Chikis MONSIVAIS  Outcome: Progressing Towards Goal  10/22/2019 2306 by Chikis MONSIVAIS  Outcome: Progressing Towards Goal     Problem: Pressure Injury - Risk of  Goal: *Prevention of pressure injury  Description  Document Nate Scale and appropriate interventions in the flowsheet.   10/23/2019 0030 by Chikis MONSIVAIS  Outcome: Progressing Towards Goal  Note:   Pressure Injury Interventions:  Sensory Interventions: Assess need for specialty bed, Assess changes in LOC, Avoid rigorous massage over bony prominences, Discuss PT/OT consult with provider, Keep linens dry and wrinkle-free, Maintain/enhance activity level, Minimize linen layers, Monitor skin under medical devices, Pad between skin to skin, Pressure redistribution bed/mattress (bed type)    Moisture Interventions: Apply protective barrier, creams and emollients, Absorbent underpads, Check for incontinence Q2 hours and as needed, Maintain skin hydration (lotion/cream), Offer toileting Q_hr, Minimize layers    Activity Interventions: Increase time out of bed, Pressure redistribution bed/mattress(bed type), PT/OT evaluation    Mobility Interventions: Float heels, HOB 30 degrees or less, PT/OT evaluation, Pressure redistribution bed/mattress (bed type)    Nutrition Interventions: Document food/fluid/supplement intake    Friction and Shear Interventions: HOB 30 degrees or less, Lift sheet, Minimize layers             10/22/2019 2306 by Angel MONSIVAIS  Outcome: Progressing Towards Goal  Note:   Pressure Injury Interventions:  Sensory Interventions: Assess need for specialty bed, Assess changes in LOC, Avoid rigorous massage over bony prominences, Discuss PT/OT consult with provider, Keep linens dry and wrinkle-free, Maintain/enhance activity level, Minimize linen layers, Monitor skin under medical devices, Pad between skin to skin, Pressure redistribution bed/mattress (bed type)    Moisture Interventions: Apply protective barrier, creams and emollients, Absorbent underpads, Check for incontinence Q2 hours and as needed, Maintain skin hydration (lotion/cream), Offer toileting Q_hr, Minimize layers    Activity Interventions: Increase time out of bed, Pressure redistribution bed/mattress(bed type), PT/OT evaluation    Mobility Interventions: Float heels, HOB 30 degrees or less, PT/OT evaluation, Pressure redistribution bed/mattress (bed type)    Nutrition Interventions: Document food/fluid/supplement intake    Friction and Shear Interventions: HOB 30 degrees or less, Lift sheet, Minimize layers                Problem: Patient Education: Go to Patient Education Activity  Goal: Patient/Family Education  10/23/2019 0030 by Brennan MONSIVAIS  Outcome: Progressing Towards Goal  10/22/2019 2306 by Brennan MONSIVAIS  Outcome: Progressing Towards Goal     Problem: Patient Education: Go to Patient Education Activity  Goal: Patient/Family Education  10/23/2019 0030 by Brennan MONSIVAIS  Outcome: Progressing Towards Goal  10/22/2019 2306 by Brennan MONSIVAIS  Outcome: Progressing Towards Goal     Problem: Hemorrhagic Stroke: Day 5 through Discharge  Goal: Off Pathway (Use only if patient is Off Pathway)  10/23/2019 0030 by Brennan MONSIVAIS  Outcome: Progressing Towards Goal  10/22/2019 2306 by Dakota Matthews  Outcome: Progressing Towards Goal  Goal: Activity/Safety  10/23/2019 0030 by Brennan MONSIVAIS  Outcome: Progressing Towards Goal  10/22/2019 2306 by Brennan MONSIVAIS  Outcome: Progressing Towards Goal  Goal: Consults, if ordered  10/23/2019 0030 by Brennan MONSIVAIS  Outcome: Progressing Towards Goal  10/22/2019 2306 by Brennan MONSIVAIS  Outcome: Progressing Towards Goal  Goal: Diagnostic Test/Procedures  10/23/2019 0030 by Brennan MONSIVAIS  Outcome: Progressing Towards Goal  10/22/2019 2306 by Brennan MONSIVAIS  Outcome: Progressing Towards Goal  Goal: Nutrition/Diet  10/23/2019 0030 by Brennan MONSIVAIS  Outcome: Progressing Towards Goal  10/22/2019 2306 by Brennan MONSIVAIS  Outcome: Progressing Towards Goal  Goal: Medications  10/23/2019 0030 by Brennan MONSIVAIS  Outcome: Progressing Towards Goal  10/22/2019 2306 by Dakota Matthews  Outcome: Progressing Towards Goal  Goal: Respiratory  10/23/2019 0030 by Brennan MONSIVAIS  Outcome: Progressing Towards Goal  10/22/2019 2306 by Brennan MONSIVAIS  Outcome: Progressing Towards Goal  Goal: Treatments/Interventions/Procedures  10/23/2019 0030 by Jonathon Liesharon  Outcome: Progressing Towards Goal  10/22/2019 2306 by Michaeline Joey D  Outcome: Progressing Towards Goal  Goal: Psychosocial  10/23/2019 0030 by Michaeline Joey D  Outcome: Progressing Towards Goal  10/22/2019 2306 by Michaeline Joey D  Outcome: Progressing Towards Goal  Goal: *Hemodynamically stable  10/23/2019 0030 by Michaeline Joey D  Outcome: Progressing Towards Goal  10/22/2019 2306 by Michaeline Joey D  Outcome: Progressing Towards Goal  Goal: *Verbalizes anxiety and depression are reduced or absent  10/23/2019 0030 by Michaeline Joey D  Outcome: Progressing Towards Goal  10/22/2019 2306 by Michaeline Joey D  Outcome: Progressing Towards Goal  Goal: *Absence of aspiration  10/23/2019 0030 by Michaeline Joey D  Outcome: Progressing Towards Goal  10/22/2019 2306 by Michaeline Joey D  Outcome: Progressing Towards Goal  Goal: *Absence of signs and symptoms of DVT  10/23/2019 0030 by Michaeline Joey D  Outcome: Progressing Towards Goal  10/22/2019 2306 by Michaeline Joey D  Outcome: Progressing Towards Goal  Goal: *Optimal pain control at patient's stated goal  10/23/2019 0030 by Michaeline Joey D  Outcome: Progressing Towards Goal  10/22/2019 2306 by Michaeline Joey D  Outcome: Progressing Towards Goal  Goal: *Tolerating diet  10/23/2019 0030 by Michaeline Joey D  Outcome: Progressing Towards Goal  10/22/2019 2306 by Michaeline Joey D  Outcome: Progressing Towards Goal  Goal: *Progressive mobility and function  10/23/2019 0030 by Michaeline Joey D  Outcome: Progressing Towards Goal  10/22/2019 2306 by Jonathon Liesharon  Outcome: Progressing Towards Goal  Goal: *Rehabilitation readiness  10/23/2019 0030 by Michaeline Joey D  Outcome: Progressing Towards Goal  10/22/2019 2306 by Michaeline Joey D  Outcome: Progressing Towards Goal     Problem: Hemorrhagic Stroke: Discharge Outcomes  Goal: *Verbalizes anxiety and depression are reduced or absent  Outcome: Progressing Towards Goal  Goal: *Verbalize understanding of risk factor modification(Stroke Metric)  Outcome: Progressing Towards Goal  Goal: *Optimal pain control at patient's stated goal  Outcome: Progressing Towards Goal  Goal: *Hemodynamically stable  Outcome: Progressing Towards Goal  Goal: *Absence of aspiration pneumonia  Outcome: Progressing Towards Goal  Goal: *Aware of needed dietary changes  Outcome: Progressing Towards Goal  Goal: *Verbalizes understanding and describes medication purposes and frequencies  Outcome: Progressing Towards Goal  Goal: *Tolerating diet  Outcome: Progressing Towards Goal  Goal: *Absence of signs and symptoms of DVT  Outcome: Progressing Towards Goal  Goal: *Absence of aspiration  Outcome: Progressing Towards Goal  Goal: *Progressive mobility and function  Outcome: Progressing Towards Goal  Goal: *Home safety concerns addressed  Outcome: Progressing Towards Goal

## 2019-10-23 NOTE — PROGRESS NOTES
Plans of care discussed in IDR. CM will continue to follow. Transition of Care Plan    1. Transition to [facility] for [address] [facility]  2. Transport   -TBD    CM contacted [facility] [phone] and spoke with Admissions Director: Eunice Russell. Currently still awaiting updates. CM will continue to follow and assist with [facility] needs as they arise.     SANGEETA Turner/GALO  Care Management  10:04 AM

## 2019-10-23 NOTE — PROGRESS NOTES
Stroke Education provided to relative(s) and the following topics were discussed    1. Patients personal risk factors for stroke are hypertensionHypertension, Diabetes    2. Warning signs of Stroke:        * Sudden numbness or weakness of the face, arm or leg, especially on one side of          The body            * Sudden confusion, trouble speaking or understanding        * Sudden trouble seeing in one or both eyes        * Sudden trouble walking, dizziness, loss of balance or coordination        * Sudden severe headache with no known cause      3. Importance of activation Emergency Medical Services ( 9-1-1 ) immediately if experience any warning signs of stroke. 4. Be sure and schedule a follow-up appointment with your primary care doctor or any specialists as instructed. 5. You must take medicine every day to treat your risk factors for stroke. Be sure to take your medicines exactly as your doctor tells you: no more, no less. Know what your medicines are for , what they do. Anti-thrombotics /anticoagulants can help prevent strokes. You are taking the following medicine(s)  *Norvasc, Metoprolol**     6.  Smoking and second-hand smoke greatly increase your risk of stroke, cardiovascular disease and death. Smoking history never    7. Information provided was Stroke Handouts     8. Documentation of teaching completed in Patient Education Activity and on Care Plan with teaching response noted?     no

## 2019-10-23 NOTE — PROGRESS NOTES
Problem: Mobility Impaired (Adult and Pediatric)  Goal: *Acute Goals and Plan of Care (Insert Text)  Description    FUNCTIONAL STATUS PRIOR TO ADMISSION: Patient was independent and active without use of DME.    HOME SUPPORT PRIOR TO ADMISSION: The patient lived with her niece but did not require assist for mobility. Physical Therapy Goals  Initiated 10/18/2019  1. Patient will move from supine to sit and sit to supine , scoot up and down and roll side to side in bed with independence within 7 day(s). 2.  Patient will transfer from bed to chair and chair to bed with modified independence using the least restrictive device within 7 day(s). 3.  Patient will perform sit to stand with modified independence within 7 day(s). 4.  Patient will ambulate with independence for 150 feet with the least restrictive device within 7 day(s). 5.  Patient will ascend/descend 7 stairs with handrail(s) with modified independence within 7 day(s). 6.  Patient will improve Fiore Balance score by 7 points within 7 days. Outcome: Progressing Towards Goal   PHYSICAL THERAPY TREATMENT  Patient: Marixa Duarte (76 y.o. female)  Date: 10/23/2019  Diagnosis: ICH (intracerebral hemorrhage) ([facility]) [I61.9]  [facility] [I62.9] <principal problem not specified>       Precautions:  Falls  Chart, physical therapy assessment, plan of care and goals were reviewed. ASSESSMENT:  Patient continues with skilled PT services and is progressing towards goals. Pt received seated in chair and agreeable to therapy. Pt continues to be limited by impaired balance and gait, ? L inattention, and difficulty processing verbal cues to visually identify objects on the Left. Pt will also turn right when asked to turn left and needs max A/ hand over hand assist to redirect patient to correctly follow cues properly.  Pt able to progress gait distance with RW and without RW, but is easily distracted and runs into objects without the ability correct herself. Pt remains at increased risk for falls and will benefit from SNF placement to continue therapy efforts. Current Level of Function Impacting Discharge (mobility/balance): CGA sit<>stand with and without RW, gait training with min A with and without RW           PLAN :  Patient continues to benefit from skilled intervention to address the above impairments. Continue treatment per established plan of care. to address goals. Recommendation for discharge: (in order for the patient to meet his/her long term goals)  Therapy up to 5 days/week in SNF setting         SUBJECTIVE:   Patient stated I feel good.     OBJECTIVE DATA SUMMARY:   Critical Behavior:  Neurologic State: Alert  Orientation Level: Oriented to person, Disoriented to time, Disoriented to situation, Disoriented to place  Cognition: Follows commands  Safety/Judgement: Decreased awareness of environment, Decreased awareness of need for assistance, Decreased awareness of need for safety, Decreased insight into deficits  Functional Mobility Training:  Bed Mobility:  NT--pt received and returned seated in chair  Transfers:  Sit to Stand: Contact guard assistance  Stand to Sit: Contact guard assistance        Bed to Chair: Contact guard assistance                    Balance:  Sitting: Intact  Sitting - Static: Good (unsupported)  Sitting - Dynamic: Fair (occasional)  Standing: Impaired; With support  Standing - Static: Fair  Standing - Dynamic : Fair  Ambulation/Gait Training:  Distance (ft): 60 Feet (ft)     Ambulation - Level of Assistance: Minimal assistance        Gait Abnormalities: Decreased step clearance; Path deviations        Base of Support: Widened     Speed/Melissa: Pace decreased (<100 feet/min)  Step Length: Right shortened;Left shortened       Activity Tolerance:   Good  Please refer to the flowsheet for vital signs taken during this treatment.     After treatment patient left in no apparent distress:   Sitting in chair, Call bell within reach and Bed / chair alarm activated    COMMUNICATION/COLLABORATION:   The patients plan of care was discussed with: Occupational Therapist and Registered Nurse    Radha Madrid PT, DPT   Time Calculation: 23 mins

## 2019-10-23 NOTE — PROGRESS NOTES
Hospitalist Progress Note  Vikram Montague MD  Answering service: [phone] from in house phone        Date of Service:  10/23/2019  NAME:  Valerie Gruber  :  1955  MRN:  [id number]      Admission Summary:     A 62 y. o. female with a history of cerebral aneurysm and shunt in  as well as multiple infarcts in the past who presents with confusion and mental status changes. CT scan done showed an acute right parietal intracranial hematoma as described with midline shift, probable small right subdural hematoma    Interval history / Subjective:     She said she feels the same, niece at bedside     Assessment & Plan:     Acute intracranial hemorrhage with midline shift  -repeat CT scan 10/20/2019 stable  -neurosurgery consulted, no surgical intervention  -on lisinopril, norvasc, prn hydralazine for BP control  -frequent neurochecks  -hold aspirin  -PT/OT on board     Hypertension  -keep SBP<140  -BP normal, continue lisinopril to 40mg daily, norvasc to 10mg daily, metoprolol succinate 25mg daily, monitor BP  -cont prn hydralazine  -cont to monitor     Type 2 diabetes  -A1C 9.1  -sliding scale insulin  -add home metformin      Hx of Cerebral aneurysm s/p  shunt in   -has short memory problems otherwise, stable  -continue supportive care    Code status: Full Code  DVT prophylaxis: SNF    Care Plan discussed with: Patient/Family and Nurse  Disposition: SNF Rehab, [facility] [address]   [place] contreras, [phone] at bedside, questions answered      Hospital Problems  Date Reviewed: 2017          Codes Class Noted POA    ICH (intracerebral hemorrhage) ([facility] ICD-10-CM: I61.9  ICD-9-CM: 220  10/18/2019 Unknown        Intracranial hemorrhage ([facility] ICD-10-CM: I62.9  ICD-9-CM: 432.9  10/18/2019 Unknown              Vital Signs:    Last 24hrs VS reviewed since prior progress note.  Most recent are:  Visit Vitals  /71 (BP 1 Location: Left arm, BP Patient Position: At rest)   Pulse 99   Temp 98 °F (36.7 °C)   Resp 13   Ht 5' 8\" (1.727 m)   Wt 78 kg (171 lb 15.3 oz)   SpO2 93%   Breastfeeding? No   BMI 26.15 kg/m²       No intake or output data in the 24 hours ending 10/23/19 1137     Physical Examination:             Constitutional:  No acute distress, cooperative, pleasant    ENT:  Oral mucous moist, oropharynx benign. Resp:  CTA bilaterally. No wheezing/rhonchi/rales. No accessory muscle use   CV:  Regular rhythm, normal rate, no murmurs, gallops, rubs    GI:  Soft, non distended, non tender. normoactive bowel sounds, no hepatosplenomegaly     Musculoskeletal:  No edema    Neurologic:  Conscious and alert, oriented to place and person, moves all extremities, CN II-XII reviewed     Skin:  Good turgor, no rashes or ulcers       Data Review:    Review and/or order of clinical lab test  Review and/or order of tests in the radiology section of CPT  Review and/or order of tests in the medicine section of CPT      Labs:     Recent Labs     10/23/19  0244 10/22/19  0139   WBC 8.7 8.4   HGB 12.9 12.5   HCT 41.3 40.3    181     Recent Labs     10/23/19  0244 10/22/19  0139 10/21/19  0320    135* 136   K 3.5 3.5 3.3*    106 106   CO2 26 25 23   BUN 10 11 12   CREA 0.74 0.76 0.83   * 200* 238*   CA 9.3 9.0 8.4*     No results for input(s): SGOT, GPT, ALT, AP, TBIL, TBILI, TP, ALB, GLOB, GGT, AML, LPSE in the last 72 hours. No lab exists for component: AMYP, HLPSE  No results for input(s): INR, PTP, APTT, INREXT, INREXT in the last 72 hours. No results for input(s): FE, TIBC, PSAT, FERR in the last 72 hours. No results found for: FOL, RBCF   No results for input(s): PH, PCO2, PO2 in the last 72 hours. No results for input(s): CPK, CKNDX, TROIQ in the last 72 hours.     No lab exists for component: CPKMB  No results found for: CHOL, CHOLX, CHLST, CHOLV, HDL, HDLP, LDL, LDLC, DLDLP, TGLX, TRIGL, TRIGP, CHHD, [facility]  Lab Results   Component Value Date/Time    Glucose (POC) 230 (H) 10/23/2019 07:49 AM    Glucose (POC) 212 (H) 10/22/2019 09:15 PM    Glucose (POC) 277 (H) 10/22/2019 04:49 PM    Glucose (POC) 245 (H) 10/22/2019 11:35 AM    Glucose (POC) 204 (H) 10/22/2019 07:27 AM     Lab Results   Component Value Date/Time    Color YELLOW/STRAW 10/18/2019 05:16 AM    Appearance CLEAR 10/18/2019 05:16 AM    Specific gravity 1.028 10/18/2019 05:16 AM    Specific gravity 1.020 12/22/2017 01:12 PM    pH (UA) 6.0 10/18/2019 05:16 AM    Protein 30 (A) 10/18/2019 05:16 AM    Glucose >1,000 (A) 10/18/2019 05:16 AM    Ketone TRACE (A) 10/18/2019 05:16 AM    Bilirubin NEGATIVE  10/18/2019 05:16 AM    Urobilinogen 1.0 10/18/2019 05:16 AM    Nitrites NEGATIVE  10/18/2019 05:16 AM    Leukocyte Esterase NEGATIVE  10/18/2019 05:16 AM    Epithelial cells FEW 10/18/2019 05:16 AM    Bacteria NEGATIVE  10/18/2019 05:16 AM    WBC 0-4 10/18/2019 05:16 AM    RBC 0-5 10/18/2019 05:16 AM         Medications Reviewed:     Current Facility-Administered Medications   Medication Dose Route Frequency    [START ON 10/24/2019] metoprolol succinate (TOPROL-XL) XL tablet 50 mg  50 mg Oral DAILY    amLODIPine (NORVASC) tablet 10 mg  10 mg Oral DAILY    lisinopril (PRINIVIL, ZESTRIL) tablet 40 mg  40 mg Oral DAILY    hydrALAZINE (APRESOLINE) 20 mg/mL injection 10 mg  10 mg IntraVENous Q6H PRN    calcium carbonate (TUMS) chewable tablet 200 mg [elemental]  200 mg Oral TID PRN    influenza vaccine 2019-20 (6 mos+)(PF) (FLUARIX/FLULAVAL/FLUZONE QUAD) injection 0.5 mL  0.5 mL IntraMUSCular PRIOR TO DISCHARGE    0.9% sodium chloride infusion  100 mL/hr IntraVENous CONTINUOUS    sodium chloride (NS) flush 5-40 mL  5-40 mL IntraVENous Q8H    sodium chloride (NS) flush 5-40 mL  5-40 mL IntraVENous PRN    glucose chewable tablet 16 g  4 Tab Oral PRN    glucagon (GLUCAGEN) injection 1 mg  1 mg IntraMUSCular PRN    dextrose 10% infusion 0-250 mL  0-250 mL IntraVENous PRN    insulin lispro (HUMALOG) injection   SubCUTAneous AC&HS     ______________________________________________________________________  EXPECTED LENGTH OF STAY: 4d 9h  ACTUAL LENGTH OF STAY:          5                 Timo Montoya MD

## 2019-10-23 NOTE — PROGRESS NOTES
Bedside and Verbal shift change report given to Judge Nader RN (oncoming nurse) by Linus Canas RN (offgoing nurse). Report included the following information SBAR, Kardex, MAR, Recent Results and Cardiac Rhythm NSR.

## 2019-10-23 NOTE — DIABETES MGMT
[facility]    [facility] Progress Note    Recommendations/ Comments: Chart review for hyperglycemia - BG's 193-230 mg/dL over the last 24 hours. If appropriate, please consider   Addition of Amaryl 2 mg daily    Current hospital DM medication:   Lispro resistant correction scale    Chart reviewed on Neris Pacheco. Patient is a 62 y.o. female with known DM. No DM listed in PTA      A1c:   Lab Results   Component Value Date/Time    Hemoglobin A1c 9.1 (H) 10/19/2019 02:41 AM    Hemoglobin A1c 9.5 (H) 10/18/2019 06:26 AM       Recent Glucose Results:   Lab Results   Component Value Date/Time     (H) 10/23/2019 02:44 AM    GLUCPOC 213 (H) 10/23/2019 11:38 AM    GLUCPOC 230 (H) 10/23/2019 07:49 AM    GLUCPOC 212 (H) 10/22/2019 09:15 PM        Lab Results   Component Value Date/Time    Creatinine 0.74 10/23/2019 02:44 AM     Estimated Creatinine Clearance: 85.4 mL/min (based on SCr of 0.74 mg/dL). Active Orders   Diet    DIET DIABETIC CONSISTENT CARB Regular        PO intake:   Patient Vitals for the past 72 hrs:   % Diet Eaten   10/21/19 1200 75 %   10/21/19 0800 100 %       Will continue to follow as needed.     Thank you  Aidan Anna RD, CDE    Time spent: 4 min

## 2019-10-23 NOTE — PROGRESS NOTES
Bedside and Verbal shift change report given to Kenzie Arrington RN (oncoming nurse) by Maurizio Ngo RN (offgoing nurse). Report included the following information SBAR, Kardex, Intake/Output, MAR, Accordion, Recent Results and Cardiac Rhythm nsr.

## 2019-10-24 VITALS
SYSTOLIC BLOOD PRESSURE: 106 MMHG | WEIGHT: 161.4 LBS | OXYGEN SATURATION: 98 % | TEMPERATURE: 98.3 F | RESPIRATION RATE: 18 BRPM | HEART RATE: 93 BPM | HEIGHT: 68 IN | DIASTOLIC BLOOD PRESSURE: 65 MMHG | BODY MASS INDEX: 24.46 KG/M2

## 2019-10-24 LAB
GLUCOSE BLD STRIP.AUTO-MCNC: 192 MG/DL (ref 65–100)
GLUCOSE BLD STRIP.AUTO-MCNC: 299 MG/DL (ref 65–100)
SERVICE CMNT-IMP: ABNORMAL
SERVICE CMNT-IMP: ABNORMAL

## 2019-10-24 PROCEDURE — 74011250637 HC RX REV CODE- 250/637: Performed by: HOSPITALIST

## 2019-10-24 PROCEDURE — 90686 IIV4 VACC NO PRSV 0.5 ML IM: CPT | Performed by: FAMILY MEDICINE

## 2019-10-24 PROCEDURE — 82962 GLUCOSE BLOOD TEST: CPT

## 2019-10-24 PROCEDURE — 97535 SELF CARE MNGMENT TRAINING: CPT

## 2019-10-24 PROCEDURE — 74011250637 HC RX REV CODE- 250/637: Performed by: FAMILY MEDICINE

## 2019-10-24 PROCEDURE — 74011250636 HC RX REV CODE- 250/636: Performed by: FAMILY MEDICINE

## 2019-10-24 PROCEDURE — 74011250636 HC RX REV CODE- 250/636: Performed by: NURSE PRACTITIONER

## 2019-10-24 PROCEDURE — 90471 IMMUNIZATION ADMIN: CPT

## 2019-10-24 PROCEDURE — 74011636637 HC RX REV CODE- 636/637: Performed by: FAMILY MEDICINE

## 2019-10-24 RX ORDER — METFORMIN HYDROCHLORIDE 500 MG/1
500 TABLET ORAL 2 TIMES DAILY WITH MEALS
Qty: 60 TAB | Refills: 0 | Status: SHIPPED
Start: 2019-10-24 | End: 2020-01-10 | Stop reason: SDUPTHER

## 2019-10-24 RX ORDER — LISINOPRIL 40 MG/1
40 TABLET ORAL DAILY
Qty: 30 TAB | Refills: 0 | Status: SHIPPED | OUTPATIENT
Start: 2019-10-25 | End: 2020-01-06

## 2019-10-24 RX ORDER — HYDRALAZINE HYDROCHLORIDE 20 MG/ML
10 INJECTION INTRAMUSCULAR; INTRAVENOUS
Status: DISCONTINUED | OUTPATIENT
Start: 2019-10-24 | End: 2019-10-24 | Stop reason: HOSPADM

## 2019-10-24 RX ORDER — INSULIN LISPRO 100 [IU]/ML
INJECTION, SOLUTION INTRAVENOUS; SUBCUTANEOUS
Qty: 1 VIAL | Refills: 0 | Status: SHIPPED
Start: 2019-10-24 | End: 2020-01-06

## 2019-10-24 RX ORDER — AMLODIPINE BESYLATE 10 MG/1
10 TABLET ORAL DAILY
Qty: 30 TAB | Refills: 0 | Status: SHIPPED | OUTPATIENT
Start: 2019-10-25 | End: 2020-01-06

## 2019-10-24 RX ORDER — METOPROLOL SUCCINATE 50 MG/1
50 TABLET, EXTENDED RELEASE ORAL DAILY
Qty: 30 TAB | Refills: 0 | Status: SHIPPED
Start: 2019-10-25 | End: 2020-01-10 | Stop reason: SDUPTHER

## 2019-10-24 RX ADMIN — AMLODIPINE BESYLATE 10 MG: 5 TABLET ORAL at 08:11

## 2019-10-24 RX ADMIN — INFLUENZA VIRUS VACCINE 0.5 ML: 15; 15; 15; 15 SUSPENSION INTRAMUSCULAR at 14:36

## 2019-10-24 RX ADMIN — INSULIN LISPRO 3 UNITS: 100 INJECTION, SOLUTION INTRAVENOUS; SUBCUTANEOUS at 08:11

## 2019-10-24 RX ADMIN — Medication 10 ML: at 05:38

## 2019-10-24 RX ADMIN — HYDRALAZINE HYDROCHLORIDE 10 MG: 20 INJECTION INTRAMUSCULAR; INTRAVENOUS at 02:34

## 2019-10-24 RX ADMIN — METOPROLOL SUCCINATE 50 MG: 50 TABLET, EXTENDED RELEASE ORAL at 08:11

## 2019-10-24 RX ADMIN — LISINOPRIL 40 MG: 20 TABLET ORAL at 08:11

## 2019-10-24 RX ADMIN — INSULIN LISPRO 7 UNITS: 100 INJECTION, SOLUTION INTRAVENOUS; SUBCUTANEOUS at 12:19

## 2019-10-24 NOTE — PROGRESS NOTES
Problem: Diabetes Self-Management  Goal: *Disease process and treatment process  Description  Define diabetes and identify own type of diabetes; list 3 options for treating diabetes. Outcome: Progressing Towards Goal  Goal: *Incorporating nutritional management into lifestyle  Description  Describe effect of type, amount and timing of food on blood glucose; list 3 methods for planning meals. Outcome: Progressing Towards Goal  Goal: *Incorporating physical activity into lifestyle  Description  State effect of exercise on blood glucose levels. Outcome: Progressing Towards Goal  Goal: *Developing strategies to promote health/change behavior  Description  Define the ABC's of diabetes; identify appropriate screenings, schedule and personal plan for screenings. Outcome: Progressing Towards Goal  Goal: *Using medications safely  Description  State effect of diabetes medications on diabetes; name diabetes medication taking, action and side effects. Outcome: Progressing Towards Goal  Goal: *Monitoring blood glucose, interpreting and using results  Description  Identify recommended blood glucose targets  and personal targets. Outcome: Progressing Towards Goal  Goal: *Prevention, detection, treatment of acute complications  Description  List symptoms of hyper- and hypoglycemia; describe how to treat low blood sugar and actions for lowering  high blood glucose level. Outcome: Progressing Towards Goal  Goal: *Prevention, detection and treatment of chronic complications  Description  Define the natural course of diabetes and describe the relationship of blood glucose levels to long term complications of diabetes.   Outcome: Progressing Towards Goal  Goal: *Developing strategies to address psychosocial issues  Description  Describe feelings about living with diabetes; identify support needed and support network  Outcome: Progressing Towards Goal  Goal: *Sick day guidelines  Outcome: Progressing Towards Goal  Goal: *Patient Specific Goal (EDIT GOAL, INSERT TEXT)  Outcome: Progressing Towards Goal     Problem: Patient Education: Go to Patient Education Activity  Goal: Patient/Family Education  Outcome: Progressing Towards Goal     Problem: Falls - Risk of  Goal: *Absence of Falls  Description  Document Rayna Archibald Fall Risk and appropriate interventions in the flowsheet. Outcome: Progressing Towards Goal  Note:   Fall Risk Interventions:  Mobility Interventions: Bed/chair exit alarm, Communicate number of staff needed for ambulation/transfer, OT consult for ADLs, Patient to call before getting OOB, PT Consult for mobility concerns, PT Consult for assist device competence, Strengthening exercises (ROM-active/passive)    Mentation Interventions: Adequate sleep, hydration, pain control, Bed/chair exit alarm, Toileting rounds, Door open when patient unattended, Evaluate medications/consider consulting pharmacy, Increase mobility, More frequent rounding, Update white board    Medication Interventions: Assess postural VS orthostatic hypotension, Bed/chair exit alarm, Evaluate medications/consider consulting pharmacy, Patient to call before getting OOB, Teach patient to arise slowly    Elimination Interventions: Call light in reach, Bed/chair exit alarm, Patient to call for help with toileting needs, Stay With Me (per policy), Toileting schedule/hourly rounds              Problem: Patient Education: Go to Patient Education Activity  Goal: Patient/Family Education  Outcome: Progressing Towards Goal     Problem: Pressure Injury - Risk of  Goal: *Prevention of pressure injury  Description  Document Nate Scale and appropriate interventions in the flowsheet.   Outcome: Progressing Towards Goal  Note:   Pressure Injury Interventions:  Sensory Interventions: Assess changes in LOC    Moisture Interventions: Absorbent underpads, Check for incontinence Q2 hours and as needed, Internal/External urinary devices, Maintain skin hydration (lotion/cream)    Activity Interventions: Pressure redistribution bed/mattress(bed type), PT/OT evaluation    Mobility Interventions: HOB 30 degrees or less, Pressure redistribution bed/mattress (bed type), PT/OT evaluation    Nutrition Interventions: Document food/fluid/supplement intake    Friction and Shear Interventions: Lift sheet, Minimize layers                Problem: Patient Education: Go to Patient Education Activity  Goal: Patient/Family Education  Outcome: Progressing Towards Goal     Problem: Patient Education: Go to Patient Education Activity  Goal: Patient/Family Education  Outcome: Progressing Towards Goal     Problem: Hemorrhagic Stroke: Day 5 through Discharge  Goal: Activity/Safety  Outcome: Progressing Towards Goal  Goal: Consults, if ordered  Outcome: Progressing Towards Goal  Goal: Diagnostic Test/Procedures  Outcome: Progressing Towards Goal  Goal: Nutrition/Diet  Outcome: Progressing Towards Goal  Goal: Medications  Outcome: Progressing Towards Goal  Goal: Respiratory  Outcome: Progressing Towards Goal  Goal: Treatments/Interventions/Procedures  Outcome: Progressing Towards Goal  Goal: Psychosocial  Outcome: Progressing Towards Goal  Goal: *Hemodynamically stable  Outcome: Progressing Towards Goal  Goal: *Verbalizes anxiety and depression are reduced or absent  Outcome: Progressing Towards Goal  Goal: *Absence of aspiration  Outcome: Progressing Towards Goal  Goal: *Absence of signs and symptoms of DVT  Outcome: Progressing Towards Goal  Goal: *Optimal pain control at patient's stated goal  Outcome: Progressing Towards Goal  Goal: *Tolerating diet  Outcome: Progressing Towards Goal  Goal: *Progressive mobility and function  Outcome: Progressing Towards Goal  Goal: *Rehabilitation readiness  Outcome: Progressing Towards Goal     Problem: Hemorrhagic Stroke: Discharge Outcomes  Goal: *Verbalizes anxiety and depression are reduced or absent  Outcome: Progressing Towards Goal  Goal: *Verbalize understanding of risk factor modification(Stroke Metric)  Outcome: Progressing Towards Goal  Goal: *Optimal pain control at patient's stated goal  Outcome: Progressing Towards Goal  Goal: *Hemodynamically stable  Outcome: Progressing Towards Goal  Goal: *Absence of aspiration pneumonia  Outcome: Progressing Towards Goal  Goal: *Aware of needed dietary changes  Outcome: Progressing Towards Goal  Goal: *Verbalizes understanding and describes medication purposes and frequencies  Outcome: Progressing Towards Goal  Goal: *Tolerating diet  Outcome: Progressing Towards Goal  Goal: *Absence of signs and symptoms of DVT  Outcome: Progressing Towards Goal  Goal: *Absence of aspiration  Outcome: Progressing Towards Goal  Goal: *Progressive mobility and function  Outcome: Progressing Towards Goal  Goal: *Home safety concerns addressed  Outcome: Progressing Towards Goal

## 2019-10-24 NOTE — DISCHARGE SUMMARY
Discharge Summary       PATIENT ID: Myrtle Leone  MRN: [id number]   YOB: 1955    DATE OF ADMISSION: 10/18/2019  6:07 AM    DATE OF DISCHARGE: 10/24/2019   PRIMARY CARE PROVIDER: aDllas Lopez     ATTENDING PHYSICIAN: Hanna Hui MD   DISCHARGING PROVIDER: Diane Roblero MD    To contact this individual call [phone] and ask the  to page. If unavailable ask to be transferred the Adult Hospitalist Department. CONSULTATIONS: IP CONSULT TO HOSPITALIST    PROCEDURES/SURGERIES: * No surgery found *    ADMITTING [address] [place] [address] COURSE:     A 62 y. o. female with a history of cerebral aneurysm and shunt in 2007 as well as multiple infarcts in the past who presents with confusion and mental status changes.  CT scan done showed an acute right parietal intracranial hematoma as described with midline shift, probable small right subdural hematoma  Acute intracranial hemorrhage with midline shift  -repeat CT scan 10/20/2019 stable  -neurosurgery consulted, no surgical intervention  -continue on lisinopril, norvasc, metoprolol and prn hydralazine for BP control  -frequent neurochecks  -hold aspirin  -PT/OT on board  Hypertension  -keep SBP<140  -continue lisinopril to 40mg daily, norvasc to 10mg daily, metoprolol succinate 25mg daily, BP normal  -cont prn hydralazine  -cont to monitor   Type 2 diabetes  -A1C 9.1  -sliding scale insulin  -continue metformin 500 mg po bid  Hx of Cerebral aneurysm s/p  shunt in 2007  -has short memory problems otherwise, stable  -continue supportive care     Code status: Full Code  DVT prophylaxis: SNF      DISCHARGE DIAGNOSES / PLAN:      Acute intracranial hemorrhage with midline shift  -neurosurgery consulted, no surgical intervention  -continue on lisinopril, norvasc, metoprolol and prn hydralazine for BP control  -hold aspirin  -continue PT/OT  Hypertension  -keep SBP<140  -continue lisinopril to 40mg daily, norvasc to 10mg daily, metoprolol succinate 25mg daily, BP normal  -cont to monitor   Type 2 diabetes  -continue metformin 500 mg po bid, lispro insuline sliding scale  Hx of Cerebral aneurysm s/p  shunt in 2007  -has short memory problems otherwise, stable  -continue supportive care  -outpatient follow up      PENDING TEST RESULTS:   At the time of discharge the following test results are still pending: None    FOLLOW UP APPOINTMENTS:    Follow-up Information     Follow up With Specialties Details Why Contact Info   1. [address] [id number] [phone]   2. Lynnette Vanessa  In one week  [id number] [facility] [id number]       3. Cherie Hannah MD, Neurosurgeon in 2-3 weeks      DIET: Diabetic Diet    ACTIVITY: Activity as tolerated    WOUND CARE: None    EQUIPMENT needed: None      DISCHARGE MEDICATIONS:  Current Discharge Medication List      START taking these medications    Details   amLODIPine (NORVASC) 10 mg tablet Take 1 Tab by mouth daily. Qty: 30 Tab, Refills: 0      metoprolol succinate (TOPROL-XL) 50 mg XL tablet Take 1 Tab by mouth daily. Qty: 30 Tab, Refills: 0      insulin lispro (HUMALOG) 100 unit/mL injection Lispro per sliding scale ACqHs  Qty: 1 Vial, Refills: 0      metFORMIN (GLUCOPHAGE) 500 mg tablet Take 1 Tab by mouth two (2) times daily (with meals). Qty: 60 Tab, Refills: 0         CONTINUE these medications which have CHANGED    Details   lisinopril (PRINIVIL, ZESTRIL) 40 mg tablet Take 1 Tab by mouth daily. Qty: 30 Tab, Refills: 0         CONTINUE these medications which have NOT CHANGED    Details   ferrous sulfate 325 mg (65 mg iron) tablet Take 1 Tab by mouth Daily (before breakfast). Qty: 14 Tab, Refills: 0      ascorbic acid, vitamin C, (VITAMIN C) 1,000 mg tablet Take 1 Tab by mouth daily. Qty: 14 Tab, Refills: 0      folic acid (FOLVITE) 1 mg tablet Take 1 Tab by mouth daily.   Qty: 14 Tab, Refills: 0 STOP taking these medications       aspirin delayed-release 81 mg tablet Comments:   Reason for Stopping:                 NOTIFY YOUR PHYSICIAN FOR ANY OF THE FOLLOWING:   Fever over 101 degrees for 24 hours. Chest pain, shortness of breath, fever, chills, nausea, vomiting, diarrhea, change in mentation, falling, weakness, bleeding. Severe pain or pain not relieved by medications. Or, any other signs or symptoms that you may have questions about. DISPOSITION:    Home With:   OT  PT  HH  RN      x Long term SNF/Inpatient Rehab    Independent/assisted living    Hospice    Other:       PATIENT CONDITION AT DISCHARGE:     Functional status    Poor    x Deconditioned     Independent      Cognition     Lucid    x Forgetful     Dementia      Catheters/lines (plus indication)    Tristan     PICC     PEG    x None      Code status   x  Full code     DNR      PHYSICAL EXAMINATION AT DISCHARGE:  Patient Vitals for the past 24 hrs:   Temp Pulse Resp BP SpO2   10/24/19 1354 98.3 °F (36.8 °C) 93 18 106/65 98 %   10/24/19 0945 98.1 °F (36.7 °C) 90 14 128/85 97 %   10/24/19 0811  99  142/87    10/24/19 0552 97.6 °F (36.4 °C) 84 15 143/83 96 %   10/24/19 0234  93  148/82    10/24/19 0158 98.2 °F (36.8 °C) 85 18 148/87 99 %     General:          Alert, cooperative, no distress, appears stated age. HEENT:           Atraumatic, anicteric sclerae, pink conjunctivae                          No oral ulcers, mucosa moist, throat clear, dentition fair  Neck:               Supple, symmetrical  Lungs:             Clear to auscultation bilaterally. No Wheezing or Rhonchi. No rales. Chest wall:      No tenderness  No Accessory muscle use. Heart:              Regular  rhythm,  No  murmur   No edema  Abdomen:        Soft, non-tender. Not distended. Bowel sounds normal  Extremities:     No cyanosis. No clubbing,                            Skin turgor normal, Capillary refill normal  Skin:                Not pale.   Not Jaundiced  No rashes   Psych:             Not anxious or agitated.   Neurologic:      Alert, moves all extremities, answers questions appropriately and responds to commands       Recent Results (from the past 24 hour(s))   GLUCOSE, POC    Collection Time: 10/24/19  7:21 AM   Result Value Ref Range    Glucose (POC) 192 (H) 65 - 100 mg/dL    Performed by Kailash Villarreal, POC    Collection Time: 10/24/19 11:47 AM   Result Value Ref Range    Glucose (POC) 299 (H) 65 - 100 mg/dL    Performed by Wero Bermudez        CHRONIC MEDICAL DIAGNOSES:  Problem List as of 10/24/2019 Date Reviewed: 4/2/2017          Codes Class Noted - Resolved    ICH (intracerebral hemorrhage) ([facility] ICD-10-CM: I61.9  ICD-9-CM: 180  10/18/2019 - Present        Intracranial hemorrhage ([facility] ICD-10-CM: I62.9  ICD-9-CM: 432.9  10/18/2019 - Present        Acute lower GI bleeding ICD-10-CM: K92.2  ICD-9-CM: 578.9  4/2/2017 - Present        Type 2 diabetes mellitus ([facility] ICD-10-CM: E11.9  ICD-9-CM: 250.00  4/2/2017 - Present        Intellectual delay ICD-10-CM: F81.9  ICD-9-CM: 315.9  4/2/2017 - Present        H/O cerebral aneurysm repair ICD-10-CM: Z98.890, Z86.79  ICD-9-CM: V45.89  4/2/2017 - Present        Altered mental status ICD-10-CM: R41.82  ICD-9-CM: 780.97  11/24/2016 - Present        Acute encephalopathy ICD-10-CM: G93.40  ICD-9-CM: 348.30  11/22/2016 - Present        Hypertension (Chronic) ICD-10-CM: I10  ICD-9-CM: 401.9  11/22/2016 - Present         (ventriculoperitoneal) shunt status (Chronic) ICD-10-CM: Z98.2  ICD-9-CM: V45.2  11/22/2016 - Present        Diabetes ([facility] (Chronic) ICD-10-CM: E11.9  ICD-9-CM: 250.00  11/22/2016 - Present              Greater than 45 minutes were spent with the patient on counseling and coordination of care    Signed:   Dex Miller MD  10/24/2019  11:41 AM

## 2019-10-24 NOTE — PROGRESS NOTES
Problem: Self Care Deficits Care Plan (Adult)  Goal: *Acute Goals and Plan of Care (Insert Text)  Description    FUNCTIONAL STATUS PRIOR TO ADMISSION: Independent PTA     HOME SUPPORT: The patient lived with niece but did not require assist.    Occupational Therapy Goals  Initiated 10/20/2019  1. Patient will perform grooming with supervision/set-up within 7 day(s). 2.  Patient will perform bathing with supervision/set-up within 7 day(s). 3.  Patient will perform self-feeding with independence within 7 day(s). 4.  Patient will perform toilet transfers with modified independence within 7 day(s). 5.  Patient will perform all aspects of toileting with independence within 7 day(s). Outcome: Progressing Towards Goal    OCCUPATIONAL THERAPY TREATMENT  Patient: Myrtle Leone (89 y.o. female)  Date: 10/24/2019  Diagnosis: ICH (intracerebral hemorrhage) ([facility] [I61.9]  Intracranial hemorrhage ([facility] [I62.9] <principal problem not specified>       Precautions:  Fall  Chart, occupational therapy assessment, plan of care, and goals were reviewed. ASSESSMENT  Patient continues with skilled OT services and is progressing towards goals. Pt was able to progress again with a morning ADL routine and overall did good with all activities. She is progressing with performance of ADL activities. She was able to complete dressing from bathroom level with mod A overall. Pt continues to be at increased fall risk due to visual perceptual processing difficulties. She continues to require both verbal and tactile cues to scan her environment to locate any/all items. As noted before, visual tracking is intact, but functionally she is having great difficulty with all activities. She is unable to find the towels on the towel bar or toothbrush on the shelf. However, for the first time, she was able to read the clock on the wall with correct timing.   She also has deficits with Short term memory and needs constant cues to perform activities. Current Level of Function Impacting Discharge (ADLs): mod A for ADL activates     Other factors to consider for discharge: Cognitive status         PLAN :  Patient continues to benefit from skilled intervention to address the above impairments. Continue treatment per established plan of care. to address goals. Recommend with staff: OOB to chair, routine toileting every 2 hours during the day, no longer using a purewick    Recommend next OT session: ADL activities, dressing, visual scanning tasks    Recommendation for discharge: (in order for the patient to meet his/her long term goals)  Therapy up to 5 days/week in SNF setting or an intensive home health therapy program    This discharge recommendation:  Has been made in collaboration with the attending provider and/or case management    IF patient discharges home will need the following DME: none       SUBJECTIVE:   Patient stated I am feeling alright.     OBJECTIVE DATA SUMMARY:   Cognitive/Behavioral Status:  Neurologic State: Alert  Orientation Level: Oriented to person;Oriented to place; Disoriented to time;Disoriented to situation  Cognition: Follows commands  Perception: Cues to attend left visual field;Cues to attend right visual field  Perseveration: No perseveration noted  Safety/Judgement: Decreased awareness of environment;Decreased awareness of need for assistance;Decreased awareness of need for safety;Decreased insight into deficits    Functional Mobility and Transfers for ADLs:  Bed Mobility:  Supine to Sit: Supervision  Sit to Supine: (remained sitting in chair)    Transfers:  Sit to Stand: Minimum assistance  Functional Transfers  Bathroom Mobility: Minimum assistance(due to impaired visual )  Toilet Transfer : Minimum assistance  Bed to Chair: Minimum assistance    Balance:  Sitting: Intact  Sitting - Static: Good (unsupported)  Sitting - Dynamic: Good (unsupported)  Standing: Impaired; Without support  Standing - Static: Fair  Standing - Dynamic : Fair    ADL Intervention:                           Lower Body Dressing Assistance  Dressing Assistance: Moderate assistance  Protective Undergarmet: Maximum assistance  Socks: Contact guard assistance    Toileting  Toileting Assistance: Moderate assistance(continent but does have frequent accidents per family report)  Bladder Hygiene: Minimum assistance  Bowel Hygiene: Moderate assistance  Clothing Management: Minimum assistance  Cues: Verbal cues provided    Cognitive Retraining  Safety/Judgement: Decreased awareness of environment;Decreased awareness of need for assistance;Decreased awareness of need for safety;Decreased insight into deficits    Pain:  No pain reported    Activity Tolerance:   Good  Please refer to the flowsheet for vital signs taken during this treatment.     After treatment patient left in no apparent distress:   Sitting in chair, Bed / chair alarm activated and Caregiver / family present    COMMUNICATION/COLLABORATION:   The patients plan of care was discussed with: Physical Therapist and Registered Nurse    Evelyn Tinoco OT  Time Calculation: 25 mins

## 2019-10-24 NOTE — DISCHARGE INSTRUCTIONS
Discharge SNF/Rehab Instructions/LTAC       PATIENT ID: Renay Angeles  MRN: [id number]   YOB: 1955    DATE OF ADMISSION: 10/18/2019  6:07 AM    DATE OF DISCHARGE: 10/24/2019    PRIMARY CARE PROVIDER: Mariella Claude       ATTENDING PHYSICIAN: Rajendra Delarosa MD  DISCHARGING PROVIDER: Dex Miller MD     To contact this individual call [phone] and ask the  to page. If unavailable ask to be transferred the Adult Hospitalist Department. CONSULTATIONS: IP CONSULT TO HOSPITALIST    PROCEDURES/SURGERIES: * No surgery found *    ADMITTING [address] [place] [address] COURSE:     A 62 y. o. female with a history of cerebral aneurysm and shunt in 2007 as well as multiple infarcts in the past who presents with confusion and mental status changes.  CT scan done showed an acute right parietal intracranial hematoma as described with midline shift, probable small right subdural hematoma  Acute intracranial hemorrhage with midline shift  -repeat CT scan 10/20/2019 stable  -neurosurgery consulted, no surgical intervention  -continue on lisinopril, norvasc, metoprolol and prn hydralazine for BP control  -frequent neurochecks  -hold aspirin  -PT/OT on board  Hypertension  -keep SBP<140  -continue lisinopril to 40mg daily, norvasc to 10mg daily, metoprolol succinate 25mg daily, BP normal  -cont prn hydralazine  -cont to monitor   Type 2 diabetes  -A1C 9.1  -sliding scale insulin  -continue metformin 500 mg po bid  Hx of Cerebral aneurysm s/p  shunt in 2007  -has short memory problems otherwise, stable  -continue supportive care     Code status: Full Code  DVT prophylaxis: SNF      DISCHARGE DIAGNOSES / PLAN:      Acute intracranial hemorrhage with midline shift  -neurosurgery consulted, no surgical intervention  -continue on lisinopril, norvasc, metoprolol and prn hydralazine for BP control  -hold aspirin  -continue PT/OT  Hypertension  -keep SBP<140  -continue lisinopril to 40mg daily, norvasc to 10mg daily, metoprolol succinate 25mg daily, BP normal  -cont to monitor   Type 2 diabetes  -continue metformin 500 mg po bid, lispro insuline sliding scale  Hx of Cerebral aneurysm s/p  shunt in 2007  -has short memory problems otherwise, stable  -continue supportive care  -outpatient follow up      PENDING TEST RESULTS:   At the time of discharge the following test results are still pending: None    FOLLOW UP APPOINTMENTS:    Follow-up Information     Follow up With Specialties Details Why Contact Info   1. [address] [place] [address] [id number] [phone]   2. Titus Argueta  In one week  [address]  [address] [place] [address]       3. Ryder Earl MD, Neurosurgeon in 2-3 weeks    ADDITIONAL CARE RECOMMENDATIONS:     DIET: Diabetic Diet    TUBE FEEDING INSTRUCTIONS: None    OXYGEN / BiPAP SETTINGS: None    ACTIVITY: Activity as tolerated    WOUND CARE: None    EQUIPMENT needed: None      DISCHARGE MEDICATIONS:   See Medication Reconciliation Form      NOTIFY YOUR PHYSICIAN FOR ANY OF THE FOLLOWING:   Fever over 101 degrees for 24 hours. Chest pain, shortness of breath, fever, chills, nausea, vomiting, diarrhea, change in mentation, falling, weakness, bleeding. Severe pain or pain not relieved by medications. Or, any other signs or symptoms that you may have questions about.     DISPOSITION:    Home With:   OT  PT  HH  RN      x SNF/Inpatient Rehab/LTAC    Independent/assisted living    Hospice    Other:       PATIENT CONDITION AT DISCHARGE:     Functional status    Poor     Deconditioned    x Independent      Cognition   x  Lucid     Forgetful     Dementia      Catheters/lines (plus indication)    Tristan     PICC     PEG    x None      Code status   x  Full code     DNR      PHYSICAL EXAMINATION AT DISCHARGE:   Refer to Progress Note      CHRONIC MEDICAL DIAGNOSES:  Problem List as of 10/24/2019 Date Reviewed: 4/2/2017 Codes Class Noted - Resolved    ICH (intracerebral hemorrhage) ([facility] ICD-10-CM: I61.9  ICD-9-CM: 881  10/18/2019 - Present        Intracranial hemorrhage ([facility] ICD-10-CM: I62.9  ICD-9-CM: 432.9  10/18/2019 - Present        Acute lower GI bleeding ICD-10-CM: K92.2  ICD-9-CM: 578.9  4/2/2017 - Present        Type 2 diabetes mellitus ([facility] ICD-10-CM: E11.9  ICD-9-CM: 250.00  4/2/2017 - Present        Intellectual delay ICD-10-CM: F81.9  ICD-9-CM: 315.9  4/2/2017 - Present        H/O cerebral aneurysm repair ICD-10-CM: Z98.890, Z86.79  ICD-9-CM: V45.89  4/2/2017 - Present        Altered mental status ICD-10-CM: R41.82  ICD-9-CM: 780.97  11/24/2016 - Present        Acute encephalopathy ICD-10-CM: G93.40  ICD-9-CM: 348.30  11/22/2016 - Present        Hypertension (Chronic) ICD-10-CM: I10  ICD-9-CM: 401.9  11/22/2016 - Present         (ventriculoperitoneal) shunt status (Chronic) ICD-10-CM: Z98.2  ICD-9-CM: V45.2  11/22/2016 - Present        Diabetes ([facility] (Chronic) ICD-10-CM: E11.9  ICD-9-CM: 250.00  11/22/2016 - Present                CDMP Checked:   Yes x     PROBLEM LIST Updated:  Yes x         Signed:   Mir Momin MD  10/24/2019  9:43 PM

## 2019-10-24 NOTE — PROGRESS NOTES
TRANSFER - OUT REPORT:    Verbal report given to Percy Mccauley Nurse on Godwin Riggs  being transferred to [facility] for routine progression of care       Report consisted of patients Situation, Background, Assessment and   Recommendations(SBAR). Information from the following report(s) SBAR, Kardex, MAR, Recent Results and Cardiac Rhythm NSR was reviewed with the receiving nurse. Opportunity for questions and clarification was provided. Patient transported with:   EMS      Stroke Education documented in Patient Education: YES  Core Measures Documented in Connect Care:  Risk Factors: YES  Warning signs of stroke: YES  When to Activate 911: YES  Medication Education for Risk Factors: YES  Smoking cessation if applicable: YES  Written Education Given:  YES    Discharge NIH Completed: YES  Score: 0    BRAINS: NO    Follow Up Appointment Made: NO  Date/Time if applicable: Going to [employer] performed patient education and medication education. Discharge concerns initiated and discussed with patient, including clarification on \"who\" assists the patient at their home and instructions for when the home going patient should call their provider after discharge. Opportunity for questions and clarification was provided. Patient receptive to education: YES  Patient stated: \"Okay. \"  Barriers to Education: Cognitive/Dementia  Diagnosis Education given:  YES    Length of stay: 6  Expected Day of Discharge: 6  Ask if they have \"Help at Home\" & add to white board?   YES    Education Day #: 6    Medication Education Given:  YES  M in the box Medication name: Metoprolol    Pt aware of HCAHPS survey: YES

## 2019-10-24 NOTE — PROGRESS NOTES
Bedside and Verbal shift change report given to Mitzy eParson RN (oncoming nurse) by Annia Scott RN (offgoing nurse). Report included the following information SBAR, Kardex, Intake/Output, MAR, Accordion, Recent Results and Cardiac Rhythm NSR.

## 2019-10-24 NOTE — PROGRESS NOTES
TRANSITION OF CARE  Discharge to [facility]. CM follow up. CM spoke with Hanna Lerma, [facility], who confirmed that patient is accepted and that bed is available today . Nurse Report to be called to [phone]. CM sent referral via [employer] to  [address] [facility]) (Phone [phone]) for BLS transport, and referral was accepted  for a  3:30PM . CM completed PCS and placed it on chart. CM gave verbal update to staff nurse. Communication to Patient/Family:  Met with patient and family and they are agreeable to the transition plan. Patient's caregiver/decision maker is sonam Redd who declined [facility] stating post SNF disposition is for admission to [facility]. Transition:  Patient has been accepted to [facility] SNF/Rehab and meets criteria for admission. Patient will transported by [facility] and expected to leave at 3:30PM.    Communication to SNF/Rehab:  Discharge information has been updated on the AVS.     Nursing Please include all hard scripts for controlled substances, med rec and dc summary, and AVS in packet. Reviewed and confirmed with facility, Zakiya Alas Liaison, can manage the patient care needs for the following:     Dhara Chin with (X) only those applicable:  Medication:  [x]Medications are available at the facility  []IV Antibiotics    []Controlled Substance  hard copies available sent.   []Weekly Labs    Equipment:  []CPAP/BiPAP  []Wound Vacuum  []Tristan or Urinary Device  []PICC/Central Line  []Nebulizer  []Ventilator    Treatment:  []Isolation (for MRSA, VRE, etc.)  []Surgical Drain Management  []Tracheostomy Care  []Dressing Changes  []Dialysis with transportation  []PEG Care  []Oxygen  []Daily Weights for Heart Failure    Dietary:  []Any diet limitations  []Tube Feedings   []Total Parenteral Management (TPN)    Financial Resources:  []Medicaid Application Completed    []UAI Completed and copy given to pt/family  and copy given to pt/family  []A screening has previously been completed. []Level II Completed    [] Private pay individual who will not become   financially eligible for Medicaid within 6 months from admission to a [address] [place] [address] facility. [] Individual refused to have screening conducted. []Medicaid Application Completed    []The screening denied because it was determined individual did not need/did not qualify for nursing facility level of care. [] Out of state residents seeking direct admission to a [address] facility. [] Individuals who are inpatients of an out of state hospital, or in state or out of state veterans/ hospital and seek direct admission to a [address] facility  [] Individuals who are pateints or residents of a state owned/operated facility that is licensed by [employer]) and seek direct admission to [address] [place] [address]  [] A screening not required for enrollment in [facility] services as set out in [address] [place] [address] 12-  [] [facility]) staff shall perform screenings of the [facility] clients. Patient's family declined [facility]. Advanced Care Plan:  []Surrogate Decision Maker of Care  []POA  []Communicated Code Status and copy sent.     Other:

## 2019-10-24 NOTE — PROGRESS NOTES
Hospitalist Progress Note  Matthew Ferrell MD  Answering service: [phone] from in house phone        Date of Service:  10/24/2019  NAME:  Brigida Galindo  :  1955  MRN:  [id number]      Admission Summary:     A 62 y. o. female with a history of cerebral aneurysm and shunt in  as well as multiple infarcts in the past who presents with confusion and mental status changes. CT scan done showed an acute right parietal intracranial hematoma as described with midline shift, probable small right subdural hematoma    Interval history / Subjective:     She said she feels the same     Assessment & Plan:     Acute intracranial hemorrhage with midline shift  -repeat CT scan 10/20/2019 stable  -neurosurgery consulted, no surgical intervention  -on lisinopril, norvasc, prn hydralazine for BP control  -frequent neurochecks  -hold aspirin  -PT/OT on board     Hypertension  -keep SBP<140  -BP normal, continue lisinopril to 40mg daily, norvasc to 10mg daily, metoprolol succinate 25mg daily, monitor BP  -cont prn hydralazine  -cont to monitor     Type 2 diabetes  -A1C 9.1  -sliding scale insulin  -add home metformin      Hx of Cerebral aneurysm s/p  shunt in   -has short memory problems otherwise, stable  -continue supportive care    Code status: Full Code  DVT prophylaxis: SNF    Care Plan discussed with: Patient/Family and Nurse  Disposition: SNF Rehab, [facility] [address]   Eulogio Delgado Cadet, [phone] at bedside, questions answered      Hospital Problems  Date Reviewed: 2017          Codes Class Noted POA    ICH (intracerebral hemorrhage) ([facility] ICD-10-CM: I61.9  ICD-9-CM: 755  10/18/2019 Unknown        Intracranial hemorrhage ([facility] ICD-10-CM: I62.9  ICD-9-CM: 432.9  10/18/2019 Unknown              Vital Signs:    Last 24hrs VS reviewed since prior progress note.  Most recent are:  Visit Vitals  /85 (BP 1 Location: Left arm, BP Patient Position: At rest)   Pulse 90   Temp 98.1 °F (36.7 °C)   Resp 14   Ht 5' 8\" (1.727 m)   Wt 73.2 kg (161 lb 6.4 oz)   SpO2 97%   Breastfeeding? No   BMI 24.54 kg/m²         Intake/Output Summary (Last 24 hours) at 10/24/2019 1123  Last data filed at 10/24/2019 0553  Gross per 24 hour   Intake    Output 850 ml   Net -850 ml        Physical Examination:             Constitutional:  No acute distress, cooperative, pleasant    ENT:  Oral mucous moist, oropharynx benign. Resp:  CTA bilaterally. No wheezing/rhonchi/rales. No accessory muscle use   CV:  Regular rhythm, normal rate, no murmurs, gallops, rubs    GI:  Soft, non distended, non tender. normoactive bowel sounds, no hepatosplenomegaly     Musculoskeletal:  No edema    Neurologic:  Conscious and alert, oriented to place and person, moves all extremities, CN II-XII reviewed     Skin:  Good turgor, no rashes or ulcers       Data Review:    Review and/or order of clinical lab test  Review and/or order of tests in the radiology section of CPT  Review and/or order of tests in the medicine section of CPT      Labs:     Recent Labs     10/23/19  0244 10/22/19  0139   WBC 8.7 8.4   HGB 12.9 12.5   HCT 41.3 40.3    181     Recent Labs     10/23/19  0244 10/22/19  0139    135*   K 3.5 3.5    106   CO2 26 25   BUN 10 11   CREA 0.74 0.76   * 200*   CA 9.3 9.0     No results for input(s): SGOT, GPT, ALT, AP, TBIL, TBILI, TP, ALB, GLOB, GGT, AML, LPSE in the last 72 hours. No lab exists for component: AMYP, HLPSE  No results for input(s): INR, PTP, APTT, INREXT, INREXT in the last 72 hours. No results for input(s): FE, TIBC, PSAT, FERR in the last 72 hours. No results found for: FOL, RBCF   No results for input(s): PH, PCO2, PO2 in the last 72 hours. No results for input(s): CPK, CKNDX, TROIQ in the last 72 hours.     No lab exists for component: CPKMB  No results found for: CHOL, CHOLX, CHLST, CHOLV, HDL, HDLP, LDL, LDLC, DLDLP, Linda Torres, [facility], [facility]  Lab Results   Component Value Date/Time    Glucose (POC) 192 (H) 10/24/2019 07:21 AM    Glucose (POC) 193 (H) 10/23/2019 09:58 PM    Glucose (POC) 281 (H) 10/23/2019 05:15 PM    Glucose (POC) 213 (H) 10/23/2019 11:38 AM    Glucose (POC) 230 (H) 10/23/2019 07:49 AM     Lab Results   Component Value Date/Time    Color YELLOW/STRAW 10/18/2019 05:16 AM    Appearance CLEAR 10/18/2019 05:16 AM    Specific gravity 1.028 10/18/2019 05:16 AM    Specific gravity 1.020 12/22/2017 01:12 PM    pH (UA) 6.0 10/18/2019 05:16 AM    Protein 30 (A) 10/18/2019 05:16 AM    Glucose >1,000 (A) 10/18/2019 05:16 AM    Ketone TRACE (A) 10/18/2019 05:16 AM    Bilirubin NEGATIVE  10/18/2019 05:16 AM    Urobilinogen 1.0 10/18/2019 05:16 AM    Nitrites NEGATIVE  10/18/2019 05:16 AM    Leukocyte Esterase NEGATIVE  10/18/2019 05:16 AM    Epithelial cells FEW 10/18/2019 05:16 AM    Bacteria NEGATIVE  10/18/2019 05:16 AM    WBC 0-4 10/18/2019 05:16 AM    RBC 0-5 10/18/2019 05:16 AM         Medications Reviewed:     Current Facility-Administered Medications   Medication Dose Route Frequency    hydrALAZINE (APRESOLINE) 20 mg/mL injection 10 mg  10 mg IntraVENous Q4H PRN    metoprolol succinate (TOPROL-XL) XL tablet 50 mg  50 mg Oral DAILY    amLODIPine (NORVASC) tablet 10 mg  10 mg Oral DAILY    lisinopril (PRINIVIL, ZESTRIL) tablet 40 mg  40 mg Oral DAILY    calcium carbonate (TUMS) chewable tablet 200 mg [elemental]  200 mg Oral TID PRN    influenza vaccine 2019-20 (6 mos+)(PF) (FLUARIX/FLULAVAL/FLUZONE QUAD) injection 0.5 mL  0.5 mL IntraMUSCular PRIOR TO DISCHARGE    0.9% sodium chloride infusion  100 mL/hr IntraVENous CONTINUOUS    sodium chloride (NS) flush 5-40 mL  5-40 mL IntraVENous Q8H    sodium chloride (NS) flush 5-40 mL  5-40 mL IntraVENous PRN    glucose chewable tablet 16 g  4 Tab Oral PRN    glucagon (GLUCAGEN) injection 1 mg  1 mg IntraMUSCular PRN    dextrose 10% infusion 0-250 mL  0-250 mL IntraVENous PRN    insulin lispro (HUMALOG) injection   SubCUTAneous AC&HS     ______________________________________________________________________  EXPECTED LENGTH OF STAY: 4d 9h  ACTUAL LENGTH OF STAY:          6                 Darryl Dobbins MD

## 2019-10-24 NOTE — PROGRESS NOTES
Problem: Patient Education: Go to Patient Education Activity  Goal: Patient/Family Education  Outcome: Progressing Towards Goal     Problem: Diabetes Self-Management  Goal: *Disease process and treatment process  Description  Define diabetes and identify own type of diabetes; list 3 options for treating diabetes. Outcome: Progressing Towards Goal  Goal: *Incorporating nutritional management into lifestyle  Description  Describe effect of type, amount and timing of food on blood glucose; list 3 methods for planning meals. Outcome: Progressing Towards Goal  Goal: *Incorporating physical activity into lifestyle  Description  State effect of exercise on blood glucose levels. Outcome: Progressing Towards Goal  Goal: *Developing strategies to promote health/change behavior  Description  Define the ABC's of diabetes; identify appropriate screenings, schedule and personal plan for screenings. Outcome: Progressing Towards Goal  Goal: *Using medications safely  Description  State effect of diabetes medications on diabetes; name diabetes medication taking, action and side effects. Outcome: Progressing Towards Goal  Goal: *Monitoring blood glucose, interpreting and using results  Description  Identify recommended blood glucose targets  and personal targets. Outcome: Progressing Towards Goal  Goal: *Prevention, detection, treatment of acute complications  Description  List symptoms of hyper- and hypoglycemia; describe how to treat low blood sugar and actions for lowering  high blood glucose level. Outcome: Progressing Towards Goal  Goal: *Prevention, detection and treatment of chronic complications  Description  Define the natural course of diabetes and describe the relationship of blood glucose levels to long term complications of diabetes.   Outcome: Progressing Towards Goal  Goal: *Developing strategies to address psychosocial issues  Description  Describe feelings about living with diabetes; identify support needed and support network  Outcome: Progressing Towards Goal  Goal: *Insulin pump training  Outcome: Progressing Towards Goal  Goal: *Sick day guidelines  Outcome: Progressing Towards Goal  Goal: *Patient Specific Goal (EDIT GOAL, INSERT TEXT)  Outcome: Progressing Towards Goal     Problem: Patient Education: Go to Patient Education Activity  Goal: Patient/Family Education  Outcome: Progressing Towards Goal     Problem: Falls - Risk of  Goal: *Absence of Falls  Description  Document Bernard Schroeder Fall Risk and appropriate interventions in the flowsheet. Outcome: Progressing Towards Goal  Note:   Fall Risk Interventions:  Mobility Interventions: Bed/chair exit alarm, Communicate number of staff needed for ambulation/transfer, OT consult for ADLs, Patient to call before getting OOB, PT Consult for mobility concerns, PT Consult for assist device competence, Strengthening exercises (ROM-active/passive)    Mentation Interventions: Adequate sleep, hydration, pain control, Bed/chair exit alarm, Toileting rounds, Door open when patient unattended, Evaluate medications/consider consulting pharmacy, Increase mobility, More frequent rounding, Update white board    Medication Interventions: Assess postural VS orthostatic hypotension, Bed/chair exit alarm, Evaluate medications/consider consulting pharmacy, Patient to call before getting OOB, Teach patient to arise slowly    Elimination Interventions: Call light in reach, Bed/chair exit alarm, Patient to call for help with toileting needs, Stay With Me (per policy), Toileting schedule/hourly rounds              Problem: Patient Education: Go to Patient Education Activity  Goal: Patient/Family Education  Outcome: Progressing Towards Goal     Problem: Pressure Injury - Risk of  Goal: *Prevention of pressure injury  Description  Document Nate Scale and appropriate interventions in the flowsheet.   Outcome: Progressing Towards Goal  Note:   Pressure Injury Interventions:  Sensory Interventions: Assess changes in LOC    Moisture Interventions: Absorbent underpads, Check for incontinence Q2 hours and as needed, Internal/External urinary devices, Maintain skin hydration (lotion/cream)    Activity Interventions: Pressure redistribution bed/mattress(bed type), PT/OT evaluation    Mobility Interventions: HOB 30 degrees or less, Pressure redistribution bed/mattress (bed type), PT/OT evaluation    Nutrition Interventions: Document food/fluid/supplement intake    Friction and Shear Interventions: Lift sheet, Minimize layers                Problem: Patient Education: Go to Patient Education Activity  Goal: Patient/Family Education  Outcome: Progressing Towards Goal     Problem: Patient Education: Go to Patient Education Activity  Goal: Patient/Family Education  Outcome: Progressing Towards Goal     Problem: Hemorrhagic Stroke: Day 5 through Discharge  Goal: Activity/Safety  Outcome: Progressing Towards Goal  Goal: Consults, if ordered  Outcome: Progressing Towards Goal  Goal: Diagnostic Test/Procedures  Outcome: Progressing Towards Goal  Goal: Nutrition/Diet  Outcome: Progressing Towards Goal  Goal: Medications  Outcome: Progressing Towards Goal  Goal: Respiratory  Outcome: Progressing Towards Goal  Goal: Treatments/Interventions/Procedures  Outcome: Progressing Towards Goal  Goal: Psychosocial  Outcome: Progressing Towards Goal  Goal: *Hemodynamically stable  Outcome: Progressing Towards Goal  Goal: *Verbalizes anxiety and depression are reduced or absent  Outcome: Progressing Towards Goal  Goal: *Absence of aspiration  Outcome: Progressing Towards Goal  Goal: *Absence of signs and symptoms of DVT  Outcome: Progressing Towards Goal  Goal: *Optimal pain control at patient's stated goal  Outcome: Progressing Towards Goal  Goal: *Tolerating diet  Outcome: Progressing Towards Goal  Goal: *Progressive mobility and function  Outcome: Progressing Towards Goal  Goal: *Rehabilitation readiness  Outcome: Progressing Towards Goal     Problem: Hemorrhagic Stroke: Discharge Outcomes  Goal: *Verbalizes anxiety and depression are reduced or absent  Outcome: Progressing Towards Goal  Goal: *Verbalize understanding of risk factor modification(Stroke Metric)  Outcome: Progressing Towards Goal  Goal: *Optimal pain control at patient's stated goal  Outcome: Progressing Towards Goal  Goal: *Hemodynamically stable  Outcome: Progressing Towards Goal  Goal: *Absence of aspiration pneumonia  Outcome: Progressing Towards Goal  Goal: *Aware of needed dietary changes  Outcome: Progressing Towards Goal  Goal: *Verbalizes understanding and describes medication purposes and frequencies  Outcome: Progressing Towards Goal  Goal: *Tolerating diet  Outcome: Progressing Towards Goal  Goal: *Absence of signs and symptoms of DVT  Outcome: Progressing Towards Goal  Goal: *Absence of aspiration  Outcome: Progressing Towards Goal  Goal: *Progressive mobility and function  Outcome: Progressing Towards Goal  Goal: *Home safety concerns addressed  Outcome: Progressing Towards Goal     Problem: Patient Education: Go to Patient Education Activity  Goal: Patient/Family Education  Outcome: Progressing Towards Goal

## 2020-01-02 ENCOUNTER — HOSPITAL ENCOUNTER (INPATIENT)
Age: 65
LOS: 4 days | Discharge: NURSING, REHAB OR LONG-TERM CARE | DRG: 872 | End: 2020-01-06
Attending: STUDENT IN AN ORGANIZED HEALTH CARE EDUCATION/TRAINING PROGRAM | Admitting: INTERNAL MEDICINE
Payer: MEDICARE

## 2020-01-02 ENCOUNTER — APPOINTMENT (OUTPATIENT)
Dept: GENERAL RADIOLOGY | Age: 65
DRG: 872 | End: 2020-01-02
Attending: STUDENT IN AN ORGANIZED HEALTH CARE EDUCATION/TRAINING PROGRAM
Payer: MEDICARE

## 2020-01-02 ENCOUNTER — APPOINTMENT (OUTPATIENT)
Dept: CT IMAGING | Age: 65
DRG: 872 | End: 2020-01-02
Attending: STUDENT IN AN ORGANIZED HEALTH CARE EDUCATION/TRAINING PROGRAM
Payer: MEDICARE

## 2020-01-02 DIAGNOSIS — A41.9 SEPSIS, DUE TO UNSPECIFIED ORGANISM, UNSPECIFIED WHETHER ACUTE ORGAN DYSFUNCTION PRESENT (HCC): Primary | ICD-10-CM

## 2020-01-02 DIAGNOSIS — E87.20 LACTIC ACIDOSIS: ICD-10-CM

## 2020-01-02 LAB
ALBUMIN SERPL-MCNC: 4 G/DL (ref 3.5–5)
ALBUMIN/GLOB SERPL: 0.8 {RATIO} (ref 1.1–2.2)
ALP SERPL-CCNC: 83 U/L (ref 45–117)
ALT SERPL-CCNC: 50 U/L (ref 12–78)
ANION GAP SERPL CALC-SCNC: 8 MMOL/L (ref 5–15)
APPEARANCE UR: CLEAR
AST SERPL-CCNC: 47 U/L (ref 15–37)
BACTERIA URNS QL MICRO: NEGATIVE /HPF
BASOPHILS # BLD: 0 K/UL (ref 0–0.1)
BASOPHILS NFR BLD: 1 % (ref 0–1)
BILIRUB SERPL-MCNC: 0.5 MG/DL (ref 0.2–1)
BILIRUB UR QL: NEGATIVE
BUN SERPL-MCNC: 12 MG/DL (ref 6–20)
BUN/CREAT SERPL: 11 (ref 12–20)
CALCIUM SERPL-MCNC: 9.8 MG/DL (ref 8.5–10.1)
CHLORIDE SERPL-SCNC: 108 MMOL/L (ref 97–108)
CO2 SERPL-SCNC: 24 MMOL/L (ref 21–32)
COLOR UR: ABNORMAL
COMMENT, HOLDF: NORMAL
CREAT SERPL-MCNC: 1.08 MG/DL (ref 0.55–1.02)
DIFFERENTIAL METHOD BLD: ABNORMAL
EOSINOPHIL # BLD: 0 K/UL (ref 0–0.4)
EOSINOPHIL NFR BLD: 0 % (ref 0–7)
EPITH CASTS URNS QL MICRO: ABNORMAL /LPF
ERYTHROCYTE [DISTWIDTH] IN BLOOD BY AUTOMATED COUNT: 16.6 % (ref 11.5–14.5)
FLUAV AG NPH QL IA: NEGATIVE
FLUBV AG NOSE QL IA: NEGATIVE
GLOBULIN SER CALC-MCNC: 4.9 G/DL (ref 2–4)
GLUCOSE SERPL-MCNC: 151 MG/DL (ref 65–100)
GLUCOSE UR STRIP.AUTO-MCNC: NEGATIVE MG/DL
GRAN CASTS URNS QL MICRO: ABNORMAL /LPF
HCT VFR BLD AUTO: 46.5 % (ref 35–47)
HGB BLD-MCNC: 13.9 G/DL (ref 11.5–16)
HGB UR QL STRIP: ABNORMAL
HYALINE CASTS URNS QL MICRO: ABNORMAL /LPF (ref 0–5)
IMM GRANULOCYTES # BLD AUTO: 0 K/UL (ref 0–0.04)
IMM GRANULOCYTES NFR BLD AUTO: 0 % (ref 0–0.5)
KETONES UR QL STRIP.AUTO: 15 MG/DL
LACTATE BLD-SCNC: 2.04 MMOL/L (ref 0.4–2)
LACTATE BLD-SCNC: 2.49 MMOL/L (ref 0.4–2)
LEUKOCYTE ESTERASE UR QL STRIP.AUTO: NEGATIVE
LYMPHOCYTES # BLD: 1.5 K/UL (ref 0.8–3.5)
LYMPHOCYTES NFR BLD: 26 % (ref 12–49)
MCH RBC QN AUTO: 26 PG (ref 26–34)
MCHC RBC AUTO-ENTMCNC: 29.9 G/DL (ref 30–36.5)
MCV RBC AUTO: 87.1 FL (ref 80–99)
MONOCYTES # BLD: 0.3 K/UL (ref 0–1)
MONOCYTES NFR BLD: 5 % (ref 5–13)
NEUTS SEG # BLD: 3.7 K/UL (ref 1.8–8)
NEUTS SEG NFR BLD: 68 % (ref 32–75)
NITRITE UR QL STRIP.AUTO: NEGATIVE
NRBC # BLD: 0 K/UL (ref 0–0.01)
NRBC BLD-RTO: 0 PER 100 WBC
PH UR STRIP: 5.5 [PH] (ref 5–8)
PLATELET # BLD AUTO: 197 K/UL (ref 150–400)
PMV BLD AUTO: 10.3 FL (ref 8.9–12.9)
POTASSIUM SERPL-SCNC: 3.7 MMOL/L (ref 3.5–5.1)
PROT SERPL-MCNC: 8.9 G/DL (ref 6.4–8.2)
PROT UR STRIP-MCNC: 100 MG/DL
RBC # BLD AUTO: 5.34 M/UL (ref 3.8–5.2)
RBC #/AREA URNS HPF: ABNORMAL /HPF (ref 0–5)
SAMPLES BEING HELD,HOLD: NORMAL
SODIUM SERPL-SCNC: 140 MMOL/L (ref 136–145)
SP GR UR REFRACTOMETRY: 1.03 (ref 1–1.03)
TROPONIN I SERPL-MCNC: <0.05 NG/ML
UR CULT HOLD, URHOLD: NORMAL
UROBILINOGEN UR QL STRIP.AUTO: 1 EU/DL (ref 0.2–1)
WBC # BLD AUTO: 5.5 K/UL (ref 3.6–11)
WBC URNS QL MICRO: ABNORMAL /HPF (ref 0–4)

## 2020-01-02 PROCEDURE — 85025 COMPLETE CBC W/AUTO DIFF WBC: CPT

## 2020-01-02 PROCEDURE — 83605 ASSAY OF LACTIC ACID: CPT

## 2020-01-02 PROCEDURE — 77030019905 HC CATH URETH INTMIT MDII -A

## 2020-01-02 PROCEDURE — 70250 X-RAY EXAM OF SKULL: CPT

## 2020-01-02 PROCEDURE — 81001 URINALYSIS AUTO W/SCOPE: CPT

## 2020-01-02 PROCEDURE — 74011250637 HC RX REV CODE- 250/637: Performed by: STUDENT IN AN ORGANIZED HEALTH CARE EDUCATION/TRAINING PROGRAM

## 2020-01-02 PROCEDURE — 51701 INSERT BLADDER CATHETER: CPT

## 2020-01-02 PROCEDURE — 87040 BLOOD CULTURE FOR BACTERIA: CPT

## 2020-01-02 PROCEDURE — 36415 COLL VENOUS BLD VENIPUNCTURE: CPT

## 2020-01-02 PROCEDURE — 70450 CT HEAD/BRAIN W/O DYE: CPT

## 2020-01-02 PROCEDURE — 74011250636 HC RX REV CODE- 250/636: Performed by: STUDENT IN AN ORGANIZED HEALTH CARE EDUCATION/TRAINING PROGRAM

## 2020-01-02 PROCEDURE — 84484 ASSAY OF TROPONIN QUANT: CPT

## 2020-01-02 PROCEDURE — 96366 THER/PROPH/DIAG IV INF ADDON: CPT

## 2020-01-02 PROCEDURE — 80053 COMPREHEN METABOLIC PANEL: CPT

## 2020-01-02 PROCEDURE — 71045 X-RAY EXAM CHEST 1 VIEW: CPT

## 2020-01-02 PROCEDURE — 65660000000 HC RM CCU STEPDOWN

## 2020-01-02 PROCEDURE — 93005 ELECTROCARDIOGRAM TRACING: CPT

## 2020-01-02 PROCEDURE — 99285 EMERGENCY DEPT VISIT HI MDM: CPT

## 2020-01-02 PROCEDURE — 96365 THER/PROPH/DIAG IV INF INIT: CPT

## 2020-01-02 PROCEDURE — P9612 CATHETERIZE FOR URINE SPEC: HCPCS

## 2020-01-02 PROCEDURE — 74011000258 HC RX REV CODE- 258: Performed by: STUDENT IN AN ORGANIZED HEALTH CARE EDUCATION/TRAINING PROGRAM

## 2020-01-02 PROCEDURE — 87804 INFLUENZA ASSAY W/OPTIC: CPT

## 2020-01-02 RX ORDER — SODIUM CHLORIDE 9 MG/ML
1000 INJECTION, SOLUTION INTRAVENOUS ONCE
Status: COMPLETED | OUTPATIENT
Start: 2020-01-02 | End: 2020-01-03

## 2020-01-02 RX ORDER — ACETAMINOPHEN 500 MG
1000 TABLET ORAL
Status: COMPLETED | OUTPATIENT
Start: 2020-01-02 | End: 2020-01-02

## 2020-01-02 RX ORDER — SODIUM CHLORIDE 0.9 % (FLUSH) 0.9 %
5-10 SYRINGE (ML) INJECTION AS NEEDED
Status: DISCONTINUED | OUTPATIENT
Start: 2020-01-02 | End: 2020-01-06 | Stop reason: HOSPADM

## 2020-01-02 RX ORDER — SODIUM CHLORIDE, SODIUM LACTATE, POTASSIUM CHLORIDE, CALCIUM CHLORIDE 600; 310; 30; 20 MG/100ML; MG/100ML; MG/100ML; MG/100ML
100 INJECTION, SOLUTION INTRAVENOUS CONTINUOUS
Status: DISCONTINUED | OUTPATIENT
Start: 2020-01-02 | End: 2020-01-04

## 2020-01-02 RX ORDER — ACETAMINOPHEN 325 MG/1
650 TABLET ORAL
Status: DISCONTINUED | OUTPATIENT
Start: 2020-01-02 | End: 2020-01-06 | Stop reason: HOSPADM

## 2020-01-02 RX ADMIN — ACETAMINOPHEN 1000 MG: 500 TABLET ORAL at 21:27

## 2020-01-02 RX ADMIN — SODIUM CHLORIDE 1000 ML: 900 INJECTION, SOLUTION INTRAVENOUS at 21:28

## 2020-01-02 RX ADMIN — CEFEPIME HYDROCHLORIDE 1 G: 1 INJECTION, POWDER, FOR SOLUTION INTRAMUSCULAR; INTRAVENOUS at 21:28

## 2020-01-03 ENCOUNTER — APPOINTMENT (OUTPATIENT)
Dept: CT IMAGING | Age: 65
DRG: 872 | End: 2020-01-03
Attending: INTERNAL MEDICINE
Payer: MEDICARE

## 2020-01-03 LAB
ALBUMIN SERPL-MCNC: 3 G/DL (ref 3.5–5)
ALBUMIN/GLOB SERPL: 0.9 {RATIO} (ref 1.1–2.2)
ALP SERPL-CCNC: 60 U/L (ref 45–117)
ALT SERPL-CCNC: 35 U/L (ref 12–78)
ANION GAP SERPL CALC-SCNC: 6 MMOL/L (ref 5–15)
AST SERPL-CCNC: 28 U/L (ref 15–37)
ATRIAL RATE: 128 BPM
BASOPHILS # BLD: 0 K/UL (ref 0–0.1)
BASOPHILS NFR BLD: 0 % (ref 0–1)
BILIRUB SERPL-MCNC: 0.4 MG/DL (ref 0.2–1)
BUN SERPL-MCNC: 9 MG/DL (ref 6–20)
BUN/CREAT SERPL: 14 (ref 12–20)
CALCIUM SERPL-MCNC: 8.6 MG/DL (ref 8.5–10.1)
CALCULATED P AXIS, ECG09: 55 DEGREES
CALCULATED R AXIS, ECG10: -27 DEGREES
CALCULATED T AXIS, ECG11: 41 DEGREES
CHLORIDE SERPL-SCNC: 111 MMOL/L (ref 97–108)
CHOLEST SERPL-MCNC: 170 MG/DL
CO2 SERPL-SCNC: 25 MMOL/L (ref 21–32)
CREAT SERPL-MCNC: 0.63 MG/DL (ref 0.55–1.02)
DIAGNOSIS, 93000: NORMAL
DIFFERENTIAL METHOD BLD: ABNORMAL
EOSINOPHIL # BLD: 0 K/UL (ref 0–0.4)
EOSINOPHIL NFR BLD: 0 % (ref 0–7)
ERYTHROCYTE [DISTWIDTH] IN BLOOD BY AUTOMATED COUNT: 16.1 % (ref 11.5–14.5)
GLOBULIN SER CALC-MCNC: 3.5 G/DL (ref 2–4)
GLUCOSE BLD STRIP.AUTO-MCNC: 108 MG/DL (ref 65–100)
GLUCOSE BLD STRIP.AUTO-MCNC: 136 MG/DL (ref 65–100)
GLUCOSE BLD STRIP.AUTO-MCNC: 144 MG/DL (ref 65–100)
GLUCOSE SERPL-MCNC: 119 MG/DL (ref 65–100)
HCT VFR BLD AUTO: 36.4 % (ref 35–47)
HDLC SERPL-MCNC: 29 MG/DL
HDLC SERPL: 5.9 {RATIO} (ref 0–5)
HGB BLD-MCNC: 11.2 G/DL (ref 11.5–16)
IMM GRANULOCYTES # BLD AUTO: 0 K/UL (ref 0–0.04)
IMM GRANULOCYTES NFR BLD AUTO: 0 % (ref 0–0.5)
LACTATE SERPL-SCNC: 1.4 MMOL/L (ref 0.4–2)
LDLC SERPL CALC-MCNC: 117.8 MG/DL (ref 0–100)
LIPASE SERPL-CCNC: 75 U/L (ref 73–393)
LIPID PROFILE,FLP: ABNORMAL
LYMPHOCYTES # BLD: 1.6 K/UL (ref 0.8–3.5)
LYMPHOCYTES NFR BLD: 33 % (ref 12–49)
MAGNESIUM SERPL-MCNC: 1.5 MG/DL (ref 1.6–2.4)
MCH RBC QN AUTO: 26.6 PG (ref 26–34)
MCHC RBC AUTO-ENTMCNC: 30.8 G/DL (ref 30–36.5)
MCV RBC AUTO: 86.5 FL (ref 80–99)
MONOCYTES # BLD: 0.3 K/UL (ref 0–1)
MONOCYTES NFR BLD: 7 % (ref 5–13)
NEUTS SEG # BLD: 2.8 K/UL (ref 1.8–8)
NEUTS SEG NFR BLD: 60 % (ref 32–75)
NRBC # BLD: 0 K/UL (ref 0–0.01)
NRBC BLD-RTO: 0 PER 100 WBC
P-R INTERVAL, ECG05: 138 MS
PHOSPHATE SERPL-MCNC: 2.5 MG/DL (ref 2.6–4.7)
PLATELET # BLD AUTO: 159 K/UL (ref 150–400)
PMV BLD AUTO: 10.6 FL (ref 8.9–12.9)
POTASSIUM SERPL-SCNC: 3.3 MMOL/L (ref 3.5–5.1)
PROT SERPL-MCNC: 6.5 G/DL (ref 6.4–8.2)
Q-T INTERVAL, ECG07: 304 MS
QRS DURATION, ECG06: 82 MS
QTC CALCULATION (BEZET), ECG08: 443 MS
RBC # BLD AUTO: 4.21 M/UL (ref 3.8–5.2)
RBC MORPH BLD: ABNORMAL
SERVICE CMNT-IMP: ABNORMAL
SODIUM SERPL-SCNC: 142 MMOL/L (ref 136–145)
TRIGL SERPL-MCNC: 116 MG/DL (ref ?–150)
TROPONIN I SERPL-MCNC: <0.05 NG/ML
TSH SERPL DL<=0.05 MIU/L-ACNC: 0.82 UIU/ML (ref 0.36–3.74)
VENTRICULAR RATE, ECG03: 128 BPM
VLDLC SERPL CALC-MCNC: 23.2 MG/DL
WBC # BLD AUTO: 4.7 K/UL (ref 3.6–11)

## 2020-01-03 PROCEDURE — 74011636637 HC RX REV CODE- 636/637: Performed by: INTERNAL MEDICINE

## 2020-01-03 PROCEDURE — 84100 ASSAY OF PHOSPHORUS: CPT

## 2020-01-03 PROCEDURE — 84484 ASSAY OF TROPONIN QUANT: CPT

## 2020-01-03 PROCEDURE — 74011000258 HC RX REV CODE- 258: Performed by: RADIOLOGY

## 2020-01-03 PROCEDURE — 84443 ASSAY THYROID STIM HORMONE: CPT

## 2020-01-03 PROCEDURE — 36415 COLL VENOUS BLD VENIPUNCTURE: CPT

## 2020-01-03 PROCEDURE — 83735 ASSAY OF MAGNESIUM: CPT

## 2020-01-03 PROCEDURE — 83690 ASSAY OF LIPASE: CPT

## 2020-01-03 PROCEDURE — 74177 CT ABD & PELVIS W/CONTRAST: CPT

## 2020-01-03 PROCEDURE — 80061 LIPID PANEL: CPT

## 2020-01-03 PROCEDURE — 74011000258 HC RX REV CODE- 258: Performed by: INTERNAL MEDICINE

## 2020-01-03 PROCEDURE — 74011250637 HC RX REV CODE- 250/637: Performed by: INTERNAL MEDICINE

## 2020-01-03 PROCEDURE — 74011250636 HC RX REV CODE- 250/636: Performed by: INTERNAL MEDICINE

## 2020-01-03 PROCEDURE — 80053 COMPREHEN METABOLIC PANEL: CPT

## 2020-01-03 PROCEDURE — 83605 ASSAY OF LACTIC ACID: CPT

## 2020-01-03 PROCEDURE — 71275 CT ANGIOGRAPHY CHEST: CPT

## 2020-01-03 PROCEDURE — 74011636320 HC RX REV CODE- 636/320: Performed by: RADIOLOGY

## 2020-01-03 PROCEDURE — 65660000000 HC RM CCU STEPDOWN

## 2020-01-03 PROCEDURE — 85025 COMPLETE CBC W/AUTO DIFF WBC: CPT

## 2020-01-03 PROCEDURE — 82962 GLUCOSE BLOOD TEST: CPT

## 2020-01-03 RX ORDER — ONDANSETRON 2 MG/ML
4 INJECTION INTRAMUSCULAR; INTRAVENOUS
Status: DISCONTINUED | OUTPATIENT
Start: 2020-01-03 | End: 2020-01-06 | Stop reason: HOSPADM

## 2020-01-03 RX ORDER — LISINOPRIL 20 MG/1
40 TABLET ORAL DAILY
Status: DISCONTINUED | OUTPATIENT
Start: 2020-01-03 | End: 2020-01-04

## 2020-01-03 RX ORDER — AMLODIPINE BESYLATE 5 MG/1
10 TABLET ORAL DAILY
Status: DISCONTINUED | OUTPATIENT
Start: 2020-01-03 | End: 2020-01-04

## 2020-01-03 RX ORDER — METOPROLOL SUCCINATE 50 MG/1
50 TABLET, EXTENDED RELEASE ORAL DAILY
Status: DISCONTINUED | OUTPATIENT
Start: 2020-01-03 | End: 2020-01-06 | Stop reason: HOSPADM

## 2020-01-03 RX ORDER — LANOLIN ALCOHOL/MO/W.PET/CERES
325 CREAM (GRAM) TOPICAL
Status: DISCONTINUED | OUTPATIENT
Start: 2020-01-03 | End: 2020-01-06 | Stop reason: HOSPADM

## 2020-01-03 RX ORDER — HEPARIN SODIUM 5000 [USP'U]/ML
5000 INJECTION, SOLUTION INTRAVENOUS; SUBCUTANEOUS EVERY 8 HOURS
Status: DISCONTINUED | OUTPATIENT
Start: 2020-01-03 | End: 2020-01-06 | Stop reason: HOSPADM

## 2020-01-03 RX ORDER — BISACODYL 5 MG
5 TABLET, DELAYED RELEASE (ENTERIC COATED) ORAL DAILY PRN
Status: DISCONTINUED | OUTPATIENT
Start: 2020-01-03 | End: 2020-01-06 | Stop reason: HOSPADM

## 2020-01-03 RX ORDER — SODIUM CHLORIDE 0.9 % (FLUSH) 0.9 %
5-40 SYRINGE (ML) INJECTION AS NEEDED
Status: DISCONTINUED | OUTPATIENT
Start: 2020-01-03 | End: 2020-01-06 | Stop reason: HOSPADM

## 2020-01-03 RX ORDER — DEXTROSE MONOHYDRATE 100 MG/ML
0-250 INJECTION, SOLUTION INTRAVENOUS AS NEEDED
Status: DISCONTINUED | OUTPATIENT
Start: 2020-01-03 | End: 2020-01-06 | Stop reason: HOSPADM

## 2020-01-03 RX ORDER — INSULIN LISPRO 100 [IU]/ML
INJECTION, SOLUTION INTRAVENOUS; SUBCUTANEOUS
Status: DISCONTINUED | OUTPATIENT
Start: 2020-01-03 | End: 2020-01-06 | Stop reason: HOSPADM

## 2020-01-03 RX ORDER — FOLIC ACID 1 MG/1
1 TABLET ORAL DAILY
Status: DISCONTINUED | OUTPATIENT
Start: 2020-01-03 | End: 2020-01-06 | Stop reason: HOSPADM

## 2020-01-03 RX ORDER — SODIUM CHLORIDE 0.9 % (FLUSH) 0.9 %
10 SYRINGE (ML) INJECTION
Status: COMPLETED | OUTPATIENT
Start: 2020-01-03 | End: 2020-01-03

## 2020-01-03 RX ORDER — ASCORBIC ACID 500 MG
1000 TABLET ORAL DAILY
Status: DISCONTINUED | OUTPATIENT
Start: 2020-01-03 | End: 2020-01-06 | Stop reason: HOSPADM

## 2020-01-03 RX ORDER — VANCOMYCIN 1.75 GRAM/500 ML IN 0.9 % SODIUM CHLORIDE INTRAVENOUS
1750 ONCE
Status: COMPLETED | OUTPATIENT
Start: 2020-01-03 | End: 2020-01-03

## 2020-01-03 RX ORDER — SODIUM CHLORIDE 0.9 % (FLUSH) 0.9 %
5-40 SYRINGE (ML) INJECTION EVERY 8 HOURS
Status: DISCONTINUED | OUTPATIENT
Start: 2020-01-03 | End: 2020-01-06 | Stop reason: HOSPADM

## 2020-01-03 RX ORDER — MAGNESIUM SULFATE 100 %
4 CRYSTALS MISCELLANEOUS AS NEEDED
Status: DISCONTINUED | OUTPATIENT
Start: 2020-01-03 | End: 2020-01-06 | Stop reason: HOSPADM

## 2020-01-03 RX ADMIN — IOPAMIDOL 100 ML: 755 INJECTION, SOLUTION INTRAVENOUS at 12:46

## 2020-01-03 RX ADMIN — Medication 10 ML: at 12:46

## 2020-01-03 RX ADMIN — PIPERACILLIN AND TAZOBACTAM 3.38 G: 3; .375 INJECTION, POWDER, LYOPHILIZED, FOR SOLUTION INTRAVENOUS at 18:56

## 2020-01-03 RX ADMIN — FOLIC ACID 1 MG: 1 TABLET ORAL at 08:59

## 2020-01-03 RX ADMIN — VANCOMYCIN HYDROCHLORIDE 1000 MG: 1 INJECTION, POWDER, LYOPHILIZED, FOR SOLUTION INTRAVENOUS at 16:33

## 2020-01-03 RX ADMIN — Medication 1 CAPSULE: at 08:59

## 2020-01-03 RX ADMIN — INSULIN LISPRO 2 UNITS: 100 INJECTION, SOLUTION INTRAVENOUS; SUBCUTANEOUS at 11:58

## 2020-01-03 RX ADMIN — OXYCODONE HYDROCHLORIDE AND ACETAMINOPHEN 1000 MG: 500 TABLET ORAL at 08:58

## 2020-01-03 RX ADMIN — PIPERACILLIN AND TAZOBACTAM 3.38 G: 3; .375 INJECTION, POWDER, LYOPHILIZED, FOR SOLUTION INTRAVENOUS at 11:19

## 2020-01-03 RX ADMIN — METOPROLOL SUCCINATE 50 MG: 50 TABLET, EXTENDED RELEASE ORAL at 08:59

## 2020-01-03 RX ADMIN — HEPARIN SODIUM 5000 UNITS: 5000 INJECTION INTRAVENOUS; SUBCUTANEOUS at 18:57

## 2020-01-03 RX ADMIN — VANCOMYCIN HYDROCHLORIDE 1750 MG: 10 INJECTION, POWDER, LYOPHILIZED, FOR SOLUTION INTRAVENOUS at 03:33

## 2020-01-03 RX ADMIN — SODIUM CHLORIDE 100 ML: 900 INJECTION, SOLUTION INTRAVENOUS at 12:46

## 2020-01-03 RX ADMIN — SODIUM CHLORIDE, SODIUM LACTATE, POTASSIUM CHLORIDE, AND CALCIUM CHLORIDE 150 ML/HR: 600; 310; 30; 20 INJECTION, SOLUTION INTRAVENOUS at 16:34

## 2020-01-03 RX ADMIN — SODIUM CHLORIDE, SODIUM LACTATE, POTASSIUM CHLORIDE, AND CALCIUM CHLORIDE 150 ML/HR: 600; 310; 30; 20 INJECTION, SOLUTION INTRAVENOUS at 01:29

## 2020-01-03 RX ADMIN — HEPARIN SODIUM 5000 UNITS: 5000 INJECTION INTRAVENOUS; SUBCUTANEOUS at 04:32

## 2020-01-03 RX ADMIN — PIPERACILLIN AND TAZOBACTAM 3.38 G: 3; .375 INJECTION, POWDER, LYOPHILIZED, FOR SOLUTION INTRAVENOUS at 04:32

## 2020-01-03 RX ADMIN — HEPARIN SODIUM 5000 UNITS: 5000 INJECTION INTRAVENOUS; SUBCUTANEOUS at 11:18

## 2020-01-03 RX ADMIN — FERROUS SULFATE TAB 325 MG (65 MG ELEMENTAL FE) 325 MG: 325 (65 FE) TAB at 08:59

## 2020-01-03 RX ADMIN — Medication 10 ML: at 23:20

## 2020-01-03 NOTE — ROUTINE PROCESS
TRANSFER - OUT REPORT:    Verbal report given to JANEEN Thao(name) on Kymberly Bates  being transferred to [facility](unit) for routine progression of care       Report consisted of patients Situation, Background, Assessment and   Recommendations(SBAR). Information from the following report(s) SBAR, ED Summary, [facility] and Recent Results was reviewed with the receiving nurse. Lines:   Peripheral IV 01/02/20 Right Antecubital (Active)   Site Assessment Clean, dry, & intact 1/2/2020  9:14 PM   Phlebitis Assessment 0 1/2/2020  9:14 PM   Infiltration Assessment 0 1/2/2020  9:14 PM   Dressing Status Clean, dry, & intact 1/2/2020  9:14 PM   Dressing Type Transparent 1/2/2020  9:14 PM   Hub Color/Line Status Pink;Flushed;Patent 1/2/2020  9:14 PM   Action Taken Blood drawn 1/2/2020  9:14 PM        Opportunity for questions and clarification was provided.       Patient transported with:   [employer]

## 2020-01-03 NOTE — PROGRESS NOTES
Clinical Pharmacy Note - Extended Infusion Piperacillin/Tazobactam Dosing    This patient has been ordered piperacillin/tazobactam at 3.375 g IV every 6 hours. P&T protocol allows automatic substitution to extended infusion piperacillin/tazobactam and dose adjustment based on indication and renal function (adjustment required if creatinine clearance < 20 mL/min). Indication: sepsis    CrCl: ~60 mL/min    Per P&T protocol, the piperacillin/tazobactam dosing will be adjusted to 3.375 g IV every 8 hours (each dose will be infused over 4 hours). Please call pharmacy with any questions.

## 2020-01-03 NOTE — PROGRESS NOTES
Day #1 of Vancomycin  Indication:  sepsis  Current regimen:  1750mg IV loading dose given 1/3 @ 03:33. Abx regimen:  Vancomycin and Zosyn  ID Following ?: NO  Concomitant nephrotoxic drugs (requires more frequent monitoring): Contrast agents (1/3)  Frequency of BMP?: daily through 2020    Recent Labs     20  0434 20  2110   WBC 4.7 5.5   CREA 0.63 1.08*   BUN 9 12     Est CrCl: ~80 ml/min; UO: Not currently being recorded  Temp (24hrs), Av.1 °F (37.3 °C), Min:97.6 °F (36.4 °C), Max:103 °F (39.4 °C)    Cultures:    Blood:  pending    Goal trough = 15 - 20 mcg/mL    Recent trough history (date/time/level/dose/action taken):  New start    Begin 1000mg IV [id number] with the first dose due at 16:00 today, 1/3/2020 now that markers of renal function have significantly improved from ED admission.

## 2020-01-03 NOTE — ED NOTES
Addendum:      VITAL SIGNS:  Patient Vitals for the past 4 hrs:   Temp Pulse Resp BP SpO2   01/02/20 2200  (!) 114 17 135/77 97 %   01/02/20 2145  (!) 123 22 141/82 99 %   01/02/20 2130  (!) 130 18 (!) 148/91 98 %   01/02/20 2058 (!) 103 °F (39.4 °C) (!) 141 18 130/61 95 %         LABS:  Recent Results (from the past 6 hour(s))   SAMPLES BEING HELD    Collection Time: 01/02/20  9:10 PM   Result Value Ref Range    SAMPLES BEING HELD 1RED     COMMENT        Add-on orders for these samples will be processed based on acceptable specimen integrity and analyte stability, which may vary by analyte. CBC WITH AUTOMATED DIFF    Collection Time: 01/02/20  9:10 PM   Result Value Ref Range    WBC 5.5 3.6 - 11.0 K/uL    RBC 5.34 (H) 3.80 - 5.20 M/uL    HGB 13.9 11.5 - 16.0 g/dL    HCT 46.5 35.0 - 47.0 %    MCV 87.1 80.0 - 99.0 FL    MCH 26.0 26.0 - 34.0 PG    MCHC 29.9 (L) 30.0 - 36.5 g/dL    RDW 16.6 (H) 11.5 - 14.5 %    PLATELET 001 789 - 674 K/uL    MPV 10.3 8.9 - 12.9 FL    NRBC 0.0 0  WBC    ABSOLUTE NRBC 0.00 0.00 - 0.01 K/uL    NEUTROPHILS 68 32 - 75 %    LYMPHOCYTES 26 12 - 49 %    MONOCYTES 5 5 - 13 %    EOSINOPHILS 0 0 - 7 %    BASOPHILS 1 0 - 1 %    IMMATURE GRANULOCYTES 0 0.0 - 0.5 %    ABS. NEUTROPHILS 3.7 1.8 - 8.0 K/UL    ABS. LYMPHOCYTES 1.5 0.8 - 3.5 K/UL    ABS. MONOCYTES 0.3 0.0 - 1.0 K/UL    ABS. EOSINOPHILS 0.0 0.0 - 0.4 K/UL    ABS. BASOPHILS 0.0 0.0 - 0.1 K/UL    ABS. IMM.  GRANS. 0.0 0.00 - 0.04 K/UL    DF AUTOMATED     METABOLIC PANEL, COMPREHENSIVE    Collection Time: 01/02/20  9:10 PM   Result Value Ref Range    Sodium 140 136 - 145 mmol/L    Potassium 3.7 3.5 - 5.1 mmol/L    Chloride 108 97 - 108 mmol/L    CO2 24 21 - 32 mmol/L    Anion gap 8 5 - 15 mmol/L    Glucose 151 (H) 65 - 100 mg/dL    BUN 12 6 - 20 MG/DL    Creatinine 1.08 (H) 0.55 - 1.02 MG/DL    BUN/Creatinine ratio 11 (L) 12 - 20      GFR est AA >60 >60 ml/min/1.73m2    GFR est non-AA 51 (L) >60 ml/min/1.73m2    Calcium 9.8 8.5 - 10.1 MG/DL    Bilirubin, total 0.5 0.2 - 1.0 MG/DL    ALT (SGPT) 50 12 - 78 U/L    AST (SGOT) 47 (H) 15 - 37 U/L    Alk. phosphatase 83 45 - 117 U/L    Protein, total 8.9 (H) 6.4 - 8.2 g/dL    Albumin 4.0 3.5 - 5.0 g/dL    Globulin 4.9 (H) 2.0 - 4.0 g/dL    A-G Ratio 0.8 (L) 1.1 - 2.2     TROPONIN I    Collection Time: 01/02/20  9:10 PM   Result Value Ref Range    Troponin-I, Qt. <0.05 <0.05 ng/mL   INFLUENZA A & B AG (RAPID TEST)    Collection Time: 01/02/20  9:10 PM   Result Value Ref Range    Influenza A Antigen NEGATIVE  NEG      Influenza B Antigen NEGATIVE  NEG     POC LACTIC ACID    Collection Time: 01/02/20  9:12 PM   Result Value Ref Range    Lactic Acid (POC) 2.49 (HH) 0.40 - 2.00 mmol/L   EKG, 12 LEAD, INITIAL    Collection Time: 01/02/20  9:30 PM   Result Value Ref Range    Ventricular Rate 128 BPM    Atrial Rate 128 BPM    P-R Interval 138 ms    QRS Duration 82 ms    Q-T Interval 304 ms    QTC Calculation (Bezet) 443 ms    Calculated P Axis 55 degrees    Calculated R Axis -27 degrees    Calculated T Axis 41 degrees    Diagnosis       Sinus tachycardia  When compared with ECG of 18-OCT-2019 04:14,  Vent.  rate has increased BY  44 BPM  Nonspecific T wave abnormality now evident in Inferior leads     URINALYSIS W/ RFLX MICROSCOPIC    Collection Time: 01/02/20  9:41 PM   Result Value Ref Range    Color YELLOW/STRAW      Appearance CLEAR CLEAR      Specific gravity 1.026 1.003 - 1.030      pH (UA) 5.5 5.0 - 8.0      Protein 100 (A) NEG mg/dL    Glucose NEGATIVE  NEG mg/dL    Ketone 15 (A) NEG mg/dL    Bilirubin NEGATIVE  NEG      Blood TRACE (A) NEG      Urobilinogen 1.0 0.2 - 1.0 EU/dL    Nitrites NEGATIVE  NEG      Leukocyte Esterase NEGATIVE  NEG      WBC 0-4 0 - 4 /hpf    RBC 0-5 0 - 5 /hpf    Epithelial cells FEW FEW /lpf    Bacteria NEGATIVE  NEG /hpf    Hyaline cast 2-5 0 - 5 /lpf    Granular cast 5-10 (A) NEG /lpf   URINE CULTURE HOLD SAMPLE    Collection Time: 01/02/20  9:41 PM   Result Value Ref Range    Urine culture hold        URINE ON HOLD IN MICROBIOLOGY DEPT FOR 3 DAYS. IF UNPRESERVED URINE IS SUBMITTED, IT CANNOT BE USED FOR ADDITIONAL TESTING AFTER 24 HRS, RECOLLECTION WILL BE REQUIRED. POC LACTIC ACID    Collection Time: 01/02/20 10:52 PM   Result Value Ref Range    Lactic Acid (POC) 2.04 (HH) 0.40 - 2.00 mmol/L        IMAGING:  CT HEAD WO CONT   Final Result   IMPRESSION: Ventriculostomy catheter in place with no change in ventricular   size. XR CHEST PORT   Final Result   Impression:   1. No acute disease         XR SHUNT SERIES    (Results Pending)         Medications During Visit:  Medications   sodium chloride (NS) flush 5-10 mL (has no administration in time range)   acetaminophen (TYLENOL) tablet 1,000 mg (1,000 mg Oral Given 1/2/20 2127)   0.9% sodium chloride infusion 1,000 mL (1,000 mL IntraVENous New Bag 1/2/20 2128)   cefepime (MAXIPIME) 1 g in 0.9% sodium chloride (MBP/ADV) 50 mL (1 g IntraVENous New Bag 1/2/20 2128)         DECISION MAKING:  Cleo Barrera is a 62 y.o. female who comes in as above. Patient signed out to me by Dr. Molly Bates. Patient here for fever. Labs and imaging reviewed. Will admit to hospitalist.           IMPRESSION:  1. Sepsis, due to unspecified organism, unspecified whether acute organ dysfunction present ([facility] [address]    2. Lactic acidosis        DISPOSITION:  Admitted      Hospitalist Orland Text for Admission  11:19 PM    ED Room Number: ER07/07  Patient Name and age:  Cleo Barrera 62 y.o.  female  Working Diagnosis:   1. Sepsis, due to unspecified organism, unspecified whether acute organ dysfunction present ([facility] [address]    2. Lactic acidosis      Readmission: no  Isolation Requirements:  no  Recommended Level of Care:  telemetry  Code Status:  Full  Other:  SIgnout from last Doctor pending CT. Patient at baseline. Sepsis, unknown source. HR and Lactic improving. ABX given.   Department:[facility] Adult ED - [phone]    \

## 2020-01-03 NOTE — PROGRESS NOTES
Pharmacist Note - Vancomycin Dosing    Consult provided for this 62 y.o. female for indication of sepsis. Antibiotic regimen(s): vanc + zosyn  Patient on vancomycin PTA? NO     Recent Labs     20  2110   WBC 5.5   CREA 1.08*   BUN 12     Frequency of BMP: daily x 3  Height: 167.6 cm  Weight: 69.2 kg  Est CrCl: 49.9 ml/min; UO: - ml/kg/hr  Temp (24hrs), Av.4 °F (37.4 °C), Min:97.6 °F (36.4 °C), Max:103 °F (39.4 °C)    Cultures:  1/ blood-    Goal trough = 15 - 20 mcg/mL    Therapy will be initiated with a loading dose of 1750 mg IV x 1 to be followed by a maintenance dose by levels. Pharmacy to follow patient daily and order levels / make dose adjustments as appropriate.

## 2020-01-03 NOTE — ED TRIAGE NOTES
Pt arrives via EMS from home c/o dizziness. Daughter called EMS stating pt hadn't been acting like herself. Pt was ambulatory with a BS of 216. Pt has hx of TIA, CVA and HTN.

## 2020-01-03 NOTE — H&P
[facility]  HISTORY AND PHYSICAL    Name:  Levon Garrison  MR#:  [id number]  :  1955  ACCOUNT #:  [de-identified]  ADMIT DATE:  2020      The patient was seen, evaluated and admitted by me on 2020. PRIMARY CARE PHYSICIAN:  Dr. Ginette Jensen INFORMATION:  The patient, who is not a good historian because of memory impairment. CHIEF COMPLAINT:  Dizziness. HISTORY OF PRESENT ILLNESS:  This is a 60-year-old woman with past medical history significant for cerebral aneurysm, status post  shunt placement; multiple cerebral infarcts resulting in memory impairment; hypertension; and type 2 diabetes, who was in her usual state of health until the day of her presentation at the emergency room when the patient developed dizziness. According to report, the patient's daughter is concerned that the patient may be having another episode of a stroke, because of that, she called 911. The patient initially complained of dizziness and the EMS was called but on the way to the emergency room, the dizziness resolved. The patient stated that she has been taking her medication for blood pressure. Her blood pressure was found to be elevated when the EMS arrived at the scene. She was brought to the emergency room for further evaluation. When the patient arrived at the emergency room, the patient's clinical presentation and symptoms triggered code sepsis. Code sepsis was called and code sepsis protocol was initiated. The patient was subsequently referred to the hospitalist service for evaluation for admission. The source of the sepsis was never identified. The patient was last admitted to this hospital from 10/18/2019 to 10/24/2019. The patient was admitted with confusion and change in mental status and subsequent evaluation of these symptoms revealed acute intracranial hemorrhage.   The patient was treated conservatively, seen by the neurosurgeon, no intervention was advised. The patient was subsequently discharged to skilled nursing facility. The patient is a very poor historian because of her memory impairment. PAST MEDICAL HISTORY:  1. Type 2 diabetes. 2.  Hypertension. 3.  Cerebral aneurysm, status post  shunt. 4.  Multiple brain infarct. 5.  Memory impairment. ALLERGIES:  NO KNOWN DRUG ALLERGIES. MEDICATIONS:  1.  Norvasc 10 mg daily. 2.  Vitamin C 1000 mg daily. 3.  Ferrous sulfate 325 mg daily. 4.  Folic acid 1 mg daily. 5.  Lisinopril 40 mg daily. 6.  Glucophage 500 mg twice daily. 7.  Toprol-XL 50 mg daily. 8.  Sliding scale with insulin coverage. FAMILY HISTORY:  This was reviewed. Her mother had hypertension. PAST SURGICAL HISTORY:  This is significant for back surgery and  shunt placement. SOCIAL HISTORY:  No history of alcohol or tobacco abuse. REVIEW OF SYSTEMS:  HEAD, EYES, EARS, NOSE, AND THROAT:  This is positive for dizziness. No headache, no blurring of vision, no photophobia. RESPIRATORY SYSTEM:  No cough, no shortness of breath, no hemoptysis. CARDIOVASCULAR SYSTEM:  No chest pain, no orthopnea, no palpitations. GASTROINTESTINAL SYSTEM:  No nausea or vomiting. No diarrhea, no constipation. GENITOURINARY SYSTEM:  No dysuria, no urgency, no frequency. All other systems are reviewed and they are negative. PHYSICAL EXAMINATION:  GENERAL APPEARANCE:  The patient appeared ill, in moderate distress. VITAL SIGNS:  On arrival at the emergency room; temperature 103, pulse 141, respiratory rate 18, blood pressure 130/61, oxygen saturation 95% on room air. HEENT:  Head:  Normocephalic, atraumatic. Eyes:  Normal eye movement. No redness, no drainage, no discharge. Ears:  Normal external ears with no evidence of drainage. Nose:  No deformity, no drainage. Mouth and Throat:  No visible oral lesion. Dry oral mucosa. NECK:  Neck is supple. No JVD, no thyromegaly. CHEST:  Clear breath sounds.   No wheezing, no crackles. HEART:  Normal S1 and S2, regular. No clinically appreciable murmur. ABDOMEN:  Soft, nontender. Normal bowel sounds. CNS:  Lethargic but easily arousable. Very slow in answering the questions. EXTREMITIES:  No edema. Pulses 2+ bilaterally. MUSCULOSKELETAL SYSTEM:  No obvious joint deformity or swelling. SKIN:  No active skin lesions seen in the exposed part of the body. PSYCHIATRY:  Unable to assess mood and affect. LYMPHATIC SYSTEM:  No cervical lymphadenopathy. DIAGNOSTIC DATA:  EKG shows sinus tachycardia and nonspecific T-waves abnormalities. Chest x-ray, no acute disease. CT scan of the head without contrast shows ventriculostomy catheter in place with no change in ventricular size. Abdominal x-ray for shunt series shows intact left-sided ventriculoperitoneal shunt catheter with tip in the right pelvis. No other significant abnormalities are seen. LABORATORY DATA:  Hematology:  WBC 5.5, hemoglobin 13.9, hematocrit 46.5, platelets 996. Point-of-care lactic acid level 2.49. Influenza A antigen negative. Influenza B antigen negative. Cardiac profile:  Troponin less than 0.05. Chemistry:  Sodium 140, potassium 3.7, chloride 108, CO2 of 24, glucose 151, BUN 12, creatinine 1.08, calcium 9.8, total bilirubin 0.5, ALT 50, AST 47, alkaline phosphatase 83, total protein 8.9, albumin level 4.0, globulin 4.9. Urinalysis: This is significant for negative nitrite, negative leukocyte esterase, negative bacteria. ASSESSMENT:  1.  Sepsis. 2.  Dizziness. 3.  Hypertension. 4.  Type 2 diabetes. 5.  Cerebral aneurysm, status post ventriculoperitoneal shunt . 6. Memory impairment. PLAN:  1. Sepsis:  We will admit the patient for further evaluation and treatment. We will start the patient on vancomycin and Zosyn. We will await the result of blood culture. We will check lipase level.   We will obtain CT scan of the chest to evaluate the patient for pneumonia as a possible source of her sepsis. We will also obtain a CT scan of the abdomen and pelvis to evaluate the patient for sources of infection. The sources of infection cannot be identified in the next 24-48 hours as the antibiotics can be de-escalated. 2.  Dizziness: The dizziness resolved by the time the patient arrived at the emergency room. CT scan of the head did not show any acute pathology. We will continue to monitor. 3.  Hypertension: We will resume preadmission medication and monitor the patient's blood pressure closely. 4.  Type 2 diabetes: The patient will be placed on sliding scale with insulin coverage. We will check hemoglobin A1c level if one has not been checked recently. 5.  Cerebral aneurysm, status post  shunt:  CT scan of the head showed that the shunt is in satisfactory position. We will continue to monitor. 6.  Memory impairment: We will continue supportive therapy. We will check urine drug screen. OTHER ISSUES:  Code status: The patient is a full code. We will place the patient on heparin for DVT prophylaxis. FUNCTIONAL STATUS PRIOR TO ADMISSION:  The patient came from home. She is ambulatory with a cane.           Donelda Canavan, MD      [id number]  D:  01/03/2020 5:54  T:  01/03/2020 7:23  JOB #:  [id number]  CC:  Dr. Shi Benjamin

## 2020-01-03 NOTE — ED PROVIDER NOTES
62 y.o. female with past medical history significant for s/p stroke, HTN, and diabetes who presents from home via EMS with chief complaint of dizziness. Per EMS patient's daughter states patient Ej Merlos not been acting like herself for ~2 hours\" (PTA). Patient's daughter was concerned for CVA so she called EMS. Patient was c/o dizziness upon EMS arrival as well. However en route to ED patient's dizziness resolved. EMS reports patient /100 and . Patient presents to [facility] ED with resolved dizziness. Patient denies noncompliance with her medications. Patient denies chest pain, SOB, abdominal pain, and cough. There are no other acute medical concerns at this time. Social hx: No Tobacco use, No EtOH use, No Illicit drug use. PCP: Riddhi FLORIAN    Note written by Shawanda Mcgee, as dictated by Dania Scott, DO 8:55 PM     The history is provided by the EMS personnel and the patient. No  was used. Past Medical History:   Diagnosis Date    Diabetes ([facility] [address]     Hypertension     Stroke [facility])        Past Surgical History:   Procedure Laterality Date    HX ORTHOPAEDIC  1996    back surgery    NEUROLOGICAL PROCEDURE UNLISTED  2008    shunt placement         No family history on file.     Social History     Socioeconomic History    Marital status: SINGLE     Spouse name: Not on file    Number of children: Not on file    Years of education: Not on file    Highest education level: Not on file   Occupational History    Not on file   Social Needs    Financial resource strain: Not on file    Food insecurity:     Worry: Not on file     Inability: Not on file    Transportation needs:     Medical: Not on file     Non-medical: Not on file   Tobacco Use    Smoking status: Never Smoker    Smokeless tobacco: Never Used   Substance and Sexual Activity    Alcohol use: No    Drug use: No    Sexual activity: Not on file   Lifestyle    Physical activity:     Days per week: Not on file     Minutes per session: Not on file    Stress: Not on file   Relationships    Social connections:     Talks on phone: Not on file     Gets together: Not on file     Attends Temple service: Not on file     Active member of club or organization: Not on file     Attends meetings of clubs or organizations: Not on file     Relationship status: Not on file    Intimate partner violence:     Fear of current or ex partner: Not on file     Emotionally abused: Not on file     Physically abused: Not on file     Forced sexual activity: Not on file   Other Topics Concern    Not on file   Social History Narrative    Not on file         ALLERGIES: Patient has no known allergies. Review of Systems   Constitutional: Negative for appetite change and chills. HENT: Negative for congestion and rhinorrhea. Eyes: Negative for redness and visual disturbance. Respiratory: Negative for cough and shortness of breath. Cardiovascular: Negative for chest pain and leg swelling. Gastrointestinal: Negative for abdominal pain, diarrhea, nausea and vomiting. Genitourinary: Negative for flank pain, frequency, hematuria and urgency. Musculoskeletal: Negative for arthralgias, back pain, myalgias and neck pain. Skin: Negative for rash and wound. Allergic/Immunologic: Negative for immunocompromised state. Neurological: Positive for dizziness. Negative for headaches. All other systems reviewed and are negative. Vitals:    01/02/20 2058   BP: 130/61   Pulse: (!) 141   Resp: 18   Temp: (!) 103 °F (39.4 °C)   SpO2: 95%            Physical Exam  Vitals signs and nursing note reviewed. Constitutional:       General: She is not in acute distress. Appearance: She is well-developed. She is not ill-appearing, toxic-appearing or diaphoretic. HENT:      Head: Normocephalic. Mouth/Throat:      Pharynx: No oropharyngeal exudate. Eyes:      General:         Right eye: No discharge.          Left eye: No discharge. Pupils: Pupils are equal, round, and reactive to light. Neck:      Musculoskeletal: Normal range of motion and neck supple. Cardiovascular:      Rate and Rhythm: Regular rhythm. Tachycardia present. Heart sounds: Normal heart sounds. No murmur. No friction rub. No gallop. Pulmonary:      Effort: Pulmonary effort is normal. No respiratory distress. Breath sounds: Normal breath sounds. No stridor. No wheezing or rales. Abdominal:      General: Bowel sounds are normal. There is no distension. Palpations: Abdomen is soft. Tenderness: There is no tenderness. There is no guarding or rebound. Musculoskeletal: Normal range of motion. General: No deformity. Skin:     General: Skin is warm and dry. Capillary Refill: Capillary refill takes less than 2 seconds. Findings: No rash. Neurological:      Mental Status: She is alert. Comments: CN II-XII tested and intact  Speech is clear and fluid  Tongue protrusion normal  5/5 b/l strength with shoulder/elbow flexion and extension  Equal  strength  5/5 b/l strength with hip extension, knee flexion/extension  Symmetric dorsi and plantar-flexion of feet  Sensation intact in face and throughout all 4 extremities  No dysmetria   No truncal ataxia      Psychiatric:         Behavior: Behavior normal.     Note written by Shawanda Sánchez, as dictated by Lucia Moser DO 8:55 PM        EKG Interpretation:   ED Physician interpretation  Sinus tachycardia rate 128, LAD, no ST elevation or depression    Labs Reviewed:   Lactate elevated but less than 4  VIKKI  UA without signs of UTI  No leukocytosis  Hyperglycemic  Trop neg    Imaging Reviewed:   CXR neg      Course:  Tylenol and IVF given  Cefepime IV given for possible sepsis    10:02 PM patient re-evaluated. Denies any headache, neck pain, or localizing symptoms. Agreeable to admission.        MDM:  80-year-old female history of prior ICH with  shunt presenting today with dizziness. Upon arrival found to be tachycardic and febrile with a lactate elevated to 2.49 therefore code sepsis was called. At this time there is no clear source for her infection. She was treated with cefepime and blood cultures were sent. Lactate less than 4 and blood pressures okay so did not require 30 cc/kg fluids. She does have a  shunt so a shunt series was ordered to evaluate for any sort of issues with this and pending at signout. Patient was signed out at 10:30 PM to Dr. Ronnie Aranda with plan for  shunt series and admit to hospitalist service. Clinical Impression:     ICD-10-CM ICD-9-CM    1. Sepsis, due to unspecified organism, unspecified whether acute organ dysfunction present ([facility] A41.9 038.9      995.91    2.  Lactic acidosis E87.2 276.2            Disposition: Admit  Hang Kramer,

## 2020-01-04 LAB
ANION GAP SERPL CALC-SCNC: 7 MMOL/L (ref 5–15)
BUN SERPL-MCNC: 7 MG/DL (ref 6–20)
BUN/CREAT SERPL: 9 (ref 12–20)
CALCIUM SERPL-MCNC: 9.4 MG/DL (ref 8.5–10.1)
CHLORIDE SERPL-SCNC: 103 MMOL/L (ref 97–108)
CO2 SERPL-SCNC: 27 MMOL/L (ref 21–32)
CREAT SERPL-MCNC: 0.8 MG/DL (ref 0.55–1.02)
ERYTHROCYTE [DISTWIDTH] IN BLOOD BY AUTOMATED COUNT: 15.9 % (ref 11.5–14.5)
GLUCOSE BLD STRIP.AUTO-MCNC: 109 MG/DL (ref 65–100)
GLUCOSE BLD STRIP.AUTO-MCNC: 110 MG/DL (ref 65–100)
GLUCOSE BLD STRIP.AUTO-MCNC: 113 MG/DL (ref 65–100)
GLUCOSE BLD STRIP.AUTO-MCNC: 118 MG/DL (ref 65–100)
GLUCOSE SERPL-MCNC: 92 MG/DL (ref 65–100)
HCT VFR BLD AUTO: 39.2 % (ref 35–47)
HGB BLD-MCNC: 12.1 G/DL (ref 11.5–16)
MAGNESIUM SERPL-MCNC: 1.7 MG/DL (ref 1.6–2.4)
MCH RBC QN AUTO: 26.3 PG (ref 26–34)
MCHC RBC AUTO-ENTMCNC: 30.9 G/DL (ref 30–36.5)
MCV RBC AUTO: 85.2 FL (ref 80–99)
NRBC # BLD: 0 K/UL (ref 0–0.01)
NRBC BLD-RTO: 0 PER 100 WBC
PHOSPHATE SERPL-MCNC: 2.6 MG/DL (ref 2.6–4.7)
PLATELET # BLD AUTO: 153 K/UL (ref 150–400)
PMV BLD AUTO: 10.7 FL (ref 8.9–12.9)
POTASSIUM SERPL-SCNC: 3.7 MMOL/L (ref 3.5–5.1)
RBC # BLD AUTO: 4.6 M/UL (ref 3.8–5.2)
SERVICE CMNT-IMP: ABNORMAL
SODIUM SERPL-SCNC: 137 MMOL/L (ref 136–145)
WBC # BLD AUTO: 5.1 K/UL (ref 3.6–11)

## 2020-01-04 PROCEDURE — 97116 GAIT TRAINING THERAPY: CPT

## 2020-01-04 PROCEDURE — 84100 ASSAY OF PHOSPHORUS: CPT

## 2020-01-04 PROCEDURE — 97161 PT EVAL LOW COMPLEX 20 MIN: CPT

## 2020-01-04 PROCEDURE — 36415 COLL VENOUS BLD VENIPUNCTURE: CPT

## 2020-01-04 PROCEDURE — 83735 ASSAY OF MAGNESIUM: CPT

## 2020-01-04 PROCEDURE — 74011250636 HC RX REV CODE- 250/636: Performed by: INTERNAL MEDICINE

## 2020-01-04 PROCEDURE — 74011250637 HC RX REV CODE- 250/637: Performed by: INTERNAL MEDICINE

## 2020-01-04 PROCEDURE — 82962 GLUCOSE BLOOD TEST: CPT

## 2020-01-04 PROCEDURE — 74011000258 HC RX REV CODE- 258: Performed by: INTERNAL MEDICINE

## 2020-01-04 PROCEDURE — 65660000000 HC RM CCU STEPDOWN

## 2020-01-04 PROCEDURE — 74011250637 HC RX REV CODE- 250/637: Performed by: FAMILY MEDICINE

## 2020-01-04 PROCEDURE — 74011250636 HC RX REV CODE- 250/636: Performed by: FAMILY MEDICINE

## 2020-01-04 PROCEDURE — 85027 COMPLETE CBC AUTOMATED: CPT

## 2020-01-04 PROCEDURE — 80048 BASIC METABOLIC PNL TOTAL CA: CPT

## 2020-01-04 RX ORDER — AMLODIPINE BESYLATE 5 MG/1
5 TABLET ORAL DAILY
Status: DISCONTINUED | OUTPATIENT
Start: 2020-01-04 | End: 2020-01-06 | Stop reason: HOSPADM

## 2020-01-04 RX ORDER — LEVOFLOXACIN 750 MG/1
750 TABLET ORAL
Status: DISCONTINUED | OUTPATIENT
Start: 2020-01-04 | End: 2020-01-06 | Stop reason: HOSPADM

## 2020-01-04 RX ORDER — LISINOPRIL 5 MG/1
5 TABLET ORAL DAILY
Status: DISCONTINUED | OUTPATIENT
Start: 2020-01-04 | End: 2020-01-05

## 2020-01-04 RX ADMIN — LEVOFLOXACIN 750 MG: 750 TABLET, FILM COATED ORAL at 08:18

## 2020-01-04 RX ADMIN — METOPROLOL SUCCINATE 50 MG: 50 TABLET, EXTENDED RELEASE ORAL at 08:18

## 2020-01-04 RX ADMIN — FOLIC ACID 1 MG: 1 TABLET ORAL at 08:18

## 2020-01-04 RX ADMIN — Medication 5 ML: at 06:00

## 2020-01-04 RX ADMIN — HEPARIN SODIUM 5000 UNITS: 5000 INJECTION INTRAVENOUS; SUBCUTANEOUS at 20:28

## 2020-01-04 RX ADMIN — VANCOMYCIN HYDROCHLORIDE 1000 MG: 1 INJECTION, POWDER, LYOPHILIZED, FOR SOLUTION INTRAVENOUS at 04:26

## 2020-01-04 RX ADMIN — OXYCODONE HYDROCHLORIDE AND ACETAMINOPHEN 1000 MG: 500 TABLET ORAL at 08:18

## 2020-01-04 RX ADMIN — AMLODIPINE BESYLATE 5 MG: 5 TABLET ORAL at 08:18

## 2020-01-04 RX ADMIN — SODIUM CHLORIDE, SODIUM LACTATE, POTASSIUM CHLORIDE, AND CALCIUM CHLORIDE 100 ML/HR: 600; 310; 30; 20 INJECTION, SOLUTION INTRAVENOUS at 14:51

## 2020-01-04 RX ADMIN — PIPERACILLIN AND TAZOBACTAM 3.38 G: 3; .375 INJECTION, POWDER, LYOPHILIZED, FOR SOLUTION INTRAVENOUS at 03:16

## 2020-01-04 RX ADMIN — FERROUS SULFATE TAB 325 MG (65 MG ELEMENTAL FE) 325 MG: 325 (65 FE) TAB at 07:20

## 2020-01-04 RX ADMIN — Medication 1 CAPSULE: at 08:18

## 2020-01-04 RX ADMIN — HEPARIN SODIUM 5000 UNITS: 5000 INJECTION INTRAVENOUS; SUBCUTANEOUS at 03:17

## 2020-01-04 RX ADMIN — LISINOPRIL 5 MG: 5 TABLET ORAL at 08:18

## 2020-01-04 RX ADMIN — Medication 10 ML: at 14:47

## 2020-01-04 RX ADMIN — HEPARIN SODIUM 5000 UNITS: 5000 INJECTION INTRAVENOUS; SUBCUTANEOUS at 11:50

## 2020-01-04 NOTE — PROGRESS NOTES
Bedside and Verbal shift change report given to Lluvia Carrasco (oncoming nurse) by John Mclean (offgoing nurse).  Report included the following information SBAR, Kardex, Procedure Summary, Intake/Output, MAR, Recent Results and Med Rec Status

## 2020-01-04 NOTE — PROGRESS NOTES
Problem: Mobility Impaired (Adult and Pediatric)  Goal: *Acute Goals and Plan of Care (Insert Text)  Description  FUNCTIONAL STATUS PRIOR TO ADMISSION: Patient was independent in the house with mobility. Sonam is there most of the time to supervise secondary to cognitive status. No falls reported. HOME SUPPORT PRIOR TO ADMISSION: The patient lived with Niece and niece's son. Physical Therapy Goals  Initiated 1/4/2020  1. Patient will move from supine to sit and sit to supine  in bed with supervision/set-up within 7 day(s). 2.  Patient will transfer from bed to chair and chair to bed with supervision/set-up using the least restrictive device within 7 day(s). 3.  Patient will perform sit to stand with supervision/set-up within 7 day(s). 4.  Patient will ambulate with supervision/set-up for 200 feet with the least restrictive device within 7 day(s). 5.  Patient will ascend/descend 2 stairs with 2 handrail(s) with supervision/set-up within 7 day(s). Outcome: Progressing Towards Goal     PHYSICAL THERAPY EVALUATION  Patient: Davon Gao (49 y.o. female)  Date: 1/4/2020  Primary Diagnosis: Sepsis ([facility] [address] [A41.9]        Precautions: Fall risk         ASSESSMENT  Based on the objective data described below, the patient presents with increased unsteadiness and increased fall risk. Patient has a history of cerebral aneurysm, status post  shunt placement; multiple cerebral infarcts resulting in memory impairment. Patient went to [facility] in October and has been successfully functioning at home since, up until ED admission. Patient was ambulating without device, no falls reported. Spoke to sonam on the phone who is concerned about change in functional status. Today, patient is oriented to self only. She responds \"yes\" and agrees with all questions. Sonam Abdie Garry is caregiver and can answer questions. Upon evaluation, patient's mobility can be deceptive.  She stands well and can initiate ambulation without device, however intermittently Patient shuffling feet, scissoring, leaning to the right, causing a LOB and physical assist to keep from falling. Patient has difficulty identifying obstacles on the left, but this may be from previous impairment. Concerned about Patient's safety at home without 24 hour physical assist with mobility. Current Level of Function Impacting Discharge (mobility/balance): min assist for ambulation    Functional Outcome Measure: The patient scored 17/56 on the Fiore balance outcome measure which is indicative of high fall risk. Other factors to consider for discharge: 2 steps, intermittently at home alone     Patient will benefit from skilled therapy intervention to address the above noted impairments. PLAN :  Recommendations and Planned Interventions: bed mobility training, transfer training, gait training, therapeutic exercises, neuromuscular re-education, patient and family training/education, and therapeutic activities      Frequency/Duration: Patient will be followed by physical therapy:  5 times a week to address goals. Recommendation for discharge: (in order for the patient to meet his/her long term goals)  Therapy up to 5 days/week in SNF setting    This discharge recommendation:  Has been made in collaboration with the attending provider and/or case management    IF patient discharges home will need the following DME: to be determined (TBD), suspect a walker would increase fall risk rather than provide additional support         SUBJECTIVE:   Patient stated [place] go to the courts twice a week.     OBJECTIVE DATA SUMMARY:   HISTORY:    Past Medical History:   Diagnosis Date    Diabetes ([address]     Hypertension     Stroke [facility])      Past Surgical History:   Procedure Laterality Date    HX ORTHOPAEDIC  1996    back surgery    NEUROLOGICAL PROCEDURE UNLISTED  2008    shunt placement       Personal factors and/or comorbidities impacting plan of care: DM, HTN, CVA, confusion    Home Situation  Home Environment: Private residence  # Steps to Enter: 2  Rails to Enter: Yes  Hand Rails : Bilateral  One/Two Story Residence: One story  Living Alone: No  Support Systems: Family member(s)(Niece and her nephew)  Patient Expects to be Discharged to[de-identified] Private residence  Current DME Used/Available at Home: aroldo Coyne(Was not using cane at baseline)    EXAMINATION/PRESENTATION/DECISION MAKING:   Critical Behavior:  Neurologic State: Alert  Orientation Level: Oriented to person, Oriented to place, Disoriented to situation, Disoriented to time  Cognition: Memory loss     Hearing: Auditory  Auditory Impairment: None    Strength:    Strength: Generally decreased, functional    Tone & Sensation:   Tone: Normal  Sensation: Intact    Coordination:  Coordination: Generally decreased, functional    Functional Mobility:  Transfers:  Sit to Stand: Contact guard assistance  Stand to Sit: Contact guard assistance  Bed to Chair: Contact guard assistance    Balance:   Sitting: Intact  Standing: Impaired  Standing - Static: Fair;Constant support  Standing - Dynamic : Fair    Ambulation/Gait Training:  Distance (ft): 100 Feet (ft)  Assistive Device: Gait belt  Ambulation - Level of Assistance: Minimal assistance;Assist x1;Additional time; Adaptive equipment  Gait Abnormalities: Decreased step clearance; Path deviations;Scissoring;Trunk sway increased  Base of Support: Narrowed; Shift to right  Speed/Melissa: Fluctuations  Step Length: Right shortened;Left shortened    Functional Measure:  Fiore Balance Test:    Sitting to Standin  Standing Unsupported: 3  Sitting with Back Unsupported: 4  Standing to Sitting: 3  Transfers: 2  Standing Unsupported with Eyes Closed: 2  Standing Unsupported with Feet Together: 1  Reach Forward with Outstretched Arm: 1   Object: 0  Turn to Look Over Shoulders: 0  Turn 360 Degrees: 0  Alternate Foot on Step/Stool: 0  Standing Unsupported One Foot in Front: 0  Stand on One Le  Total: 17         56=Maximum possible score;   0-20=High fall risk  21-40=Moderate fall risk   41-56=Low fall risk          Physical Therapy Evaluation Charge Determination   History Examination Presentation Decision-Making   MEDIUM  Complexity : 1-2 comorbidities / personal factors will impact the outcome/ POC  LOW Complexity : 1-2 Standardized tests and measures addressing body structure, function, activity limitation and / or participation in recreation  LOW Complexity : Stable, uncomplicated  LOW Complexity : FOTO score of       Based on the above components, the patient evaluation is determined to be of the following complexity level: LOW     Pain Rating:  No pain reported during evaluation    Activity Tolerance:   Good and SpO2 stable on RA  Please refer to the flowsheet for vital signs taken during this treatment. After treatment patient left in no apparent distress:   Sitting in chair, Call bell within reach, and Bed / chair alarm activated    COMMUNICATION/EDUCATION:   The patients plan of care was discussed with: Registered Nurse. Fall prevention education was provided and the patient/caregiver indicated understanding., Patient/family have participated as able in goal setting and plan of care. , and Patient/family agree to work toward stated goals and plan of care.     Thank you for this referral.  Carlota Chao, PT, DPT  Geriatric Clinical Specialist     Time Calculation: 19 mins

## 2020-01-04 NOTE — PROGRESS NOTES
[address] [facility] Adult  Hospitalist Group                                                                                          Hospitalist Progress Note  Tressa Chambers MD  Answering service: [phone] OR [phone] from in house phone        Date of Service:  1/3/2020  NAME:  Sean Rosenbaum  :  1955  MRN:  [id number]      Admission Summary: This is a 72-year-old woman with past medical history significant for cerebral aneurysm, status post  shunt placement; multiple cerebral infarcts resulting in memory impairment; hypertension; and type 2 diabetes, who was in her usual state of health until the day of her presentation at the emergency room when the patient developed dizziness. According to report, the patient's daughter is concerned that the patient may be having another episode of a stroke, because of that, she called 911. The patient initially complained of dizziness and the EMS was called but on the way to the emergency room, the dizziness resolved. The patient stated that she has been taking her medication for blood pressure. Her blood pressure was found to be elevated when the EMS arrived at the scene. She was brought to the emergency room for further evaluation. When the patient arrived at the emergency room, the patient's clinical presentation and symptoms triggered code sepsis. Code sepsis was called and code sepsis protocol was initiated. The patient was subsequently referred to the hospitalist service for evaluation for admission. The source of the sepsis was never identified. The patient was last admitted to this hospital from 10/18/2019 to 10/24/2019. The patient was admitted with confusion and change in mental status and subsequent evaluation of these symptoms revealed acute intracranial hemorrhage. The patient was treated conservatively, seen by the neurosurgeon, no intervention was advised. The patient was subsequently discharged to skilled nursing facility.   The patient is a very poor historian because of her memory impairment. Interval history / Subjective:     Patient with mild confusion  No specific complaints- denies chest pain or shortness of breath     Assessment & Plan:     1. Sepsis- unknown source of infection  Cont IV vanc and Zosyn. follow cultures. CTof chest/abdomen and pelvis unremarkable  2. Dizziness:  -resolved   Normal CT scan of the head   3. CV-Hypertension/tachycardia:    bp low/normal-/ mild tachycardia- hold home meds exp for BBlocker  4. Type 2 diabetes:   accuchecks and ss insulin  5. Hx of Cerebral aneurysm, status post  shunt:    CT scan of the head showed that the shunt is in satisfactory position. 6.  Hypokalemia, hypophosphatemia, hypomagnesemia- replete  Code status: Full Code  DVT prophylaxis: SCDs    Care Plan discussed with: Patient/Family  Disposition: Home w/Family and TBD     Hospital Problems  Date Reviewed: 1/3/2020          Codes Class Noted POA    * (Principal) Sepsis ([facility] [address] ICD-10-CM: A41.9  ICD-9-CM: 038.9, 995.91  1/2/2020 Yes                Review of Systems:   A comprehensive review of systems was negative except for that written in the HPI. Vital Signs:    Last 24hrs VS reviewed since prior progress note. Most recent are:  Visit Vitals  /81 (BP 1 Location: Right arm, BP Patient Position: Sitting)   Pulse 95   Temp 98.9 °F (37.2 °C)   Resp 16   Ht 5' 6\" (1.676 m)   Wt 69.2 kg (152 lb 8.9 oz)   SpO2 95%   BMI 24.62 kg/m²         Intake/Output Summary (Last 24 hours) at 1/3/2020 2223  Last data filed at 1/3/2020 2005  Gross per 24 hour   Intake 240 ml   Output    Net 240 ml        Physical Examination:             Constitutional:  No acute distress, cooperative on exam   ENT:  Oral mucous dry    Resp:  CTA bilaterally. No wheezing/rhonchi/rales. No accessory muscle use   CV:  Regular rhythm, normal rate, no murmurs, gallops, rubs    GI:  Soft, non distended, non tender.  normoactive bowel sounds, no hepatosplenomegaly     Musculoskeletal:  No edema, warm, 2+ pulses throughout    Neurologic:  Moves all extremities. AAOx3, CN II-XII reviewed     Psych:  flat affect       Data Review:    Review and/or order of tests in the radiology section of CPT  Review and/or order of tests in the medicine section of [facility]      Labs:     Recent Labs     01/03/20 0434 01/02/20 2110   WBC 4.7 5.5   HGB 11.2* 13.9   HCT 36.4 46.5    197     Recent Labs     01/03/20 0434 01/02/20 2110    140   K 3.3* 3.7   * 108   CO2 25 24   BUN 9 12   CREA 0.63 1.08*   * 151*   CA 8.6 9.8   MG 1.5*  --    PHOS 2.5*  --      Recent Labs     01/03/20 0434 01/02/20 2110   SGOT 28 47*   ALT 35 50   AP 60 83   TBILI 0.4 0.5   TP 6.5 8.9*   ALB 3.0* 4.0   GLOB 3.5 4.9*   LPSE 75  --      No results for input(s): INR, PTP, APTT, INREXT in the last 72 hours. No results for input(s): FE, TIBC, PSAT, FERR in the last 72 hours. No results found for: FOL, RBCF   No results for input(s): PH, PCO2, PO2 in the last 72 hours.   Recent Labs     01/03/20 0434 01/02/20 2110   TROIQ <0.05 <0.05     Lab Results   Component Value Date/Time    Cholesterol, total 170 01/03/2020 04:34 AM    HDL Cholesterol 29 01/03/2020 04:34 AM    LDL, calculated 117.8 (H) 01/03/2020 04:34 AM    Triglyceride 116 01/03/2020 04:34 AM    CHOL/HDL Ratio 5.9 (H) 01/03/2020 04:34 AM     Lab Results   Component Value Date/Time    Glucose (POC) 136 (H) 01/03/2020 10:00 PM    Glucose (POC) 108 (H) 01/03/2020 04:52 PM    Glucose (POC) 144 (H) 01/03/2020 11:20 AM    Glucose (POC) 299 (H) 10/24/2019 11:47 AM    Glucose (POC) 192 (H) 10/24/2019 07:21 AM     Lab Results   Component Value Date/Time    Color YELLOW/STRAW 01/02/2020 09:41 PM    Appearance CLEAR 01/02/2020 09:41 PM    Specific gravity 1.026 01/02/2020 09:41 PM    Specific gravity 1.020 12/22/2017 01:12 PM    pH (UA) 5.5 01/02/2020 09:41 PM    Protein 100 (A) 01/02/2020 09:41 PM    Glucose NEGATIVE 01/02/2020 09:41 PM    Ketone 15 (A) 01/02/2020 09:41 PM    Bilirubin NEGATIVE  01/02/2020 09:41 PM    Urobilinogen 1.0 01/02/2020 09:41 PM    Nitrites NEGATIVE  01/02/2020 09:41 PM    Leukocyte Esterase NEGATIVE  01/02/2020 09:41 PM    Epithelial cells FEW 01/02/2020 09:41 PM    Bacteria NEGATIVE  01/02/2020 09:41 PM    WBC 0-4 01/02/2020 09:41 PM    RBC 0-5 01/02/2020 09:41 PM         Medications Reviewed:     Current Facility-Administered Medications   Medication Dose Route Frequency    [Held by provider] amLODIPine (NORVASC) tablet 10 mg  10 mg Oral DAILY    ascorbic acid (vitamin C) (VITAMIN C) tablet 1,000 mg  1,000 mg Oral DAILY    ferrous sulfate tablet 325 mg  325 mg Oral ACB    folic acid (FOLVITE) tablet 1 mg  1 mg Oral DAILY    [Held by provider] lisinopril (PRINIVIL, ZESTRIL) tablet 40 mg  40 mg Oral DAILY    metoprolol succinate (TOPROL-XL) XL tablet 50 mg  50 mg Oral DAILY    sodium chloride (NS) flush 5-40 mL  5-40 mL IntraVENous Q8H    sodium chloride (NS) flush 5-40 mL  5-40 mL IntraVENous PRN    ondansetron (ZOFRAN) injection 4 mg  4 mg IntraVENous Q4H PRN    bisacodyl (DULCOLAX) tablet 5 mg  5 mg Oral DAILY PRN    heparin (porcine) injection 5,000 Units  5,000 Units SubCUTAneous Q8H    lactobac ac& pc-s.therm-b.anim (HENRI Q/RISAQUAD)  1 Cap Oral DAILY    piperacillin-tazobactam (ZOSYN) 3.375 g in 0.9% sodium chloride (MBP/ADV) 100 mL  3.375 g IntraVENous Q8H    insulin lispro (HUMALOG) injection   SubCUTAneous AC&HS    glucose chewable tablet 16 g  4 Tab Oral PRN    glucagon (GLUCAGEN) injection 1 mg  1 mg IntraMUSCular PRN    dextrose 10% infusion 0-250 mL  0-250 mL IntraVENous PRN    Vancomycin- pharmacy to dose   Other Rx Dosing/Monitoring    vancomycin (VANCOCIN) 1,000 mg in 0.9% sodium chloride (MBP/ADV) 250 mL  1,000 mg IntraVENous Q12H    sodium chloride (NS) flush 5-10 mL  5-10 mL IntraVENous PRN    acetaminophen (TYLENOL) tablet 650 mg  650 mg Oral Q4H PRN    lactated Ringers infusion  100 mL/hr IntraVENous CONTINUOUS     ______________________________________________________________________  EXPECTED LENGTH OF STAY: 3d 16h  ACTUAL LENGTH OF STAY:          1                 Deirdre Lara MD

## 2020-01-05 LAB
ANION GAP SERPL CALC-SCNC: 4 MMOL/L (ref 5–15)
BUN SERPL-MCNC: 9 MG/DL (ref 6–20)
BUN/CREAT SERPL: 11 (ref 12–20)
CALCIUM SERPL-MCNC: 9 MG/DL (ref 8.5–10.1)
CHLORIDE SERPL-SCNC: 106 MMOL/L (ref 97–108)
CO2 SERPL-SCNC: 29 MMOL/L (ref 21–32)
CREAT SERPL-MCNC: 0.82 MG/DL (ref 0.55–1.02)
ERYTHROCYTE [DISTWIDTH] IN BLOOD BY AUTOMATED COUNT: 15.8 % (ref 11.5–14.5)
GLUCOSE BLD STRIP.AUTO-MCNC: 115 MG/DL (ref 65–100)
GLUCOSE BLD STRIP.AUTO-MCNC: 122 MG/DL (ref 65–100)
GLUCOSE BLD STRIP.AUTO-MCNC: 139 MG/DL (ref 65–100)
GLUCOSE BLD STRIP.AUTO-MCNC: 94 MG/DL (ref 65–100)
GLUCOSE SERPL-MCNC: 109 MG/DL (ref 65–100)
HCT VFR BLD AUTO: 37.9 % (ref 35–47)
HGB BLD-MCNC: 11.5 G/DL (ref 11.5–16)
MCH RBC QN AUTO: 25.9 PG (ref 26–34)
MCHC RBC AUTO-ENTMCNC: 30.3 G/DL (ref 30–36.5)
MCV RBC AUTO: 85.4 FL (ref 80–99)
NRBC # BLD: 0 K/UL (ref 0–0.01)
NRBC BLD-RTO: 0 PER 100 WBC
PLATELET # BLD AUTO: 153 K/UL (ref 150–400)
PMV BLD AUTO: 10.4 FL (ref 8.9–12.9)
POTASSIUM SERPL-SCNC: 3.3 MMOL/L (ref 3.5–5.1)
RBC # BLD AUTO: 4.44 M/UL (ref 3.8–5.2)
SERVICE CMNT-IMP: ABNORMAL
SERVICE CMNT-IMP: NORMAL
SODIUM SERPL-SCNC: 139 MMOL/L (ref 136–145)
WBC # BLD AUTO: 5 K/UL (ref 3.6–11)

## 2020-01-05 PROCEDURE — 85027 COMPLETE CBC AUTOMATED: CPT

## 2020-01-05 PROCEDURE — 80048 BASIC METABOLIC PNL TOTAL CA: CPT

## 2020-01-05 PROCEDURE — 74011250637 HC RX REV CODE- 250/637: Performed by: INTERNAL MEDICINE

## 2020-01-05 PROCEDURE — 82962 GLUCOSE BLOOD TEST: CPT

## 2020-01-05 PROCEDURE — 36415 COLL VENOUS BLD VENIPUNCTURE: CPT

## 2020-01-05 PROCEDURE — 74011250636 HC RX REV CODE- 250/636: Performed by: INTERNAL MEDICINE

## 2020-01-05 PROCEDURE — 65270000032 HC RM SEMIPRIVATE

## 2020-01-05 PROCEDURE — 74011250637 HC RX REV CODE- 250/637: Performed by: FAMILY MEDICINE

## 2020-01-05 RX ORDER — ACETAMINOPHEN 325 MG/1
650 TABLET ORAL
Qty: 30 TAB | Refills: 0 | Status: SHIPPED
Start: 2020-01-05 | End: 2020-01-22 | Stop reason: ALTCHOICE

## 2020-01-05 RX ORDER — INSULIN LISPRO 100 [IU]/ML
2 INJECTION, SOLUTION INTRAVENOUS; SUBCUTANEOUS
Qty: 1 VIAL | Refills: 0 | Status: SHIPPED
Start: 2020-01-05 | End: 2020-01-10 | Stop reason: SDUPTHER

## 2020-01-05 RX ORDER — MAGNESIUM SULFATE 100 %
4 CRYSTALS MISCELLANEOUS AS NEEDED
Qty: 30 TAB | Refills: 0 | Status: SHIPPED
Start: 2020-01-05 | End: 2021-01-07 | Stop reason: ALTCHOICE

## 2020-01-05 RX ORDER — AMLODIPINE BESYLATE 5 MG/1
5 TABLET ORAL DAILY
Qty: 30 TAB | Refills: 0 | Status: SHIPPED
Start: 2020-01-06 | End: 2020-01-10 | Stop reason: SDUPTHER

## 2020-01-05 RX ORDER — LEVOFLOXACIN 750 MG/1
750 TABLET ORAL
Qty: 7 TAB | Refills: 0 | Status: SHIPPED | OUTPATIENT
Start: 2020-01-06 | End: 2020-01-22 | Stop reason: ALTCHOICE

## 2020-01-05 RX ADMIN — HEPARIN SODIUM 5000 UNITS: 5000 INJECTION INTRAVENOUS; SUBCUTANEOUS at 12:23

## 2020-01-05 RX ADMIN — Medication 10 ML: at 07:19

## 2020-01-05 RX ADMIN — METOPROLOL SUCCINATE 50 MG: 50 TABLET, EXTENDED RELEASE ORAL at 09:05

## 2020-01-05 RX ADMIN — Medication 1 CAPSULE: at 09:05

## 2020-01-05 RX ADMIN — HEPARIN SODIUM 5000 UNITS: 5000 INJECTION INTRAVENOUS; SUBCUTANEOUS at 19:40

## 2020-01-05 RX ADMIN — FERROUS SULFATE TAB 325 MG (65 MG ELEMENTAL FE) 325 MG: 325 (65 FE) TAB at 07:18

## 2020-01-05 RX ADMIN — AMLODIPINE BESYLATE 5 MG: 5 TABLET ORAL at 09:05

## 2020-01-05 RX ADMIN — LEVOFLOXACIN 750 MG: 750 TABLET, FILM COATED ORAL at 07:18

## 2020-01-05 RX ADMIN — FOLIC ACID 1 MG: 1 TABLET ORAL at 09:05

## 2020-01-05 RX ADMIN — OXYCODONE HYDROCHLORIDE AND ACETAMINOPHEN 1000 MG: 500 TABLET ORAL at 09:04

## 2020-01-05 RX ADMIN — HEPARIN SODIUM 5000 UNITS: 5000 INJECTION INTRAVENOUS; SUBCUTANEOUS at 04:19

## 2020-01-05 NOTE — DISCHARGE INSTRUCTIONS
Continue antibiotics as directed  Continue OT/PT- patient is a fall risk  Diabetic diet- monitor glucose levels with accuchecks AC and QHS  Monitor Blood pressure daily  Due to patient's recent infection we are holding her lisinopril and she is taking 1/2 of her prior home dose of amlodipine  Follow up with primary care MD after DC from rehab for medication updates

## 2020-01-05 NOTE — PROGRESS NOTES
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 According to report, the patient's daughter is concerned that the patient may be having another episode of a stroke, because of that, she called 911. The patient initially complained of dizziness and the EMS was called but on the way to the emergency room, the dizziness resolved. The patient stated that she has been taking her medication for blood pressure. Her blood pressure was found to be elevated when the EMS arrived at the scene. She was brought to the emergency room for further evaluation. When the patient arrived at the emergency room, the patient's clinical presentation and symptoms triggered code sepsis. Code sepsis was called and code sepsis protocol was initiated. The patient was subsequently referred to the hospitalist service for evaluation for admission. The source of the sepsis was never identified. The patient was last admitted to this hospital from 10/18/2019 to 10/24/2019. The patient was admitted with confusion and change in mental status and subsequent evaluation of these symptoms revealed acute intracranial hemorrhage. The patient was treated conservatively, seen by the neurosurgeon, no intervention was advised. The patient was subsequently discharged to skilled nursing facility.   The patient is a very poor historian because of her memory impairment. Interval history / Subjective:     Patient without complaints  denies chest pain or shortness of breath  No GI complaints     Assessment & Plan:     1. Sepsis- unknown source of infection  stopped IV vanc and Zosyn. follow cultures. Started po levaquin   CTof chest/abdomen and pelvis unremarkable  2. Dizziness:  -resolved   Normal CT scan of the head   3. CV-Hypertension/tachycardia:    bp low/normal-/ mild tachycardia- hold home meds exp for BBlocker  4. Type 2 diabetes:   accuchecks and ss insulin  5. Hx of Cerebral aneurysm, status post  shunt:    CT scan of the head showed that the shunt is in satisfactory position. 6.  Hypokalemia, hypophosphatemia, hypomagnesemia- replete  Code status: Full Code  DVT prophylaxis: SCDs    Care Plan discussed with: Patient/Family  Disposition: Home w/Family and TBD     Hospital Problems  Date Reviewed: 1/3/2020          Codes Class Noted POA    * (Principal) [facility]eps[facility] ([facility] ICD-10-CM: A41.9  ICD-9-CM: 038.9, 995.91  1/2/2020 Yes                Review of Systems:   A comprehensive review of systems was negative except for that written in the HPI. Vital Signs:    Last 24hrs VS reviewed since prior progress note. Most recent are:  Visit Vitals  /64 (BP 1 Location: Right arm, BP Patient Position: Sitting)   Pulse 80   Temp 98.2 °F (36.8 °C)   Resp 16   Ht 5' 6\" (1.676 m)   Wt 71.2 kg (156 lb 15.5 oz)   SpO2 98%   BMI 25.34 kg/m²       No intake or output data in the 24 hours ending 01/04/20 2918     Physical Examination:             Constitutional:  No acute distress, cooperative on exam   ENT:  Oral mucous dry    Resp:  CTA bilaterally. No wheezing/rhonchi/rales. No accessory muscle use   CV:  Regular rhythm, normal rate, no murmurs, gallops, rubs    GI:  Soft, non distended, non tender.  normoactive bowel sounds, no hepatosplenomegaly     Musculoskeletal:  No edema, warm, 2+ pulses throughout Neurologic:  Moves all extremities. AAOx3, CN II-XII reviewed     Psych:  flat affect       Data Review:    Review and/or order of tests in the radiology section of CPT  Review and/or order of tests in the medicine section of CPT      Labs:     Recent Labs     01/04/20 0430 01/03/20 0434   WBC 5.1 4.7   HGB 12.1 11.2*   HCT 39.2 36.4    159     Recent Labs     01/04/20 0430 01/03/20 0434 01/02/20 2110    142 140   K 3.7 3.3* 3.7    111* 108   CO2 27 25 24   BUN 7 9 12   CREA 0.80 0.63 1.08*   GLU 92 119* 151*   CA 9.4 8.6 9.8   MG 1.7 1.5*  --    PHOS 2.6 2.5*  --      Recent Labs     01/03/20 0434 01/02/20 2110   SGOT 28 47*   ALT 35 50   AP 60 83   TBILI 0.4 0.5   TP 6.5 8.9*   ALB 3.0* 4.0   GLOB 3.5 4.9*   LPSE 75  --      No results for input(s): INR, PTP, APTT, INREXT, INREXT in the last 72 hours. No results for input(s): FE, TIBC, PSAT, FERR in the last 72 hours. No results found for: FOL, RBCF   No results for input(s): PH, PCO2, PO2 in the last 72 hours.   Recent Labs     01/03/20 0434 01/02/20 2110   TROIQ <0.05 <0.05     Lab Results   Component Value Date/Time    Cholesterol, total 170 01/03/2020 04:34 AM    HDL Cholesterol 29 01/03/2020 04:34 AM    LDL, calculated 117.8 (H) 01/03/2020 04:34 AM    Triglyceride 116 01/03/2020 04:34 AM    CHOL/HDL Ratio 5.9 (H) 01/03/2020 04:34 AM     Lab Results   Component Value Date/Time    Glucose (POC) 109 (H) 01/04/2020 09:42 PM    Glucose (POC) 113 (H) 01/04/2020 04:10 PM    Glucose (POC) 118 (H) 01/04/2020 11:33 AM    Glucose (POC) 110 (H) 01/04/2020 07:14 AM    Glucose (POC) 136 (H) 01/03/2020 10:00 PM     Lab Results   Component Value Date/Time    Color YELLOW/STRAW 01/02/2020 09:41 PM    Appearance CLEAR 01/02/2020 09:41 PM    Specific gravity 1.026 01/02/2020 09:41 PM    Specific gravity 1.020 12/22/2017 01:12 PM    pH (UA) 5.5 01/02/2020 09:41 PM    Protein 100 (A) 01/02/2020 09:41 PM    Glucose NEGATIVE  01/02/2020 09:41 PM Ketone 15 (A) 01/02/2020 09:41 PM    Bilirubin NEGATIVE  01/02/2020 09:41 PM    Urobilinogen 1.0 01/02/2020 09:41 PM    Nitrites NEGATIVE  01/02/2020 09:41 PM    Leukocyte Esterase NEGATIVE  01/02/2020 09:41 PM    Epithelial cells FEW 01/02/2020 09:41 PM    Bacteria NEGATIVE  01/02/2020 09:41 PM    WBC 0-4 01/02/2020 09:41 PM    RBC 0-5 01/02/2020 09:41 PM         Medications Reviewed:     Current Facility-Administered Medications   Medication Dose Route Frequency    levoFLOXacin (LEVAQUIN) tablet 750 mg  750 mg Oral ACB    amLODIPine (NORVASC) tablet 5 mg  5 mg Oral DAILY    lisinopril (PRINIVIL, ZESTRIL) tablet 5 mg  5 mg Oral DAILY    ascorbic acid (vitamin C) (VITAMIN C) tablet 1,000 mg  1,000 mg Oral DAILY    ferrous sulfate tablet 325 mg  325 mg Oral ACB    folic acid (FOLVITE) tablet 1 mg  1 mg Oral DAILY    metoprolol succinate (TOPROL-XL) XL tablet 50 mg  50 mg Oral DAILY    sodium chloride (NS) flush 5-40 mL  5-40 mL IntraVENous Q8H    sodium chloride (NS) flush 5-40 mL  5-40 mL IntraVENous PRN    ondansetron (ZOFRAN) injection 4 mg  4 mg IntraVENous Q4H PRN    bisacodyl (DULCOLAX) tablet 5 mg  5 mg Oral DAILY PRN    heparin (porcine) injection 5,000 Units  5,000 Units SubCUTAneous Q8H    lactobac ac& pc-s.therm-b.anim (HENRI Q/RISAQUAD)  1 Cap Oral DAILY    insulin lispro (HUMALOG) injection   SubCUTAneous AC&HS    glucose chewable tablet 16 g  4 Tab Oral PRN    glucagon (GLUCAGEN) injection 1 mg  1 mg IntraMUSCular PRN    dextrose 10% infusion 0-250 mL  0-250 mL IntraVENous PRN    sodium chloride (NS) flush 5-10 mL  5-10 mL IntraVENous PRN    acetaminophen (TYLENOL) tablet 650 mg  650 mg Oral Q4H PRN    lactated Ringers infusion  100 mL/hr IntraVENous CONTINUOUS     ______________________________________________________________________  EXPECTED LENGTH OF STAY: 3d 16h  ACTUAL LENGTH OF STAY:          2                 Michelle Canales MD

## 2020-01-05 NOTE — PROGRESS NOTES
Problem: Falls - Risk of  Goal: *Absence of Falls  Description  Document Joycelynvikram Rodriguezaurora Fall Risk and appropriate interventions in the flowsheet.   Outcome: Progressing Towards Goal  Note: Fall Risk Interventions:  Mobility Interventions: Communicate number of staff needed for ambulation/transfer    Mentation Interventions: Bed/chair exit alarm, Door open when patient unattended, Increase mobility, More frequent rounding, Reorient patient, Update white board, Toileting rounds    Medication Interventions: Evaluate medications/consider consulting pharmacy, Patient to call before getting OOB, Teach patient to arise slowly    Elimination Interventions: Call light in reach, Toileting schedule/hourly rounds, Patient to call for help with toileting needs, Stay With Me (per policy)              Problem: Patient Education: Go to Patient Education Activity  Goal: Patient/Family Education  Outcome: Progressing Towards Goal     Problem: Sepsis: Day 2  Goal: Off Pathway (Use only if patient is Off Pathway)  Outcome: Progressing Towards Goal  Goal: Activity/Safety  Outcome: Progressing Towards Goal

## 2020-01-05 NOTE — ROUTINE PROCESS
Bedside shift change report given to Dylan Marcum (oncoming nurse) by Ivis Parker RN (offgoing nurse). Report included the following information SBAR, Kardex, Procedure Summary, Intake/Output, MAR, Accordion, Recent Results, Med Rec Status, Cardiac Rhythm NSR and Alarm Parameters .

## 2020-01-05 NOTE — DISCHARGE SUMMARY
Discharge Summary       PATIENT ID: Jackson Short  MRN: [id number]   YOB: 1955    DATE OF ADMISSION: 1/2/2020  8:52 PM    DATE OF DISCHARGE: 1/6/2020 9am  PRIMARY CARE PROVIDER: Orlin Galloway     ATTENDING PHYSICIAN: Ely Fox  DISCHARGING PROVIDER: Curtis Cabral MD    To contact this individual call [phone] and ask the  to page. If unavailable ask to be transferred the Adult Hospitalist Department.     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  According to report, the patient's daughter is concerned that the patient may be having another episode of a stroke, because of that, she called 911.  The patient initially complained of dizziness and the EMS was called but on the way to the emergency room, the dizziness resolved.  The patient stated that she has been taking her medication for blood pressure. Nkechi Pugh blood pressure was found to be elevated when the EMS arrived at the scene. Katia Schulz was brought to the emergency room for further evaluation.  When the patient arrived at the emergency room, the patient's clinical presentation and symptoms triggered code sepsis.  Code sepsis was called and code sepsis protocol was initiated.  The patient was subsequently referred to the hospitalist service for evaluation for admission.  The source of the sepsis was never identified.  The patient was last admitted to this hospital from 10/18/2019 to 10/24/2019.  The patient was admitted with confusion and change in mental status and subsequent evaluation of these symptoms revealed acute intracranial hemorrhage.  The patient was treated conservatively, seen by the neurosurgeon, no intervention was advised.  The patient was subsequently discharged to skilled nursing facility.  The patient is a very poor historian because of her memory impairment    DISCHARGE DIAGNOSES / PLAN:      1.  Sepsis- unknown source of infection- suspected UTI  stopped IV vanc and Zosyn. blood cultures= NGTD. Started po levaquin   CTof chest/abdomen and pelvis unremarkable  2.  Dizziness:  -resolved   Normal CT scan of the head   3.  CV-Hypertension/tachycardia:    bp low/normal-/ mild tachycardia- hold home meds exp for BBlocker  4.  Type 2 diabetes:   accuchecks and ss insulin  5.  Hx of Cerebral aneurysm, status post  shunt:    CT scan of the head showed that the shunt is in satisfactory position.    6. Hypokalemia, hypophosphatemia, hypomagnesemia- replete  Code status: Full Code     ADDITIONAL CARE RECOMMENDATIONS:   Continue antibiotics as directed  Continue OT/PT- patient is a fall risk  Diabetic diet- monitor glucose levels with accuchecks AC and QHS  Monitor Blood pressure daily  Due to patient's recent infection we are holding her lisinopril and she is taking 1/2 of her prior home dose of amlodipine  Follow up with primary care MD after DC from rehab for medication updates     PENDING TEST RESULTS:   At the time of discharge the following test results are still pending: final blood culture results    FOLLOW UP APPOINTMENTS:    Follow-up Information     Follow up With Specialties Details Why Contact Gali Rienoso [phone]               DIET: Diabetic    ACTIVITY: Activity as tolerated    WOUND CARE: NA    EQUIPMENT needed: none      DISCHARGE MEDICATIONS:  Current Discharge Medication List      START taking these medications    Details   acetaminophen (TYLENOL) 325 mg tablet Take 2 Tabs by mouth every four (4) hours as needed for Pain. Qty: 30 Tab, Refills: 0      L. acidoph & paracasei- S therm- Bifido (HENRI-Q/RISAQUAD) 8 billion cell cap cap Take 1 Cap by mouth daily. Qty: 30 Cap, Refills: 0      levoFLOXacin (LEVAQUIN) 750 mg tablet Take 1 Tab by mouth Daily (before breakfast). Qty: 7 Tab, Refills: 0      glucose 4 gram chewable tablet Take 4 Tabs by mouth as needed for Other (glucose levels < 70). Qty: 30 Tab, Refills: 0         CONTINUE these medications which have CHANGED    Details   amLODIPine (NORVASC) 5 mg tablet Take 1 Tab by mouth daily. Qty: 30 Tab, Refills: 0      insulin lispro (HUMALOG) 100 unit/mL injection 2 Units by SubCUTAneous route Before breakfast, lunch, and dinner. For glucose 150-200 =2 units, 201-250=4 units, 251-300= 6 units, 301-350 8 units, 351-400= 10 units  Qty: 1 Vial, Refills: 0         CONTINUE these medications which have NOT CHANGED    Details   metoprolol succinate (TOPROL-XL) 50 mg XL tablet Take 1 Tab by mouth daily. Qty: 30 Tab, Refills: 0      metFORMIN (GLUCOPHAGE) 500 mg tablet Take 1 Tab by mouth two (2) times daily (with meals). Qty: 60 Tab, Refills: 0      ferrous sulfate 325 mg (65 mg iron) tablet Take 1 Tab by mouth Daily (before breakfast). Qty: 14 Tab, Refills: 0      ascorbic acid, vitamin C, (VITAMIN C) 1,000 mg tablet Take 1 Tab by mouth daily. Qty: 14 Tab, Refills: 0      folic acid (FOLVITE) 1 mg tablet Take 1 Tab by mouth daily. Qty: 14 Tab, Refills: 0         STOP taking these medications       lisinopril (PRINIVIL, ZESTRIL) 40 mg tablet Comments:   Reason for Stopping:                 NOTIFY YOUR PHYSICIAN FOR ANY OF THE FOLLOWING:   Fever over 101 degrees for 24 hours. Chest pain, shortness of breath, fever, chills, nausea, vomiting, diarrhea, change in mentation, falling, weakness, bleeding. Severe pain or pain not relieved by medications. Or, any other signs or symptoms that you may have questions about.     DISPOSITION:    Home With:   OT  PT  HH  RN      X SNF/Inpatient Rehab    Independent/assisted living    Hospice    Other:       PATIENT CONDITION AT DISCHARGE:     Functional status    Poor    X Deconditioned     Independent      Cognition     Lucid    X Forgetful     Dementia      Catheters/lines (plus indication)    Tristan     PICC     PEG    X None      Code status   X  Full code     DNR      PHYSICAL EXAMINATION AT DISCHARGE:  Patient Vitals for the past 24 hrs:   Temp Pulse Resp BP SpO2   01/06/20 1027  71  118/72 100 %   01/06/20 0751 97.7 °F (36.5 °C) 70 17 138/84 98 %   01/05/20 2318 98 °F (36.7 °C) 60 16 133/81 99 %   01/05/20 1616 97.5 °F (36.4 °C) 67 16 132/84 100 %   01/05/20 1515 97.8 °F (36.6 °C) 72 16 104/62 98 %   01/05/20 1138 98 °F (36.7 °C) 64 16 100/66 94 %                                                 Constitutional:  No acute distress, cooperative on exam   ENT:  Oral mucous dry    Resp:  CTA bilaterally. No wheezing/rhonchi/rales. No accessory muscle use   CV:  Regular rhythm, normal rate, no murmurs, gallops, rubs    GI:  Soft, non distended, non tender. normoactive bowel sounds, no hepatosplenomegaly     Musculoskeletal:  No edema, warm, 2+ pulses throughout    Neurologic:  Moves all extremities.   AAOx3, CN II-XII reviewed                         Psych:  flat affect  Recent Results (from the past 24 hour(s))   GLUCOSE, POC    Collection Time: 01/05/20 12:02 PM   Result Value Ref Range    Glucose (POC) 139 (H) 65 - 100 mg/dL    Performed by Jolynn Miller    GLUCOSE, POC    Collection Time: 01/05/20  4:25 PM   Result Value Ref Range    Glucose (POC) 115 (H) 65 - 100 mg/dL    Performed by Dru Phillips, POC    Collection Time: 01/05/20  9:16 PM   Result Value Ref Range    Glucose (POC) 122 (H) 65 - 100 mg/dL    Performed by [id number], BASIC    Collection Time: 01/06/20  3:33 AM   Result Value Ref Range    Sodium 140 136 - 145 mmol/L    Potassium 3.4 (L) 3.5 - 5.1 mmol/L    Chloride 105 97 - 108 mmol/L    CO2 29 21 - 32 mmol/L    Anion gap 6 5 - 15 mmol/L    Glucose 98 65 - 100 mg/dL    BUN 12 6 - 20 MG/DL    Creatinine 0.81 0.55 - 1.02 MG/DL    BUN/Creatinine ratio 15 12 - 20      GFR est AA >60 >60 ml/min/1.73m2    GFR est non-AA >60 >60 ml/min/1.73m2    Calcium 9.5 8.5 - 10.1 MG/DL   CBC W/O DIFF    Collection Time: 01/06/20  3:33 AM   Result Value Ref Range    WBC 5.8 3.6 - 11.0 K/uL    RBC 4.60 3.80 - 5.20 M/uL    HGB 12.1 11.5 - 16.0 g/dL    HCT 39.1 35.0 - 47.0 %    MCV 85.0 80.0 - 99.0 FL    MCH 26.3 26.0 - 34.0 PG    MCHC 30.9 30.0 - 36.5 g/dL    RDW 15.9 (H) 11.5 - 14.5 %    PLATELET 317 521 - 464 K/uL    MPV 11.0 8.9 - 12.9 FL    NRBC 0.0 0  WBC    ABSOLUTE NRBC 0.00 0.00 - 0.01 K/uL   MAGNESIUM    Collection Time: 01/06/20  3:33 AM   Result Value Ref Range    Magnesium 1.9 1.6 - 2.4 mg/dL   PHOSPHORUS    Collection Time: 01/06/20  3:33 AM   Result Value Ref Range    Phosphorus 3.4 2.6 - 4.7 MG/DL   GLUCOSE, POC    Collection Time: 01/06/20  6:02 AM   Result Value Ref Range    Glucose (POC) 90 65 - 100 mg/dL    Performed by [address]:  Problem List as of 1/5/2020 Date Reviewed: 1/3/2020          Codes Class Noted - Resolved    * (Principal) Sepsis ([facility] ICD-10-CM: A41.9  ICD-9-CM: 038.9, 995.91  1/2/2020 - Present        ICH (intracerebral hemorrhage) ([facility] ICD-10-CM: I61.9  ICD-9-CM: 394  10/18/2019 - Present        Intracranial hemorrhage ([facility] ICD-10-CM: I62.9  ICD-9-CM: 432.9  10/18/2019 - Present        Acute lower GI bleeding ICD-10-CM: K92.2  ICD-9-CM: 578.9  4/2/2017 - Present        Type 2 diabetes mellitus ([facility] ICD-10-CM: E11.9  ICD-9-CM: 250.00  4/2/2017 - Present        Intellectual delay ICD-10-CM: F81.9  ICD-9-CM: 315.9  4/2/2017 - Present        H/O cerebral aneurysm repair ICD-10-CM: Z98.890, Z86.79  ICD-9-CM: V45.89  4/2/2017 - Present        Altered mental status ICD-10-CM: R41.82  ICD-9-CM: 780.97  11/24/2016 - Present        Acute encephalopathy ICD-10-CM: G93.40  ICD-9-CM: 348.30 11/22/2016 - Present        Hypertension (Chronic) ICD-10-CM: I10  ICD-9-CM: 401.9  11/22/2016 - Present         (ventriculoperitoneal) shunt status (Chronic) ICD-10-CM: Z98.2  ICD-9-CM: V45.2  11/22/2016 - Present        Diabetes ([facility] (Chronic) ICD-10-CM: E11.9  ICD-9-CM: 250.00  11/22/2016 - Present              Greater than 30 minutes were spent with the patient on counseling and coordination of care    Signed:   Adrian Willingham MD  1/6/2020  10:30am

## 2020-01-05 NOTE — PROGRESS NOTES
TRANSFER - OUT REPORT:    Verbal report given to Jessica(name) on Jayne Aly  being transferred to [facility](unit) for routine progression of care       Report consisted of patients Situation, Background, Assessment and   Recommendations(SBAR). Information from the following report(s) SBAR, Kardex, ED Summary, OR Summary, Procedure Summary, Intake/Output, MAR, Recent Results and Cardiac Rhythm NSR was reviewed with the receiving nurse. Lines:   Peripheral IV 01/02/20 Right Antecubital (Active)   Site Assessment Clean, dry, & intact 1/5/2020 12:00 PM   Phlebitis Assessment 0 1/5/2020 12:00 PM   Infiltration Assessment 0 1/5/2020 12:00 PM   Dressing Status Clean, dry, & intact 1/5/2020 12:00 PM   Dressing Type Tape;Transparent 1/5/2020 12:00 PM   Hub Color/Line Status Pink;Capped;Flushed 1/5/2020 12:00 PM   Action Taken Open ports on tubing capped 1/5/2020 12:00 PM   Alcohol Cap Used Yes 1/5/2020 12:00 PM       Peripheral IV 01/03/20 Distal;Posterior;Right Forearm (Active)   Site Assessment Clean, dry, & intact 1/5/2020 12:00 PM   Phlebitis Assessment 0 1/5/2020 12:00 PM   Infiltration Assessment 0 1/5/2020 12:00 PM   Dressing Status Clean, dry, & intact 1/5/2020 12:00 PM   Dressing Type Tape;Transparent 1/5/2020 12:00 PM   Hub Color/Line Status Capped;Flushed;Pink 1/5/2020 12:00 PM   Action Taken Open ports on tubing capped 1/5/2020 12:00 PM   Alcohol Cap Used Yes 1/5/2020 12:00 PM        Opportunity for questions and clarification was provided.       Patient transported with:   Tech        Patient belongings

## 2020-01-06 VITALS
TEMPERATURE: 98.3 F | SYSTOLIC BLOOD PRESSURE: 142 MMHG | WEIGHT: 157.1 LBS | RESPIRATION RATE: 15 BRPM | BODY MASS INDEX: 25.25 KG/M2 | OXYGEN SATURATION: 92 % | HEIGHT: 66 IN | DIASTOLIC BLOOD PRESSURE: 88 MMHG | HEART RATE: 83 BPM

## 2020-01-06 LAB
ANION GAP SERPL CALC-SCNC: 6 MMOL/L (ref 5–15)
BUN SERPL-MCNC: 12 MG/DL (ref 6–20)
BUN/CREAT SERPL: 15 (ref 12–20)
CALCIUM SERPL-MCNC: 9.5 MG/DL (ref 8.5–10.1)
CHLORIDE SERPL-SCNC: 105 MMOL/L (ref 97–108)
CO2 SERPL-SCNC: 29 MMOL/L (ref 21–32)
CREAT SERPL-MCNC: 0.81 MG/DL (ref 0.55–1.02)
ERYTHROCYTE [DISTWIDTH] IN BLOOD BY AUTOMATED COUNT: 15.9 % (ref 11.5–14.5)
GLUCOSE BLD STRIP.AUTO-MCNC: 114 MG/DL (ref 65–100)
GLUCOSE BLD STRIP.AUTO-MCNC: 90 MG/DL (ref 65–100)
GLUCOSE SERPL-MCNC: 98 MG/DL (ref 65–100)
HCT VFR BLD AUTO: 39.1 % (ref 35–47)
HGB BLD-MCNC: 12.1 G/DL (ref 11.5–16)
MAGNESIUM SERPL-MCNC: 1.9 MG/DL (ref 1.6–2.4)
MCH RBC QN AUTO: 26.3 PG (ref 26–34)
MCHC RBC AUTO-ENTMCNC: 30.9 G/DL (ref 30–36.5)
MCV RBC AUTO: 85 FL (ref 80–99)
NRBC # BLD: 0 K/UL (ref 0–0.01)
NRBC BLD-RTO: 0 PER 100 WBC
PHOSPHATE SERPL-MCNC: 3.4 MG/DL (ref 2.6–4.7)
PLATELET # BLD AUTO: 175 K/UL (ref 150–400)
PMV BLD AUTO: 11 FL (ref 8.9–12.9)
POTASSIUM SERPL-SCNC: 3.4 MMOL/L (ref 3.5–5.1)
RBC # BLD AUTO: 4.6 M/UL (ref 3.8–5.2)
SERVICE CMNT-IMP: ABNORMAL
SERVICE CMNT-IMP: NORMAL
SODIUM SERPL-SCNC: 140 MMOL/L (ref 136–145)
WBC # BLD AUTO: 5.8 K/UL (ref 3.6–11)

## 2020-01-06 PROCEDURE — 97535 SELF CARE MNGMENT TRAINING: CPT

## 2020-01-06 PROCEDURE — 97116 GAIT TRAINING THERAPY: CPT

## 2020-01-06 PROCEDURE — 82962 GLUCOSE BLOOD TEST: CPT

## 2020-01-06 PROCEDURE — 36415 COLL VENOUS BLD VENIPUNCTURE: CPT

## 2020-01-06 PROCEDURE — 85027 COMPLETE CBC AUTOMATED: CPT

## 2020-01-06 PROCEDURE — 74011250637 HC RX REV CODE- 250/637: Performed by: INTERNAL MEDICINE

## 2020-01-06 PROCEDURE — 83735 ASSAY OF MAGNESIUM: CPT

## 2020-01-06 PROCEDURE — 80048 BASIC METABOLIC PNL TOTAL CA: CPT

## 2020-01-06 PROCEDURE — 84100 ASSAY OF PHOSPHORUS: CPT

## 2020-01-06 PROCEDURE — 74011250637 HC RX REV CODE- 250/637: Performed by: FAMILY MEDICINE

## 2020-01-06 PROCEDURE — 97166 OT EVAL MOD COMPLEX 45 MIN: CPT

## 2020-01-06 PROCEDURE — 74011250636 HC RX REV CODE- 250/636: Performed by: INTERNAL MEDICINE

## 2020-01-06 RX ADMIN — FOLIC ACID 1 MG: 1 TABLET ORAL at 10:28

## 2020-01-06 RX ADMIN — LEVOFLOXACIN 750 MG: 750 TABLET, FILM COATED ORAL at 07:13

## 2020-01-06 RX ADMIN — AMLODIPINE BESYLATE 5 MG: 5 TABLET ORAL at 10:28

## 2020-01-06 RX ADMIN — Medication 1 CAPSULE: at 10:29

## 2020-01-06 RX ADMIN — OXYCODONE HYDROCHLORIDE AND ACETAMINOPHEN 1000 MG: 500 TABLET ORAL at 10:29

## 2020-01-06 RX ADMIN — Medication 10 ML: at 07:11

## 2020-01-06 RX ADMIN — HEPARIN SODIUM 5000 UNITS: 5000 INJECTION INTRAVENOUS; SUBCUTANEOUS at 03:31

## 2020-01-06 RX ADMIN — FERROUS SULFATE TAB 325 MG (65 MG ELEMENTAL FE) 325 MG: 325 (65 FE) TAB at 07:11

## 2020-01-06 RX ADMIN — HEPARIN SODIUM 5000 UNITS: 5000 INJECTION INTRAVENOUS; SUBCUTANEOUS at 13:14

## 2020-01-06 RX ADMIN — METOPROLOL SUCCINATE 50 MG: 50 TABLET, EXTENDED RELEASE ORAL at 10:28

## 2020-01-06 NOTE — PROGRESS NOTES
Occupational Therapy    Orders received, chart reviewed and patient evaluated by occupational therapy. Pending progression with skilled acute occupational therapy, recommend:  Therapy up to 5 days/week in SNF setting or an intensive home health therapy program     Recommend with nursing patient to complete as able in order to maintain strength, endurance and independence: OOB to chair 3x/day with min A and functional mobility to the bathroom with min A.  LOB needing physical assistance to correct. Thank you for your assistance. Full evaluation to follow.      Jane Blanchard, OT

## 2020-01-06 NOTE — PROGRESS NOTES
Bedside shift change report given to Sharon Fraser (oncoming nurse) by Trung Santana (offgoing nurse). Report included the following information SBAR.

## 2020-01-06 NOTE — PROGRESS NOTES
Problem: Self Care Deficits Care Plan (Adult)  Goal: *Acute Goals and Plan of Care (Insert Text)  Description    FUNCTIONAL STATUS PRIOR TO ADMISSION: Patient was modified independent using a single point cane for functional mobility. HOME SUPPORT: The patient lived with family(niece). Occupational Therapy Goals  Initiated 1/6/2020  1. Patient will perform standing grooming x 5 mins with supervision/set-up within 7 day(s). 2.  Patient will perform lower body dressing with minimal assistance/contact guard assist within 7 day(s). 3.  Patient will perform bathing with minimal assistance/contact guard assist within 7 day(s). 4.  Patient will perform toilet transfers with supervision/set-up within 7 day(s). 5.  Patient will perform all aspects of toileting with supervision/set-up within 7 day(s). 6.  Patient will participate in upper extremity therapeutic exercise/activities with supervision/set-up for 8 minutes within 7 day(s). Outcome: Progressing Towards Goal   OCCUPATIONAL THERAPY EVALUATION  Patient: Cleo Barrera (24 y.o. female)  Date: 1/6/2020  Primary Diagnosis: Sepsis ([facility] [address] [A41.9]        Precautions: Fall, chair alarm       ASSESSMENT  Based on the objective data described below, the patient presents with impaired functional balance, mobility, and safety awareness necessary in ADL tasks with admission for sepsis. Hx of  shunt, stroke, memory impairment. She is oriented to self and place only, good command follow. Amb to bathroom for hand hygiene task prior to lunch with initial CGA with moderate LOB with turning to her right needing mod-max A to correct. Questionable inattention to L as she needed cues for tracking to L for papertowels or chair all located on her left. Set up for lunch while up in chair with alarm on.      Current Level of Function Impacting Discharge (ADLs/self-care): grooming standing at sink with CGA, self care transfers CGA with one LOB needing mod-max A to correct, and socks max A    Functional Outcome Measure: The patient scored 50/100 on the  Barthel Index outcome measure which is indicative of moderate impairment with ADL tasks. Other factors to consider for discharge: Patient has 24 hour assistance at home, per chart review family is considering SNF. Brief SNF would allow for additional rehab to maximize her indep with ADL tasks, decrease fall risk and decrease caregiver burden. Patient will benefit from skilled therapy intervention to address the above noted impairments. PLAN :  Recommendations and Planned Interventions: self care training, functional mobility training, therapeutic exercise, balance training, therapeutic activities, endurance activities, patient education, home safety training, and family training/education    Frequency/Duration: Patient will be followed by occupational therapy 5 times a week to address goals. Recommendation for discharge: (in order for the patient to meet his/her long term goals)  Therapy up to 5 days/week in SNF setting or an intensive home health therapy program with 24 hour supervision    This discharge recommendation:  Has been made in collaboration with the attending provider and/or case management    IF patient discharges home will need the following DME: TBD        SUBJECTIVE:   Patient stated I didn't know it was here.  referring to lunch on tray    OBJECTIVE DATA SUMMARY:   HISTORY:   Past Medical History:   Diagnosis Date    Diabetes ([facility] [address]     Hypertension     Stroke [facility])      Past Surgical History:   Procedure Laterality Date    HX ORTHOPAEDIC  1996    back surgery    NEUROLOGICAL PROCEDURE UNLISTED  2008    shunt placement       Expanded or extensive additional review of patient history:     Home Situation  Home Environment: Private residence  # Steps to Enter: 2  Rails to Enter: Yes  Hand Rails : Bilateral  One/Two Story Residence: One story  Living Alone: No  Support Systems: Family member(s)(Niece and her nephew)  Patient Expects to be Discharged to[de-identified] Private residence  Current DME Used/Available at Home: Cane, straight(Was not using cane at baseline)    EXAMINATION OF PERFORMANCE DEFICITS:  Cognitive/Behavioral Status:  Neurologic State: Alert  Orientation Level: Oriented to person;Oriented to place; Disoriented to situation;Disoriented to time  Cognition: Impaired decision making;Memory loss         Hearing: Auditory  Auditory Impairment: None    Vision/Perceptual:    Questionable L sided inattention to environment                 Range of Motion:  BUE mild impairment, functional                            Strength:  Strength: Generally decreased, functional                Coordination:  Coordination: Generally decreased, functional  Fine Motor Skills-Upper: Left Intact; Right Intact    Gross Motor Skills-Upper: Left Intact; Right Intact    Tone & Sensation:  Tone: Normal  Sensation: Intact                      Balance:  Sitting: Intact(Simultaneous filing. User may not have seen previous data.)  Standing: Impaired; Without support(Simultaneous filing. User may not have seen previous data.)  Standing - Static: Fair(Simultaneous filing. User may not have seen previous data.)  Standing - Dynamic : Fair(Simultaneous filing. User may not have seen previous data.)    Functional Mobility and Transfers for ADLs:  Bed Mobility:  Supine to Sit: Supervision  Scooting: Supervision    Transfers:  Sit to Stand: Contact guard assistance(Simultaneous filing. User may not have seen previous data.)  Stand to Sit: Contact guard assistance(Simultaneous filing. User may not have seen previous data.)  Bed to Chair: Contact guard assistance    ADL Assessment:  Feeding: Setup    Oral Facial Hygiene/Grooming: Contact guard assistance(standing at sink)    Bathing: Moderate assistance    Upper Body Dressing: Supervision    Lower Body Dressing:  Moderate assistance    Toileting: Minimum assistance         ADL Intervention and task modifications:  Feeding  Feeding Assistance: Set-up    Grooming  Washing Hands: Contact guard assistance(standing at sink)    Patient instructed and indicated understanding the benefits of maintaining activity tolerance, functional mobility, and independence with self care tasks during acute stay  to ensure safe return home and to baseline. Encouraged patient to increase frequency and duration OOB, be out of bed for all meals, perform daily ADLs (as approved by RN/MD regarding bathing etc), and performing functional mobility to/from bathroom. Agreeable to amb to bathroom for hand hygiene prior to lunch. Initial sit > stand with CGA. With turning around the bed, moderate LOB needing mod-max A to correct. Denies dizziness with LOB. Standing at sink with CGA for hand hygiene with cues for location of soap dispenser, paper towel (left), and left faucet. Walked back to room with HHA with CGA, no LOB. Sitting in chair with alarm and set up for lunch. Functional Measure:  Barthel Index:    Bathin  Bladder: 5  Bowels: 5  Groomin  Dressin  Feedin  Mobility: 10  Stairs: 5  Toilet Use: 5  Transfer (Bed to Chair and Back): 10  Total: 50/100        The Barthel ADL Index: Guidelines  1. The index should be used as a record of what a patient does, not as a record of what a patient could do. 2. The main aim is to establish degree of independence from any help, physical or verbal, however minor and for whatever reason. 3. The need for supervision renders the patient not independent. 4. A patient's performance should be established using the best available evidence. Asking the patient, friends/relatives and nurses are the usual sources, but direct observation and common sense are also important. However direct testing is not needed. 5. Usually the patient's performance over the preceding 24-48 hours is important, but occasionally longer periods will be relevant.   6. Middle categories imply that the patient supplies over 50 per cent of the effort. 7. Use of aids to be independent is allowed. Christian Knight., Barthel, D.W. (0603). Functional evaluation: the Barthel Index. [facility] (14)2. SONALI Leger, Shannen Hand., Nicole Peguero., [place], [address] (1999). Measuring the change indisability after inpatient rehabilitation; comparison of the responsiveness of the Barthel Index and Functional Tyler Measure. Journal of Neurology, Neurosurgery, and Psychiatry, 66(4), 024-179. Dirk Brambila, SAMEERA, KSENIA Pulido, & Kenyon Flowers MTHAO. (2004.) Assessment of post-stroke quality of life in cost-effectiveness studies: The usefulness of the Barthel Index and the EuroQoL-5D. Quality of Life Research, 15, 832-10         Occupational Therapy Evaluation Charge Determination   History Examination Decision-Making   MEDIUM Complexity : Expanded review of history including physical, cognitive and psychosocial  history  MEDIUM Complexity : 3-5 performance deficits relating to physical, cognitive , or psychosocial skils that result in activity limitations and / or participation restrictions MEDIUM Complexity : Patient may present with comorbidities that affect occupational performnce. Miniml to moderate modification of tasks or assistance (eg, physical or verbal ) with assesment(s) is necessary to enable patient to complete evaluation       Based on the above components, the patient evaluation is determined to be of the following complexity level: MEDIUM  Pain Rating:  Did not c/o pain    Activity Tolerance:   Fair  Sitting post activity- reports dizziness- /82, HR 66  (previous BP prior to session 118/72, HR 71)  Please refer to the flowsheet for vital signs taken during this treatment.         After treatment patient left in no apparent distress:    Sitting in chair, Call bell within reach, and Bed / chair alarm activated    COMMUNICATION/EDUCATION:   The patients plan of care was discussed with: Registered Nurse. Patient/family agree to work toward stated goals and plan of care. This patients plan of care is appropriate for delegation to EZ.     Thank you for this referral.  Jordon Lynn OT  Time Calculation: 17 mins

## 2020-01-06 NOTE — PROGRESS NOTES
Problem: Mobility Impaired (Adult and Pediatric)  Goal: *Acute Goals and Plan of Care (Insert Text)  Description  FUNCTIONAL STATUS PRIOR TO ADMISSION: Patient was independent in the house with mobility. Niece is there most of the time to supervise secondary to cognitive status. No falls reported. HOME SUPPORT PRIOR TO ADMISSION: The patient lived with Niece and niece's son. Physical Therapy Goals  Initiated 1/4/2020  1. Patient will move from supine to sit and sit to supine  in bed with supervision/set-up within 7 day(s). 2.  Patient will transfer from bed to chair and chair to bed with supervision/set-up using the least restrictive device within 7 day(s). 3.  Patient will perform sit to stand with supervision/set-up within 7 day(s). 4.  Patient will ambulate with supervision/set-up for 200 feet with the least restrictive device within 7 day(s). 5.  Patient will ascend/descend 2 stairs with 2 handrail(s) with supervision/set-up within 7 day(s). Outcome: Progressing Towards Goal   PHYSICAL THERAPY TREATMENT  Patient: Hang Call (89 y.o. female)  Date: 1/6/2020  Diagnosis: Sepsis ([facility] [address] [A41.9]   Sepsis ([facility] [address]       Precautions:    Chart, physical therapy assessment, plan of care and goals were reviewed. ASSESSMENT  Patient continues with skilled PT services and is progressing towards goals. Pt sitting in chair upon arrival. Pt ambulated 180 ft initially with CGA, but required Min A and HHA to recover after large LOB to right side. After initial LOB, observed very unsteady gait, mainly swaying to right side, during gait training. Pt required verbal cues to look ahead vs. Right side with poor carryover. Pt seemed to constantly look towards and favor right side during ambulation. Observed scissoring gait when pt turned around to ambulate back to room. Pt returned to chair and left with RN for discharge.      Current Level of Function Impacting Discharge (mobility/balance): CGA-Min A with transfers and gait    Other factors to consider for discharge: supportive family, decreased safety awareness, confusion         PLAN :  Patient continues to benefit from skilled intervention to address the above impairments. Continue treatment per established plan of care. to address goals. Recommendation for discharge: (in order for the patient to meet his/her long term goals)  Therapy up to 5 days/week in SNF setting    This discharge recommendation:  Has not yet been discussed the attending provider and/or case management    IF patient discharges home will need the following DME: HHA due to decreased safety awareness with RW       SUBJECTIVE:   Patient stated I don't know.  when asked if she was discharging today    OBJECTIVE DATA SUMMARY:   Critical Behavior:  Neurologic State: Alert  Orientation Level: Oriented to person, Oriented to place, Disoriented to situation, Disoriented to time  Cognition: Impaired decision making, Memory loss     Functional Mobility Training:  Bed Mobility:     Supine to Sit: Supervision     Scooting: Supervision        Transfers:  Sit to Stand: Contact guard assistance(Simultaneous filing. User may not have seen previous data.)  Stand to Sit: Contact guard assistance(Simultaneous filing. User may not have seen previous data.)        Bed to Chair: Contact guard assistance                    Balance:  Sitting: Intact(Simultaneous filing. User may not have seen previous data.)  Standing: Impaired; Without support(Simultaneous filing. User may not have seen previous data.)  Standing - Static: Fair(Simultaneous filing. User may not have seen previous data.)  Standing - Dynamic : Fair(Simultaneous filing. User may not have seen previous data.)  Ambulation/Gait Training:  Distance (ft): 180 Feet (ft)  Assistive Device: Gait belt  Ambulation - Level of Assistance: Contact guard assistance;Minimal assistance        Gait Abnormalities: Altered arm swing;Decreased step clearance; Path deviations;Scissoring;Trunk sway increased        Base of Support: Narrowed     Speed/Melissa: Pace decreased (<100 feet/min)  Step Length: Left shortened;Right shortened       Pain Rating:  No c/o of pain during session    Activity Tolerance:   Fair and requires rest breaks  Please refer to the flowsheet for vital signs taken during this treatment.     After treatment patient left in no apparent distress:   Sitting in chair, Call bell within reach, and Bed / chair alarm activated    COMMUNICATION/COLLABORATION:   The patients plan of care was discussed with: Registered Nurse    KARL Ulloa   Time Calculation: 17 mins

## 2020-01-06 NOTE — PROGRESS NOTES
[facility] Adult  Hospitalist Group                                                                                          Hospitalist Progress Note  Gabby Moy MD  Answering service: [phone] or [phone] from in house phone        Date of Service:  2020  NAME:  Dhaval Casey  :  1955  MRN:  [id number]      Admission Summary: This is a 81-year-old woman with past medical history significant for cerebral aneurysm, status post  shunt placement; multiple cerebral infarcts resulting in memory impairment; hypertension; and type 2 diabetes, who was in her usual state of health until the day of her presentation at the emergency room when the patient developed dizziness. According to report, the patient's daughter is concerned that the patient may be having another episode of a stroke, because of that, she called 911. The patient initially complained of dizziness and the EMS was called but on the way to the emergency room, the dizziness resolved. The patient stated that she has been taking her medication for blood pressure. Her blood pressure was found to be elevated when the EMS arrived at the scene. She was brought to the emergency room for further evaluation. When the patient arrived at the emergency room, the patient's clinical presentation and symptoms triggered code sepsis. Code sepsis was called and code sepsis protocol was initiated. The patient was subsequently referred to the hospitalist service for evaluation for admission. The source of the sepsis was never identified. The patient was last admitted to this hospital from 10/18/2019 to 10/24/2019. The patient was admitted with confusion and change in mental status and subsequent evaluation of these symptoms revealed acute intracranial hemorrhage. The patient was treated conservatively, seen by the neurosurgeon, no intervention was advised. The patient was subsequently discharged to skilled nursing facility.   The patient is a very poor historian because of her memory impairment. Interval history / Subjective:     Patient without complaints  denies chest pain or shortness of breath  No GI complaints     Assessment & Plan:     1. Sepsis- unknown source of infection  stopped IV vanc and Zosyn. follow cultures. Started po levaquin   CTof chest/abdomen and pelvis unremarkable  2. Dizziness:  -resolved   Normal CT scan of the head   3. CV-Hypertension/tachycardia:    bp low/normal-/ mild tachycardia- hold home meds exp for BBlocker  4. Type 2 diabetes:   accuchecks and ss insulin  5. Hx of Cerebral aneurysm, status post  shunt:    CT scan of the head showed that the shunt is in satisfactory position. 6.  Hypokalemia, hypophosphatemia, hypomagnesemia- replete    Pt stable for Dc but daughter decided that she wants her to go to SNF  Code status: Full Code  DVT prophylaxis: SCDs    Care Plan discussed with: Patient/Family  Disposition: Home w/Family and TBD     Hospital Problems  Date Reviewed: 1/3/2020          Codes Class Noted POA    * (Principal) Sepsis ([facility] [address] ICD-10-CM: A41.9  ICD-9-CM: 038.9, 995.91  1/2/2020 Yes                Review of Systems:   A comprehensive review of systems was negative except for that written in the HPI. Vital Signs:    Last 24hrs VS reviewed since prior progress note. Most recent are:  Visit Vitals  /84 (BP 1 Location: Left arm, BP Patient Position: At rest)   Pulse 67   Temp 97.5 °F (36.4 °C)   Resp 16   Ht 5' 6\" (1.676 m)   Wt 71 kg (156 lb 8.4 oz)   SpO2 100%   BMI 25.26 kg/m²         Intake/Output Summary (Last 24 hours) at 1/5/2020 3684  Last data filed at 1/5/2020 1730  Gross per 24 hour   Intake 730 ml   Output 750 ml   Net -20 ml        Physical Examination:             Constitutional:  No acute distress, cooperative on exam   ENT:  Oral mucous dry    Resp:  CTA bilaterally. No wheezing/rhonchi/rales.  No accessory muscle use   CV:  Regular rhythm, normal rate, no murmurs, gallops, rubs    GI:  Soft, non distended, non tender. normoactive bowel sounds, no hepatosplenomegaly     Musculoskeletal:  No edema, warm, 2+ pulses throughout    Neurologic:  Moves all extremities. AAOx3, CN II-XII reviewed     Psych:  flat affect       Data Review:    Review and/or order of tests in the radiology section of CPT  Review and/or order of tests in the medicine section of [facility]      Labs:     Recent Labs     01/05/20 0437 01/04/20 0430   WBC 5.0 5.1   HGB 11.5 12.1   HCT 37.9 39.2    153     Recent Labs     01/05/20 0437 01/04/20 0430 01/03/20 0434    137 142   K 3.3* 3.7 3.3*    103 111*   CO2 29 27 25   BUN 9 7 9   CREA 0.82 0.80 0.63   * 92 119*   CA 9.0 9.4 8.6   MG  --  1.7 1.5*   PHOS  --  2.6 2.5*     Recent Labs     01/03/20 0434   SGOT 28   ALT 35   AP 60   TBILI 0.4   TP 6.5   ALB 3.0*   GLOB 3.5   LPSE 75     No results for input(s): INR, PTP, APTT, INREXT, INREXT in the last 72 hours. No results for input(s): FE, TIBC, PSAT, FERR in the last 72 hours. No results found for: FOL, RBCF   No results for input(s): PH, PCO2, PO2 in the last 72 hours.   Recent Labs     01/03/20 0434   TROIQ <0.05     Lab Results   Component Value Date/Time    Cholesterol, total 170 01/03/2020 04:34 AM    HDL Cholesterol 29 01/03/2020 04:34 AM    LDL, calculated 117.8 (H) 01/03/2020 04:34 AM    Triglyceride 116 01/03/2020 04:34 AM    CHOL/HDL Ratio 5.9 (H) 01/03/2020 04:34 AM     Lab Results   Component Value Date/Time    Glucose (POC) 122 (H) 01/05/2020 09:16 PM    Glucose (POC) 115 (H) 01/05/2020 04:25 PM    Glucose (POC) 139 (H) 01/05/2020 12:02 PM    Glucose (POC) 94 01/05/2020 07:12 AM    Glucose (POC) 109 (H) 01/04/2020 09:42 PM     Lab Results   Component Value Date/Time    Color YELLOW/STRAW 01/02/2020 09:41 PM    Appearance CLEAR 01/02/2020 09:41 PM    Specific gravity 1.026 01/02/2020 09:41 PM    Specific gravity 1.020 12/22/2017 01:12 PM    pH (UA) 5.5 01/02/2020 09:41 PM    Protein 100 (A) 01/02/2020 09:41 PM    Glucose NEGATIVE  01/02/2020 09:41 PM    Ketone 15 (A) 01/02/2020 09:41 PM    Bilirubin NEGATIVE  01/02/2020 09:41 PM    Urobilinogen 1.0 01/02/2020 09:41 PM    Nitrites NEGATIVE  01/02/2020 09:41 PM    Leukocyte Esterase NEGATIVE  01/02/2020 09:41 PM    Epithelial cells FEW 01/02/2020 09:41 PM    Bacteria NEGATIVE  01/02/2020 09:41 PM    WBC 0-4 01/02/2020 09:41 PM    RBC 0-5 01/02/2020 09:41 PM         Medications Reviewed:     Current Facility-Administered Medications   Medication Dose Route Frequency    levoFLOXacin (LEVAQUIN) tablet 750 mg  750 mg Oral ACB    amLODIPine (NORVASC) tablet 5 mg  5 mg Oral DAILY    ascorbic acid (vitamin C) (VITAMIN C) tablet 1,000 mg  1,000 mg Oral DAILY    ferrous sulfate tablet 325 mg  325 mg Oral ACB    folic acid (FOLVITE) tablet 1 mg  1 mg Oral DAILY    metoprolol succinate (TOPROL-XL) XL tablet 50 mg  50 mg Oral DAILY    sodium chloride (NS) flush 5-40 mL  5-40 mL IntraVENous Q8H    sodium chloride (NS) flush 5-40 mL  5-40 mL IntraVENous PRN    ondansetron (ZOFRAN) injection 4 mg  4 mg IntraVENous Q4H PRN    bisacodyl (DULCOLAX) tablet 5 mg  5 mg Oral DAILY PRN    heparin (porcine) injection 5,000 Units  5,000 Units SubCUTAneous Q8H    lactobac ac& pc-s.therm-b.anim (HENRI Q/RISAQUAD)  1 Cap Oral DAILY    insulin lispro (HUMALOG) injection   SubCUTAneous AC&HS    glucose chewable tablet 16 g  4 Tab Oral PRN    glucagon (GLUCAGEN) injection 1 mg  1 mg IntraMUSCular PRN    dextrose 10% infusion 0-250 mL  0-250 mL IntraVENous PRN    sodium chloride (NS) flush 5-10 mL  5-10 mL IntraVENous PRN    acetaminophen (TYLENOL) tablet 650 mg  650 mg Oral Q4H PRN     ______________________________________________________________________  EXPECTED LENGTH OF STAY: 3d 16h  ACTUAL LENGTH OF STAY:          3                 Michelle Canales MD

## 2020-01-06 NOTE — PROGRESS NOTES
FORTINO:    Cm informed that patient is ready for discharge today and has a consult for SNF placement. Cm met with patient and her niece/poa in the room to discuss options. They would like referrals sent to both [facility] and [facility]. Referrals sent; will follow. 11:50am: [facility] can accept patient today. Cm contacted patients' neice and informed her of this. She asked that I arrange transportation for the patient. Referral sent to [facility] requesting a 3 pm .     Yvette ALEXANDER, ACM

## 2020-01-07 ENCOUNTER — PATIENT OUTREACH (OUTPATIENT)
Dept: INTERNAL MEDICINE CLINIC | Age: 65
End: 2020-01-07

## 2020-01-07 ENCOUNTER — EXTERNAL NURSING HOME DOCUMENTATION (OUTPATIENT)
Dept: INTERNAL MEDICINE CLINIC | Age: 65
End: 2020-01-07

## 2020-01-07 DIAGNOSIS — I15.9 SECONDARY HYPERTENSION: ICD-10-CM

## 2020-01-07 DIAGNOSIS — F81.9 INTELLECTUAL DELAY: ICD-10-CM

## 2020-01-07 DIAGNOSIS — E11.9 TYPE 2 DIABETES MELLITUS WITHOUT COMPLICATION, WITHOUT LONG-TERM CURRENT USE OF INSULIN (HCC): ICD-10-CM

## 2020-01-07 DIAGNOSIS — I61.9 NONTRAUMATIC INTRACEREBRAL HEMORRHAGE, UNSPECIFIED CEREBRAL LOCATION, UNSPECIFIED LATERALITY (HCC): ICD-10-CM

## 2020-01-07 DIAGNOSIS — A41.9 SEPSIS, DUE TO UNSPECIFIED ORGANISM, UNSPECIFIED WHETHER ACUTE ORGAN DYSFUNCTION PRESENT (HCC): Primary | ICD-10-CM

## 2020-01-07 LAB
BACTERIA SPEC CULT: NORMAL
SERVICE CMNT-IMP: NORMAL

## 2020-01-07 NOTE — PROGRESS NOTES
History and Physical    History of Present Illness:  Ms. Miay Hui is a 62year old  female with past medical history significant for cerebral aneurysm, status post  shunt placement, multiple cerebral infarcts, resulting in memory impairment, hypertension, type 2 diabetes mellitus, presented to the emergency room with dizziness. In the emergency room she was found to have elevated blood pressure. Patient had symptoms of sepsis and code sepsis was called. Blood culture was sent out. She was started on IV Vancomycin and Zosyn. Blood culture came back negative. Her antibiotics were changed to PO Levaquin and CT abdomen and pelvis done that came back unremarkable. She also had a CT of head done because of dizziness and it did not show anything. She had last admission with confusion and she subsequently revealed cerebral hemorrhage. She was treated conservatively by neurosurgeon last time. She was stabilized and transferred to [facility]. I am seeing her in the rehab and she is doing well right now, no complaints. Past Medical History:  Multiple brain infarcts, memory impairment, cerebral aneurysm, status post  shunt, hypertension, type 2 diabetes mellitus. Past Surgical History:  Back surgery and  shunt placement. Social History:  She has no history of alcohol or tobacco use. Family History: Mother has hypertension. No other family history. Allergies:  She is not allergic to any medications. Medications:  Patient is on right now Tylenol 325, two tablets every four hours as needed, probiotics, one capsule every day, Levaquin 750 mg once a day for one week, glucose 4 grams, four tablets by mouth as needed for low blood sugar, Amlodipine 5 mg once a day, Toprol XL, one tablet every day, Metformin 500 mg twice a day, iron sulfate 325 mg, one tablet every day, vitamin C, one tablet every day, folic acid 1 mg once a day.      Review of Systems:  Complete review of systems done, right now essentially negative. Objective:    GENERAL: This is a pleasant  female, not in any apparent distress. VITALS:  T:  98 degrees Fahrenheit. P:  75 per minute. BP:  120/80 mmHg. SaO2:  98% on room air. HEENT:  No abnormality detected. NECK:  Supple, no JVD, no carotid bruits, no thyromegaly. CHEST:  Chest is clear to auscultation bilaterally. No rales, no rhonchi. CARDIOVASCULAR:  S1, S2 normal.  Regular rate and rhythm. ABDOMEN:  Soft, nontender, nondistended, bowel sounds present. EXTREMITIES:  No edema is noted. Dorsalis pedis pulse normal.    NEUROLOGICAL:  Alert and oriented x3. Mild memory loss. Cranial nerves II-XII grossly intact. Motor 5/5 bilaterally. Sensory within normal limits. Assessment and Plan:  1. Sepsis with unknown origin. Blood culture came back negative. Patient is on PO Levaquin 750 mg once a day for seven days. She is doing well. 2. Dizziness has resolved. CT scan of head negative. 3. Type 2 diabetes mellitus, on Metformin. Will check blood sugar periodically. 4. Hypertension, stable blood pressure on Metoprolol and Amlodipine, doing well. 5. History of cerebral aneurysm, status post  shunt placement. Shunt is in place, doing well. 6. Gait weakness. Patient will continue PT and OT. THIS IS NOT A COMPLETE MEDICAL RECORD ON THIS PATIENT.    THIS IS A PATIENT AT [facility].    PLEASE CONTACT THE FACILITY LISTED BELOW FOR MORE INFORMATION ON THIS PATIENT.    THE COMPLETE RECORD RESIDES WITH THIS LONG TERM CARE FACILITY

## 2020-01-07 NOTE — PROGRESS NOTES
Patient discharged to a SN[facility] facility, [facility]. (Medicare [facility]

## 2020-01-08 ENCOUNTER — EXTERNAL NURSING HOME DOCUMENTATION (OUTPATIENT)
Dept: INTERNAL MEDICINE CLINIC | Age: 65
End: 2020-01-08

## 2020-01-08 VITALS — BODY MASS INDEX: 25.36 KG/M2 | HEIGHT: 66 IN

## 2020-01-08 DIAGNOSIS — Z98.890 HISTORY OF CEREBRAL ANEURYSM REPAIR: ICD-10-CM

## 2020-01-08 DIAGNOSIS — Z86.79 HISTORY OF INTRACRANIAL HEMORRHAGE: ICD-10-CM

## 2020-01-08 DIAGNOSIS — A41.9 SEPSIS, DUE TO UNSPECIFIED ORGANISM, UNSPECIFIED WHETHER ACUTE ORGAN DYSFUNCTION PRESENT (HCC): Primary | ICD-10-CM

## 2020-01-08 DIAGNOSIS — Z98.2 S/P VP SHUNT: ICD-10-CM

## 2020-01-08 DIAGNOSIS — I10 ESSENTIAL HYPERTENSION: ICD-10-CM

## 2020-01-08 DIAGNOSIS — Z86.79 HISTORY OF CEREBRAL ANEURYSM REPAIR: ICD-10-CM

## 2020-01-08 DIAGNOSIS — E11.69 TYPE 2 DIABETES MELLITUS WITH OTHER SPECIFIED COMPLICATION, UNSPECIFIED WHETHER LONG TERM INSULIN USE (HCC): ICD-10-CM

## 2020-01-08 NOTE — PROGRESS NOTES
THIS IS NOT A COMPLETE MEDICAL RECORD ON THIS PATIENT. THIS IS A PATIENT AT [facility]. PLEASE CONTACT THE FACILITY LISTED BELOW FOR MORE INFORMATION ON THIS PATIENT. THE COMPLETE RECORD RESIDES WITH THIS LONG TERM CARE FACILITY. HISTORY OF PRESENT ILLNESS  Carlitos Arteaga is a 62 y.o. female. This patient is currently under the care of Dr. Bernardo Harris at [facility].  The patient was admitted to this facility following hospitalization related to dizziness. She was treated for sepsis with unknown source of infection, UTI suspected. She was discharged with orders to complete a course of oral Levaquin. Patient's past medical history includes cerebral aneurysm, s/p  shunt, multiple cerebral infarcts, memory impairment, hypertension, and diabetes. The patient is seen today for follow-up. She states that she is generally feeling well at the time of today's visit. She denies chest pain, shortness of breath, dizziness, and GI/ problems. Nursing does not report any concerns or changes in condition at this time. HPI    Review of Systems   Constitutional: Negative for chills and fever. HENT: Negative for congestion. Respiratory: Negative for shortness of breath. Cardiovascular: Negative for chest pain. Gastrointestinal: Negative for abdominal pain. Genitourinary: Negative. Musculoskeletal: Negative. Skin: Negative. Neurological: Negative. Endo/Heme/Allergies: Negative. Psychiatric/Behavioral: Negative. Physical Exam  Constitutional:       General: She is not in acute distress. HENT:      Head: Normocephalic. Nose: No congestion. Neck:      Musculoskeletal: Neck supple. Cardiovascular:      Rate and Rhythm: Normal rate and regular rhythm. Pulmonary:      Effort: Pulmonary effort is normal.      Breath sounds: Normal breath sounds. Abdominal:      General: Bowel sounds are normal. There is no distension. Palpations: Abdomen is soft. Tenderness: There is no tenderness. Musculoskeletal:         General: No swelling. Skin:     General: Skin is warm and dry. Neurological:      Mental Status: She is alert. Comments: Answering simple questions appropriately   Psychiatric:         Mood and Affect: Mood normal.         Behavior: Behavior normal.         ASSESSMENT and PLAN  Encounter Diagnoses   Name Primary?  Sepsis, due to unspecified organism, unspecified whether acute organ dysfunction present ([facility] [address] Yes    History of cerebral aneurysm repair     History of intracranial hemorrhage     S/P  shunt     Essential hypertension     Type 2 diabetes mellitus with other specified complication, unspecified whether long term insulin use (HCC)      CBC, CMP, HgbA1c next lab day. Continue PT/OT/ST as tolerated. Reviewed medications and side effects in detail. Continue current medications at this time. Nursing to continue to monitor closely and notify MD/NP of any change in condition.

## 2020-01-10 ENCOUNTER — EXTERNAL NURSING HOME DOCUMENTATION (OUTPATIENT)
Dept: INTERNAL MEDICINE CLINIC | Age: 65
End: 2020-01-10

## 2020-01-10 VITALS — HEIGHT: 66 IN | BODY MASS INDEX: 25.36 KG/M2

## 2020-01-10 DIAGNOSIS — Z98.2 S/P VP SHUNT: ICD-10-CM

## 2020-01-10 DIAGNOSIS — I10 ESSENTIAL HYPERTENSION: ICD-10-CM

## 2020-01-10 DIAGNOSIS — Z86.79 HISTORY OF INTRACRANIAL HEMORRHAGE: ICD-10-CM

## 2020-01-10 DIAGNOSIS — Z98.890 HISTORY OF CEREBRAL ANEURYSM REPAIR: ICD-10-CM

## 2020-01-10 DIAGNOSIS — Z86.79 HISTORY OF CEREBRAL ANEURYSM REPAIR: ICD-10-CM

## 2020-01-10 DIAGNOSIS — E11.69 TYPE 2 DIABETES MELLITUS WITH OTHER SPECIFIED COMPLICATION, UNSPECIFIED WHETHER LONG TERM INSULIN USE (HCC): ICD-10-CM

## 2020-01-10 DIAGNOSIS — A41.9 SEPSIS, DUE TO UNSPECIFIED ORGANISM, UNSPECIFIED WHETHER ACUTE ORGAN DYSFUNCTION PRESENT (HCC): Primary | ICD-10-CM

## 2020-01-10 RX ORDER — METOPROLOL SUCCINATE 50 MG/1
50 TABLET, EXTENDED RELEASE ORAL DAILY
Qty: 30 TAB | Refills: 0 | Status: SHIPPED | OUTPATIENT
Start: 2020-01-10 | End: 2020-01-24

## 2020-01-10 RX ORDER — METFORMIN HYDROCHLORIDE 500 MG/1
500 TABLET ORAL 2 TIMES DAILY WITH MEALS
Qty: 60 TAB | Refills: 0 | Status: SHIPPED | OUTPATIENT
Start: 2020-01-10 | End: 2020-01-24

## 2020-01-10 RX ORDER — LANOLIN ALCOHOL/MO/W.PET/CERES
325 CREAM (GRAM) TOPICAL
Qty: 30 TAB | Refills: 0 | Status: SHIPPED | OUTPATIENT
Start: 2020-01-10 | End: 2020-01-22 | Stop reason: ALTCHOICE

## 2020-01-10 RX ORDER — INSULIN LISPRO 100 [IU]/ML
INJECTION, SOLUTION INTRAVENOUS; SUBCUTANEOUS
Qty: 1 VIAL | Refills: 0 | Status: SHIPPED | OUTPATIENT
Start: 2020-01-10 | End: 2020-01-22 | Stop reason: ALTCHOICE

## 2020-01-10 RX ORDER — AMLODIPINE BESYLATE 5 MG/1
5 TABLET ORAL DAILY
Qty: 30 TAB | Refills: 0 | Status: SHIPPED | OUTPATIENT
Start: 2020-01-10 | End: 2020-01-24

## 2020-01-10 NOTE — PROGRESS NOTES
THIS IS NOT A COMPLETE MEDICAL RECORD ON THIS PATIENT. THIS IS A PATIENT AT [facility]. PLEASE CONTACT THE FACILITY LISTED BELOW FOR MORE INFORMATION ON THIS PATIENT. THE COMPLETE RECORD RESIDES WITH THIS LONG TERM CARE FACILITY. HISTORY OF PRESENT ILLNESS  Jackson Short is a 62 y.o. female. This patient is currently under the care of Dr. Maggie Moreau at [facility].  The patient was admitted to this facility following hospitalization related to dizziness. She was treated for sepsis with unknown source of infection, UTI suspected. She was discharged with orders to complete a course of oral Levaquin. Patient's past medical history includes cerebral aneurysm, s/p  shunt, multiple cerebral infarcts, memory impairment, hypertension, and diabetes. Per social work, the patient is scheduled to discharge Tuesday, 01/14/20, per request of her niece. Patient states she is feeling well at time of visit. Nursing does not report any concerns or changes in condition at this time. HPI    Review of Systems   Constitutional: Negative for chills and fever. HENT: Negative for congestion. Respiratory: Negative for shortness of breath. Cardiovascular: Negative for chest pain. Gastrointestinal: Negative for abdominal pain. Genitourinary: Negative. Musculoskeletal: Negative. Skin: Negative. Neurological: Negative. Endo/Heme/Allergies: Negative. Psychiatric/Behavioral: Negative. Physical Exam  Constitutional:       General: She is not in acute distress. HENT:      Head: Normocephalic. Nose: No congestion. Neck:      Musculoskeletal: Neck supple. Cardiovascular:      Rate and Rhythm: Normal rate and regular rhythm. Pulmonary:      Effort: Pulmonary effort is normal.      Breath sounds: Normal breath sounds. Abdominal:      General: Bowel sounds are normal. There is no distension. Palpations: Abdomen is soft. Tenderness: There is no tenderness. Musculoskeletal:         General: No swelling. Skin:     General: Skin is warm and dry. Neurological:      Mental Status: She is alert. Comments: Answering simple questions appropriately   Psychiatric:         Mood and Affect: Mood normal.         Behavior: Behavior normal.         ASSESSMENT and PLAN  Encounter Diagnoses   Name Primary?  Sepsis, due to unspecified organism, unspecified whether acute organ dysfunction present ([address] Yes    History of cerebral aneurysm repair     History of intracranial hemorrhage     S/P  shunt     Essential hypertension     Type 2 diabetes mellitus with other specified complication, unspecified whether long term insulin use ([address]      Patient to have home health services upon discharge. Will provide prescriptions for 30 day supply of medications at discharge. Patient to follow-up with PCP within 2 weeks of discharge and specialists as scheduled. Nursing to continue to monitor closely and notify MD/NP of any change in condition prior to discharge.

## 2020-01-22 ENCOUNTER — OFFICE VISIT (OUTPATIENT)
Dept: INTERNAL MEDICINE CLINIC | Age: 65
End: 2020-01-22

## 2020-01-22 ENCOUNTER — HOSPITAL ENCOUNTER (OUTPATIENT)
Dept: LAB | Age: 65
Discharge: HOME | End: 2020-01-22
Payer: MEDICARE

## 2020-01-22 VITALS
HEART RATE: 87 BPM | WEIGHT: 156 LBS | HEIGHT: 66 IN | DIASTOLIC BLOOD PRESSURE: 84 MMHG | SYSTOLIC BLOOD PRESSURE: 126 MMHG | RESPIRATION RATE: 18 BRPM | OXYGEN SATURATION: 99 % | TEMPERATURE: 97.3 F | BODY MASS INDEX: 25.07 KG/M2

## 2020-01-22 DIAGNOSIS — Z78.0 POST-MENOPAUSE: ICD-10-CM

## 2020-01-22 DIAGNOSIS — Z11.59 NEED FOR HEPATITIS C SCREENING TEST: ICD-10-CM

## 2020-01-22 DIAGNOSIS — Z86.79 HISTORY OF INTRACRANIAL HEMORRHAGE: ICD-10-CM

## 2020-01-22 DIAGNOSIS — I10 ESSENTIAL HYPERTENSION: ICD-10-CM

## 2020-01-22 DIAGNOSIS — E55.9 VITAMIN D DEFICIENCY: ICD-10-CM

## 2020-01-22 DIAGNOSIS — E11.69 TYPE 2 DIABETES MELLITUS WITH OTHER SPECIFIED COMPLICATION, UNSPECIFIED WHETHER LONG TERM INSULIN USE (HCC): Primary | ICD-10-CM

## 2020-01-22 DIAGNOSIS — Z00.00 MEDICARE ANNUAL WELLNESS VISIT, INITIAL: ICD-10-CM

## 2020-01-22 DIAGNOSIS — Z98.2 S/P VP SHUNT: ICD-10-CM

## 2020-01-22 DIAGNOSIS — Z71.89 ACP (ADVANCE CARE PLANNING): ICD-10-CM

## 2020-01-22 DIAGNOSIS — Z12.11 SCREENING FOR COLON CANCER: ICD-10-CM

## 2020-01-22 DIAGNOSIS — Z12.39 SCREENING BREAST EXAMINATION: ICD-10-CM

## 2020-01-22 PROCEDURE — 36415 COLL VENOUS BLD VENIPUNCTURE: CPT

## 2020-01-22 PROCEDURE — 82306 VITAMIN D 25 HYDROXY: CPT

## 2020-01-22 PROCEDURE — 86803 HEPATITIS C AB TEST: CPT

## 2020-01-22 PROCEDURE — 83036 HEMOGLOBIN GLYCOSYLATED A1C: CPT

## 2020-01-22 PROCEDURE — 80053 COMPREHEN METABOLIC PANEL: CPT

## 2020-01-22 PROCEDURE — 82043 UR ALBUMIN QUANTITATIVE: CPT

## 2020-01-22 RX ORDER — LANCETS 33 GAUGE
EACH MISCELLANEOUS
COMMUNITY
Start: 2019-12-20 | End: 2020-01-22 | Stop reason: SDUPTHER

## 2020-01-22 RX ORDER — FERROUS SULFATE, DRIED 160(50) MG
1 TABLET, EXTENDED RELEASE ORAL DAILY
Qty: 90 TAB | Refills: 3 | Status: SHIPPED | OUTPATIENT
Start: 2020-01-22 | End: 2021-01-07 | Stop reason: ALTCHOICE

## 2020-01-22 RX ORDER — LANCETS 33 GAUGE
EACH MISCELLANEOUS
Qty: 100 LANCET | Refills: 12 | Status: ON HOLD | OUTPATIENT
Start: 2020-01-22 | End: 2022-07-07

## 2020-01-22 RX ORDER — CALCIUM CITRATE/VITAMIN D3 200MG-6.25
TABLET ORAL
COMMUNITY
Start: 2019-12-20 | End: 2020-01-22 | Stop reason: SDUPTHER

## 2020-01-22 RX ORDER — CALCIUM CITRATE/VITAMIN D3 200MG-6.25
TABLET ORAL
Qty: 100 STRIP | Refills: 12 | Status: ON HOLD | OUTPATIENT
Start: 2020-01-22 | End: 2022-07-07

## 2020-01-22 NOTE — PROGRESS NOTES
This is an Initial Medicare Annual Wellness Exam (AWV) (Performed 12 months after IPPE or effective date of Medicare Part B enrollment, Once in a lifetime)    I have reviewed the patient's medical history in detail and updated the computerized patient record. History     Patient Active Problem List   Diagnosis Code    Acute encephalopathy G93.40    Hypertension I10     (ventriculoperitoneal) shunt status Z98.2    Diabetes ([address] E11.9    Altered mental status R41.82    Acute lower GI bleeding K92.2    Type 2 diabetes mellitus (HCC) E11.9    Intellectual delay F81.9    H/O cerebral aneurysm repair Z98.890, Z86.79    ICH (intracerebral hemorrhage) (HCC) I61.9    Intracranial hemorrhage (HCC) I62.9    Sepsis ([facility]) A41.9     Past Medical History:   Diagnosis Date    Diabetes ([address]     Hypertension     Memory loss     Stroke ([facility] [address]     cerebral hemorrhage,cerebral anurysm      Past Surgical History:   Procedure Laterality Date    HX ORTHOPAEDIC  1996    back surgery    NEUROLOGICAL PROCEDURE UNLISTED  2008    shunt placement     Current Outpatient Medications   Medication Sig Dispense Refill    TRUE METRIX GLUCOSE TEST STRIP strip USE TO TEST BS  Strip 12    TRUEPLUS LANCETS 33 gauge misc USE AS DIRECTED TO CHECK BG  Lancet 12    amLODIPine (NORVASC) 5 mg tablet Take 1 Tab by mouth daily. 30 Tab 0    metFORMIN (GLUCOPHAGE) 500 mg tablet Take 1 Tab by mouth two (2) times daily (with meals). 60 Tab 0    metoprolol succinate (TOPROL-XL) 50 mg XL tablet Take 1 Tab by mouth daily. 30 Tab 0    glucose 4 gram chewable tablet Take 4 Tabs by mouth as needed for Other (glucose levels < 70). 30 Tab 0     No Known Allergies    History reviewed. No pertinent family history.   Social History     Tobacco Use    Smoking status: Never Smoker    Smokeless tobacco: Never Used   Substance Use Topics    Alcohol use: No       Depression Risk Factor Screening:     3 most recent PHQ Screens 1/22/2020   Little interest or pleasure in doing things Several days   Feeling down, depressed, irritable, or hopeless Several days   Total Score PHQ 2 2       Alcohol Risk Factor Screening:   Do you average 1 drink per night or more than 7 drinks a week:  No    On any one occasion in the past three months have you have had more than 3 drinks containing alcohol:  No      Functional Ability and Level of Safety:   Hearing: Hearing is good. Activities of Daily Living: The home contains: no safety equipment. Patient does total self care     Ambulation: with no difficulty    Fall Risk:  No flowsheet data found. Abuse Screen:  Patient is not abused    Cognitive Screening   Has your family/caregiver stated any concerns about your memory: no  Cognitive Screening: Normal - MMSE (Mini Mental Status Exam)    Patient Care Team   Patient Care Team:  Vida Jalloh as PCP - Laura Ladd LPN as Care Coordinator    Assessment/Plan   Education and counseling provided:  Are appropriate based on today's review and evaluation  End-of-Life planning (with patient's consent)  Pneumococcal Vaccine  Screening Mammography  Bone mass measurement (DEXA)  Diabetes screening test    Diagnoses and all orders for this visit:    1. Need for hepatitis C screening test  -     HEPATITIS C AB    2. Type 2 diabetes mellitus with other specified complication, unspecified whether long term insulin use (HCC)  -     TRUE METRIX GLUCOSE TEST STRIP strip; USE TO TEST BS BID  -     TRUEPLUS LANCETS 33 gauge misc; USE AS DIRECTED TO CHECK BG QD  -     METABOLIC PANEL, COMPREHENSIVE  -     MICROALBUMIN, UR, RAND W/ MICROALB/CREAT RATIO  -     HEMOGLOBIN A1C WITH EAG  -     REFERRAL TO OPHTHALMOLOGY    3. Essential hypertension    4. History of intracranial hemorrhage    5. S/P  shunt    6. Medicare annual wellness visit, initial    7. ACP (advance care planning)    8.  Vitamin D deficiency  -     VITAMIN D, [address] [facility] Maintenance Due   Topic Date Due    Hepatitis C Screening  1955    Pneumococcal 0-64 years (1 of 1 - PPSV23) 09/23/1962    MICROALBUMIN Q1  09/23/1966    EYE EXAM RETINAL OR DILATED  09/23/1966    DTaP/Tdap/Td series (1 - Tdap) 09/23/1967    PAP AKA CERVICAL CYTOLOGY  09/23/1977    Shingrix Vaccine Age 50> (1 of 2) 09/23/2006    BREAST CANCER SCRN MAMMOGRAM  09/23/2006    FOBT Q 1 YEAR AGE 50-75  04/02/2018

## 2020-01-22 NOTE — ACP (ADVANCE CARE PLANNING)
Advance Care Planning (ACP) Provider Conversation Snapshot    Date of ACP Conversation: 01/22/20  Persons included in Conversation:  patient and family  Length of ACP Conversation in minutes:  16 minutes    Authorized Decision Maker (if patient is incapable of making informed decisions):    This person is:   sonam PATEL Nitarsha wright            For Patients with Decision Making Capacity:   Values/Goals: Exploration of values, goals, and preferences if recovery is not expected, even with continued medical treatment in the event of:  Imminent death    Conversation Outcomes / Follow-Up Plan:   Recommended completion of Advance Directive form after review of ACP materials and conversation with prospective healthcare agent

## 2020-01-22 NOTE — PATIENT INSTRUCTIONS
Medicare Wellness Visit, Female     The best way to live healthy is to have a lifestyle where you eat a well-balanced diet, exercise regularly, limit alcohol use, and quit all forms of tobacco/nicotine, if applicable. Regular preventive services are another way to keep healthy. Preventive services (vaccines, screening tests, monitoring & exams) can help personalize your care plan, which helps you manage your own care. Screening tests can find health problems at the earliest stages, when they are easiest to treat. [facility] follows the current, evidence-based guidelines published by the [facility]unior Vit[facility] ([facility]) when recommending preventive services for our patients. Because we follow these guidelines, sometimes recommendations change over time as research supports it. (For example, mammograms used to be recommended annually. Even though Medicare will still pay for an annual mammogram, the newer guidelines recommend a mammogram every two years for women of average risk). Of course, you and your doctor may decide to screen more often for some diseases, based on your risk and your co-morbidities (chronic disease you are already diagnosed with). Preventive services for you include:  - Medicare offers their members a free annual wellness visit, which is time for you and your primary care provider to discuss and plan for your preventive service needs. Take advantage of this benefit every year!  -All adults over the age of 72 should receive the recommended pneumonia vaccines. Current USPSTF guidelines recommend a series of two vaccines for the best pneumonia protection.   -All adults should have a flu vaccine yearly and a tetanus vaccine every 10 years.   -All adults age 48 and older should receive the shingles vaccines (series of two vaccines).       -All adults age 38-68 who are overweight should have a diabetes screening test once every three years.   -All adults born between [birth year] and 1965 should be screened once for Hepatitis C.  -Other screening tests and preventive services for persons with diabetes include: an eye exam to screen for diabetic retinopathy, a kidney function test, a foot exam, and stricter control over your cholesterol.   -Cardiovascular screening for adults with routine risk involves an electrocardiogram (ECG) at intervals determined by your doctor.   -Colorectal cancer screenings should be done for adults age 54-65 with no increased risk factors for colorectal cancer. There are a number of acceptable methods of screening for this type of cancer. Each test has its own benefits and drawbacks. Discuss with your doctor what is most appropriate for you during your annual wellness visit. The different tests include: colonoscopy (considered the best screening method), a fecal occult blood test, a fecal DNA test, and sigmoidoscopy.    -A bone mass density test is recommended when a woman turns 65 to screen for osteoporosis. This test is only recommended one time, as a screening. Some providers will use this same test as a disease monitoring tool if you already have osteoporosis. -Breast cancer screenings are recommended every other year for women of normal risk, age 54-69.  -Cervical cancer screenings for women over age 72 are only recommended with certain risk factors.      Here is a list of your current Health Maintenance items (your personalized list of preventive services) with a due date:  Health Maintenance Due   Topic Date Due    Hepatitis C Test  1955    Pneumococcal Vaccine (1 of 1 - PPSV23) 09/23/1962    Albumin Urine Test  09/23/1966    Eye Exam  09/23/1966    DTaP/Tdap/Td  (1 - Tdap) 09/23/1967    Pap Test  09/23/1977    Shingles Vaccine (1 of 2) 09/23/2006    Mammogram  09/23/2006    Stool testing for trace blood  04/02/2018

## 2020-01-22 NOTE — PROGRESS NOTES
HISTORY OF PRESENT ILLNESS  Mauricio Booker is a 62 y.o. female here to establish care in the office. I have seen her in 29 [facility] facilities. She was admitted to hospitals with questionable sepsis. Received IV antibiotic followed by p.o. Levaquin. Blood culture and urine culture did not grow any bacteria. Had recent history of  CVA with cerebral hemorrhage several months back. She is not on blood thinner. She also has history of cerebral aneurysm in the past had  shunt done. She is active, no hemiplegia or weakness in arms or legs. She is walking fine. Refused to do any home health for PT and OT. She is diabetic, on metformin. As she mentioned she is dropping blood sugar in early morning. Advised her to do nighttime snack. Her last A1c was almost 1 months old. That was elevated. Need new lab work. Need foot exam.  Did not see eye specialist for long time. Need an eye referral.  Needs strips and lancets refill. Has hypertension, on amlodipine and Toprol-XL. Blood pressure seems okay. No chest pain palpitation or shortness of breath. Had history of acute GI bleeding. Not on blood thinner. She is planning to relocate to [facility] independent living with her niece. Need mammogram, she is menopausal, not on calcium supplement. Did not have colonoscopy. If is to do pneumonia vaccine today. Here for Medicare wellness visit. Has no living will. HPI    Review of Systems   Constitutional: Negative. HENT: Negative. Eyes: Negative. Respiratory: Negative. Cardiovascular: Negative. Gastrointestinal: Negative. Genitourinary: Negative. Musculoskeletal: Negative. Skin: Negative. Neurological: Negative. Endo/Heme/Allergies: Negative. Psychiatric/Behavioral: Negative. Physical Exam  Constitutional:       Appearance: Normal appearance. She is normal weight. HENT:      Head: Normocephalic and atraumatic.       Right Ear: Tympanic membrane, ear canal and external ear normal.      Left Ear: Tympanic membrane, ear canal and external ear normal.      Nose: Nose normal.      Mouth/Throat:      Mouth: Mucous membranes are moist.   Eyes:      Extraocular Movements: Extraocular movements intact. Conjunctiva/sclera: Conjunctivae normal.      Pupils: Pupils are equal, round, and reactive to light. Neck:      Musculoskeletal: Normal range of motion and neck supple. Cardiovascular:      Rate and Rhythm: Normal rate and regular rhythm. Pulses: Normal pulses. Pulmonary:      Effort: Pulmonary effort is normal.      Breath sounds: Normal breath sounds. Abdominal:      General: Abdomen is flat. Bowel sounds are normal.      Palpations: Abdomen is soft. Musculoskeletal: Normal range of motion. Skin:     General: Skin is warm. Neurological:      General: No focal deficit present. Mental Status: She is alert and oriented to person, place, and time. Mental status is at baseline. Psychiatric:         Mood and Affect: Mood normal.         Behavior: Behavior normal.         ASSESSMENT and PLAN  Diagnoses and all orders for this visit:    1. Need for hepatitis C screening test  -     HEPATITIS C AB    2. Type 2 diabetes mellitus with other specified complication, unspecified whether long term insulin use (HCC)  -     TRUE METRIX GLUCOSE TEST STRIP strip; USE TO TEST BS BID  -     TRUEPLUS LANCETS 33 gauge misc; USE AS DIRECTED TO CHECK BG QD  -     METABOLIC PANEL, COMPREHENSIVE  -     MICROALBUMIN, UR, RAND W/ MICROALB/CREAT RATIO  -     HEMOGLOBIN A1C WITH EAG  -     REFERRAL TO OPHTHALMOLOGY    3. Essential hypertension    4. History of intracranial hemorrhage    5. S/P  shunt    6. Medicare annual wellness visit, initial    7. ACP (advance care planning)    8. Vitamin D deficiency  -     VITAMIN D, 25 HYDROXY    9. Screening breast examination  -     VIELKA MAMMO BI SCREENING INCL CAD; Future    10.  Screening for colon cancer  -     REFERRAL TO GASTROENTEROLOGY    11. Post-menopause  -     calcium-vitamin D (OYSTER SHELL) 500 mg(1,250mg) -200 unit per tablet; Take 1 Tab by mouth daily.

## 2020-01-22 NOTE — PROGRESS NOTES
Health Maintenance Due   Topic Date Due    Hepatitis C Screening  1955    Pneumococcal 0-64 years (1 of 1 - PPSV23) 09/23/1962    FOOT EXAM Q1  09/23/1966    MICROALBUMIN Q1  09/23/1966    EYE EXAM RETINAL OR DILATED  09/23/1966    DTaP/Tdap/Td series (1 - Tdap) 09/23/1967    PAP AKA CERVICAL CYTOLOGY  09/23/1977    Shingrix Vaccine Age 50> (1 of 2) 09/23/2006    BREAST CANCER SCRN MAMMOGRAM  09/23/2006    MEDICARE YEARLY EXAM  03/14/2018    FOBT Q 1 YEAR AGE 50-75  04/02/2018       Chief Complaint   Patient presents with    New Patient    Hypertension    Diabetes     had been running a bit low in am some times, diabetes exducation provided       1. Have you been to the ER, urgent care clinic since your last visit? Hospitalized since your last visit? No    2. Have you seen or consulted any other health care providers outside of the [address] [place] [address] since your last visit? Include any pap smears or colon screening. No    3) Do you have an Advance Directive on file? no    4) Are you interested in receiving information on Advance Directives? NO      Patient is accompanied by neice I have received verbal consent from Zygmunt Guardian to discuss any/all medical information while they are present in the room.

## 2020-01-23 ENCOUNTER — PATIENT OUTREACH (OUTPATIENT)
Dept: INTERNAL MEDICINE CLINIC | Age: 65
End: 2020-01-23

## 2020-01-23 ENCOUNTER — TELEPHONE (OUTPATIENT)
Dept: INTERNAL MEDICINE CLINIC | Age: 65
End: 2020-01-23

## 2020-01-23 LAB
25(OH)D3+25(OH)D2 SERPL-MCNC: 12 NG/ML (ref 30–100)
ALBUMIN SERPL-MCNC: 4.7 G/DL (ref 3.8–4.8)
ALBUMIN/CREAT UR: 904 MG/G CREAT (ref 0–29)
ALBUMIN/GLOB SERPL: 1.5 {RATIO} (ref 1.2–2.2)
ALP SERPL-CCNC: 86 IU/L (ref 39–117)
ALT SERPL-CCNC: 33 IU/L (ref 0–32)
AST SERPL-CCNC: 21 IU/L (ref 0–40)
BILIRUB SERPL-MCNC: 0.2 MG/DL (ref 0–1.2)
BUN SERPL-MCNC: 9 MG/DL (ref 8–27)
BUN/CREAT SERPL: 10 (ref 12–28)
CALCIUM SERPL-MCNC: 10.2 MG/DL (ref 8.7–10.3)
CHLORIDE SERPL-SCNC: 105 MMOL/L (ref 96–106)
CO2 SERPL-SCNC: 19 MMOL/L (ref 20–29)
CREAT SERPL-MCNC: 0.86 MG/DL (ref 0.57–1)
CREAT UR-MCNC: 157.7 MG/DL
EST. AVERAGE GLUCOSE BLD GHB EST-MCNC: 137 MG/DL
GLOBULIN SER CALC-MCNC: 3.2 G/DL (ref 1.5–4.5)
GLUCOSE SERPL-MCNC: 115 MG/DL (ref 65–99)
HBA1C MFR BLD: 6.4 % (ref 4.8–5.6)
HCV AB S/CO SERPL IA: <0.1 S/CO RATIO (ref 0–0.9)
MICROALBUMIN UR-MCNC: 1426.2 UG/ML
POTASSIUM SERPL-SCNC: 4.2 MMOL/L (ref 3.5–5.2)
PROT SERPL-MCNC: 7.9 G/DL (ref 6–8.5)
SODIUM SERPL-SCNC: 145 MMOL/L (ref 134–144)

## 2020-01-23 NOTE — TELEPHONE ENCOUNTER
Call placed to [facility] and made aware that all requests needed to come in the form of a fax, voiced understanding and stated that request would be faxed

## 2020-01-23 NOTE — Clinical Note
Hello Ladies, I spoke with Ms Pacheco's niece Tala Ferrell (caregiver) patient is on the sepsis bundle until 04/6/2020. Per the niece the goal is to get patient into [facility]  by April.

## 2020-01-23 NOTE — PROGRESS NOTES
[address] [place] [address] Dr Discharge Follow-Up      Date/Time:  2020 3:47 PM    Patient was admitted to [facility] on 2020 and discharged to [facility] on 2020. The patients discharge diagnosis was sepsis. Patient was discharge on 2020 from [facility].  The discharge summary from the post acute facilty was not available at the time of outreach. Patient was contacted within 7  business days of discharge from the post acute facility. N Care Coordinator contacted the patient's niece Kiara Bardales (caregiver) by telephone to perform post hospital discharge follow up. Verified name and  with caregiver as identifiers. Provided introduction to self, and explanation of the LPN Care Coordinator role. Caregiver received post acute facility discharge instructions. LPN Care Coordinator reviewed discharge instructions and red flags with caregiver who verbalized understanding. Caregiver given an opportunity to ask questions and does not have any further questions or concerns at this time. The caregiver agrees to contact the PCP office for questions related to their healthcare. N Care Coordinator provided contact information for future reference. Advance Care Planning:   Does patient have an Advance Directive:  not on file     [address] orders at discharge: PT, [address]: Patient's niece cannot remember name   Date of initial visit: TBD    Durable Medical Equipment ordered at discharge: none  [facility] received: N/A    Medication(s):   The post acute facility medication discharge list was not available for this call. Medication review was  not performed as medication reviewed by Dr. Isiah Hugo 2020, who verbalizes understanding of administration of home medications. There were no barriers to obtaining medications identified at this time.   Per niece patient is to transition into [address] [place] [address] in April 2020. [facility] follow up appointment(s): No future appointments.    Non-[facility] follow up appointment(s): n/a  [facility]:  information provided as a resource

## 2020-01-24 RX ORDER — METFORMIN HYDROCHLORIDE 500 MG/1
TABLET ORAL
Qty: 60 TAB | Refills: 0 | Status: SHIPPED | OUTPATIENT
Start: 2020-01-24 | End: 2020-04-08

## 2020-01-24 RX ORDER — FERROUS SULFATE 325(65) MG
TABLET, DELAYED RELEASE (ENTERIC COATED) ORAL
Qty: 30 TAB | Refills: 0 | Status: SHIPPED | OUTPATIENT
Start: 2020-01-24 | End: 2020-03-09

## 2020-01-24 RX ORDER — METOPROLOL SUCCINATE 50 MG/1
TABLET, EXTENDED RELEASE ORAL
Qty: 30 TAB | Refills: 0 | Status: SHIPPED | OUTPATIENT
Start: 2020-01-24 | End: 2020-03-09

## 2020-01-24 RX ORDER — AMLODIPINE BESYLATE 5 MG/1
TABLET ORAL
Qty: 30 TAB | Refills: 0 | Status: SHIPPED | OUTPATIENT
Start: 2020-01-24 | End: 2020-03-09

## 2020-01-28 DIAGNOSIS — E55.9 VITAMIN D DEFICIENCY: Primary | ICD-10-CM

## 2020-01-28 RX ORDER — ERGOCALCIFEROL 1.25 MG/1
50000 CAPSULE ORAL
Qty: 4 CAP | Refills: 3 | Status: SHIPPED | OUTPATIENT
Start: 2020-01-28 | End: 2020-08-14 | Stop reason: ALTCHOICE

## 2020-01-28 NOTE — PROGRESS NOTES
vit D level very low.will start on vit D 50,000 unit 1 cap weekly for 4 months. will repeat level in 4 months. adv to be on milk product and expose to sun for 20 min a day. Diabetes has improved significantly. Continue same dosage of medicine.

## 2020-02-18 ENCOUNTER — PATIENT OUTREACH (OUTPATIENT)
Dept: CARDIOLOGY CLINIC | Age: 65
End: 2020-02-18

## 2020-02-18 NOTE — PROGRESS NOTES
Patient has graduated from the Transitions of Care Coordination  program on 02/18/2020. Patient/family has the ability to self-manage at this time Care management goals have been completed. Patient was not referred to the [facility] team for further management. Patient has Care Transition Nurse's contact information for any further questions, concerns, or needs. Patients upcoming visits:  No future appointments.

## 2020-03-09 RX ORDER — FERROUS SULFATE 325(65) MG
TABLET, DELAYED RELEASE (ENTERIC COATED) ORAL
Qty: 30 TAB | Refills: 0 | Status: SHIPPED | OUTPATIENT
Start: 2020-03-09 | End: 2020-04-08

## 2020-03-09 RX ORDER — AMLODIPINE BESYLATE 5 MG/1
TABLET ORAL
Qty: 30 TAB | Refills: 0 | Status: SHIPPED | OUTPATIENT
Start: 2020-03-09 | End: 2020-04-08

## 2020-03-09 RX ORDER — METOPROLOL SUCCINATE 50 MG/1
TABLET, EXTENDED RELEASE ORAL
Qty: 30 TAB | Refills: 0 | Status: SHIPPED | OUTPATIENT
Start: 2020-03-09 | End: 2020-04-08

## 2020-04-08 RX ORDER — FERROUS SULFATE 325(65) MG
TABLET, DELAYED RELEASE (ENTERIC COATED) ORAL
Qty: 30 TAB | Refills: 0 | Status: SHIPPED | OUTPATIENT
Start: 2020-04-08 | End: 2020-08-14 | Stop reason: ALTCHOICE

## 2020-04-08 RX ORDER — AMLODIPINE BESYLATE 5 MG/1
TABLET ORAL
Qty: 30 TAB | Refills: 0 | Status: SHIPPED | OUTPATIENT
Start: 2020-04-08 | End: 2020-05-07

## 2020-04-08 RX ORDER — METOPROLOL SUCCINATE 50 MG/1
TABLET, EXTENDED RELEASE ORAL
Qty: 30 TAB | Refills: 0 | Status: SHIPPED | OUTPATIENT
Start: 2020-04-08 | End: 2020-05-07

## 2020-04-08 RX ORDER — METFORMIN HYDROCHLORIDE 500 MG/1
TABLET ORAL
Qty: 60 TAB | Refills: 0 | Status: SHIPPED | OUTPATIENT
Start: 2020-04-08 | End: 2020-04-15 | Stop reason: SDUPTHER

## 2020-04-10 ENCOUNTER — TELEPHONE (OUTPATIENT)
Dept: INTERNAL MEDICINE CLINIC | Age: 65
End: 2020-04-10

## 2020-04-15 DIAGNOSIS — E11.9 TYPE 2 DIABETES MELLITUS WITHOUT COMPLICATION, WITHOUT LONG-TERM CURRENT USE OF INSULIN (HCC): Primary | ICD-10-CM

## 2020-04-15 RX ORDER — METFORMIN HYDROCHLORIDE 500 MG/1
TABLET ORAL
Qty: 30 TAB | Refills: 1 | Status: SHIPPED | OUTPATIENT
Start: 2020-04-15 | End: 2020-06-29

## 2020-04-15 NOTE — TELEPHONE ENCOUNTER
Call placed to ruben and she stated that metformin 500mg 1 tab 2 x a day was causing patients BS to drop, per provider change to metformin 500mg 1 tab every day, sent to pharmacy per verbal order from provider and dgter made aware and voiced understanding

## 2020-04-15 NOTE — TELEPHONE ENCOUNTER
Requested Prescriptions     Signed Prescriptions Disp Refills    metFORMIN (GLUCOPHAGE) 500 mg tablet 30 Tab 1     Sig: TAKE 1 TABLET BY MOUTH DAILY WITH MEALS     Authorizing Provider: Natalee Palmer     Ordering User: Mark Collins, verbal order received.

## 2020-05-07 RX ORDER — METOPROLOL SUCCINATE 50 MG/1
TABLET, EXTENDED RELEASE ORAL
Qty: 30 TAB | Refills: 0 | Status: SHIPPED | OUTPATIENT
Start: 2020-05-07 | End: 2020-07-23

## 2020-05-07 RX ORDER — AMLODIPINE BESYLATE 5 MG/1
TABLET ORAL
Qty: 30 TAB | Refills: 0 | Status: SHIPPED | OUTPATIENT
Start: 2020-05-07 | End: 2020-06-08

## 2020-06-08 RX ORDER — AMLODIPINE BESYLATE 5 MG/1
TABLET ORAL
Qty: 30 TAB | Refills: 0 | Status: SHIPPED | OUTPATIENT
Start: 2020-06-08 | End: 2020-07-15

## 2020-06-28 DIAGNOSIS — E11.9 TYPE 2 DIABETES MELLITUS WITHOUT COMPLICATION, WITHOUT LONG-TERM CURRENT USE OF INSULIN (HCC): ICD-10-CM

## 2020-06-29 RX ORDER — METFORMIN HYDROCHLORIDE 500 MG/1
TABLET ORAL
Qty: 60 TAB | Refills: 0 | Status: SHIPPED | OUTPATIENT
Start: 2020-06-29 | End: 2020-08-14 | Stop reason: SDUPTHER

## 2020-07-15 RX ORDER — AMLODIPINE BESYLATE 5 MG/1
TABLET ORAL
Qty: 30 TAB | Refills: 0 | Status: SHIPPED | OUTPATIENT
Start: 2020-07-15 | End: 2020-08-14 | Stop reason: SDUPTHER

## 2020-07-23 RX ORDER — METOPROLOL SUCCINATE 50 MG/1
TABLET, EXTENDED RELEASE ORAL
Qty: 30 TAB | Refills: 0 | Status: SHIPPED | OUTPATIENT
Start: 2020-07-23 | End: 2020-08-14 | Stop reason: SDUPTHER

## 2020-08-14 ENCOUNTER — VIRTUAL VISIT (OUTPATIENT)
Dept: INTERNAL MEDICINE CLINIC | Age: 65
End: 2020-08-14
Payer: MEDICARE

## 2020-08-14 DIAGNOSIS — Z98.2 S/P VP SHUNT: ICD-10-CM

## 2020-08-14 DIAGNOSIS — Z98.890 HISTORY OF CEREBRAL ANEURYSM REPAIR: ICD-10-CM

## 2020-08-14 DIAGNOSIS — Z86.79 HISTORY OF CEREBRAL ANEURYSM REPAIR: ICD-10-CM

## 2020-08-14 DIAGNOSIS — Z86.79 HISTORY OF INTRACRANIAL HEMORRHAGE: ICD-10-CM

## 2020-08-14 DIAGNOSIS — I10 ESSENTIAL HYPERTENSION: ICD-10-CM

## 2020-08-14 DIAGNOSIS — R41.3 MEMORY IMPAIRMENT: ICD-10-CM

## 2020-08-14 DIAGNOSIS — E55.9 VITAMIN D DEFICIENCY: ICD-10-CM

## 2020-08-14 DIAGNOSIS — E11.9 TYPE 2 DIABETES MELLITUS WITHOUT COMPLICATION, WITHOUT LONG-TERM CURRENT USE OF INSULIN (HCC): Primary | ICD-10-CM

## 2020-08-14 PROCEDURE — 99214 OFFICE O/P EST MOD 30 MIN: CPT | Performed by: NURSE PRACTITIONER

## 2020-08-14 PROCEDURE — G8432 DEP SCR NOT DOC, RNG: HCPCS | Performed by: NURSE PRACTITIONER

## 2020-08-14 PROCEDURE — G8756 NO BP MEASURE DOC: HCPCS | Performed by: NURSE PRACTITIONER

## 2020-08-14 PROCEDURE — 3017F COLORECTAL CA SCREEN DOC REV: CPT | Performed by: NURSE PRACTITIONER

## 2020-08-14 PROCEDURE — 3044F HG A1C LEVEL LT 7.0%: CPT | Performed by: NURSE PRACTITIONER

## 2020-08-14 PROCEDURE — 2022F DILAT RTA XM EVC RTNOPTHY: CPT | Performed by: NURSE PRACTITIONER

## 2020-08-14 PROCEDURE — G9899 SCRN MAM PERF RSLTS DOC: HCPCS | Performed by: NURSE PRACTITIONER

## 2020-08-14 PROCEDURE — G8419 CALC BMI OUT NRM PARAM NOF/U: HCPCS | Performed by: NURSE PRACTITIONER

## 2020-08-14 PROCEDURE — G8427 DOCREV CUR MEDS BY ELIG CLIN: HCPCS | Performed by: NURSE PRACTITIONER

## 2020-08-14 RX ORDER — AMLODIPINE BESYLATE 5 MG/1
TABLET ORAL
Qty: 90 TAB | Refills: 1 | Status: SHIPPED | OUTPATIENT
Start: 2020-08-14 | End: 2021-02-12

## 2020-08-14 RX ORDER — METFORMIN HYDROCHLORIDE 500 MG/1
TABLET ORAL
Qty: 90 TAB | Refills: 1 | Status: SHIPPED | OUTPATIENT
Start: 2020-08-14 | End: 2020-08-31

## 2020-08-14 RX ORDER — METOPROLOL SUCCINATE 50 MG/1
TABLET, EXTENDED RELEASE ORAL
Qty: 90 TAB | Refills: 1 | Status: SHIPPED | OUTPATIENT
Start: 2020-08-14 | End: 2021-03-01

## 2020-08-14 NOTE — PROGRESS NOTES
Cherelle Crawford is a 62 y.o. female who was seen by synchronous (real-time) audio-video technology on 8/14/2020 for Medication Refill and Diabetes (states when taking metformin BID BS drops low 66-72, thinks it has been decreased to once a day in past)    This is a patient of Dr. Ariel Robertson who presents today for follow-up. Her past medical history includes cerebral aneurysm, s/p  shunt, multiple cerebral infarcts, memory impairment, hypertension, and diabetes. She is taking metformin 500 mg once daily. Blood sugars are running 80-90s fasting and 120s-130s after eating, per report. Patient is generally feeling well at time of visit. Medication refills requested. Assessment & Plan:   Diagnoses and all orders for this visit:    1. Type 2 diabetes mellitus without complication, without long-term current use of insulin ([address]    2. Essential hypertension    3. History of intracranial hemorrhage    4. History of cerebral aneurysm repair    5. S/P  shunt    6. Vitamin D deficiency    7. Memory impairment    Will refill:  Orders Placed This Encounter    amLODIPine (NORVASC) 5 mg tablet     Sig: Take 1 tablet by mouth once daily     Dispense:  90 Tab     Refill:  1    metFORMIN (GLUCOPHAGE) 500 mg tablet     Sig: TAKE 1 TABLET BY MOUTH  DAILY WITH A MEAL     Dispense:  90 Tab     Refill:  1    metoprolol succinate (TOPROL-XL) 50 mg XL tablet     Sig: Take 1 tablet by mouth once daily     Dispense:  90 Tab     Refill:  1     Will order  Orders Placed This Encounter    CBC WITH AUTOMATED DIFF    METABOLIC PANEL, COMPREHENSIVE    TSH 3RD GENERATION    VITAMIN D, 25 HYDROXY    LIPID PANEL    HEMOGLOBIN A1C WITH EAG     Reviewed plan of care with patient and caregiver who acknowledge understanding and agree. Follow-up with Dr. Ariel Robertson in 3 months, or sooner as needed. Declined making follow-up appt at time of visit; caregiver will call back.   Subjective:       Prior to Admission medications    Medication Sig Start Date End Date Taking? Authorizing Provider   amLODIPine (NORVASC) 5 mg tablet Take 1 tablet by mouth once daily 8/14/20  Yes Mary Storm NP   metFORMIN (GLUCOPHAGE) 500 mg tablet TAKE 1 TABLET BY MOUTH  DAILY WITH A MEAL 8/14/20  Yes Mary Storm NP   metoprolol succinate (TOPROL-XL) 50 mg XL tablet Take 1 tablet by mouth once daily 8/14/20  Yes Mary Storm NP   TRUE METRIX GLUCOSE TEST STRIP strip USE TO TEST BS BID 1/22/20  Yes Geovanni Jo MD   TRUEPLUS LANCETS 33 gauge misc USE AS DIRECTED TO CHECK BG QD 1/22/20  Yes Geovanni Jo MD   calcium-vitamin D (OYSTER SHELL) 500 mg(1,250mg) -200 unit per tablet Take 1 Tab by mouth daily. 1/22/20  Yes Geovanni Jo MD   glucose 4 gram chewable tablet Take 4 Tabs by mouth as needed for Other (glucose levels < 70). 1/5/20  Yes Tremaine Bolton MD   metoprolol succinate (TOPROL-XL) 50 mg XL tablet Take 1 tablet by mouth once daily 7/23/20 8/14/20  Yeyo Canales NP   amLODIPine (NORVASC) 5 mg tablet Take 1 tablet by mouth once daily 7/15/20 8/14/20  Yeyo Canales NP   metFORMIN (GLUCOPHAGE) 500 mg tablet TAKE 1 TABLET BY MOUTH TWICE DAILY WITH MEALS 6/29/20 8/14/20  Yeyo Canales NP   ferrous sulfate (IRON) 325 mg (65 mg iron) EC tablet TAKE 1 TABLET BY MOUTH ONCE DAILY BEFORE BREAKFAST 4/8/20 8/14/20  Jorge Celis NP   ergocalciferol (ERGOCALCIFEROL) 1,250 mcg (50,000 unit) capsule Take 1 Cap by mouth every seven (7) days. 1/28/20 8/14/20  Geovanni Jo MD     No Known Allergies  Past Medical History:   Diagnosis Date    Diabetes ([facility]     Hypertension     Memory loss     Stroke ([facility]     cerebral hemorrhage,cerebral anurysm     Past Surgical History:   Procedure Laterality Date    HX ORTHOPAEDIC  1996    back surgery    NEUROLOGICAL PROCEDURE UNLISTED  2008    shunt placement       ROS    Objective:   No flowsheet data found.      [INSTRUCTIONS:  \"[x]\" Indicates a positive item  \"[]\" Indicates a negative item  -- DELETE ALL ITEMS NOT EXAMINED]    Constitutional: [x] Appears well-developed and well-nourished [x] No apparent distress      [] Abnormal -     Mental status: [x] Alert and awake      Eyes:   EOM    [x]  Normal    [] Abnormal -   Sclera  [x]  Normal    [] Abnormal -          Discharge [x]  None visible   [] Abnormal -     HENT: [x] Normocephalic, atraumatic  [] Abnormal -     Neck: [x] No visualized mass [] Abnormal -     Pulmonary/Chest: [x] Respiratory effort normal   [x] No visualized signs of difficulty breathing or respiratory distress        [] Abnormal -      Musculoskeletal:          [x] Normal range of motion of neck        [] Abnormal -     Neurological:        [x] No Facial Asymmetry (Cranial nerve 7 motor function) (limited exam due to video visit)          [] Abnormal -          Skin:        [x] No significant exanthematous lesions or discoloration noted on facial skin         [] Abnormal -            Psychiatric:       [x] Normal Affect [] Abnormal -         We discussed the expected course, resolution and complications of the diagnosis(es) in detail. Medication risks, benefits, costs, interactions, and alternatives were discussed as indicated. I advised her to contact the office if her condition worsens, changes or fails to improve as anticipated. She expressed understanding with the diagnosis(es) and plan. Godwin Riggs, who was evaluated through a patient-initiated, synchronous (real-time) audio-video encounter, and/or her healthcare decision maker, is aware that it is a billable service, with coverage as determined by her insurance carrier. She provided verbal consent to proceed: Yes, and patient identification was verified. It was conducted pursuant to the emergency declaration under the [address] [place] [address], [address] [place] [address] and the [employer] and [employer] Act. A caregiver was present when appropriate.  Ability to conduct physical exam was limited. I was at home. The patient was at home.       Elie Monreal NP

## 2020-08-14 NOTE — PROGRESS NOTES
Health Maintenance Due   Topic Date Due    Pneumococcal 0-64 years (1 of 1 - PPSV23) 09/23/1962    Eye Exam Retinal or Dilated  09/23/1966    DTaP/Tdap/Td series (1 - Tdap) 09/23/1977    PAP AKA CERVICAL CYTOLOGY  09/23/1977    Shingrix Vaccine Age 50> (1 of 2) 09/23/2006    Breast Cancer Screen Mammogram  09/23/2006    FOBT Q1Y Age 54-65  04/02/2018    Influenza Age 5 to Adult  08/01/2020       Chief Complaint   Patient presents with    Medication Refill    Diabetes     states when taking metformin BID BS drops low 66-72, thinks it has been decreased to once a day in past       1. Have you been to the ER, urgent care clinic since your last visit? Hospitalized since your last visit? No    2. Have you seen or consulted any other health care providers outside of the [address] [place] [address] since your last visit? Include any pap smears or colon screening. No    3) Do you have an Advance Directive on file? no    4) Are you interested in receiving information on Advance Directives? NO      Patient is accompanied by elba I have received verbal consent from Marylene Leeks to discuss any/all medical information while they are present in the room.

## 2020-08-26 ENCOUNTER — HOSPITAL ENCOUNTER (EMERGENCY)
Age: 65
Discharge: HOME | End: 2020-08-26
Attending: EMERGENCY MEDICINE
Payer: MEDICARE

## 2020-08-26 VITALS
TEMPERATURE: 97.6 F | SYSTOLIC BLOOD PRESSURE: 162 MMHG | RESPIRATION RATE: 18 BRPM | HEART RATE: 77 BPM | OXYGEN SATURATION: 100 % | DIASTOLIC BLOOD PRESSURE: 90 MMHG

## 2020-08-26 DIAGNOSIS — S40.022A ARM BRUISE, LEFT, INITIAL ENCOUNTER: Primary | ICD-10-CM

## 2020-08-26 PROCEDURE — 99282 EMERGENCY DEPT VISIT SF MDM: CPT

## 2020-08-26 NOTE — ED TRIAGE NOTES
Patient presents from home with complaints of bruise to her left upper arm. Patient denies pain, trauma, or injury to the area.  Patient reports she noticed the bruise yesterday afternoon

## 2020-08-26 NOTE — ED PROVIDER NOTES
Pt with bruise to LUE. Noticed it early this morning. Area is sore. Denies any trauma or injury. Pt does frequently sleep on her arm. Patient has not taken any pain medications for these symptoms. She denies taking any blood thinning medications. On chart review, the patient saw her primary care doctor about a week and a half ago. She reports that she had blood drawn at that time and it was drawn from her antecubital fossa on the same arm as her bruise. Past Medical History:   Diagnosis Date    Diabetes ([facility]     Hypertension     Memory loss     Stroke ([facility]     cerebral hemorrhage,cerebral anurysm       Past Surgical History:   Procedure Laterality Date    HX ORTHOPAEDIC  1996    back surgery    NEUROLOGICAL PROCEDURE UNLISTED  2008    shunt placement         History reviewed. No pertinent family history.     Social History     Socioeconomic History    Marital status: SINGLE     Spouse name: Not on file    Number of children: Not on file    Years of education: Not on file    Highest education level: Not on file   Occupational History    Not on file   Social Needs    Financial resource strain: Not on file    Food insecurity     Worry: Not on file     Inability: Not on file    Transportation needs     Medical: Not on file     Non-medical: Not on file   Tobacco Use    Smoking status: Never Smoker    Smokeless tobacco: Never Used   Substance and Sexual Activity    Alcohol use: No    Drug use: No    Sexual activity: Not on file     Comment: single,no children,niece,POA,nitarsha genesis   Lifestyle    Physical activity     Days per week: Not on file     Minutes per session: Not on file    Stress: Not on file   Relationships    Social connections     Talks on phone: Not on file     Gets together: Not on file     Attends Worship service: Not on file     Active member of club or organization: Not on file     Attends meetings of clubs or organizations: Not on file     Relationship status: Not on file    Intimate partner violence     Fear of current or ex partner: Not on file     Emotionally abused: Not on file     Physically abused: Not on file     Forced sexual activity: Not on file   Other Topics Concern    Not on file   Social History Narrative    Not on file         ALLERGIES: Patient has no known allergies. Review of Systems   Constitutional: Negative for fever. HENT: Negative for facial swelling. Eyes: Negative for visual disturbance. Respiratory: Negative for chest tightness. Cardiovascular: Negative for chest pain. Gastrointestinal: Negative for abdominal pain. Genitourinary: Negative for difficulty urinating and dysuria. Musculoskeletal: Negative for arthralgias. Skin: Negative for rash. Neurological: Negative for headaches. Hematological: Negative for adenopathy. Psychiatric/Behavioral: Negative for suicidal ideas. Vitals:    08/26/20 1043   BP: 162/90   Pulse: 77   Resp: 18   Temp: 97.6 °F (36.4 °C)   SpO2: 100%            Physical Exam  Vitals signs and nursing note reviewed. Constitutional:       General: She is not in acute distress. Appearance: She is well-developed. HENT:      Head: Normocephalic and atraumatic. Eyes:      General: No scleral icterus. Conjunctiva/sclera: Conjunctivae normal.      Pupils: Pupils are equal, round, and reactive to light. Neck:      Musculoskeletal: Normal range of motion and neck supple. Cardiovascular:      Rate and Rhythm: Normal rate. Heart sounds: No murmur. Pulmonary:      Effort: Pulmonary effort is normal. No respiratory distress. Abdominal:      General: There is no distension. Musculoskeletal: Normal range of motion. Comments: Resolving bruise along the left upper extremity extending from the antecubital fossa. No tenderness or induration. Full range of motion. Neurovascularly intact. No bruit. Skin:     General: Skin is warm and dry. Findings: No rash. Neurological:      Mental Status: She is alert and oriented to person, place, and time. MDM  Number of Diagnoses or Management Options  Diagnosis management comments: Assessment: Bruise likely related to recent blood draw for routine lab work. Vital signs stable. No evidence of any significant hematoma or blood loss. Stable for discharge home to follow-up with a primary care doctor as needed.          Procedures

## 2020-08-27 ENCOUNTER — PATIENT OUTREACH (OUTPATIENT)
Dept: CASE MANAGEMENT | Age: 65
End: 2020-08-27

## 2020-08-27 NOTE — ACP (ADVANCE CARE PLANNING)
Advance Care Planning:   Does patient have an Advance Directive: decision makers updated   Rosi Delgado

## 2020-08-27 NOTE — PROGRESS NOTES
Patient contacted regarding recent discharge and COVID-19 risk. Discussed COVID-19 related testing which was not done at this time. Test results were not done. Patient informed of results, if available? no    Care Transition Nurse/ Ambulatory Care Manager/ LPN Care Coordinator contacted the family by telephone to perform post discharge assessment. Verified name and  with family as identifiers. Patient has following risk factors of: diabetes. CTN/ACM/LPN reviewed discharge instructions, medical action plan and red flags related to discharge diagnosis. Reviewed and educated them on any new and changed medications related to discharge diagnosis. Advised obtaining a 90-day supply of all daily and as-needed medications. Advance Care Planning:   Does patient have an Advance Directive: decision makers updated     Education provided regarding infection prevention, and signs and symptoms of COVID-19 and when to seek medical attention with family who verbalized understanding. Discussed exposure protocols and quarantine from [address] you at higher risk for severe illness  and given an opportunity for questions and concerns. The family agrees to contact the COVID-19 hotline [phone] or PCP office for questions related to their healthcare. CTN/ACM/LPN provided contact information for future reference. From CDC: Are you at higher risk for severe illness?  Wash your hands often.  Avoid close contact (6 feet, which is about two arm lengths) with people who are sick.  Put distance between yourself and other people if COVID-19 is spreading in your community.  Clean and disinfect frequently touched surfaces.  Avoid all cruise travel and non-essential air travel.  Call your healthcare professional if you have concerns about COVID-19 and your underlying condition or if you are sick.     For more information on steps you can take to protect yourself, see CDC's How to Protect Yourself Patient/family/caregiver given information for [employer] and agrees to enroll no    Plan for follow-up call in 7-14 days based on severity of symptoms and risk factors.

## 2020-08-29 DIAGNOSIS — E11.9 TYPE 2 DIABETES MELLITUS WITHOUT COMPLICATION, WITHOUT LONG-TERM CURRENT USE OF INSULIN (HCC): ICD-10-CM

## 2020-08-31 RX ORDER — METFORMIN HYDROCHLORIDE 500 MG/1
TABLET ORAL
Qty: 60 TAB | Refills: 0 | Status: SHIPPED | OUTPATIENT
Start: 2020-08-31 | End: 2021-02-03 | Stop reason: SDUPTHER

## 2020-09-02 ENCOUNTER — HOSPITAL ENCOUNTER (OUTPATIENT)
Dept: LAB | Age: 65
Discharge: HOME | End: 2020-09-02
Payer: MEDICARE

## 2020-09-02 PROCEDURE — 80061 LIPID PANEL: CPT

## 2020-09-02 PROCEDURE — 85025 COMPLETE CBC W/AUTO DIFF WBC: CPT

## 2020-09-02 PROCEDURE — 83036 HEMOGLOBIN GLYCOSYLATED A1C: CPT

## 2020-09-02 PROCEDURE — 36415 COLL VENOUS BLD VENIPUNCTURE: CPT

## 2020-09-02 PROCEDURE — 80053 COMPREHEN METABOLIC PANEL: CPT

## 2020-09-02 PROCEDURE — 84443 ASSAY THYROID STIM HORMONE: CPT

## 2020-09-02 PROCEDURE — 82306 VITAMIN D 25 HYDROXY: CPT

## 2020-09-03 LAB
25(OH)D3+25(OH)D2 SERPL-MCNC: 19.5 NG/ML (ref 30–100)
ALBUMIN SERPL-MCNC: 4.3 G/DL (ref 3.8–4.8)
ALBUMIN/GLOB SERPL: 1.5 {RATIO} (ref 1.2–2.2)
ALP SERPL-CCNC: 92 IU/L (ref 39–117)
ALT SERPL-CCNC: 11 IU/L (ref 0–32)
AST SERPL-CCNC: 11 IU/L (ref 0–40)
BASOPHILS # BLD AUTO: 0 X10E3/UL (ref 0–0.2)
BASOPHILS NFR BLD AUTO: 0 %
BILIRUB SERPL-MCNC: 0.2 MG/DL (ref 0–1.2)
BUN SERPL-MCNC: 17 MG/DL (ref 8–27)
BUN/CREAT SERPL: 18 (ref 12–28)
CALCIUM SERPL-MCNC: 10 MG/DL (ref 8.7–10.3)
CHLORIDE SERPL-SCNC: 100 MMOL/L (ref 96–106)
CHOLEST SERPL-MCNC: 241 MG/DL (ref 100–199)
CO2 SERPL-SCNC: 25 MMOL/L (ref 20–29)
CREAT SERPL-MCNC: 0.93 MG/DL (ref 0.57–1)
EOSINOPHIL # BLD AUTO: 0.1 X10E3/UL (ref 0–0.4)
EOSINOPHIL NFR BLD AUTO: 1 %
ERYTHROCYTE [DISTWIDTH] IN BLOOD BY AUTOMATED COUNT: 15.7 % (ref 11.7–15.4)
EST. AVERAGE GLUCOSE BLD GHB EST-MCNC: 137 MG/DL
GLOBULIN SER CALC-MCNC: 2.9 G/DL (ref 1.5–4.5)
GLUCOSE SERPL-MCNC: 110 MG/DL (ref 65–99)
HBA1C MFR BLD: 6.4 % (ref 4.8–5.6)
HCT VFR BLD AUTO: 41.6 % (ref 34–46.6)
HDLC SERPL-MCNC: 50 MG/DL
HGB BLD-MCNC: 13.5 G/DL (ref 11.1–15.9)
IMM GRANULOCYTES # BLD AUTO: 0 X10E3/UL (ref 0–0.1)
IMM GRANULOCYTES NFR BLD AUTO: 0 %
INTERPRETATION, 910389: NORMAL
LDLC SERPL CALC-MCNC: 170 MG/DL (ref 0–99)
LYMPHOCYTES # BLD AUTO: 3.6 X10E3/UL (ref 0.7–3.1)
LYMPHOCYTES NFR BLD AUTO: 36 %
Lab: NORMAL
MCH RBC QN AUTO: 26.6 PG (ref 26.6–33)
MCHC RBC AUTO-ENTMCNC: 32.5 G/DL (ref 31.5–35.7)
MCV RBC AUTO: 82 FL (ref 79–97)
MONOCYTES # BLD AUTO: 0.6 X10E3/UL (ref 0.1–0.9)
MONOCYTES NFR BLD AUTO: 6 %
NEUTROPHILS # BLD AUTO: 5.6 X10E3/UL (ref 1.4–7)
NEUTROPHILS NFR BLD AUTO: 57 %
PLATELET # BLD AUTO: 262 X10E3/UL (ref 150–450)
POTASSIUM SERPL-SCNC: 4.4 MMOL/L (ref 3.5–5.2)
PROT SERPL-MCNC: 7.2 G/DL (ref 6–8.5)
RBC # BLD AUTO: 5.07 X10E6/UL (ref 3.77–5.28)
SODIUM SERPL-SCNC: 139 MMOL/L (ref 134–144)
TRIGL SERPL-MCNC: 116 MG/DL (ref 0–149)
TSH SERPL DL<=0.005 MIU/L-ACNC: 1.75 UIU/ML (ref 0.45–4.5)
VLDLC SERPL CALC-MCNC: 21 MG/DL (ref 5–40)
WBC # BLD AUTO: 10 X10E3/UL (ref 3.4–10.8)

## 2020-09-04 RX ORDER — ERGOCALCIFEROL 1.25 MG/1
50000 CAPSULE ORAL
Qty: 12 CAP | Refills: 0 | Status: SHIPPED | OUTPATIENT
Start: 2020-09-04 | End: 2021-05-05 | Stop reason: ALTCHOICE

## 2020-09-04 NOTE — PROGRESS NOTES
Vitamin D level is low. Vitamin D 50,000 units weekly x 12 weeks. After completion of 12 weeks, recommend OTC Vitamin D3 1000 units daily. Cholesterol levels elevated. Recommend that patient watch diet for fatty foods and exercise as tolerated. Follow-up with Dr. Selina Kearney in 3 months; if levels remain elevated, may need to consider cholesterol lowering medication. Otherwise, stable labs.

## 2020-09-10 ENCOUNTER — PATIENT OUTREACH (OUTPATIENT)
Dept: CASE MANAGEMENT | Age: 65
End: 2020-09-10

## 2020-09-10 NOTE — PROGRESS NOTES
Patient resolved from Transition of Care episode on 09/10/20  Discussed COVID-19 related testing which was not done at this time. Test results were not done. Patient informed of results, if available? no     Patient/family has been provided the following resources and education related to COVID-19:                         Signs, symptoms and red flags related to COVID-19            [facility] exposure and quarantine guidelines            [employer] exposure contact - [phone]            Contact for their local Department of Health                 Patient currently reports that the following symptoms have improved:  no new symptoms and no worsening symptoms. No further outreach scheduled with this CTN/ACM/LPN/HC/ MA. Episode of Care resolved. Patient has this CTN/ACM/LPN/HC/MA contact information if future needs arise.

## 2021-01-05 ENCOUNTER — HOSPITAL ENCOUNTER (EMERGENCY)
Age: 66
Discharge: HOME | End: 2021-01-05
Attending: EMERGENCY MEDICINE
Payer: MEDICARE

## 2021-01-05 ENCOUNTER — APPOINTMENT (OUTPATIENT)
Dept: CT IMAGING | Age: 66
End: 2021-01-05
Attending: EMERGENCY MEDICINE
Payer: MEDICARE

## 2021-01-05 VITALS
DIASTOLIC BLOOD PRESSURE: 69 MMHG | RESPIRATION RATE: 18 BRPM | HEART RATE: 72 BPM | OXYGEN SATURATION: 97 % | HEIGHT: 65 IN | TEMPERATURE: 97.6 F | SYSTOLIC BLOOD PRESSURE: 140 MMHG | BODY MASS INDEX: 25.96 KG/M2

## 2021-01-05 DIAGNOSIS — F01.518 VASCULAR DEMENTIA WITH BEHAVIOR DISTURBANCE: Primary | ICD-10-CM

## 2021-01-05 LAB
ALBUMIN SERPL-MCNC: 3.5 G/DL (ref 3.5–5)
ALBUMIN/GLOB SERPL: 0.8 {RATIO} (ref 1.1–2.2)
ALP SERPL-CCNC: 106 U/L (ref 45–117)
ALT SERPL-CCNC: 16 U/L (ref 12–78)
ANION GAP SERPL CALC-SCNC: 2 MMOL/L (ref 5–15)
APPEARANCE UR: CLEAR
AST SERPL-CCNC: 9 U/L (ref 15–37)
BACTERIA URNS QL MICRO: NEGATIVE /HPF
BASOPHILS # BLD: 0 K/UL (ref 0–0.1)
BASOPHILS NFR BLD: 0 % (ref 0–1)
BILIRUB SERPL-MCNC: 0.2 MG/DL (ref 0.2–1)
BILIRUB UR QL: NEGATIVE
BUN SERPL-MCNC: 12 MG/DL (ref 6–20)
BUN/CREAT SERPL: 13 (ref 12–20)
CALCIUM SERPL-MCNC: 9.5 MG/DL (ref 8.5–10.1)
CHLORIDE SERPL-SCNC: 105 MMOL/L (ref 97–108)
CO2 SERPL-SCNC: 32 MMOL/L (ref 21–32)
COLOR UR: YELLOW
CREAT SERPL-MCNC: 0.95 MG/DL (ref 0.55–1.02)
DIFFERENTIAL METHOD BLD: ABNORMAL
EOSINOPHIL # BLD: 0.1 K/UL (ref 0–0.4)
EOSINOPHIL NFR BLD: 1 % (ref 0–7)
EPITH CASTS URNS QL MICRO: ABNORMAL /LPF
ERYTHROCYTE [DISTWIDTH] IN BLOOD BY AUTOMATED COUNT: 15.9 % (ref 11.5–14.5)
GLOBULIN SER CALC-MCNC: 4.4 G/DL (ref 2–4)
GLUCOSE SERPL-MCNC: 111 MG/DL (ref 65–100)
GLUCOSE UR STRIP.AUTO-MCNC: NEGATIVE MG/DL
HCT VFR BLD AUTO: 41.1 % (ref 35–47)
HGB BLD-MCNC: 12.7 G/DL (ref 11.5–16)
HGB UR QL STRIP: NEGATIVE
IMM GRANULOCYTES # BLD AUTO: 0 K/UL (ref 0–0.04)
IMM GRANULOCYTES NFR BLD AUTO: 0 % (ref 0–0.5)
KETONES UR QL STRIP.AUTO: NEGATIVE MG/DL
LEUKOCYTE ESTERASE UR QL STRIP.AUTO: NEGATIVE
LYMPHOCYTES # BLD: 3.9 K/UL (ref 0.8–3.5)
LYMPHOCYTES NFR BLD: 37 % (ref 12–49)
MCH RBC QN AUTO: 26.7 PG (ref 26–34)
MCHC RBC AUTO-ENTMCNC: 30.9 G/DL (ref 30–36.5)
MCV RBC AUTO: 86.3 FL (ref 80–99)
MONOCYTES # BLD: 0.7 K/UL (ref 0–1)
MONOCYTES NFR BLD: 7 % (ref 5–13)
MUCOUS THREADS URNS QL MICRO: ABNORMAL /LPF
NEUTS SEG # BLD: 5.7 K/UL (ref 1.8–8)
NEUTS SEG NFR BLD: 55 % (ref 32–75)
NITRITE UR QL STRIP.AUTO: NEGATIVE
NRBC # BLD: 0 K/UL (ref 0–0.01)
NRBC BLD-RTO: 0 PER 100 WBC
PH UR STRIP: 6 [PH] (ref 5–8)
PLATELET # BLD AUTO: 230 K/UL (ref 150–400)
PMV BLD AUTO: 10.2 FL (ref 8.9–12.9)
POTASSIUM SERPL-SCNC: 3.6 MMOL/L (ref 3.5–5.1)
PROT SERPL-MCNC: 7.9 G/DL (ref 6.4–8.2)
PROT UR STRIP-MCNC: NEGATIVE MG/DL
RBC # BLD AUTO: 4.76 M/UL (ref 3.8–5.2)
RBC #/AREA URNS HPF: ABNORMAL /HPF (ref 0–5)
SODIUM SERPL-SCNC: 139 MMOL/L (ref 136–145)
SP GR UR REFRACTOMETRY: 1.01 (ref 1–1.03)
UA: UC IF INDICATED,UAUC: ABNORMAL
UROBILINOGEN UR QL STRIP.AUTO: 0.2 EU/DL (ref 0.2–1)
WBC # BLD AUTO: 10.4 K/UL (ref 3.6–11)
WBC URNS QL MICRO: ABNORMAL /HPF (ref 0–4)

## 2021-01-05 PROCEDURE — 99285 EMERGENCY DEPT VISIT HI MDM: CPT

## 2021-01-05 PROCEDURE — 85025 COMPLETE CBC W/AUTO DIFF WBC: CPT

## 2021-01-05 PROCEDURE — 81001 URINALYSIS AUTO W/SCOPE: CPT

## 2021-01-05 PROCEDURE — 70450 CT HEAD/BRAIN W/O DYE: CPT

## 2021-01-05 PROCEDURE — 80053 COMPREHEN METABOLIC PANEL: CPT

## 2021-01-05 PROCEDURE — 36415 COLL VENOUS BLD VENIPUNCTURE: CPT

## 2021-01-05 NOTE — DISCHARGE INSTRUCTIONS
It was a pleasure taking care of you in our emergency department today. Your examination, laboratories, and CT scan are all reassuring. We recommend that you take all medications as prescribed, maintain a familiar environment, and get adequate sleep. There are no signs of an infection, stroke, injury, or any bleeding on the brain today.

## 2021-01-05 NOTE — ED TRIAGE NOTES
Pt arrives via EMS from home for c/o altered mental status x2 days-talking at the tv about a disease, pacing at night. Hx of TIA's.  BS =120 for EMS.   Pt oriented to self, location upon arrival but not oriented to time-EMS told by family member that this is pt's normal.

## 2021-01-05 NOTE — ED NOTES
[address] [facility] [address] care of patient from triage. Patient is alert and oriented to self and the fact that she is in the hospital but unable to explain why. Patient denies pain at this time. Placed on the monitor x 2. Call bell in reach. Patient instructed to use call bell if she needs to get out of bed. Will move patient to room closer to nurses station in event of wandering. 1845: I have reviewed discharge instructions with the patient. The patient verbalized understanding. Patient ambulatory   With RN to waiting room. Patient's niece to pick patient up.    1900: Patient out to family vehicle at this time.

## 2021-01-06 ENCOUNTER — PATIENT OUTREACH (OUTPATIENT)
Dept: CASE MANAGEMENT | Age: 66
End: 2021-01-06

## 2021-01-06 NOTE — PROGRESS NOTES
Patient contacted regarding recent discharge and COVID-19 risk. Discussed COVID-19 related testing which was not done at this time. Test results were not done. Patient informed of results, if available? no    Outreach made within 2 business days of discharge: Yes    Care Transition Nurse/ Ambulatory Care Manager/ LPN Care Coordinator contacted the family sonam Kohli by telephone to perform post discharge assessment. Verified name and  with family as identifiers. Patient has following risk factors of: diabetes. CTN/ACM/LPN reviewed discharge instructions, medical action plan and red flags related to discharge diagnosis. Reviewed and educated them on any new and changed medications related to discharge diagnosis. Advised obtaining a 90-day supply of all daily and as-needed medications. Advance Care Planning:   Does patient have an Advance Directive: currently not on file; patient declined education    Education provided regarding infection prevention, and signs and symptoms of COVID-19 and when to seek medical attention with family who verbalized understanding. Discussed exposure protocols and quarantine from [address] you at higher risk for severe illness  and given an opportunity for questions and concerns. The family agrees to contact the COVID-19 hotline [phone] or PCP office for questions related to their healthcare. CTN/ACM/LPN provided contact information for future reference. From CDC: Are you at higher risk for severe illness?  Wash your hands often.  Avoid close contact (6 feet, which is about two arm lengths) with people who are sick.  Put distance between yourself and other people if COVID-19 is spreading in your community.  Clean and disinfect frequently touched surfaces.  Avoid all cruise travel and non-essential air travel.    Call your healthcare professional if you have concerns about COVID-19 and your underlying condition or if you are sick.    Nguyễn Ham spoke to Catalino and she scheduled patient for a follow up PCP appointment. For more information on steps you can take to protect yourself, see CDC's How to Protect Yourself      Patient/family/caregiver given information for [employer] and agrees to enroll no  Patient's preferred e-mail:  n/a  Patient's preferred phone number: n/a  Based on Loop alert triggers, patient will be contacted by nurse care manager for worsening symptoms. Plan for follow-up call in 7-14 days based on severity of symptoms and risk factors.

## 2021-01-06 NOTE — ED PROVIDER NOTES
EMERGENCY DEPARTMENT HISTORY AND PHYSICAL EXAM      Date: 1/5/2021  Patient Name: Carmelina Berrios  Patient Age and Sex: 60 y.o. female    History of Presenting Illness     Chief Complaint   Patient presents with    Altered mental status       History Provided By: Patient and patient's niece, by telephone    Ability to gather history was limited by: Dementia    HPI: Carmelina Berrios, 60 y.o. female with history of diabetes, hypertension, dementia, sent to the emergency department by her niece who was concerned that patient was acting more strangely than usual.  Patient seemed confused, seems to be doing unusual behaviors such as talking to herself and seemed more disoriented than usual.  The symptoms have been present for the last 3 to 5 days or so, waxing and waning. No reported fevers or headaches or any pain. Location:    Quality:      Severity:    Duration:   Timing:      Context:    Modifying factors:   Associated symptoms:       The patient's medical, surgical, family, and social history on file were reviewed by me today. Past Medical History:   Diagnosis Date    Diabetes ([address]     Hypertension     Memory loss     Stroke ([address]     cerebral hemorrhage,cerebral anurysm     Past Surgical History:   Procedure Laterality Date    HX ORTHOPAEDIC  1996    back surgery    NEUROLOGICAL PROCEDURE UNLISTED  2008    shunt placement       PCP: Loraine Mueller MD    Past History     Past Medical History:  Past Medical History:   Diagnosis Date    Diabetes ([address]     Hypertension     Memory loss     Stroke ([address]     cerebral hemorrhage,cerebral anurysm       Past Surgical History:  Past Surgical History:   Procedure Laterality Date    HX ORTHOPAEDIC  1996    back surgery    NEUROLOGICAL PROCEDURE UNLISTED  2008    shunt placement       Family History:  History reviewed. No pertinent family history.     Social History:  Social History     Tobacco Use    Smoking status: Never Smoker    Smokeless tobacco: Never Used   Substance Use Topics    Alcohol use: No    Drug use: No       Allergies:  No Known Allergies    Current Medications:  No current facility-administered medications on file prior to encounter. Current Outpatient Medications on File Prior to Encounter   Medication Sig Dispense Refill    metFORMIN (GLUCOPHAGE) 500 mg tablet TAKE 1 TABLET BY MOUTH TWICE DAILY WITH MEALS 60 Tab 0    amLODIPine (NORVASC) 5 mg tablet Take 1 tablet by mouth once daily 90 Tab 1    metoprolol succinate (TOPROL-XL) 50 mg XL tablet Take 1 tablet by mouth once daily 90 Tab 1    calcium-vitamin D (OYSTER SHELL) 500 mg(1,250mg) -200 unit per tablet Take 1 Tab by mouth daily. 90 Tab 3    ergocalciferol (ERGOCALCIFEROL) 1,250 mcg (50,000 unit) capsule Take 1 Cap by mouth every seven (7) days. 12 Cap 0    TRUE METRIX GLUCOSE TEST STRIP strip USE TO TEST BS  Strip 12    TRUEPLUS LANCETS 33 gauge misc USE AS DIRECTED TO CHECK BG  Lancet 12    glucose 4 gram chewable tablet Take 4 Tabs by mouth as needed for Other (glucose levels < 70). 30 Tab 0       Review of Systems   Review of Systems   Constitutional: Negative for fatigue and fever. Neurological: Negative for headaches. Psychiatric/Behavioral: Positive for behavioral problems and confusion. Negative for agitation. All other systems reviewed and are negative. Physical Exam   Vital Signs  Patient Vitals for the past 8 hrs:   BP SpO2   01/05/21 1734 (!) 140/69 97 %          Physical Exam  Vitals signs and nursing note reviewed. Constitutional:       General: She is not in acute distress. Appearance: Normal appearance. She is well-developed. She is not ill-appearing. HENT:      Head: Normocephalic and atraumatic. Mouth/Throat:      Mouth: Mucous membranes are moist.   Eyes:      General:         Right eye: No discharge. Left eye: No discharge.       Conjunctiva/sclera: Conjunctivae normal.   Neck:      Musculoskeletal: Normal range of motion and neck supple. Cardiovascular:      Rate and Rhythm: Normal rate and regular rhythm. Heart sounds: Normal heart sounds. No murmur. Pulmonary:      Effort: Pulmonary effort is normal. No respiratory distress. Breath sounds: Normal breath sounds. No wheezing. Abdominal:      General: There is no distension. Palpations: Abdomen is soft. Tenderness: There is no abdominal tenderness. Musculoskeletal: Normal range of motion. General: No deformity. Skin:     General: Skin is warm and dry. Findings: No rash. Neurological:      General: No focal deficit present. Mental Status: She is alert. She is disoriented. Psychiatric:         Speech: Speech normal.         Behavior: Behavior normal.         Cognition and Memory: Cognition is impaired. Memory is impaired.          Diagnostic Study Results   Labs  Recent Results (from the past 24 hour(s))   URINALYSIS W/ REFLEX CULTURE    Collection Time: 01/05/21  3:54 PM    Specimen: Urine   Result Value Ref Range    Color YELLOW      Appearance CLEAR CLEAR      Specific gravity 1.015 1.003 - 1.030      pH (UA) 6.0 5.0 - 8.0      Protein Negative NEG mg/dL    Glucose Negative NEG mg/dL    Ketone Negative NEG mg/dL    Bilirubin Negative NEG      Blood Negative NEG      Urobilinogen 0.2 0.2 - 1.0 EU/dL    Nitrites Negative NEG      Leukocyte Esterase Negative NEG      WBC 0-4 0 - 4 /hpf    RBC 0-5 0 - 5 /hpf    Epithelial cells FEW FEW /lpf    Bacteria Negative NEG /hpf    UA:UC IF INDICATED CULTURE NOT INDICATED BY UA RESULT CNI      Mucus 1+ (A) NEG /lpf   CBC WITH AUTOMATED DIFF    Collection Time: 01/05/21  4:06 PM   Result Value Ref Range    WBC 10.4 3.6 - 11.0 K/uL    RBC 4.76 3.80 - 5.20 M/uL    HGB 12.7 11.5 - 16.0 g/dL    HCT 41.1 35.0 - 47.0 %    MCV 86.3 80.0 - 99.0 FL    MCH 26.7 26.0 - 34.0 PG    MCHC 30.9 30.0 - 36.5 g/dL    RDW 15.9 (H) 11.5 - 14.5 %    PLATELET 181 398 - 892 K/uL    MPV 10.2 8.9 - 12.9 FL    NRBC 0.0 0  WBC    ABSOLUTE NRBC 0.00 0.00 - 0.01 K/uL    NEUTROPHILS 55 32 - 75 %    LYMPHOCYTES 37 12 - 49 %    MONOCYTES 7 5 - 13 %    EOSINOPHILS 1 0 - 7 %    BASOPHILS 0 0 - 1 %    IMMATURE GRANULOCYTES 0 0.0 - 0.5 %    ABS. NEUTROPHILS 5.7 1.8 - 8.0 K/UL    ABS. LYMPHOCYTES 3.9 (H) 0.8 - 3.5 K/UL    ABS. MONOCYTES 0.7 0.0 - 1.0 K/UL    ABS. EOSINOPHILS 0.1 0.0 - 0.4 K/UL    ABS. BASOPHILS 0.0 0.0 - 0.1 K/UL    ABS. IMM. GRANS. 0.0 0.00 - 0.04 K/UL    DF AUTOMATED     METABOLIC PANEL, COMPREHENSIVE    Collection Time: 01/05/21  4:06 PM   Result Value Ref Range    Sodium 139 136 - 145 mmol/L    Potassium 3.6 3.5 - 5.1 mmol/L    Chloride 105 97 - 108 mmol/L    CO2 32 21 - 32 mmol/L    Anion gap 2 (L) 5 - 15 mmol/L    Glucose 111 (H) 65 - 100 mg/dL    BUN 12 6 - 20 MG/DL    Creatinine 0.95 0.55 - 1.02 MG/DL    BUN/Creatinine ratio 13 12 - 20      GFR est AA >60 >60 ml/min/1.73m2    GFR est non-AA 59 (L) >60 ml/min/1.73m2    Calcium 9.5 8.5 - 10.1 MG/DL    Bilirubin, total 0.2 0.2 - 1.0 MG/DL    ALT (SGPT) 16 12 - 78 U/L    AST (SGOT) 9 (L) 15 - 37 U/L    Alk. phosphatase 106 45 - 117 U/L    Protein, total 7.9 6.4 - 8.2 g/dL    Albumin 3.5 3.5 - 5.0 g/dL    Globulin 4.4 (H) 2.0 - 4.0 g/dL    A-G Ratio 0.8 (L) 1.1 - 2.2         Radiologic Studies  CT HEAD WO CONT   Final Result   IMPRESSION:    1. No evidence of acute intracranial abnormality by this modality. CT Results  (Last 48 hours)               01/05/21 1602  CT HEAD WO CONT Final result    Impression:  IMPRESSION:    1. No evidence of acute intracranial abnormality by this modality. Narrative:  EXAM:  CT HEAD WO CONT   INDICATION:   confusion   Additional history: Altered mental status/confusion x2 days   COMPARISON: CT of the head, 1/2/2020 and 10/20/2019. Ren Marcos TECHNIQUE:    Unenhanced CT of the head was performed using 5 mm images. Coronal and sagittal   reformats were produced. Brain and bone windows were generated.     CT dose reduction was achieved through use of a standardized protocol tailored   for this examination and automatic exposure control for dose modulation. Loraine Rice FINDINGS:   Left frontal approach ventriculostomy tube. Right frontal encephalomalacia. Right parietal encephalomalacia at the vertex. Right occipital encephalomalacia. Right cerebellar encephalomalacia. Confluent subcortical and periventricular   white matter hypoattenuation. The basilar cisterns are open. No acute infarct   is identified. Intracranial atherosclerosis. The bone windows demonstrate no abnormalities. The visualized portions of the   paranasal sinuses and mastoid air cells are clear. .           CXR Results  (Last 48 hours)    None          Procedures   Procedures    Medical Decision Making     I reviewed the patient's most recent Emergency Dept notes and diagnostic tests  in formulating my MDM on today's visit. Provider Notes (Medical Decision Making):   60-year-old female with history of dementia, presenting with confusion and talking to herself over the past few days. On my examination she is alert and cooperative, does seem to have some dementia and possibly intellectual disability. Otherwise normal physical examination. Nonfocal neurologic exam.    Reassuring laboratories, urinalysis, and CT scan. No evidence of UTI or other infectious process, no evidence of CVA. Stable for discharge home, follow-up outpatient primary care. Ciara Rizvi MD  11:38 PM    Consults:    Social History     Tobacco Use    Smoking status: Never Smoker    Smokeless tobacco: Never Used   Substance Use Topics    Alcohol use: No    Drug use: No     No data found. Prescriptions from today's ED visit:  Discharge Medication List as of 1/5/2021  6:37 PM           Medications Administered during ED course:  Medications - No data to display       Diagnosis and Disposition     Disposition:  Discharged    Clinical Impression:   1. Vascular dementia with behavior disturbance ([facility]        Attestation:  I personally performed the services described in this documentation on this date 1/5/2021 for patient Letcher Koyanagi. Laila Cobb MD        I was the first provider for this patient on this visit. To the best of my ability I reviewed relevant prior medical records, electrocardiograms, laboratories, and radiologic studies. The patient's presenting problems were discussed, and the patient was in agreement with the care plan formulated and outlined with them. Laila Cobb MD    Please note that this dictation was completed with Dragon voice recognition software. Quite often unanticipated grammatical, syntax, homophones, and other interpretive errors are inadvertently transcribed by the computer software. Please disregard these errors and excuse any errors that have escaped final proofreading.

## 2021-01-07 ENCOUNTER — VIRTUAL VISIT (OUTPATIENT)
Dept: INTERNAL MEDICINE CLINIC | Age: 66
End: 2021-01-07
Payer: MEDICARE

## 2021-01-07 DIAGNOSIS — R41.3 MEMORY CHANGE: ICD-10-CM

## 2021-01-07 DIAGNOSIS — R46.89 WANDERING: ICD-10-CM

## 2021-01-07 DIAGNOSIS — I10 ESSENTIAL HYPERTENSION: ICD-10-CM

## 2021-01-07 DIAGNOSIS — E55.9 VITAMIN D DEFICIENCY: ICD-10-CM

## 2021-01-07 DIAGNOSIS — E11.9 TYPE 2 DIABETES MELLITUS WITHOUT COMPLICATION, WITHOUT LONG-TERM CURRENT USE OF INSULIN (HCC): Primary | ICD-10-CM

## 2021-01-07 DIAGNOSIS — E11.9 TYPE 2 DIABETES MELLITUS WITHOUT COMPLICATION, WITHOUT LONG-TERM CURRENT USE OF INSULIN (HCC): ICD-10-CM

## 2021-01-07 DIAGNOSIS — Z71.89 ACP (ADVANCE CARE PLANNING): ICD-10-CM

## 2021-01-07 DIAGNOSIS — Z00.00 MEDICARE ANNUAL WELLNESS VISIT, SUBSEQUENT: ICD-10-CM

## 2021-01-07 PROCEDURE — 99497 ADVNCD CARE PLAN 30 MIN: CPT | Performed by: INTERNAL MEDICINE

## 2021-01-07 PROCEDURE — G9899 SCRN MAM PERF RSLTS DOC: HCPCS | Performed by: INTERNAL MEDICINE

## 2021-01-07 PROCEDURE — G8427 DOCREV CUR MEDS BY ELIG CLIN: HCPCS | Performed by: INTERNAL MEDICINE

## 2021-01-07 PROCEDURE — G0439 PPPS, SUBSEQ VISIT: HCPCS | Performed by: INTERNAL MEDICINE

## 2021-01-07 PROCEDURE — G8510 SCR DEP NEG, NO PLAN REQD: HCPCS | Performed by: INTERNAL MEDICINE

## 2021-01-07 PROCEDURE — G8419 CALC BMI OUT NRM PARAM NOF/U: HCPCS | Performed by: INTERNAL MEDICINE

## 2021-01-07 PROCEDURE — G0463 HOSPITAL OUTPT CLINIC VISIT: HCPCS | Performed by: INTERNAL MEDICINE

## 2021-01-07 PROCEDURE — G8756 NO BP MEASURE DOC: HCPCS | Performed by: INTERNAL MEDICINE

## 2021-01-07 PROCEDURE — 99214 OFFICE O/P EST MOD 30 MIN: CPT | Performed by: INTERNAL MEDICINE

## 2021-01-07 NOTE — ACP (ADVANCE CARE PLANNING)

## 2021-01-07 NOTE — PROGRESS NOTES
Health Maintenance Due   Topic Date Due    Pneumococcal 0-64 years (1 of 1 - PPSV23) 09/23/1962    Eye Exam Retinal or Dilated  09/23/1966    DTaP/Tdap/Td series (1 - Tdap) 09/23/1977    PAP AKA CERVICAL CYTOLOGY  09/23/1977    Shingrix Vaccine Age 50> (1 of 2) 09/23/2006    Breast Cancer Screen Mammogram  09/23/2006    Colorectal Cancer Screening Combo  04/02/2018    Flu Vaccine (1) 09/01/2020    Medicare Yearly Exam  01/22/2021    Foot Exam Q1  01/22/2021    MICROALBUMIN Q1  01/22/2021       Chief Complaint   Patient presents with   [facility] ED Follow-up     ED [facility] ED 1/5/2021 AMS, dx with dementia       1. Have you been to the ER, urgent care clinic since your last visit? Hospitalized since your last visit? Yes When: 1/5/2021 Where: [facility] Reason for visit: ams    2. Have you seen or consulted any other health care providers outside of the [address] [place] [address] since your last visit? Include any pap smears or colon screening. No    3) Do you have an Advance Directive on file? no    4) Are you interested in receiving information on Advance Directives? NO      Patient is accompanied by daughter I have received verbal consent from Malia Hilliard to discuss any/all medical information while they are present in the room.

## 2021-01-07 NOTE — PROGRESS NOTES
Teodora Carlin is a 60 y.o. female who was seen by synchronous (real-time) audio-video technology on 1/7/2021 for ED Follow-up [facility] ED 1/5/2021 AMS, dx with dementia) and Annual Wellness Visit        Assessment & Plan:   Diagnoses and all orders for this visit:    1. Type 2 diabetes mellitus without complication, without long-term current use of insulin ([address]    She is diabetic. On Metformin. Will check,  -     HEMOGLOBIN A1C WITH EAG; Future  -     METABOLIC PANEL, BASIC; Future  -     MICROALBUMIN, UR, RAND W/ MICROALB/CREAT RATIO; Future    2. Essential hypertension  Stable blood pressure. On amlodipine. Will check,    -     METABOLIC PANEL, BASIC; Future    3. Medicare annual wellness visit, subsequent    4. ACP (advance care planning)    5. Wandering  Probably due to advanced dementia. No formal diagnosis of dementia done. We will send her to a neuropsychologist.  6. Memory change    We will give,  -     VITAMIN B12; Future  -     TSH 3RD GENERATION; Future  -     REFERRAL TO NEUROPSYCHOLOGY    7. Vitamin D deficiency  -     VITAMIN D, 25 HYDROXY; Future        I spent at least 25 minutes on this visit with this established patient. Subjective:   Ms. Grey Mccauley is here for follow-up. She went to emergency room. She was taken because of wandering. Her caregiver is her niece. She never had any children. She had history of dementia but she is not on any medications for dementia. Has short-term memory loss. She is not agitated. All lab work done in the emergency room. UTI ruled out. Has diabetes, compliant with her Metformin. Need lab work. Eye checkup not up-to-date. Has hypertension, compliant with medicine. Denies chest pain palpitations or shortness of breath. Had low vitamin D, finished vitamin D. Will need lab work. Prior to Admission medications    Medication Sig Start Date End Date Taking?  Authorizing Provider   ergocalciferol (ERGOCALCIFEROL) 1,250 mcg (50,000 unit) capsule Take 1 Cap by mouth every seven (7) days. 9/4/20  Yes Minor Ditch, NP   metFORMIN (GLUCOPHAGE) 500 mg tablet TAKE 1 TABLET BY MOUTH TWICE DAILY WITH MEALS 8/31/20  Yes Garret Celis NP   amLODIPine (NORVASC) 5 mg tablet Take 1 tablet by mouth once daily 8/14/20  Yes Minor Ditch, NP   metoprolol succinate (TOPROL-XL) 50 mg XL tablet Take 1 tablet by mouth once daily 8/14/20  Yes Minor Ditch, NP   TRUE METRIX GLUCOSE TEST STRIP strip USE TO TEST BS BID 1/22/20  Yes MD SABRINA ArriagaPLUS LANCETS 33 gauge misc USE AS DIRECTED TO CHECK BG QD 1/22/20  Yes Reema Rey MD   calcium-vitamin D (OYSTER SHELL) 500 mg(1,250mg) -200 unit per tablet Take 1 Tab by mouth daily. 1/22/20 1/7/21  Reema Rey MD   glucose 4 gram chewable tablet Take 4 Tabs by mouth as needed for Other (glucose levels < 70). 1/5/20 1/7/21  Oneyda Horne MD     Past Medical History:   Diagnosis Date    [facility]     Hypertension     Memory loss     Stroke ([facility]     cerebral hemorrhage,cerebral anurysm       ROS significant for memory loss. Objective:   No flowsheet data found.      Constitutional: [x] Appears well-developed and well-nourished [x] No apparent distress      [] Abnormal -     Mental status: [x] Alert and awake  [x] Oriented to person/place/time [x] Able to follow commands    [] Abnormal -   -     HENT: [x] Normocephalic, atraumatic  [] Abnormal -   [x] Mouth/Throat: Mucous membranes are moist    External Ears [x] Normal  [] Abnormal -    Neck: [x] No visualized mass [] Abnormal -     Pulmonary/Chest: [x] Respiratory effort normal   [x] No visualized signs of difficulty breathing or respiratory distress        [] Abnormal -      Musculoskeletal:   [x] Normal gait with no signs of ataxia         [x] Normal range of motion of neck        [] Abnormal -     Neurological:        [x] No Facial Asymmetry (Cranial nerve 7 motor function) (limited exam due to video visit)          [x] No gaze palsy        [] Abnormal -          Skin:        [x] No significant exanthematous lesions or discoloration noted on facial skin         [] Abnormal -            Psychiatric:       [x] Normal Affect [] Abnormal -        [x] No Hallucinations    Other pertinent observable physical exam findings:-        We discussed the expected course, resolution and complications of the diagnosis(es) in detail. Medication risks, benefits, costs, interactions, and alternatives were discussed as indicated. I advised her to contact the office if her condition worsens, changes or fails to improve as anticipated. She expressed understanding with the diagnosis(es) and plan. Bebo Su, who was evaluated through a patient-initiated, synchronous (real-time) audio-video encounter, and/or her healthcare decision maker, is aware that it is a billable service, with coverage as determined by her insurance carrier. She provided verbal consent to proceed: Yes, and patient identification was verified. It was conducted pursuant to the emergency declaration under the [address] [place] [address], [address] [place] [address] and the [employer] General Act. A caregiver was present when appropriate. Ability to conduct physical exam was limited. I was in the office. The patient was at home.       Sorin Casanova MD

## 2021-01-07 NOTE — PROGRESS NOTES
This is the Subsequent Medicare Annual Wellness Exam, performed 12 months or more after the Initial AWV or the last Subsequent AWV    I have reviewed the patient's medical history in detail and updated the computerized patient record. Depression Risk Factor Screening:     3 most recent PHQ Screens 1/7/2021   Little interest or pleasure in doing things Not at all   Feeling down, depressed, irritable, or hopeless Not at all   Total Score PHQ 2 0       Alcohol Risk Screen    Do you average more than 1 drink per night or more than 7 drinks a week:  No    On any one occasion in the past three months have you have had more than 3 drinks containing alcohol:  No        Functional Ability and Level of Safety:    Hearing: Hearing is good. Activities of Daily Living: The home contains: no safety equipment. Patient needs help with:  phone, transportation, shopping, housework, managing medications and managing money      Ambulation: with no difficulty     Fall Risk:  Fall Risk Assessment, last 12 mths 1/7/2021   Able to walk? Yes   Fall in past 12 months? 0   Do you feel unsteady?  0   Are you worried about falling 0      Abuse Screen:  Patient is not abused       Cognitive Screening    Has your family/caregiver stated any concerns about your memory: yes - demented    Cognitive Screening: demented    Assessment/Plan   Education and counseling provided:  Are appropriate based on today's review and evaluation  End-of-Life planning (with patient's consent)  Pneumococcal Vaccine  Screening Mammography        Health Maintenance Due     Health Maintenance Due   Topic Date Due    Pneumococcal 0-64 years (1 of 1 - PPSV23) 09/23/1962    Eye Exam Retinal or Dilated  09/23/1966    DTaP/Tdap/Td series (1 - Tdap) 09/23/1977    PAP AKA CERVICAL CYTOLOGY  09/23/1977    Shingrix Vaccine Age 50> (1 of 2) 09/23/2006    Breast Cancer Screen Mammogram  09/23/2006    Colorectal Cancer Screening Combo  04/02/2018    Flu Vaccine (1) 09/01/2020    Foot Exam Q1  01/22/2021    MICROALBUMIN Q1  01/22/2021       Patient Care Team   Patient Care Team:  Gm Roth MD as PCP - General (Internal Medicine)  Gm Roth MD as PCP - [facility] Provider  Ismael Burton LPN as Care Coordinator    History     Patient Active Problem List   Diagnosis Code    Acute encephalopathy G93.40    Hypertension I10     (ventriculoperitoneal) shunt status Z98.2    Diabetes ([address] E11.9    Altered mental status R41.82    Acute lower GI bleeding K92.2    Type 2 diabetes mellitus ([address] E11.9    Intellectual delay F81.9    H/O cerebral aneurysm repair Z98.890, Z86.79    ICH (intracerebral hemorrhage) ([address] I61.9    Intracranial hemorrhage ([address] I62.9    Sepsis ([address] A41.9     Past Medical History:   Diagnosis Date    Diabetes ([address]     Hypertension     Memory loss     Stroke ([address]     cerebral hemorrhage,cerebral anurysm      Past Surgical History:   Procedure Laterality Date    HX ORTHOPAEDIC  1996    back surgery    NEUROLOGICAL PROCEDURE UNLISTED  2008    shunt placement     Current Outpatient Medications   Medication Sig Dispense Refill    ergocalciferol (ERGOCALCIFEROL) 1,250 mcg (50,000 unit) capsule Take 1 Cap by mouth every seven (7) days. 12 Cap 0    metFORMIN (GLUCOPHAGE) 500 mg tablet TAKE 1 TABLET BY MOUTH TWICE DAILY WITH MEALS 60 Tab 0    amLODIPine (NORVASC) 5 mg tablet Take 1 tablet by mouth once daily 90 Tab 1    metoprolol succinate (TOPROL-XL) 50 mg XL tablet Take 1 tablet by mouth once daily 90 Tab 1    TRUE METRIX GLUCOSE TEST STRIP strip USE TO TEST BS  Strip 12    TRUEPLUS LANCETS 33 gauge misc USE AS DIRECTED TO CHECK BG  Lancet 12     No Known Allergies    History reviewed. No pertinent family history.   Social History     Tobacco Use    Smoking status: Never Smoker    Smokeless tobacco: Never Used   Substance Use Topics    Alcohol use: No       Dixie Quiñones, who was evaluated through a synchronous (real-time) audio-video encounter, and/or her healthcare decision maker, is aware that it is a billable service, with coverage as determined by her insurance carrier. She provided verbal consent to proceed: Yes, and patient identification was verified. It was conducted pursuant to the emergency declaration under the [address] [place] [address] authority and the [employer] General Act. A caregiver was present when appropriate. Ability to conduct physical exam was limited. I was in the office. The patient was at home.     Sorin Casanova MD

## 2021-01-07 NOTE — PATIENT INSTRUCTIONS
Medicare Wellness Visit, Female The best way to live healthy is to have a lifestyle where you eat a well-balanced diet, exercise regularly, limit alcohol use, and quit all forms of tobacco/nicotine, if applicable. Regular preventive services are another way to keep healthy. Preventive services (vaccines, screening tests, monitoring & exams) can help personalize your care plan, which helps you manage your own care. Screening tests can find health problems at the earliest stages, when they are easiest to treat. [facility] follows the current, evidence-based guidelines published by the [facility] Vit[facility] ([facility]) when recommending preventive services for our patients. Because we follow these guidelines, sometimes recommendations change over time as research supports it. (For example, mammograms used to be recommended annually. Even though Medicare will still pay for an annual mammogram, the newer guidelines recommend a mammogram every two years for women of average risk). Of course, you and your doctor may decide to screen more often for some diseases, based on your risk and your co-morbidities (chronic disease you are already diagnosed with). Preventive services for you include: - Medicare offers their members a free annual wellness visit, which is time for you and your primary care provider to discuss and plan for your preventive service needs. Take advantage of this benefit every year! 
-All adults over the age of 72 should receive the recommended pneumonia vaccines. Current USPSTF guidelines recommend a series of two vaccines for the best pneumonia protection.  
-All adults should have a flu vaccine yearly and a tetanus vaccine every 10 years.  
-All adults age 48 and older should receive the shingles vaccines (series of two vaccines). -All adults age 38-68 who are overweight should have a diabetes screening test once every three years. -All adults born between [birth year] and 1964 should be screened once for Hepatitis C. 
-Other screening tests and preventive services for persons with diabetes include: an eye exam to screen for diabetic retinopathy, a kidney function test, a foot exam, and stricter control over your cholesterol.  
-Cardiovascular screening for adults with routine risk involves an electrocardiogram (ECG) at intervals determined by your doctor.  
-Colorectal cancer screenings should be done for adults age 54-65 with no increased risk factors for colorectal cancer. There are a number of acceptable methods of screening for this type of cancer. Each test has its own benefits and drawbacks. Discuss with your doctor what is most appropriate for you during your annual wellness visit. The different tests include: colonoscopy (considered the best screening method), a fecal occult blood test, a fecal DNA test, and sigmoidoscopy. 
 
-A bone mass density test is recommended when a woman turns 65 to screen for osteoporosis. This test is only recommended one time, as a screening. Some providers will use this same test as a disease monitoring tool if you already have osteoporosis. -Breast cancer screenings are recommended every other year for women of normal risk, age 54-69. 
-Cervical cancer screenings for women over age 72 are only recommended with certain risk factors. Here is a list of your current Health Maintenance items (your personalized list of preventive services) with a due date: 
Health Maintenance Due Topic Date Due  Pneumococcal Vaccine (1 of 1 - PPSV23) 09/23/1962  Eye Exam  09/23/1966  
 DTaP/Tdap/Td  (1 - Tdap) 09/23/1977  Pap Test  09/23/1977  Shingles Vaccine (1 of 2) 09/23/2006  Mammogram  09/23/2006  Colorectal Screening  04/02/2018  Yearly Flu Vaccine (1) 09/01/2020 [facility]  01/22/2021  Albumin Urine Test  01/22/2021

## 2021-01-28 ENCOUNTER — HOSPITAL ENCOUNTER (OUTPATIENT)
Dept: LAB | Age: 66
Discharge: HOME | End: 2021-01-28
Payer: MEDICARE

## 2021-01-28 PROCEDURE — 82043 UR ALBUMIN QUANTITATIVE: CPT

## 2021-01-28 PROCEDURE — 82607 VITAMIN B-12: CPT

## 2021-01-28 PROCEDURE — 82306 VITAMIN D 25 HYDROXY: CPT

## 2021-01-28 PROCEDURE — 84443 ASSAY THYROID STIM HORMONE: CPT

## 2021-01-28 PROCEDURE — 80048 BASIC METABOLIC PNL TOTAL CA: CPT

## 2021-01-28 PROCEDURE — 83036 HEMOGLOBIN GLYCOSYLATED A1C: CPT

## 2021-01-28 PROCEDURE — 36415 COLL VENOUS BLD VENIPUNCTURE: CPT

## 2021-01-29 LAB
25(OH)D3+25(OH)D2 SERPL-MCNC: 34.4 NG/ML (ref 30–100)
ALBUMIN/CREAT UR: 16 MG/G CREAT (ref 0–29)
BUN SERPL-MCNC: 13 MG/DL (ref 8–27)
BUN/CREAT SERPL: 18 (ref 12–28)
CALCIUM SERPL-MCNC: 9.8 MG/DL (ref 8.7–10.3)
CHLORIDE SERPL-SCNC: 102 MMOL/L (ref 96–106)
CO2 SERPL-SCNC: 26 MMOL/L (ref 20–29)
CREAT SERPL-MCNC: 0.72 MG/DL (ref 0.57–1)
CREAT UR-MCNC: 213.2 MG/DL
EST. AVERAGE GLUCOSE BLD GHB EST-MCNC: 160 MG/DL
GLUCOSE SERPL-MCNC: 116 MG/DL (ref 65–99)
HBA1C MFR BLD: 7.2 % (ref 4.8–5.6)
MICROALBUMIN UR-MCNC: 34.4 UG/ML
POTASSIUM SERPL-SCNC: 4.4 MMOL/L (ref 3.5–5.2)
SODIUM SERPL-SCNC: 143 MMOL/L (ref 134–144)
TSH SERPL DL<=0.005 MIU/L-ACNC: 1.84 UIU/ML (ref 0.45–4.5)
VIT B12 SERPL-MCNC: 561 PG/ML (ref 232–1245)

## 2021-02-03 ENCOUNTER — TELEPHONE (OUTPATIENT)
Dept: INTERNAL MEDICINE CLINIC | Age: 66
End: 2021-02-03

## 2021-02-03 DIAGNOSIS — E11.9 TYPE 2 DIABETES MELLITUS WITHOUT COMPLICATION, WITHOUT LONG-TERM CURRENT USE OF INSULIN (HCC): ICD-10-CM

## 2021-02-03 RX ORDER — METFORMIN HYDROCHLORIDE 500 MG/1
TABLET ORAL
Qty: 90 TAB | Refills: 1 | Status: SHIPPED | OUTPATIENT
Start: 2021-02-03 | End: 2021-05-21

## 2021-02-03 NOTE — TELEPHONE ENCOUNTER
----- Message from Shanti Suárez NP sent at 2/3/2021 10:02 AM EST -----  HgbA1c elevated to 7.2. Increase metformin 500 mg tablet to 1 tab in the morning and 2 tabs in the evening. Repeat BMP in 1 month. Otherwise, labs within normal range.

## 2021-02-05 NOTE — TELEPHONE ENCOUNTER
Called and spoke with pts niece, She states that her aunt was taking metformin 500 mg once daily. That the RX for twice daily was changed.     She is asking is the provider does wasn't to increase the metformin 500 mg once daily to Metformin 500 mg Q am and 1000 mg Q pm.

## 2021-02-12 DIAGNOSIS — I10 ESSENTIAL HYPERTENSION: ICD-10-CM

## 2021-02-12 RX ORDER — AMLODIPINE BESYLATE 5 MG/1
TABLET ORAL
Qty: 90 TAB | Refills: 0 | Status: SHIPPED | OUTPATIENT
Start: 2021-02-12 | End: 2021-05-12

## 2021-02-15 ENCOUNTER — VIRTUAL VISIT (OUTPATIENT)
Dept: NEUROLOGY | Age: 66
End: 2021-02-15
Payer: MEDICARE

## 2021-02-15 DIAGNOSIS — F03.90 MAJOR NEUROCOGNITIVE DISORDER DUE TO ALZHEIMER'S DISEASE, PROBABLE, WITHOUT BEHAVIORAL DISTURBANCE: Primary | ICD-10-CM

## 2021-02-15 PROCEDURE — 96116 NUBHVL XM PHYS/QHP 1ST HR: CPT | Performed by: PSYCHOLOGIST

## 2021-02-15 NOTE — PROGRESS NOTES
804This note will not be viewable in [employer] for the following reason(s). Likely risk of substantial harm from the misinterpretation of the data generated by this evaluation. Pursuant to the emergency declaration under the [facility], [facility] waiver authority and the [employer] Act, this Virtual Visit was conducted, with appropriate consent obtained, to reduce the patient's risk of exposure to COVID-19 and provide continuity of care   Services were provided in this manner to substitute for in-person clinic visit. The originating site is the patient's home and the distance site is [facility]. These types of teleneuropsychology/telehealth/telemedicine visits were authorized by the President of the [place], though I/we cannot guarantee what a third party payor will do reimbursement/coverage wise. I indicated that I would evaluate the patient and recommend diagnostics and treatment based on my assessment and impressions, and that our sessions are not being recorded and that personal health information is protected to the best of our abilities. [facility] [address]    Office:  [phone]  Fax: [phone]                 Initial Office Exam    Patient Name: Dixie Quiñones  Age: 60 y.o. Gender: female   Occupation:  Handedness: right handed   Presenting Concern: had no chief complaint listed for this encounter. Primary Care Physician: Gm Roth MD  Referring Provider: Gm Roth MD      REASON FOR REFERRAL:  This comprehensive and medically necessary neuropsychological assessment was requested to assist with a differential diagnosis of dementia.   The use and purpose of this examination, as well as the extent and limitations of confidentiality, were explained prior to obtaining permission to participate. Instructions were provided regarding the necessity to put forth optimal effort and answer questions truthfully in order to obtain reliable and accurate test results. PERTINENT HISTORY:  Ms. Sophia Mitchell presented for a neuropsychological assessment at the recommendation of her treating physician secondary to complaints of cognitive decline. She has reported symptoms that include Memory loss, but with the assistance of her niece reports that she has become very dependent on others. Ms Sophia Mitchell has marked impairment of memory, communication skills, decline in reasoning, and is most perseverative. Her medical history is positive for ICH/aneursym. From a brief review of her medical and personal history there has not been any other significant neurological injury or illness noted or reported. She did not report experiencing depression or anxiety in the past.      Ms. Sophia Mitchell does not  report any problems at birth or difficulties meeting developmental milestones. She reports that she had an adequate level of family support and was not subject to trauma or abuse as a child. Ms. Sophia Mitchell does not  report being retain in school or receiving special assistance in any of she classes or subjects. Ms. Sophia Mitchell completed 12 years of education. 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 Ms. Sophia Mitchell does not  exercise on a regular basis and does  maintain a balanced diet. She does not  report problems with sleep and does not  complain of pain. She does not  participate in mentally stimulating activities. Ms. Sophia Mitchell does not  have concerns or stressors at this time. Ms. Sophia Mitchell indicated that she is independent in her instrumental activities of daily living, but requires prompting to do most things. Her niece provides assistance with housekeeping, medication management and finances.       Current Outpatient Medications Medication Sig    amLODIPine (NORVASC) 5 mg tablet Take 1 tablet by mouth once daily    metFORMIN (GLUCOPHAGE) 500 mg tablet TAKE 1 TABLET BY MOUTH IN THE MORNING AND TWO TABLETS BY MOUTH IN THE EVENING WITH MEALS    ergocalciferol (ERGOCALCIFEROL) 1,250 mcg (50,000 unit) capsule Take 1 Cap by mouth every seven (7) days.  metoprolol succinate (TOPROL-XL) 50 mg XL tablet Take 1 tablet by mouth once daily    TRUE METRIX GLUCOSE TEST STRIP strip USE TO TEST BS BID    TRUEPLUS LANCETS 33 gauge misc USE AS DIRECTED TO CHECK BG QD     No current facility-administered medications for this visit. Past Medical History:   Diagnosis Date    Diabetes ([facility] [address]     Hypertension     Memory loss     Stroke ([facility] [address]     cerebral hemorrhage,cerebral anurysm       No flowsheet data found. No data recorded    Past Surgical History:   Procedure Laterality Date    HX ORTHOPAEDIC  1996    back surgery    NEUROLOGICAL PROCEDURE UNLISTED  2008    shunt placement       Social History     Socioeconomic History    Marital status: SINGLE     Spouse name: Not on file    Number of children: Not on file    Years of education: Not on file    Highest education level: Not on file   Tobacco Use    Smoking status: Never Smoker    Smokeless tobacco: Never Used   Substance and Sexual Activity    Alcohol use: No    Drug use: No       No family history on file. CT Results (most recent):  Results from Hospital Encounter encounter on 01/05/21   CT HEAD WO CONT    Narrative EXAM:  CT HEAD WO CONT  INDICATION:   confusion  Additional history: Altered mental status/confusion x2 days  COMPARISON: CT of the head, 1/2/2020 and 10/20/2019. Michael Petersen TECHNIQUE:   Unenhanced CT of the head was performed using 5 mm images. Coronal and sagittal  reformats were produced. Brain and bone windows were generated.    CT dose reduction was achieved through use of a standardized protocol tailored  for this examination and automatic exposure control for dose modulation. [employer] FINDINGS:  Left frontal approach ventriculostomy tube. Right frontal encephalomalacia. Right parietal encephalomalacia at the vertex. Right occipital encephalomalacia. Right cerebellar encephalomalacia. Confluent subcortical and periventricular  white matter hypoattenuation. The basilar cisterns are open. No acute infarct  is identified. Intracranial atherosclerosis. The bone windows demonstrate no abnormalities. The visualized portions of the  paranasal sinuses and mastoid air cells are clear. .    Impression IMPRESSION:   1. No evidence of acute intracranial abnormality by this modality. MRI Results (most recent):  Results from [place] encounter on 11/22/16   MRI BRAIN WO CONT    Narrative INDICATION:   rule out stroke     EXAMINATION:  MRI BRAIN WO CONTRAST    COMPARISON:  CT head 11/22/2016    TECHNIQUE:  MR imaging of the brain was performed with sagittal T1, axial T1,  T2, FLAIR, GRE, DWI/ADC, coronal T2.    FINDINGS:      There is a left frontal ventriculostomy catheter. The ventricles are midline  without hydrocephalus. There is remote hemorrhage within the bilateral thalami,  left basal ganglia, posterior left temporal lobe, left corona radiata, as well  as in the right frontal lobe along the tract of prior ventriculostomy catheter. There are also a few areas of remote microhemorrhage in the cortical and  subcortical regions of the superior and lateral frontal lobes and right parietal  lobe. There is a focus of remote hemorrhage in the left damon. Encephalomalacia  and chronic hemosiderin deposition right cerebellum. There is extensive  periventricular T2 signal hyperintensity as well as involving the damon and left  medulla. There is no acute infarction. The major intracranial vascular  flow-voids are patent. Impression IMPRESSION:  No acute infarction. Areas of chronic infarction, microvascular ischemic disease  and prior hemorrhages as above.  Left frontal ventriculostomy catheter. No  hydrocephalus. MENTAL STATUS:    Orientation:  Disoriented to year, month, day of month, day of week. Eye Contact:  Poor   Motor Behavior:   N/A   Speech:   Fluent, but perseverative   Thought Process:  Confused at times   Thought Content:  No evidence of hallucinations or delusions   Suicidal ideations:  Denies   Mood:   Euthymic   Affect:   Congruent with stated mood   Concentration:   Poor   Abstraction:   Poor   Insight:   Limited     On the Modified Mini-Mental Status Exam: 39/100 (Profoundly Impaired)    DIAGNOSTIC IMPRESSIONS:    ICD-10-CM ICD-9-CM    1. Major neurocognitive disorder due to Alzheimer's disease, probable, without behavioral disturbance ([facility] [address]  F01.50 331.0      294.10            PLAN:         1.  Ms. Jocelin Tobar is profoundly impaired and warrants the diagnosis of Dementia, probable Alzheimer's disease, and further assessment is not necessary at this time. 2. Consider referral for elder health nurse to provide an in-home functional assessment. 3. Consider placement issues to provide greater structure and supervision to ensure safety, health and well-being. 16436 x 1 Review of records. Face to face interview w/ patient. Determine test protocol: 60 minutes. Total 1 unit        Catie Amaya, PhD, ABPP, LCP  Licensed Clinical Psychologist/ Neuropsychologist        This note will not be viewable in [address]

## 2021-03-03 DIAGNOSIS — E11.9 TYPE 2 DIABETES MELLITUS WITHOUT COMPLICATION, WITHOUT LONG-TERM CURRENT USE OF INSULIN (HCC): ICD-10-CM

## 2021-04-11 ENCOUNTER — HOSPITAL ENCOUNTER (EMERGENCY)
Age: 66
Discharge: HOME | End: 2021-04-11
Attending: EMERGENCY MEDICINE | Admitting: EMERGENCY MEDICINE
Payer: MEDICARE

## 2021-04-11 ENCOUNTER — APPOINTMENT (OUTPATIENT)
Dept: CT IMAGING | Age: 66
End: 2021-04-11
Attending: EMERGENCY MEDICINE
Payer: MEDICARE

## 2021-04-11 VITALS
SYSTOLIC BLOOD PRESSURE: 179 MMHG | HEART RATE: 73 BPM | RESPIRATION RATE: 18 BRPM | OXYGEN SATURATION: 100 % | TEMPERATURE: 97.6 F | DIASTOLIC BLOOD PRESSURE: 92 MMHG

## 2021-04-11 DIAGNOSIS — S05.11XA PERIORBITAL CONTUSION OF RIGHT EYE, INITIAL ENCOUNTER: Primary | ICD-10-CM

## 2021-04-11 PROCEDURE — 99282 EMERGENCY DEPT VISIT SF MDM: CPT

## 2021-04-11 PROCEDURE — 70486 CT MAXILLOFACIAL W/O DYE: CPT

## 2021-04-11 NOTE — ED PROVIDER NOTES
HPI patient is a 64-year-old female with past medical history significant for type 2 diabetes, hypertension and dementia who presents to the ED by her family member who is concerned about the ecchymosis around her right eye. They are unaware of a fall. Patient has dementia and denies any pain. PMH , social history and ROS are limited secondary to dementia. Past Medical History:   Diagnosis Date    Diabetes ([facility] [address]     Hypertension     Memory loss     Stroke ([facility] [address]     cerebral hemorrhage,cerebral anurysm       Past Surgical History:   Procedure Laterality Date    HX ORTHOPAEDIC  1996    back surgery    NEUROLOGICAL PROCEDURE UNLISTED  2008    shunt placement         History reviewed. No pertinent family history.     Social History     Socioeconomic History    Marital status: SINGLE     Spouse name: Not on file    Number of children: Not on file    Years of education: Not on file    Highest education level: Not on file   Occupational History    Not on file   Social Needs    Financial resource strain: Not on file    Food insecurity     Worry: Not on file     Inability: Not on file    Transportation needs     Medical: Not on file     Non-medical: Not on file   Tobacco Use    Smoking status: Never Smoker    Smokeless tobacco: Never Used   Substance and Sexual Activity    Alcohol use: No    Drug use: No    Sexual activity: Not on file     Comment: single,no children,niece,POA,nitarsha hurtado   Lifestyle    Physical activity     Days per week: Not on file     Minutes per session: Not on file    Stress: Not on file   Relationships    Social connections     Talks on phone: Not on file     Gets together: Not on file     Attends [employer] service: Not on file     Active member of club or organization: Not on file     Attends meetings of clubs or organizations: Not on file     Relationship status: Not on file    Intimate partner violence     Fear of current or ex partner: Not on file     Emotionally abused: Not on file     Physically abused: Not on file     Forced sexual activity: Not on file   Other Topics Concern    Not on file   Social History Narrative    Not on file         ALLERGIES: Patient has no known allergies. Review of Systems   Unable to perform ROS: Dementia   HENT: Positive for facial swelling. Right periorbital ecchymosis and mild swelling       Vitals:    04/11/21 1101   BP: (!) 179/92   Pulse: 73   Resp: 18   Temp: 97.6 °F (36.4 °C)   SpO2: 100%            Physical Exam  Vitals signs and nursing note reviewed. Constitutional:       General: She is not in acute distress. Appearance: She is normal weight. She is not ill-appearing, toxic-appearing or diaphoretic. Comments: Black female; non-smoker with dementia;   HENT:      Head:      Comments: Right periorbital ecchymosis with mild swelling. Eyes:      Extraocular Movements: Extraocular movements intact. Conjunctiva/sclera: Conjunctivae normal.      Pupils: Pupils are equal, round, and reactive to light. Comments: Right periorbital ecchymosis appears several days old. Neck:      Musculoskeletal: Normal range of motion and neck supple. Cardiovascular:      Rate and Rhythm: Normal rate and regular rhythm. Pulmonary:      Effort: Pulmonary effort is normal.      Breath sounds: Normal breath sounds. Lymphadenopathy:      Cervical: No cervical adenopathy. Neurological:      Mental Status: She is alert. MDM       Procedures      Ct Maxillofacial Wo Cont    Result Date: 4/11/2021  1. No acute bony abnormality of the maxillofacial structures. 2. Mild soft tissue swelling over the right supraorbital rim. 3. Numerous periapical lucencies around teeth as described. Correlate with dental history for abscess. 4. Age-related intracranial change greater than expected for stated age.     Patient has been reexamined and denies any complaints of pain or discomfort; Limited  due to patient's dementia      Patient's results and plan of care have been reviewed with the family members. Patient's family has verbally conveyed their understanding and agreement of the patient's signs, symptoms, diagnosis, treatment and prognosis and additionally agree to follow up as recommended or return to the Emergency Room should her condition change prior to follow-up. Discharge instructions have also been provided to the patient's family with some educational information regarding her diagnosis as well a list of reasons why they would want to return to the ER prior to their follow-up appointment should her condition change. Freddy Blanc NP

## 2021-05-05 ENCOUNTER — VIRTUAL VISIT (OUTPATIENT)
Dept: INTERNAL MEDICINE CLINIC | Age: 66
End: 2021-05-05
Payer: MEDICARE

## 2021-05-05 DIAGNOSIS — E11.9 TYPE 2 DIABETES MELLITUS WITHOUT COMPLICATION, WITHOUT LONG-TERM CURRENT USE OF INSULIN (HCC): Primary | ICD-10-CM

## 2021-05-05 DIAGNOSIS — G30.8 ALZHEIMER'S DEMENTIA OF OTHER ONSET WITHOUT BEHAVIORAL DISTURBANCE: ICD-10-CM

## 2021-05-05 DIAGNOSIS — E78.2 MIXED HYPERLIPIDEMIA: ICD-10-CM

## 2021-05-05 DIAGNOSIS — Z78.0 POST-MENOPAUSAL: ICD-10-CM

## 2021-05-05 DIAGNOSIS — F02.80 ALZHEIMER'S DEMENTIA OF OTHER ONSET WITHOUT BEHAVIORAL DISTURBANCE: ICD-10-CM

## 2021-05-05 DIAGNOSIS — I10 ESSENTIAL HYPERTENSION: ICD-10-CM

## 2021-05-05 DIAGNOSIS — Z12.39 SCREENING BREAST EXAMINATION: ICD-10-CM

## 2021-05-05 PROCEDURE — G8419 CALC BMI OUT NRM PARAM NOF/U: HCPCS | Performed by: INTERNAL MEDICINE

## 2021-05-05 PROCEDURE — G8756 NO BP MEASURE DOC: HCPCS | Performed by: INTERNAL MEDICINE

## 2021-05-05 PROCEDURE — 99214 OFFICE O/P EST MOD 30 MIN: CPT | Performed by: INTERNAL MEDICINE

## 2021-05-05 PROCEDURE — G8427 DOCREV CUR MEDS BY ELIG CLIN: HCPCS | Performed by: INTERNAL MEDICINE

## 2021-05-05 PROCEDURE — G8510 SCR DEP NEG, NO PLAN REQD: HCPCS | Performed by: INTERNAL MEDICINE

## 2021-05-05 PROCEDURE — G0463 HOSPITAL OUTPT CLINIC VISIT: HCPCS | Performed by: INTERNAL MEDICINE

## 2021-05-05 PROCEDURE — 3017F COLORECTAL CA SCREEN DOC REV: CPT | Performed by: INTERNAL MEDICINE

## 2021-05-05 PROCEDURE — 2022F DILAT RTA XM EVC RTNOPTHY: CPT | Performed by: INTERNAL MEDICINE

## 2021-05-05 NOTE — PROGRESS NOTES
Deloris Sicard is a 60 y.o. female who was seen by synchronous (real-time) audio-video technology on 5/5/2021 for Hypertension, Diabetes, and Other (BP this morning was 131/80)        Assessment & Plan:   Diagnoses and all orders for this visit:    1. Type 2 diabetes mellitus without complication, without long-term current use of insulin (HCC)    Last A1c is slightly elevated, 7.2. Previously she was taking Metformin 1 tablet a day. It was increased to Metformin 1 tablet twice a day after lab work. Blood sugars is stable right now. Will check,  -     HEMOGLOBIN A1C WITH EAG; Future  -     METABOLIC PANEL, COMPREHENSIVE; Future  -     REFERRAL TO OPHTHALMOLOGY    2. Essential hypertension    Stable blood pressure. On amlodipine.  -     METABOLIC PANEL, COMPREHENSIVE; Future    3. Mixed hyperlipidemia    Be on low-cholesterol diet and exercise. Will check,  -     LIPID PANEL; Future    4. Screening breast examination    We will check,  -     VIELKA MAMMO BI SCREENING INCL CAD; Future    5. Post-menopausal  -     DEXA BONE DENSITY STUDY AXIAL; Future    6. Alzheimer's dementia of other onset without behavioral disturbance [facility])  She is diagnosed with dementia by Dr. Riki Jackson. According to family member, patient memory is not declining. She seems stable. We will not put her on any medication right now. Will monitor her closely. I spent at least 30 minutes on this visit with this established patient. Subjective:   Ms. Home Groves is here for follow-up. She is doing okay. Has diabetes, Metformin dosage was increased to twice a day. Blood sugars running from 9210. She is watching her diet. Eye checkup up-to-date. Need foot exam.  Has hypertension, compliant with medications. Denies chest pain palpitation or shortness of breath. Has elevated lipid  , Watching diet. Need lab work. She is diagnosed with Alzheimer dementia by neuropsychologist.  Not on any medications.   According to rigidity isher memory is not declining. Did not receive Covid vaccine yet. Prior to Admission medications    Medication Sig Start Date End Date Taking? Authorizing Provider   metoprolol succinate (TOPROL-XL) 50 mg XL tablet Take 1 tablet by mouth once daily 3/1/21  Yes Lin Malone NP   amLODIPine (NORVASC) 5 mg tablet Take 1 tablet by mouth once daily 2/12/21  Yes Lin Malone NP   metFORMIN (GLUCOPHAGE) 500 mg tablet TAKE 1 TABLET BY MOUTH IN THE MORNING AND TWO TABLETS BY MOUTH IN THE EVENING WITH MEALS 2/3/21  Yes Lin Malone NP   TRUE METRIX GLUCOSE TEST STRIP strip USE TO TEST BS BID 1/22/20  Yes Speedy Shearer MD   TRUEPLUS LANCETS 33 gauge misc USE AS DIRECTED TO CHECK BG QD 1/22/20  Yes Speedy Shearer MD   ergocalciferol (ERGOCALCIFEROL) 1,250 mcg (50,000 unit) capsule Take 1 Cap by mouth every seven (7) days. 9/4/20 5/5/21  Lin Malone NP     Past Medical History:   Diagnosis Date    [facility] [address]     Hypertension     Memory loss     Stroke ([facility] [address]     cerebral hemorrhage,cerebral anurysm       ROS negative. Mild memory loss. Objective:   No flowsheet data found.      Constitutional: [x] Appears well-developed and well-nourished [x] No apparent distress      [] Abnormal -     Mental status: [x] Alert and awake  [x] Oriented to person/place/time [x] Able to follow commands    [] Abnormal -       HENT: [x] Normocephalic, atraumatic  [] Abnormal -   [x] Mouth/Throat: Mucous membranes are moist    External Ears [x] Normal  [] Abnormal -    Neck: [x] No visualized mass [] Abnormal -     Pulmonary/Chest: [x] Respiratory effort normal   [x] No visualized signs of difficulty breathing or respiratory distress        [] Abnormal -      Musculoskeletal:   [x] Normal gait with no signs of ataxia         [x] Normal range of motion of neck        [] Abnormal -     Neurological:        [x] No Facial Asymmetry (Cranial nerve 7 motor function) (limited exam due to video visit)          [x] No gaze palsy        [] Abnormal -          Skin:        [x] No significant exanthematous lesions or discoloration noted on facial skin         [] Abnormal -            Psychiatric:       [x] Normal Affect [] Abnormal -   Mild dementia. [x] No Hallucinations    Other pertinent observable physical exam findings:-        We discussed the expected course, resolution and complications of the diagnosis(es) in detail. Medication risks, benefits, costs, interactions, and alternatives were discussed as indicated. I advised her to contact the office if her condition worsens, changes or fails to improve as anticipated. She expressed understanding with the diagnosis(es) and plan. Arleen Valdivia, was evaluated through a synchronous (real-time) audio-video encounter. The patient (or guardian if applicable) is aware that this is a billable service. Verbal consent to proceed has been obtained within the past 12 months. The visit was conducted pursuant to the emergency declaration under the [facility], [address] [place] [address] authority and the [employer] Act. Patient identification was verified, and a caregiver was present when appropriate. The patient was located in a state where the provider was credentialed to provide care.       Luis Caba MD

## 2021-05-05 NOTE — PROGRESS NOTES
Results for orders placed or performed in visit on 01/07/21   HEMOGLOBIN A1C WITH EAG   Result Value Ref Range    Hemoglobin A1c 7.2 (H) 4.8 - 5.6 %    Estimated average glucose 301 mg/dL   METABOLIC PANEL, BASIC   Result Value Ref Range    Glucose 116 (H) 65 - 99 mg/dL    BUN 13 8 - 27 mg/dL    Creatinine 0.72 0.57 - 1.00 mg/dL    GFR est non-AA 89 >59 mL/min/1.73    GFR est  >59 mL/min/1.73    BUN/Creatinine ratio 18 12 - 28    Sodium 143 134 - 144 mmol/L    Potassium 4.4 3.5 - 5.2 mmol/L    Chloride 102 96 - 106 mmol/L    CO2 26 20 - 29 mmol/L    Calcium 9.8 8.7 - 10.3 mg/dL   VITAMIN D, 25 HYDROXY   Result Value Ref Range    VITAMIN D, 25-HYDROXY 34.4 30.0 - 100.0 ng/mL   MICROALBUMIN, UR, RAND W/ MICROALB/CREAT RATIO   Result Value Ref Range    Creatinine, urine 213.2 Not Estab. mg/dL    Microalbumin, urine 34.4 Not Estab. ug/mL    Microalb/Creat ratio (ug/mg creat.) 16 0 - 29 mg/g creat   VITAMIN B12   Result Value Ref Range    Vitamin B12 561 232 - 1,245 pg/mL   TSH 3RD GENERATION   Result Value Ref Range    TSH 1.840 0.450 - 4.500 uIU/mL       Health Maintenance Due   Topic Date Due    Pneumococcal 0-64 years (1 of 1 - PPSV23) Never done    Eye Exam Retinal or Dilated  Never done    COVID-19 Vaccine (1) Never done    DTaP/Tdap/Td series (1 - Tdap) Never done    PAP AKA CERVICAL CYTOLOGY  Never done    Shingrix Vaccine Age 50> (1 of 2) Never done    Breast Cancer Screen Mammogram  Never done    Colorectal Cancer Screening Combo  04/02/2018    Foot Exam Q1  01/22/2021       No chief complaint on file. 1. Have you been to the ER, urgent care clinic since your last visit? Hospitalized since your last visit? Yes When: Rolan Figueroa Where: [facility] Reason for visit: Facial Pain    2. Have you seen or consulted any other health care providers outside of the [address] [place] [address] since your last visit? Include any pap smears or colon screening. No    3) Do you have an Advance Directive on file? no    4) Are you interested in receiving information on Advance Directives? NO      Patient is accompanied by NIECE  I have received verbal consent from Danay Free to discuss any/all medical information while they are present in the room.

## 2021-05-06 ENCOUNTER — TRANSCRIBE ORDER (OUTPATIENT)
Dept: SCHEDULING | Age: 66
End: 2021-05-06

## 2021-05-06 DIAGNOSIS — Z12.31 VISIT FOR SCREENING MAMMOGRAM: Primary | ICD-10-CM

## 2021-05-20 LAB
ALBUMIN SERPL-MCNC: 4.7 G/DL (ref 3.8–4.8)
ALBUMIN/GLOB SERPL: 1.5 {RATIO} (ref 1.2–2.2)
ALP SERPL-CCNC: 83 IU/L (ref 48–121)
ALT SERPL-CCNC: 11 IU/L (ref 0–32)
AST SERPL-CCNC: 11 IU/L (ref 0–40)
BILIRUB SERPL-MCNC: <0.2 MG/DL (ref 0–1.2)
BUN SERPL-MCNC: 15 MG/DL (ref 8–27)
BUN/CREAT SERPL: 16 (ref 12–28)
CALCIUM SERPL-MCNC: 10.2 MG/DL (ref 8.7–10.3)
CHLORIDE SERPL-SCNC: 100 MMOL/L (ref 96–106)
CHOLEST SERPL-MCNC: 260 MG/DL (ref 100–199)
CO2 SERPL-SCNC: 24 MMOL/L (ref 20–29)
CREAT SERPL-MCNC: 0.95 MG/DL (ref 0.57–1)
EST. AVERAGE GLUCOSE BLD GHB EST-MCNC: 131 MG/DL
GLOBULIN SER CALC-MCNC: 3.1 G/DL (ref 1.5–4.5)
GLUCOSE SERPL-MCNC: 117 MG/DL (ref 65–99)
HBA1C MFR BLD: 6.2 % (ref 4.8–5.6)
HDLC SERPL-MCNC: 50 MG/DL
IMP & REVIEW OF LAB RESULTS: NORMAL
LDLC SERPL CALC-MCNC: 180 MG/DL (ref 0–99)
POTASSIUM SERPL-SCNC: 4.2 MMOL/L (ref 3.5–5.2)
PROT SERPL-MCNC: 7.8 G/DL (ref 6–8.5)
SODIUM SERPL-SCNC: 140 MMOL/L (ref 134–144)
TRIGL SERPL-MCNC: 162 MG/DL (ref 0–149)
VLDLC SERPL CALC-MCNC: 30 MG/DL (ref 5–40)

## 2021-05-21 ENCOUNTER — TELEPHONE (OUTPATIENT)
Dept: INTERNAL MEDICINE CLINIC | Age: 66
End: 2021-05-21

## 2021-05-21 DIAGNOSIS — E78.2 MIXED HYPERLIPIDEMIA: Primary | ICD-10-CM

## 2021-05-21 RX ORDER — ATORVASTATIN CALCIUM 10 MG/1
10 TABLET, FILM COATED ORAL DAILY
Qty: 30 TABLET | Refills: 1 | Status: SHIPPED | OUTPATIENT
Start: 2021-05-21 | End: 2021-07-16

## 2021-05-21 NOTE — TELEPHONE ENCOUNTER
----- Message from Ronnreji Bravo NP sent at 5/21/2021 10:31 AM EDT -----  HgbA1c improved to 6.2. Cholesterol levels more elevated. Will order Lipitor 10 mg po daily. Repeat CMP in 4-6 weeks. Otherwise, stable labs.

## 2021-05-21 NOTE — PROGRESS NOTES
HgbA1c improved to 6.2. Cholesterol levels more elevated. Will order Lipitor 10 mg po daily. Repeat CMP in 4-6 weeks. Otherwise, stable labs.

## 2021-06-01 DIAGNOSIS — I10 ESSENTIAL HYPERTENSION: ICD-10-CM

## 2021-06-02 RX ORDER — METOPROLOL SUCCINATE 50 MG/1
TABLET, EXTENDED RELEASE ORAL
Qty: 90 TABLET | Refills: 1 | Status: SHIPPED | OUTPATIENT
Start: 2021-06-02 | End: 2021-08-23

## 2021-08-22 DIAGNOSIS — I10 ESSENTIAL HYPERTENSION: ICD-10-CM

## 2021-08-23 RX ORDER — METOPROLOL SUCCINATE 50 MG/1
TABLET, EXTENDED RELEASE ORAL
Qty: 90 TABLET | Refills: 0 | Status: SHIPPED | OUTPATIENT
Start: 2021-08-23 | End: 2021-11-23

## 2021-09-16 ENCOUNTER — VIRTUAL VISIT (OUTPATIENT)
Dept: INTERNAL MEDICINE CLINIC | Age: 66
End: 2021-09-16
Payer: MEDICARE

## 2021-09-16 DIAGNOSIS — E11.9 TYPE 2 DIABETES MELLITUS WITHOUT COMPLICATION, WITHOUT LONG-TERM CURRENT USE OF INSULIN (HCC): ICD-10-CM

## 2021-09-16 DIAGNOSIS — R82.90 ABNORMAL URINE ODOR: ICD-10-CM

## 2021-09-16 DIAGNOSIS — E78.2 MIXED HYPERLIPIDEMIA: ICD-10-CM

## 2021-09-16 DIAGNOSIS — Z12.11 SCREEN FOR COLON CANCER: ICD-10-CM

## 2021-09-16 DIAGNOSIS — I10 ESSENTIAL HYPERTENSION: Primary | ICD-10-CM

## 2021-09-16 DIAGNOSIS — E55.9 VITAMIN D DEFICIENCY: ICD-10-CM

## 2021-09-16 PROCEDURE — G0463 HOSPITAL OUTPT CLINIC VISIT: HCPCS | Performed by: INTERNAL MEDICINE

## 2021-09-16 PROCEDURE — 2022F DILAT RTA XM EVC RTNOPTHY: CPT | Performed by: INTERNAL MEDICINE

## 2021-09-16 PROCEDURE — 99214 OFFICE O/P EST MOD 30 MIN: CPT | Performed by: INTERNAL MEDICINE

## 2021-09-16 PROCEDURE — 3017F COLORECTAL CA SCREEN DOC REV: CPT | Performed by: INTERNAL MEDICINE

## 2021-09-16 PROCEDURE — G9899 SCRN MAM PERF RSLTS DOC: HCPCS | Performed by: INTERNAL MEDICINE

## 2021-09-16 PROCEDURE — G8427 DOCREV CUR MEDS BY ELIG CLIN: HCPCS | Performed by: INTERNAL MEDICINE

## 2021-09-16 PROCEDURE — G8756 NO BP MEASURE DOC: HCPCS | Performed by: INTERNAL MEDICINE

## 2021-09-16 PROCEDURE — G8419 CALC BMI OUT NRM PARAM NOF/U: HCPCS | Performed by: INTERNAL MEDICINE

## 2021-09-16 PROCEDURE — G8510 SCR DEP NEG, NO PLAN REQD: HCPCS | Performed by: INTERNAL MEDICINE

## 2021-09-16 PROCEDURE — 3044F HG A1C LEVEL LT 7.0%: CPT | Performed by: INTERNAL MEDICINE

## 2021-09-16 NOTE — PROGRESS NOTES
Health Maintenance Due   Topic Date Due    Eye Exam Retinal or Dilated  Never done    COVID-19 Vaccine (1) Never done    DTaP/Tdap/Td series (1 - Tdap) Never done    Cervical cancer screen  Never done    Shingrix Vaccine Age 50> (1 of 2) Never done    Breast Cancer Screen Mammogram  Never done    Colorectal Cancer Screening Combo  04/02/2018    Flu Vaccine (1) 09/01/2021    Bone Densitometry (Dexa) Screening  09/23/2021       Chief Complaint   Patient presents with    Diabetes    Hypertension       1. Have you been to the ER, urgent care clinic since your last visit? Hospitalized since your last visit? No    2. Have you seen or consulted any other health care providers outside of the [address] [place] [address] since your last visit? Include any pap smears or colon screening. No    3) Do you have an Advance Directive on file? no    4) Are you interested in receiving information on Advance Directives? NO      Patient is accompanied by niece I have received verbal consent from Natalie Jackson to discuss any/all medical information while they are present in the room.

## 2021-09-16 NOTE — PROGRESS NOTES
Dragan Davenport is a 60 y.o. female who was seen by synchronous (real-time) audio-video technology on 9/16/2021 for Diabetes and Hypertension        Assessment & Plan:   Diagnoses and all orders for this visit:    1. Essential hypertension    Stable blood pressure. On amlodipine and metoprolol. Will check,  -     METABOLIC PANEL, COMPREHENSIVE    2. Type 2 diabetes mellitus without complication, without long-term current use of insulin (HCC)    On Metformin. Will check,  -     METABOLIC PANEL, COMPREHENSIVE  -     HEMOGLOBIN A1C WITH EAG    3. Vitamin D deficiency    We will check,  -     VITAMIN D, 25 HYDROXY    4. Mixed hyperlipidemia    On Lipitor. Will check,  -     METABOLIC PANEL, COMPREHENSIVE  -     LIPID PANEL    5. Abnormal urine odor  -     URINALYSIS W/ REFLEX CULTURE    6. Screen for colon cancer    We will refer,  -     REFERRAL TO GASTROENTEROLOGY        I spent at least 30 minutes on this visit with this established patient. Subjective:   Ms. Maximiliano Roman is here for follow-up. She is staying home with her daughter. Not vaccinated with Covid vaccine because her daughter do not believe on vaccine. So far she is safe, not exposed to Covid. Has hypertension, compliant with medication. Denies chest pain palpitation shortness of breath. Has elevated lipids, on statin. No myalgia. As she is diabetic, not monitoring blood sugar. Need eye checkup. Need foot exam.  On Metformin. Need colonoscopy. Mammogram also ordered, not done. Prior to Admission medications    Medication Sig Start Date End Date Taking?  Authorizing Provider   metoprolol succinate (TOPROL-XL) 50 mg XL tablet Take 1 tablet by mouth once daily 8/23/21  Yes Marisela Ballard NP   atorvastatin (LIPITOR) 10 mg tablet Take 1 tablet by mouth once daily 7/16/21  Yes Marisela Ballard NP   metFORMIN (GLUCOPHAGE) 500 mg tablet TAKE 1 TABLET BY MOUTH ONCE DAILY WITH A MEAL 6/25/21  Yes Marisela Ballard NP   amLODIPine (NORVASC) 5 mg tablet Take 1 tablet by mouth once daily 5/12/21  Yes Serneity Chowdary NP   TRUE METRIX GLUCOSE TEST STRIP strip USE TO TEST BS BID 1/22/20  Yes Nadja Cormier MD   TRUEPLUS LANCETS 33 gauge misc USE AS DIRECTED TO CHECK BG QD 1/22/20  Yes Nadja Cormier MD     Past Medical History:   Diagnosis Date    Diabetes [facility]     Hypertension     Memory loss     Stroke ([facility]     cerebral hemorrhage,cerebral anurysm       ROS negative    Objective:     Patient-Reported Vitals 9/16/2021   Patient-Reported Weight 153 lbs          Constitutional: [x] Appears well-developed and well-nourished [x] No apparent distress      [] Abnormal -     Mental status: [x] Alert and awake  [x] Oriented to person/place/time [x] Able to follow commands    [] Abnormal -   -     HENT: [x] Normocephalic, atraumatic  [] Abnormal -   [x] Mouth/Throat: Mucous membranes are moist    External Ears [x] Normal  [] Abnormal -    Neck: [x] No visualized mass [] Abnormal -     Pulmonary/Chest: [x] Respiratory effort normal   [x] No visualized signs of difficulty breathing or respiratory distress        [] Abnormal -      Musculoskeletal:   [x] Normal gait with no signs of ataxia         [x] Normal range of motion of neck        [] Abnormal -     Neurological:        [x] No Facial Asymmetry (Cranial nerve 7 motor function) (limited exam due to video visit)          [x] No gaze palsy        [] Abnormal -             Psychiatric:       [x] Normal Affect [] Abnormal -        [x] No Hallucinations    Other pertinent observable physical exam findings:-        We discussed the expected course, resolution and complications of the diagnosis(es) in detail. Medication risks, benefits, costs, interactions, and alternatives were discussed as indicated. I advised her to contact the office if her condition worsens, changes or fails to improve as anticipated. She expressed understanding with the diagnosis(es) and plan.        Leo Marcelino, was evaluated through a synchronous (real-time) audio-video encounter. The patient (or guardian if applicable) is aware that this is a billable service. Verbal consent to proceed has been obtained within the past 12 months. The visit was conducted pursuant to the emergency declaration under the [facility], [address] [place] [address] authority and the [employer] General Act. Patient identification was verified, and a caregiver was present when appropriate. The patient was located in a state where the provider was credentialed to provide care.       Pepper Maldonado MD

## 2021-10-04 DIAGNOSIS — E11.9 TYPE 2 DIABETES MELLITUS WITHOUT COMPLICATION, WITHOUT LONG-TERM CURRENT USE OF INSULIN (HCC): ICD-10-CM

## 2021-10-04 RX ORDER — METFORMIN HYDROCHLORIDE 500 MG/1
TABLET ORAL
Qty: 90 TABLET | Refills: 0 | Status: SHIPPED | OUTPATIENT
Start: 2021-10-04 | End: 2021-11-22

## 2021-10-07 LAB
25(OH)D3+25(OH)D2 SERPL-MCNC: 38.3 NG/ML (ref 30–100)
ALBUMIN SERPL-MCNC: 4.4 G/DL (ref 3.8–4.8)
ALBUMIN/GLOB SERPL: 1.5 {RATIO} (ref 1.2–2.2)
ALP SERPL-CCNC: 79 IU/L (ref 44–121)
ALT SERPL-CCNC: 9 IU/L (ref 0–32)
APPEARANCE UR: CLEAR
AST SERPL-CCNC: 8 IU/L (ref 0–40)
BACTERIA #/AREA URNS HPF: ABNORMAL /[HPF]
BACTERIA UR CULT: ABNORMAL
BILIRUB SERPL-MCNC: 0.3 MG/DL (ref 0–1.2)
BILIRUB UR QL STRIP: NEGATIVE
BUN SERPL-MCNC: 16 MG/DL (ref 8–27)
BUN/CREAT SERPL: 17 (ref 12–28)
CALCIUM SERPL-MCNC: 10.1 MG/DL (ref 8.7–10.3)
CASTS URNS QL MICRO: ABNORMAL /LPF
CHLORIDE SERPL-SCNC: 103 MMOL/L (ref 96–106)
CHOLEST SERPL-MCNC: 201 MG/DL (ref 100–199)
CO2 SERPL-SCNC: 25 MMOL/L (ref 20–29)
COLOR UR: YELLOW
CREAT SERPL-MCNC: 0.94 MG/DL (ref 0.57–1)
EPI CELLS #/AREA URNS HPF: ABNORMAL /HPF (ref 0–10)
EST. AVERAGE GLUCOSE BLD GHB EST-MCNC: 134 MG/DL
GLOBULIN SER CALC-MCNC: 2.9 G/DL (ref 1.5–4.5)
GLUCOSE SERPL-MCNC: 96 MG/DL (ref 65–99)
GLUCOSE UR QL: NEGATIVE
HBA1C MFR BLD: 6.3 % (ref 4.8–5.6)
HDLC SERPL-MCNC: 46 MG/DL
HGB UR QL STRIP: NEGATIVE
IMP & REVIEW OF LAB RESULTS: NORMAL
KETONES UR QL STRIP: NEGATIVE
LDLC SERPL CALC-MCNC: 139 MG/DL (ref 0–99)
LEUKOCYTE ESTERASE UR QL STRIP: NEGATIVE
MICRO URNS: ABNORMAL
NITRITE UR QL STRIP: POSITIVE
PH UR STRIP: 5.5 [PH] (ref 5–7.5)
POTASSIUM SERPL-SCNC: 4.5 MMOL/L (ref 3.5–5.2)
PROT SERPL-MCNC: 7.3 G/DL (ref 6–8.5)
PROT UR QL STRIP: NEGATIVE
RBC #/AREA URNS HPF: ABNORMAL /HPF (ref 0–2)
SODIUM SERPL-SCNC: 142 MMOL/L (ref 134–144)
SP GR UR: 1.02 (ref 1–1.03)
TRIGL SERPL-MCNC: 89 MG/DL (ref 0–149)
URINALYSIS REFLEX, 377202: ABNORMAL
UROBILINOGEN UR STRIP-MCNC: 0.2 MG/DL (ref 0.2–1)
VLDLC SERPL CALC-MCNC: 16 MG/DL (ref 5–40)
WBC #/AREA URNS HPF: ABNORMAL /HPF (ref 0–5)

## 2021-10-08 RX ORDER — NITROFURANTOIN 25; 75 MG/1; MG/1
100 CAPSULE ORAL 2 TIMES DAILY
Qty: 14 CAPSULE | Refills: 0 | Status: SHIPPED | OUTPATIENT
Start: 2021-10-08 | End: 2021-10-15

## 2021-10-08 NOTE — PROGRESS NOTES
Cholesterol mildly elevated. Improved from previous. Recommend that patient watch diet for fatty foods and exercise as tolerated. Urine culture indicates UTI. Will order Macrobid 100 mg po bid x 7 days. Otherwise, stable labs.

## 2021-10-12 ENCOUNTER — TELEPHONE (OUTPATIENT)
Dept: INTERNAL MEDICINE CLINIC | Age: 66
End: 2021-10-12

## 2021-10-12 NOTE — TELEPHONE ENCOUNTER
----- Message from Laury Russo NP sent at 10/8/2021  5:49 PM EDT -----  Cholesterol mildly elevated. Improved from previous. Recommend that patient watch diet for fatty foods and exercise as tolerated. Urine culture indicates UTI. Will order Macrobid 100 mg po bid x 7 days. Otherwise, stable labs.

## 2021-11-14 DIAGNOSIS — I10 ESSENTIAL HYPERTENSION: ICD-10-CM

## 2021-11-15 RX ORDER — AMLODIPINE BESYLATE 5 MG/1
TABLET ORAL
Qty: 90 TABLET | Refills: 0 | Status: SHIPPED | OUTPATIENT
Start: 2021-11-15 | End: 2022-01-31 | Stop reason: SDUPTHER

## 2021-11-23 DIAGNOSIS — I10 ESSENTIAL HYPERTENSION: ICD-10-CM

## 2021-11-23 RX ORDER — METOPROLOL SUCCINATE 50 MG/1
TABLET, EXTENDED RELEASE ORAL
Qty: 90 TABLET | Refills: 0 | Status: SHIPPED | OUTPATIENT
Start: 2021-11-23 | End: 2022-01-31 | Stop reason: SDUPTHER

## 2022-01-14 ENCOUNTER — TELEPHONE (OUTPATIENT)
Dept: INTERNAL MEDICINE CLINIC | Age: 67
End: 2022-01-14

## 2022-01-14 DIAGNOSIS — R82.90 ABNORMAL URINE ODOR: Primary | ICD-10-CM

## 2022-01-14 NOTE — TELEPHONE ENCOUNTER
Spoke w/ pt sonam put in an order for a urine cx to r/o a UTI due to last lab .   Pt sonam state that she would like the nov to be vv instead of in office due to covid and etc.

## 2022-01-14 NOTE — TELEPHONE ENCOUNTER
S/w pt:Niece(Purnima Pacheco)[phone] notice that her Aunt(urine smell bad)is asking for order to be sent to [facility],she stated that  always call in her order for (UTI)  LOV-9/16/22

## 2022-01-18 ENCOUNTER — TELEPHONE (OUTPATIENT)
Dept: INTERNAL MEDICINE CLINIC | Age: 67
End: 2022-01-18

## 2022-01-18 NOTE — TELEPHONE ENCOUNTER
[facility] stated that she dropped off pts urine at [address] on [facility] yesterday. [facility] is requesting a phone call when results are in.

## 2022-01-18 NOTE — TELEPHONE ENCOUNTER
Spoke with pts contact and advised that if urine was dropped off yesterday then the results will take a few days once we get the results we will call her.

## 2022-01-19 LAB — BACTERIA UR CULT: NORMAL

## 2022-01-20 ENCOUNTER — VIRTUAL VISIT (OUTPATIENT)
Dept: INTERNAL MEDICINE CLINIC | Age: 67
End: 2022-01-20
Payer: MEDICARE

## 2022-01-20 DIAGNOSIS — Z00.00 MEDICARE ANNUAL WELLNESS VISIT, SUBSEQUENT: ICD-10-CM

## 2022-01-20 DIAGNOSIS — E11.9 TYPE 2 DIABETES MELLITUS WITHOUT COMPLICATION, WITHOUT LONG-TERM CURRENT USE OF INSULIN (HCC): Primary | ICD-10-CM

## 2022-01-20 DIAGNOSIS — K05.10 BLISTER OF GINGIVA WITH INFECTION, INITIAL ENCOUNTER: ICD-10-CM

## 2022-01-20 DIAGNOSIS — E78.2 MIXED HYPERLIPIDEMIA: ICD-10-CM

## 2022-01-20 DIAGNOSIS — Z12.31 ENCOUNTER FOR SCREENING MAMMOGRAM FOR MALIGNANT NEOPLASM OF BREAST: ICD-10-CM

## 2022-01-20 DIAGNOSIS — Z71.89 ACP (ADVANCE CARE PLANNING): ICD-10-CM

## 2022-01-20 DIAGNOSIS — Z78.0 POST-MENOPAUSE: ICD-10-CM

## 2022-01-20 DIAGNOSIS — F01.518 VASCULAR DEMENTIA WITH BEHAVIOR DISTURBANCE: ICD-10-CM

## 2022-01-20 DIAGNOSIS — S00.522A BLISTER OF GINGIVA WITH INFECTION, INITIAL ENCOUNTER: ICD-10-CM

## 2022-01-20 PROCEDURE — 2022F DILAT RTA XM EVC RTNOPTHY: CPT | Performed by: INTERNAL MEDICINE

## 2022-01-20 PROCEDURE — G8756 NO BP MEASURE DOC: HCPCS | Performed by: INTERNAL MEDICINE

## 2022-01-20 PROCEDURE — 3017F COLORECTAL CA SCREEN DOC REV: CPT | Performed by: INTERNAL MEDICINE

## 2022-01-20 PROCEDURE — G8421 BMI NOT CALCULATED: HCPCS | Performed by: INTERNAL MEDICINE

## 2022-01-20 PROCEDURE — 3046F HEMOGLOBIN A1C LEVEL >9.0%: CPT | Performed by: INTERNAL MEDICINE

## 2022-01-20 PROCEDURE — G8536 NO DOC ELDER MAL SCRN: HCPCS | Performed by: INTERNAL MEDICINE

## 2022-01-20 PROCEDURE — G0439 PPPS, SUBSEQ VISIT: HCPCS | Performed by: INTERNAL MEDICINE

## 2022-01-20 PROCEDURE — G8400 PT W/DXA NO RESULTS DOC: HCPCS | Performed by: INTERNAL MEDICINE

## 2022-01-20 PROCEDURE — 1090F PRES/ABSN URINE INCON ASSESS: CPT | Performed by: INTERNAL MEDICINE

## 2022-01-20 PROCEDURE — 1101F PT FALLS ASSESS-DOCD LE1/YR: CPT | Performed by: INTERNAL MEDICINE

## 2022-01-20 PROCEDURE — G8427 DOCREV CUR MEDS BY ELIG CLIN: HCPCS | Performed by: INTERNAL MEDICINE

## 2022-01-20 PROCEDURE — 99497 ADVNCD CARE PLAN 30 MIN: CPT | Performed by: INTERNAL MEDICINE

## 2022-01-20 PROCEDURE — G8510 SCR DEP NEG, NO PLAN REQD: HCPCS | Performed by: INTERNAL MEDICINE

## 2022-01-20 PROCEDURE — G0463 HOSPITAL OUTPT CLINIC VISIT: HCPCS | Performed by: INTERNAL MEDICINE

## 2022-01-20 PROCEDURE — 99214 OFFICE O/P EST MOD 30 MIN: CPT | Performed by: INTERNAL MEDICINE

## 2022-01-20 PROCEDURE — G9899 SCRN MAM PERF RSLTS DOC: HCPCS | Performed by: INTERNAL MEDICINE

## 2022-01-20 RX ORDER — AMOXICILLIN AND CLAVULANATE POTASSIUM 875; 125 MG/1; MG/1
1 TABLET, FILM COATED ORAL 2 TIMES DAILY
Qty: 14 TABLET | Refills: 0 | Status: SHIPPED | OUTPATIENT
Start: 2022-01-20 | End: 2022-01-25 | Stop reason: SINTOL

## 2022-01-20 NOTE — PROGRESS NOTES
Keyon Del Cid is a 73 y.o. female who was seen by synchronous (real-time) audio-video technology on 1/20/2022 for Fatigue, Urinary Frequency (Pt Urine cx on 1/14/22 had slight bacteria no need for abx.), Neurologic Problem, Gum Problem, and Annual Wellness Visit        Assessment & Plan:   Diagnoses and all orders for this visit:    1. Type 2 diabetes mellitus without complication, without long-term current use of insulin (HCC)    On metformin. Will check,  -     HEMOGLOBIN A1C WITH EAG  -     METABOLIC PANEL, COMPREHENSIVE    2. Vascular dementia with behavior disturbance ([address]  Doing okay. Daughter is caregiver. 3. Mixed hyperlipidemia  On Lipitor. Will check,  -     METABOLIC PANEL, COMPREHENSIVE    4. Medicare annual wellness visit, subsequent    5. ACP (advance care planning)    6. Blister of gingiva with infection, initial encounter  Need to gargle with salt water mouthwash. Will give,  -     amoxicillin-clavulanate (AUGMENTIN) 875-125 mg per tablet; Take 1 Tablet by mouth two (2) times a day. Need to see dentist next week. 7. Post-menopause    Be on calcium rich diet. Will check,  -     DEXA BONE DENSITY STUDY AXIAL; Future    8. Encounter for screening mammogram for malignant neoplasm of breast    We will order,  -     VIELKA MAMMO BI SCREENING INCL CAD; Future        I spent at least 30 minutes on this visit with this established patient. Subjective:   Ms. Kenny aJckson is here for follow-up. She is with her daughter who is her caregiver. She is having pain on her, daughter noticed she has past pocket on right upper molar teeth. Has moderate pain. No fever. She has dementia, not on medication. Doing well. Has diabetes,  On metformin. Need lab work. Eye checkup up-to-date. Has hypertension, compliant with medication. Denies chest pain palpitation or shortness of breath. Need mammogram.  Here for Medicare wellness visit. Has no living will.   Prior to Admission medications    Medication Sig Start Date End Date Taking? Authorizing Provider   amoxicillin-clavulanate (AUGMENTIN) 875-125 mg per tablet Take 1 Tablet by mouth two (2) times a day. 1/20/22  Yes Lynnette Marquez MD   atorvastatin (LIPITOR) 10 mg tablet Take 1 tablet by mouth once daily 12/13/21  Yes Bushra Christianson NP   metoprolol succinate (TOPROL-XL) 50 mg XL tablet Take 1 tablet by mouth once daily 11/23/21  Yes Dione Celis NP   metFORMIN (GLUCOPHAGE) 500 mg tablet TAKE 1 TABLET BY MOUTH ONCE DAILY WITH A MEAL 11/22/21  Yes Bushra Christianson NP   amLODIPine (NORVASC) 5 mg tablet Take 1 tablet by mouth once daily 11/15/21  Yes Bushra Christianson NP   TRUE METRIX GLUCOSE TEST STRIP strip USE TO TEST BS BID 1/22/20  Yes Lynnette Marquez MD   TRUEPLUS LANCETS 33 gauge misc USE AS DIRECTED TO CHECK BG QD 1/22/20  Yes Lynnette Marquez MD     Past Medical History:   Diagnosis Date    [facility]     Hypertension     Memory loss     Stroke ([facility]     cerebral hemorrhage,cerebral anurysm       ROS toothache and gum pain. Objective:     Patient-Reported Vitals 1/20/2022   Patient-Reported Weight 153 lbs   Patient-Reported Temperature 98.f   Patient-Reported Systolic  607   Patient-Reported Diastolic 82   Patient-Reported LMP Menoapuse            Constitutional: [x] Appears well-developed and well-nourished [x] No apparent distress      [] Abnormal -     Mental status: [x] Alert and awake  [x] Oriented to person/place/time [x] Able to follow commands    [] Abnormal -     Eyes:   EOM    [x]  Normal    [] Abnormal -   Sclera  [x]  Normal    [] Abnormal -          Discharge [x]  None visible   [] Abnormal -     HENT: [x] Normocephalic, atraumatic  [] Abnormal -   [x] Mouth/Throat: Mucous membranes are moist  Right upper molar gum looks red and inflamed.   External Ears [x] Normal  [] Abnormal -    Neck: [x] No visualized mass [] Abnormal -     Pulmonary/Chest: [x] Respiratory effort normal   [x] No visualized signs of difficulty breathing or respiratory distress        [] Abnormal -      Musculoskeletal:   [x] Normal gait with no signs of ataxia         [x] Normal range of motion of neck        [] Abnormal -     Neurological:        [x] No Facial Asymmetry (Cranial nerve 7 motor function) (limited exam due to video visit)          [x] No gaze palsy        [] Abnormal -          Skin:        [x] No significant exanthematous lesions or discoloration noted on facial skin         [] Abnormal -            Psychiatric:       [x] Normal Affect [] Abnormal -        [x] No Hallucinations    Other pertinent observable physical exam findings:-        We discussed the expected course, resolution and complications of the diagnosis(es) in detail. Medication risks, benefits, costs, interactions, and alternatives were discussed as indicated. I advised her to contact the office if her condition worsens, changes or fails to improve as anticipated. She expressed understanding with the diagnosis(es) and plan. Ortiz Khan, was evaluated through a synchronous (real-time) audio-video encounter. The patient (or guardian if applicable) is aware that this is a billable service, which includes applicable co-pays. Verbal consent to proceed has been obtained. The visit was conducted pursuant to the emergency declaration under the [facility], [address] [place] [address] waiver authority and the [employer] General Act. Patient identification was verified, and a caregiver was present when appropriate. The patient was located at home in a state where the provider was licensed to provide care.       Sushil Soto MD

## 2022-01-20 NOTE — PATIENT INSTRUCTIONS
Medicare Wellness Visit, Female     The best way to live healthy is to have a lifestyle where you eat a well-balanced diet, exercise regularly, limit alcohol use, and quit all forms of tobacco/nicotine, if applicable. Regular preventive services are another way to keep healthy. Preventive services (vaccines, screening tests, monitoring & exams) can help personalize your care plan, which helps you manage your own care. Screening tests can find health problems at the earliest stages, when they are easiest to treat. Juan follows the current, evidence-based guidelines published by the [facility] ([facility]) when recommending preventive services for our patients. Because we follow these guidelines, sometimes recommendations change over time as research supports it. (For example, mammograms used to be recommended annually. Even though Medicare will still pay for an annual mammogram, the newer guidelines recommend a mammogram every two years for women of average risk). Of course, you and your doctor may decide to screen more often for some diseases, based on your risk and your co-morbidities (chronic disease you are already diagnosed with). Preventive services for you include:  - Medicare offers their members a free annual wellness visit, which is time for you and your primary care provider to discuss and plan for your preventive service needs. Take advantage of this benefit every year!  -All adults over the age of 72 should receive the recommended pneumonia vaccines. Current USPSTF guidelines recommend a series of two vaccines for the best pneumonia protection.   -All adults should have a flu vaccine yearly and a tetanus vaccine every 10 years.   -All adults age 48 and older should receive the shingles vaccines (series of two vaccines).       -All adults age 38-68 who are overweight should have a diabetes screening test once every three years.   -All adults born between [birth year] and 1964 should be screened once for Hepatitis C.  -Other screening tests and preventive services for persons with diabetes include: an eye exam to screen for diabetic retinopathy, a kidney function test, a foot exam, and stricter control over your cholesterol.   -Cardiovascular screening for adults with routine risk involves an electrocardiogram (ECG) at intervals determined by your doctor.   -Colorectal cancer screenings should be done for adults age 54-65 with no increased risk factors for colorectal cancer. There are a number of acceptable methods of screening for this type of cancer. Each test has its own benefits and drawbacks. Discuss with your doctor what is most appropriate for you during your annual wellness visit. The different tests include: colonoscopy (considered the best screening method), a fecal occult blood test, a fecal DNA test, and sigmoidoscopy.    -A bone mass density test is recommended when a woman turns 65 to screen for osteoporosis. This test is only recommended one time, as a screening. Some providers will use this same test as a disease monitoring tool if you already have osteoporosis. -Breast cancer screenings are recommended every other year for women of normal risk, age 54-69.  -Cervical cancer screenings for women over age 72 are only recommended with certain risk factors.      Here is a list of your current Health Maintenance items (your personalized list of preventive services) with a due date:  Health Maintenance Due   Topic Date Due    COVID-19 Vaccine (1) Never done    Eye Exam  Never done    DTaP/Tdap/Td  (1 - Tdap) Never done    Cervical cancer screen  Never done    Shingles Vaccine (1 of 2) Never done    Mammogram  Never done    Colorectal Screening  04/02/2018    Yearly Flu Vaccine (1) 09/01/2021    Bone Mineral Density   Never done    Pneumococcal Vaccine (1 of 1 - PPSV23) Never done    Albumin Urine Test  01/28/2022

## 2022-01-20 NOTE — ACP (ADVANCE CARE PLANNING)

## 2022-01-20 NOTE — PROGRESS NOTES
This is the Subsequent Medicare Annual Wellness Exam, performed 12 months or more after the Initial AWV or the last Subsequent AWV    I have reviewed the patient's medical history in detail and updated the computerized patient record. Assessment/Plan   Education and counseling provided:  Are appropriate based on today's review and evaluation  End-of-Life planning (with patient's consent)  Pneumococcal Vaccine  Influenza Vaccine  Screening Mammography         Depression Risk Factor Screening     3 most recent PHQ Screens 1/20/2022   Little interest or pleasure in doing things Not at all   Feeling down, depressed, irritable, or hopeless Not at all   Total Score PHQ 2 0       Alcohol & Drug Abuse Risk Screen    Do you average more than 1 drink per night or more than 7 drinks a week:  No    On any one occasion in the past three months have you have had more than 3 drinks containing alcohol:  No          Functional Ability and Level of Safety    Hearing: Hearing is good. Activities of Daily Living: The home contains: no safety equipment. Patient needs help with:  transportation      Ambulation: with no difficulty     Fall Risk:  Fall Risk Assessment, last 12 mths 1/20/2022   Able to walk? Yes   Fall in past 12 months? 0   Do you feel unsteady?  0   Are you worried about falling 0      Abuse Screen:  Patient is not abused       Cognitive Screening    Has your family/caregiver stated any concerns about your memory: no     Cognitive Screening: Normal - MMSE (Mini Mental Status Exam)    Health Maintenance Due     Health Maintenance Due   Topic Date Due    COVID-19 Vaccine (1) Never done    Eye Exam Retinal or Dilated  Never done    DTaP/Tdap/Td series (1 - Tdap) Never done    Cervical cancer screen  Never done    Shingrix Vaccine Age 50> (1 of 2) Never done    Breast Cancer Screen Mammogram  Never done    Colorectal Cancer Screening Combo  04/02/2018    Flu Vaccine (1) 09/01/2021    Bone Densitometry (Dexa) Screening  Never done    Pneumococcal 65+ years (1 of 1 - PPSV23) Never done    MICROALBUMIN Q1  01/28/2022       Patient Care Team   Patient Care Team:  Gloria Gan MD as PCP - General (Internal Medicine)  Gloria Gan MD as PCP - [facility]go Provider    History     Patient Active Problem List   Diagnosis Code    Acute encephalopathy G93.40    Hypertension I10     (ventriculoperitoneal) shunt status Z98.2    Diabetes ([address] E11.9    Altered mental status R41.82    Acute lower GI bleeding K92.2    Type 2 diabetes mellitus ([address] E11.9    Intellectual delay F81.9    H/O cerebral aneurysm repair Z98.890, Z86.79    ICH (intracerebral hemorrhage) ([address] I61.9    Intracranial hemorrhage ([address] I62.9    Sepsis ([address] A41.9     Past Medical History:   Diagnosis Date    Diabetes ([address]     Hypertension     Memory loss     Stroke ([address]     cerebral hemorrhage,cerebral anurysm      Past Surgical History:   Procedure Laterality Date    HX ORTHOPAEDIC  1996    back surgery    NEUROLOGICAL PROCEDURE UNLISTED  2008    shunt placement     Current Outpatient Medications   Medication Sig Dispense Refill    atorvastatin (LIPITOR) 10 mg tablet Take 1 tablet by mouth once daily 30 Tablet 2    metoprolol succinate (TOPROL-XL) 50 mg XL tablet Take 1 tablet by mouth once daily 90 Tablet 0    metFORMIN (GLUCOPHAGE) 500 mg tablet TAKE 1 TABLET BY MOUTH ONCE DAILY WITH A MEAL 90 Tablet 1    amLODIPine (NORVASC) 5 mg tablet Take 1 tablet by mouth once daily 90 Tablet 0    TRUE METRIX GLUCOSE TEST STRIP strip USE TO TEST BS  Strip 12    TRUEPLUS LANCETS 33 gauge misc USE AS DIRECTED TO CHECK BG  Lancet 12     No Known Allergies    No family history on file.   Social History     Tobacco Use    Smoking status: Never Smoker    Smokeless tobacco: Never Used   Substance Use Topics    Alcohol use: No         Sushil Soto MD

## 2022-01-20 NOTE — PROGRESS NOTES
Health Maintenance Due   Topic Date Due    COVID-19 Vaccine (1) Never done    Eye Exam Retinal or Dilated  Never done    DTaP/Tdap/Td series (1 - Tdap) Never done    Cervical cancer screen  Never done    Shingrix Vaccine Age 50> (1 of 2) Never done    Breast Cancer Screen Mammogram  Never done    Colorectal Cancer Screening Combo  04/02/2018    Flu Vaccine (1) 09/01/2021    Bone Densitometry (Dexa) Screening  Never done    Pneumococcal 65+ years (1 of 1 - PPSV23) Never done    Medicare Yearly Exam  01/08/2022    MICROALBUMIN Q1  01/28/2022       Chief Complaint   Patient presents with    Fatigue    Urinary Frequency     Pt Urine cx on 1/14/22 had slight bacteria no need for abx.  Neurologic Problem    Gum Problem    Annual Wellness Visit       1. Have you been to the ER, urgent care clinic since your last visit? Hospitalized since your last visit? No    2. Have you seen or consulted any other health care providers outside of the [address] [place] [address] since your last visit? Include any pap smears or colon screening. No    3) Do you have an Advance Directive on file? no    4) Are you interested in receiving information on Advance Directives? NO      Patient is accompanied by Niece I have received verbal consent from Adriana Tidwell to discuss any/all medical information while they are present in the room.

## 2022-01-24 ENCOUNTER — TELEPHONE (OUTPATIENT)
Dept: INTERNAL MEDICINE CLINIC | Age: 67
End: 2022-01-24

## 2022-01-24 NOTE — TELEPHONE ENCOUNTER
Niece claims pt is having side effects from antibiotic. Pt was scratching her head. Pt acting aggressive/ hallucinating. Niece stopped medication. Symptoms have stopped since she stopped medicine. Niece wants to know if a different antibiotic can be prescribed?

## 2022-01-25 NOTE — TELEPHONE ENCOUNTER
Please call Tasia Khalil (niece) back at [phone] to let her know what type of medication is going to be prescribed and when she can go pick it up.

## 2022-01-25 NOTE — TELEPHONE ENCOUNTER
sw patient niece advised her that I called in new script ( doxycycline 100 mg 1 tablet twice a day for 1 week.)   for aunt and when it will be ready. Niece understood. Added amoxicillin as a allergy.

## 2022-01-31 ENCOUNTER — VIRTUAL VISIT (OUTPATIENT)
Dept: INTERNAL MEDICINE CLINIC | Age: 67
End: 2022-01-31
Payer: MEDICARE

## 2022-01-31 ENCOUNTER — TELEPHONE (OUTPATIENT)
Dept: INTERNAL MEDICINE CLINIC | Age: 67
End: 2022-01-31

## 2022-01-31 DIAGNOSIS — E11.69 TYPE 2 DIABETES MELLITUS WITH OTHER SPECIFIED COMPLICATION, UNSPECIFIED WHETHER LONG TERM INSULIN USE (HCC): ICD-10-CM

## 2022-01-31 DIAGNOSIS — K13.79 SORE MOUTH: ICD-10-CM

## 2022-01-31 DIAGNOSIS — E11.9 TYPE 2 DIABETES MELLITUS WITHOUT COMPLICATION, WITHOUT LONG-TERM CURRENT USE OF INSULIN (HCC): ICD-10-CM

## 2022-01-31 DIAGNOSIS — E78.2 MIXED HYPERLIPIDEMIA: Primary | ICD-10-CM

## 2022-01-31 DIAGNOSIS — I10 ESSENTIAL HYPERTENSION: ICD-10-CM

## 2022-01-31 PROCEDURE — G0463 HOSPITAL OUTPT CLINIC VISIT: HCPCS | Performed by: INTERNAL MEDICINE

## 2022-01-31 PROCEDURE — 1090F PRES/ABSN URINE INCON ASSESS: CPT | Performed by: INTERNAL MEDICINE

## 2022-01-31 PROCEDURE — G8756 NO BP MEASURE DOC: HCPCS | Performed by: INTERNAL MEDICINE

## 2022-01-31 PROCEDURE — 99214 OFFICE O/P EST MOD 30 MIN: CPT | Performed by: INTERNAL MEDICINE

## 2022-01-31 PROCEDURE — G8421 BMI NOT CALCULATED: HCPCS | Performed by: INTERNAL MEDICINE

## 2022-01-31 PROCEDURE — G9899 SCRN MAM PERF RSLTS DOC: HCPCS | Performed by: INTERNAL MEDICINE

## 2022-01-31 PROCEDURE — G8536 NO DOC ELDER MAL SCRN: HCPCS | Performed by: INTERNAL MEDICINE

## 2022-01-31 PROCEDURE — 3017F COLORECTAL CA SCREEN DOC REV: CPT | Performed by: INTERNAL MEDICINE

## 2022-01-31 PROCEDURE — G8510 SCR DEP NEG, NO PLAN REQD: HCPCS | Performed by: INTERNAL MEDICINE

## 2022-01-31 PROCEDURE — G8400 PT W/DXA NO RESULTS DOC: HCPCS | Performed by: INTERNAL MEDICINE

## 2022-01-31 PROCEDURE — 1101F PT FALLS ASSESS-DOCD LE1/YR: CPT | Performed by: INTERNAL MEDICINE

## 2022-01-31 PROCEDURE — 3046F HEMOGLOBIN A1C LEVEL >9.0%: CPT | Performed by: INTERNAL MEDICINE

## 2022-01-31 PROCEDURE — 2022F DILAT RTA XM EVC RTNOPTHY: CPT | Performed by: INTERNAL MEDICINE

## 2022-01-31 PROCEDURE — G8427 DOCREV CUR MEDS BY ELIG CLIN: HCPCS | Performed by: INTERNAL MEDICINE

## 2022-01-31 RX ORDER — METFORMIN HYDROCHLORIDE 500 MG/1
500 TABLET ORAL 2 TIMES DAILY WITH MEALS
Qty: 180 TABLET | Refills: 1 | Status: SHIPPED | OUTPATIENT
Start: 2022-01-31 | End: 2022-02-25

## 2022-01-31 RX ORDER — METOPROLOL SUCCINATE 50 MG/1
50 TABLET, EXTENDED RELEASE ORAL DAILY
Qty: 90 TABLET | Refills: 1 | Status: SHIPPED | OUTPATIENT
Start: 2022-01-31 | End: 2022-03-03

## 2022-01-31 RX ORDER — ATORVASTATIN CALCIUM 10 MG/1
10 TABLET, FILM COATED ORAL DAILY
Qty: 90 TABLET | Refills: 1 | Status: SHIPPED | OUTPATIENT
Start: 2022-01-31 | End: 2022-04-19

## 2022-01-31 RX ORDER — AMLODIPINE BESYLATE 5 MG/1
5 TABLET ORAL DAILY
Qty: 90 TABLET | Refills: 1 | Status: ON HOLD | OUTPATIENT
Start: 2022-01-31 | End: 2022-07-07

## 2022-01-31 NOTE — PROGRESS NOTES
Yared Alanis is a 73 y.o. female who was seen by synchronous (real-time) audio-video technology on 1/31/2022 for Diabetes, Neurologic Problem, and Hypertension        Assessment & Plan:   Diagnoses and all orders for this visit:    1. Mixed hyperlipidemia    Stable. Will refill,  -     atorvastatin (LIPITOR) 10 mg tablet; Take 1 Tablet by mouth daily. 2. Type 2 diabetes mellitus without complication, without long-term current use of insulin ([address]    Be on ADA diet and exercise. Will refill,  -     metFORMIN (GLUCOPHAGE) 500 mg tablet; Take 1 Tablet by mouth two (2) times daily (with meals). -     METABOLIC PANEL, COMPREHENSIVE  -     HEMOGLOBIN A1C WITH EAG      4. Essential hypertension    We will refill,  -     amLODIPine (NORVASC) 5 mg tablet; Take 1 Tablet by mouth daily. -     metoprolol succinate (TOPROL-XL) 50 mg XL tablet; Take 1 Tablet by mouth daily.  -     METABOLIC PANEL, COMPREHENSIVE    5. Sore mouth    Patient finished of antibiotic. Still some areas in the gum and oral cavity has redness present. Will give,  -     magic mouthwash solution; Magic mouth wash   Maalox  Lidocaine 2% viscous   Diphenhydramine oral solution   1 TABLESPOON tid SWISH AND SPIT FOR 1 WEEK    Pharmacy to mix equal portions of ingredients to a total volume as indicated in the dispense amount. I spent at least 30 minutes on this visit with this established patient. Subjective:     Ms. Yolette Chandler is here for follow-up. She is accompanied by her daughter. She has sore on the mouth, antibiotic helped her but still having some redness in the mouth is wondering if she needs another antibiotic course. Has hypertension, compliant on medication. Denies chest reverberation or shortness of breath. She is diabetic, monitoring blood sugar, fasting blood sugar this morning 130. Eye checkup today. Has hyperlipidemia, on statin. No myalgia. Lab reviewed, need new labs.   Prior to Admission medications    Medication Sig Start Date End Date Taking? Authorizing Provider   metFORMIN (GLUCOPHAGE) 500 mg tablet Take 1 Tablet by mouth two (2) times daily (with meals). 1/31/22  Yes Jeffry Zheng MD   amLODIPine (NORVASC) 5 mg tablet Take 1 Tablet by mouth daily. 1/31/22  Yes Jeffry Zheng MD   metoprolol succinate (TOPROL-XL) 50 mg XL tablet Take 1 Tablet by mouth daily. 1/31/22  Yes Jeffry Zheng MD   atorvastatin (LIPITOR) 10 mg tablet Take 1 Tablet by mouth daily. 1/31/22  Yes Jeffry Zheng MD   magic mouthwash solution Magic mouth wash   Maalox  Lidocaine 2% viscous   Diphenhydramine oral solution   1 TABLESPOON tid SWISH AND SPIT FOR 1 WEEK    Pharmacy to mix equal portions of ingredients to a total volume as indicated in the dispense amount. 1/31/22  Yes Jeffry Zheng MD   TRUE METRIX GLUCOSE TEST STRIP strip USE TO TEST BS BID 1/22/20  Yes Jeffry Zheng MD   TRUEPLUS LANCETS 33 gauge misc USE AS DIRECTED TO CHECK BG QD 1/22/20  Yes Jeffry Zheng MD   atorvastatin (LIPITOR) 10 mg tablet Take 1 tablet by mouth once daily 12/13/21 1/31/22  Jesús Siddiqui NP   metoprolol succinate (TOPROL-XL) 50 mg XL tablet Take 1 tablet by mouth once daily 11/23/21 1/31/22  Andrey Wheeler NP   metFORMIN (GLUCOPHAGE) 500 mg tablet TAKE 1 TABLET BY MOUTH ONCE DAILY WITH A MEAL 11/22/21 1/31/22  Jesús Siddiqui NP   amLODIPine (NORVASC) 5 mg tablet Take 1 tablet by mouth once daily 11/15/21 1/31/22  Jesús Siddiqui NP     Past Medical History:   Diagnosis Date    [facility]     Hypertension     Memory loss     Stroke ([facility]     cerebral hemorrhage,cerebral anurysm       ROS significant for sores on mouth.     Objective:     Patient-Reported Vitals 1/31/2022   Patient-Reported Weight 152 lbs   Patient-Reported Pulse 68   Patient-Reported Temperature -   Patient-Reported Systolic  829   Patient-Reported Diastolic 78   Patient-Reported LMP Menopause          Constitutional: [x] Appears well-developed and well-nourished [x] No apparent distress [] Abnormal -     Mental status: [x] Alert and awake  [x] Oriented to person/place/time [x] Able to follow commands    [] Abnormal -     Eyes:   EOM    [x]  Normal    [] Abnormal -   Sclera  [x]  Normal    [] Abnormal -          Discharge [x]  None visible   [] Abnormal -     HENT: [x] Normocephalic, atraumatic  [] Abnormal -   [x] Mouth/Throat: Mucous membranes are moist  Few ulcers in the mouth. External Ears [x] Normal  [] Abnormal -    Neck: [x] No visualized mass [] Abnormal -     Pulmonary/Chest: [x] Respiratory effort normal   [x] No visualized signs of difficulty breathing or respiratory distress        [] Abnormal -      Musculoskeletal:   [x] Normal gait with no signs of ataxia         [x] Normal range of motion of neck        [] Abnormal -     Neurological:        [x] No Facial Asymmetry (Cranial nerve 7 motor function) (limited exam due to video visit)          [x] No gaze palsy        [] Abnormal -          Skin:        [x] No significant exanthematous lesions or discoloration noted on facial skin         [] Abnormal -            Psychiatric:       [x] Normal Affect [] Abnormal -        [x] No Hallucinations    Other pertinent observable physical exam findings:-        We discussed the expected course, resolution and complications of the diagnosis(es) in detail. Medication risks, benefits, costs, interactions, and alternatives were discussed as indicated. I advised her to contact the office if her condition worsens, changes or fails to improve as anticipated. She expressed understanding with the diagnosis(es) and plan. Alexia Shima, was evaluated through a synchronous (real-time) audio-video encounter. The patient (or guardian if applicable) is aware that this is a billable service, which includes applicable co-pays. Verbal consent to proceed has been obtained.  The visit was conducted pursuant to the emergency declaration under the [facility] [address] waiver authority and the [employer] and [place] [employer] General Act. Patient identification was verified, and a caregiver was present when appropriate. The patient was located at home in a state where the provider was licensed to provide care.       Geovanna Dickinson MD

## 2022-01-31 NOTE — PROGRESS NOTES
Health Maintenance Due   Topic Date Due    COVID-19 Vaccine (1) Never done    Eye Exam Retinal or Dilated  Never done    DTaP/Tdap/Td series (1 - Tdap) Never done    Cervical cancer screen  Never done    Shingrix Vaccine Age 50> (1 of 2) Never done    Breast Cancer Screen Mammogram  Never done    Colorectal Cancer Screening Combo  04/02/2018    Flu Vaccine (1) 09/01/2021    Bone Densitometry (Dexa) Screening  Never done    Pneumococcal 65+ years (1 of 1 - PPSV23) Never done    MICROALBUMIN Q1  01/28/2022       Chief Complaint   Patient presents with    Diabetes    Neurologic Problem    Hypertension       1. Have you been to the ER, urgent care clinic since your last visit? Hospitalized since your last visit? No    2. Have you seen or consulted any other health care providers outside of the [address] [place] [address] since your last visit? Include any pap smears or colon screening. No    3) Do you have an Advance Directive on file? no    4) Are you interested in receiving information on Advance Directives? NO      Patient is accompanied by Niece I have received verbal consent from Meng Kearney to discuss any/all medical information while they are present in the room.

## 2022-02-07 ENCOUNTER — HOSPITAL ENCOUNTER (EMERGENCY)
Age: 67
Discharge: LEFT BEFORE DISCHARGE | End: 2022-02-07
Admitting: EMERGENCY MEDICINE
Payer: MEDICARE

## 2022-02-07 PROCEDURE — 75810000275 HC EMERGENCY DEPT VISIT NO LEVEL OF CARE

## 2022-02-25 DIAGNOSIS — E11.9 TYPE 2 DIABETES MELLITUS WITHOUT COMPLICATION, WITHOUT LONG-TERM CURRENT USE OF INSULIN (HCC): ICD-10-CM

## 2022-02-25 RX ORDER — METFORMIN HYDROCHLORIDE 500 MG/1
TABLET ORAL
Qty: 90 TABLET | Refills: 0 | Status: SHIPPED | OUTPATIENT
Start: 2022-02-25 | End: 2022-04-14

## 2022-03-03 DIAGNOSIS — I10 ESSENTIAL HYPERTENSION: ICD-10-CM

## 2022-03-03 RX ORDER — METOPROLOL SUCCINATE 50 MG/1
TABLET, EXTENDED RELEASE ORAL
Qty: 90 TABLET | Refills: 0 | Status: SHIPPED | OUTPATIENT
Start: 2022-03-03

## 2022-03-12 LAB
ALBUMIN SERPL-MCNC: 4.6 G/DL (ref 3.8–4.8)
ALBUMIN/GLOB SERPL: 1.6 {RATIO} (ref 1.2–2.2)
ALP SERPL-CCNC: 106 IU/L (ref 44–121)
ALT SERPL-CCNC: 11 IU/L (ref 0–32)
AST SERPL-CCNC: 8 IU/L (ref 0–40)
BILIRUB SERPL-MCNC: 0.3 MG/DL (ref 0–1.2)
BUN SERPL-MCNC: 14 MG/DL (ref 8–27)
BUN/CREAT SERPL: 13 (ref 12–28)
CALCIUM SERPL-MCNC: 10 MG/DL (ref 8.7–10.3)
CHLORIDE SERPL-SCNC: 101 MMOL/L (ref 96–106)
CO2 SERPL-SCNC: 24 MMOL/L (ref 20–29)
CREAT SERPL-MCNC: 1.07 MG/DL (ref 0.57–1)
EGFR: 58 ML/MIN/1.73
EST. AVERAGE GLUCOSE BLD GHB EST-MCNC: 146 MG/DL
GLOBULIN SER CALC-MCNC: 2.9 G/DL (ref 1.5–4.5)
GLUCOSE SERPL-MCNC: 186 MG/DL (ref 65–99)
HBA1C MFR BLD: 6.7 % (ref 4.8–5.6)
POTASSIUM SERPL-SCNC: 4.5 MMOL/L (ref 3.5–5.2)
PROT SERPL-MCNC: 7.5 G/DL (ref 6–8.5)
SODIUM SERPL-SCNC: 142 MMOL/L (ref 134–144)

## 2022-03-16 ENCOUNTER — TELEPHONE (OUTPATIENT)
Dept: INTERNAL MEDICINE CLINIC | Age: 67
End: 2022-03-16

## 2022-03-16 NOTE — TELEPHONE ENCOUNTER
----- Message from Екатерина Mares MD sent at 3/16/2022 10:43 AM EDT -----  Diabetes slightly worse. Be on ADA diet and exercise. Will continue same dose of Metformin. Kidney function test slightly reduced, need to drink more fluid.       lvm regarding labs, labs mailed

## 2022-03-16 NOTE — PROGRESS NOTES
Diabetes slightly worse. Be on ADA diet and exercise. Will continue same dose of Metformin. Kidney function test slightly reduced, need to drink more fluid.

## 2022-03-18 PROBLEM — Z86.79 H/O CEREBRAL ANEURYSM REPAIR: Status: ACTIVE | Noted: 2017-04-02

## 2022-03-18 PROBLEM — Z98.890 H/O CEREBRAL ANEURYSM REPAIR: Status: ACTIVE | Noted: 2017-04-02

## 2022-03-19 PROBLEM — F81.9 INTELLECTUAL DELAY: Status: ACTIVE | Noted: 2017-04-02

## 2022-03-19 PROBLEM — I61.9 ICH (INTRACEREBRAL HEMORRHAGE) (HCC): Status: ACTIVE | Noted: 2019-10-18

## 2022-03-19 PROBLEM — K92.2 ACUTE LOWER GI BLEEDING: Status: ACTIVE | Noted: 2017-04-02

## 2022-03-19 PROBLEM — E11.9 TYPE 2 DIABETES MELLITUS (HCC): Status: ACTIVE | Noted: 2017-04-02

## 2022-03-20 PROBLEM — A41.9 SEPSIS (HCC): Status: ACTIVE | Noted: 2020-01-02

## 2022-03-20 PROBLEM — I62.9 INTRACRANIAL HEMORRHAGE (HCC): Status: ACTIVE | Noted: 2019-10-18

## 2022-04-14 DIAGNOSIS — E11.9 TYPE 2 DIABETES MELLITUS WITHOUT COMPLICATION, WITHOUT LONG-TERM CURRENT USE OF INSULIN (HCC): ICD-10-CM

## 2022-04-14 RX ORDER — METFORMIN HYDROCHLORIDE 500 MG/1
TABLET ORAL
Qty: 90 TABLET | Refills: 0 | Status: SHIPPED | OUTPATIENT
Start: 2022-04-14

## 2022-04-19 ENCOUNTER — HOSPITAL ENCOUNTER (OUTPATIENT)
Dept: GENERAL RADIOLOGY | Age: 67
Discharge: HOME | End: 2022-04-19
Payer: MEDICARE

## 2022-04-19 ENCOUNTER — OFFICE VISIT (OUTPATIENT)
Dept: INTERNAL MEDICINE CLINIC | Age: 67
End: 2022-04-19
Payer: MEDICARE

## 2022-04-19 VITALS
TEMPERATURE: 97.7 F | DIASTOLIC BLOOD PRESSURE: 80 MMHG | OXYGEN SATURATION: 99 % | SYSTOLIC BLOOD PRESSURE: 125 MMHG | RESPIRATION RATE: 17 BRPM | HEART RATE: 72 BPM | HEIGHT: 65 IN | WEIGHT: 156.04 LBS | BODY MASS INDEX: 26 KG/M2

## 2022-04-19 DIAGNOSIS — Z02.2 ENCOUNTER FOR EXAMINATION FOR ADMISSION TO ASSISTED LIVING FACILITY: Primary | ICD-10-CM

## 2022-04-19 DIAGNOSIS — Z11.1 SCREENING-PULMONARY TB: ICD-10-CM

## 2022-04-19 DIAGNOSIS — I10 HYPERTENSION, UNSPECIFIED TYPE: ICD-10-CM

## 2022-04-19 DIAGNOSIS — E11.9 TYPE 2 DIABETES MELLITUS WITHOUT COMPLICATION, WITHOUT LONG-TERM CURRENT USE OF INSULIN (HCC): ICD-10-CM

## 2022-04-19 PROBLEM — E11.22 TYPE 2 DIABETES MELLITUS WITH CHRONIC KIDNEY DISEASE (HCC): Status: ACTIVE | Noted: 2022-04-19

## 2022-04-19 PROCEDURE — G8400 PT W/DXA NO RESULTS DOC: HCPCS | Performed by: INTERNAL MEDICINE

## 2022-04-19 PROCEDURE — G8427 DOCREV CUR MEDS BY ELIG CLIN: HCPCS | Performed by: INTERNAL MEDICINE

## 2022-04-19 PROCEDURE — 71046 X-RAY EXAM CHEST 2 VIEWS: CPT

## 2022-04-19 PROCEDURE — 1090F PRES/ABSN URINE INCON ASSESS: CPT | Performed by: INTERNAL MEDICINE

## 2022-04-19 PROCEDURE — G8536 NO DOC ELDER MAL SCRN: HCPCS | Performed by: INTERNAL MEDICINE

## 2022-04-19 PROCEDURE — 1101F PT FALLS ASSESS-DOCD LE1/YR: CPT | Performed by: INTERNAL MEDICINE

## 2022-04-19 PROCEDURE — G9899 SCRN MAM PERF RSLTS DOC: HCPCS | Performed by: INTERNAL MEDICINE

## 2022-04-19 PROCEDURE — G8419 CALC BMI OUT NRM PARAM NOF/U: HCPCS | Performed by: INTERNAL MEDICINE

## 2022-04-19 PROCEDURE — 2022F DILAT RTA XM EVC RTNOPTHY: CPT | Performed by: INTERNAL MEDICINE

## 2022-04-19 PROCEDURE — G0463 HOSPITAL OUTPT CLINIC VISIT: HCPCS | Performed by: INTERNAL MEDICINE

## 2022-04-19 PROCEDURE — G8752 SYS BP LESS 140: HCPCS | Performed by: INTERNAL MEDICINE

## 2022-04-19 PROCEDURE — G8754 DIAS BP LESS 90: HCPCS | Performed by: INTERNAL MEDICINE

## 2022-04-19 PROCEDURE — 3044F HG A1C LEVEL LT 7.0%: CPT | Performed by: INTERNAL MEDICINE

## 2022-04-19 PROCEDURE — 99214 OFFICE O/P EST MOD 30 MIN: CPT | Performed by: INTERNAL MEDICINE

## 2022-04-19 PROCEDURE — G8432 DEP SCR NOT DOC, RNG: HCPCS | Performed by: INTERNAL MEDICINE

## 2022-04-19 PROCEDURE — 3017F COLORECTAL CA SCREEN DOC REV: CPT | Performed by: INTERNAL MEDICINE

## 2022-04-19 NOTE — PROGRESS NOTES
HISTORY OF PRESENT ILLNESS  Selena Krause is a 73 y.o. female. Presents to office accompanied by her niece for evaluation for Assisted Living at [facility]. Paperwork presented upon visit for completion for admission process. Chest Xray to be done today for TB rule out. Covid screening scheduled for Friday of this week per niece. Denies any SOB, chest pain, n/v/d, or recent illness. Able to walk unassisted by cane or walker, able to perform all functions of daily ADLs. Continues all medications as directed. No special diet outside of DM and low sodium. Does not use any machines at home. Needs some assistance with preparing foods. No recent increases in confusion. Visit Vitals  /80 (BP 1 Location: Right arm, BP Patient Position: Sitting, BP Cuff Size: Adult)   Pulse 72   Temp 97.7 °F (36.5 °C) (Oral)   Resp 17   Ht 5' 5\" (1.651 m)   Wt 156 lb 0.6 oz (70.8 kg)   SpO2 99%   BMI 25.97 kg/m²       Past Medical History:   Diagnosis Date    [facility]     Hypertension     Memory loss     Stroke ([facility]     cerebral hemorrhage,cerebral anurysm     Past Surgical History:   Procedure Laterality Date    HX ORTHOPAEDIC  1996    back surgery    NEUROLOGICAL PROCEDURE UNLISTED  2008    shunt placement     Outpatient Encounter Medications as of 4/19/2022   Medication Sig Dispense Refill    metFORMIN (GLUCOPHAGE) 500 mg tablet TAKE 1 TABLET BY MOUTH ONCE DAILY WITH A MEAL 90 Tablet 0    metoprolol succinate (TOPROL-XL) 50 mg XL tablet Take 1 tablet by mouth once daily 90 Tablet 0    amLODIPine (NORVASC) 5 mg tablet Take 1 Tablet by mouth daily. 90 Tablet 1    TRUE METRIX GLUCOSE TEST STRIP strip USE TO TEST BS  Strip 12    TRUEPLUS LANCETS 33 gauge misc USE AS DIRECTED TO CHECK BG  Lancet 12    [DISCONTINUED] atorvastatin (LIPITOR) 10 mg tablet Take 1 Tablet by mouth daily.  (Patient not taking: Reported on 4/19/2022) 90 Tablet 1    [DISCONTINUED] magic mouthwash solution Magic mouth wash   Maalox  Lidocaine 2% viscous   Diphenhydramine oral solution   1 TABLESPOON tid SWISH AND SPIT FOR 1 WEEK    Pharmacy to mix equal portions of ingredients to a total volume as indicated in the dispense amount. (Patient not taking: Reported on 4/19/2022) 200 mL 0     No facility-administered encounter medications on file as of 4/19/2022. History reviewed. No pertinent family history. Review of Systems   Constitutional: Negative. Respiratory: Negative. Cardiovascular: Negative. Musculoskeletal: Negative. Physical Exam  Vitals and nursing note reviewed. Exam conducted with a chaperone present. Constitutional:       Appearance: Normal appearance. She is normal weight. Patient in wheelchair   Cardiovascular:      Rate and Rhythm: Normal rate and regular rhythm. Heart sounds: Normal heart sounds. Pulmonary:      Effort: Pulmonary effort is normal.      Breath sounds: Normal breath sounds. Musculoskeletal:         General: Normal range of motion. Neurological:      General: No focal deficit present. Mental Status: She is alert. Psychiatric:         Mood and Affect: Mood normal.         Behavior: Behavior normal.         Thought Content: Thought content normal.         Judgment: Judgment normal.         ASSESSMENT and PLAN  Diagnoses and all orders for this visit:    1. Encounter for examination for admission to assisted living facility        -     Forms will be completed and faxed and sent to [facility]. 2. Screening-pulmonary TB  -     XR CHEST PA LAT; Future    3. Type 2 diabetes mellitus without complication, without long-term current use of insulin (HCC)        -     Continue medications. Watch sugar and carb control   4.  Hypertension, unspecified type        -     DASH diet and continue medications     current treatment plan is effective, no change in therapy  lab results and schedule of future lab studies reviewed with patient    Follow-up and Dispositions    · Return if symptoms worsen or fail to improve.

## 2022-04-19 NOTE — PROGRESS NOTES
Identified pt with two pt identifiers(name and ). Reviewed record in preparation for visit and have obtained necessary documentation. All patient medications has been reviewed. Chief Complaint   Patient presents with    Complete Physical     Patient's niece requested tb testing       3 most recent PHQ Screens 2022   Little interest or pleasure in doing things Not at all   Feeling down, depressed, irritable, or hopeless Not at all   Total Score PHQ 2 0     Abuse Screening Questionnaire 2022   Do you ever feel afraid of your partner? N   Are you in a relationship with someone who physically or mentally threatens you? N   Is it safe for you to go home? Y       Health Maintenance Due   Topic    COVID-19 Vaccine (1)    Pneumococcal 65+ years (1 - PCV)    Eye Exam Retinal or Dilated     DTaP/Tdap/Td series (1 - Tdap)    Cervical cancer screen     Shingrix Vaccine Age 50> (1 of 2)    Breast Cancer Screen Mammogram     Colorectal Cancer Screening Combo     Bone Densitometry (Dexa) Screening     MICROALBUMIN Q1      Health Maintenance Review: Patient reminded of \"due or due soon\" health maintenance. I have asked the patient to contact his/her primary care provider (PCP) for follow-up on his/her health maintenance. Vitals:    22 1326   BP: 125/80   Pulse: 72   Resp: 17   Temp: 97.7 °F (36.5 °C)   TempSrc: Oral   SpO2: 99%   Weight: 156 lb 0.6 oz (70.8 kg)   Height: 5' 5\" (1.651 m)   PainSc:   0 - No pain       Wt Readings from Last 3 Encounters:   22 156 lb 0.6 oz (70.8 kg)   20 156 lb (70.8 kg)   20 157 lb 1.6 oz (71.3 kg)     Temp Readings from Last 3 Encounters:   22 97.7 °F (36.5 °C) (Oral)   21 97.6 °F (36.4 °C)   21 97.6 °F (36.4 °C)     BP Readings from Last 3 Encounters:   22 125/80   21 (!) 179/92   21 (!) 140/69     Pulse Readings from Last 3 Encounters:   22 72   21 73   21 72       1.  \"Have you been to the ER, urgent care clinic since your last visit? Hospitalized since your last visit? \" No    2. \"Have you seen or consulted any other health care providers outside of the [address] [place] [address] since your last visit? \" No     3. For patients aged 39-70: Has the patient had a colonoscopy / FIT/ Cologuard? No      If the patient is female:    4. For patients aged 41-77: Has the patient had a mammogram within the past 2 years? No      5. For patients aged 21-65: Has the patient had a pap smear?  NA - based on age or sex

## 2022-04-19 NOTE — PROGRESS NOTES
Negative for TB for assisted living need. But did show a possible nodule.  CT is recommend if wanting to go further

## 2022-04-29 ENCOUNTER — TELEPHONE (OUTPATIENT)
Dept: INTERNAL MEDICINE CLINIC | Age: 67
End: 2022-04-29

## 2022-04-29 NOTE — TELEPHONE ENCOUNTER
----- Message from Dionisio Horn NP sent at 4/19/2022  3:56 PM EDT -----  Negative for TB for assisted living need. But did show a possible nodule.  CT is recommend if wanting to go further

## 2022-04-29 NOTE — TELEPHONE ENCOUNTER
Called and spoke with pts emergency contact. We discussed her x ray results last week and she was at pts assisted living facility and unable to talk. Called and discuss further with on recommendations with CT. She is unsure but would like to move forward with this. Pt is now under the care of a another PCP at her assisted living facility. She will discuss with that provider.

## 2022-05-06 ENCOUNTER — TELEPHONE (OUTPATIENT)
Dept: INTERNAL MEDICINE CLINIC | Age: 67
End: 2022-05-06

## 2022-05-06 NOTE — TELEPHONE ENCOUNTER
[facility] is requesting recent visit notes or h&p to be faxed to [phone]. [place] has moved to [place] they have given pt order for home [facility]. Since she is new to the facility they do not have much documentation on her.    [facility](name changed from [facility]) is sending a fax to request records

## 2022-05-16 DIAGNOSIS — E11.9 TYPE 2 DIABETES MELLITUS WITHOUT COMPLICATION, WITHOUT LONG-TERM CURRENT USE OF INSULIN (HCC): Primary | ICD-10-CM

## 2022-05-16 RX ORDER — BLOOD SUGAR DIAGNOSTIC
STRIP MISCELLANEOUS
Qty: 100 STRIP | Refills: 2 | Status: ON HOLD | OUTPATIENT
Start: 2022-05-16 | End: 2022-07-07

## 2022-05-26 RX ORDER — GLUCOSAM/CHON-MSM1/C/MANG/BOSW 500-416.6
TABLET ORAL
Qty: 100 LANCET | Refills: 1 | Status: ON HOLD | OUTPATIENT
Start: 2022-05-26 | End: 2022-07-07

## 2022-06-09 LAB — HBA1C MFR BLD HPLC: 7.2 %

## 2022-07-06 ENCOUNTER — HOSPITAL ENCOUNTER (INPATIENT)
Age: 67
LOS: 7 days | Discharge: NURSING, REHAB OR LONG-TERM CARE | DRG: 280 | End: 2022-07-13
Attending: EMERGENCY MEDICINE | Admitting: FAMILY MEDICINE
Payer: MEDICARE

## 2022-07-06 ENCOUNTER — APPOINTMENT (OUTPATIENT)
Dept: CT IMAGING | Age: 67
DRG: 280 | End: 2022-07-06
Attending: EMERGENCY MEDICINE
Payer: MEDICARE

## 2022-07-06 ENCOUNTER — APPOINTMENT (OUTPATIENT)
Dept: GENERAL RADIOLOGY | Age: 67
DRG: 280 | End: 2022-07-06
Attending: EMERGENCY MEDICINE
Payer: MEDICARE

## 2022-07-06 DIAGNOSIS — I25.10 CORONARY ARTERY DISEASE INVOLVING NATIVE CORONARY ARTERY OF NATIVE HEART WITHOUT ANGINA PECTORIS: ICD-10-CM

## 2022-07-06 DIAGNOSIS — G93.40 ACUTE ENCEPHALOPATHY: ICD-10-CM

## 2022-07-06 DIAGNOSIS — K92.2 ACUTE LOWER GI BLEEDING: ICD-10-CM

## 2022-07-06 DIAGNOSIS — E78.00 HYPERCHOLESTEREMIA: ICD-10-CM

## 2022-07-06 DIAGNOSIS — I21.4 NSTEMI (NON-ST ELEVATED MYOCARDIAL INFARCTION) (HCC): Primary | ICD-10-CM

## 2022-07-06 DIAGNOSIS — I10 PRIMARY HYPERTENSION: Chronic | ICD-10-CM

## 2022-07-06 DIAGNOSIS — R41.82 ALTERED MENTAL STATUS, UNSPECIFIED ALTERED MENTAL STATUS TYPE: ICD-10-CM

## 2022-07-06 LAB
ALBUMIN SERPL-MCNC: 3.2 G/DL (ref 3.5–5)
ALBUMIN/GLOB SERPL: 0.6 {RATIO} (ref 1.1–2.2)
ALP SERPL-CCNC: 114 U/L (ref 45–117)
ALT SERPL-CCNC: 29 U/L (ref 12–78)
ANION GAP SERPL CALC-SCNC: 12 MMOL/L (ref 5–15)
APTT PPP: 25.9 SEC (ref 22.1–31)
ARTERIAL PATENCY WRIST A: POSITIVE
AST SERPL-CCNC: 39 U/L (ref 15–37)
ATRIAL RATE: 98 BPM
BASE EXCESS BLD CALC-SCNC: 1.9 MMOL/L
BASOPHILS # BLD: 0 K/UL (ref 0–0.1)
BASOPHILS NFR BLD: 0 % (ref 0–1)
BDY SITE: ABNORMAL
BILIRUB SERPL-MCNC: 0.5 MG/DL (ref 0.2–1)
BUN SERPL-MCNC: 45 MG/DL (ref 6–20)
BUN/CREAT SERPL: 25 (ref 12–20)
CA-I BLD-SCNC: 1.23 MMOL/L (ref 1.12–1.32)
CALCIUM SERPL-MCNC: 10.7 MG/DL (ref 8.5–10.1)
CALCULATED P AXIS, ECG09: 58 DEGREES
CALCULATED R AXIS, ECG10: -48 DEGREES
CALCULATED T AXIS, ECG11: 74 DEGREES
CHLORIDE SERPL-SCNC: 105 MMOL/L (ref 97–108)
CO2 SERPL-SCNC: 20 MMOL/L (ref 21–32)
COMMENT, HOLDF: NORMAL
COVID-19 RAPID TEST, COVR: NOT DETECTED
CREAT SERPL-MCNC: 1.8 MG/DL (ref 0.55–1.02)
DIAGNOSIS, 93000: NORMAL
DIFFERENTIAL METHOD BLD: ABNORMAL
EOSINOPHIL # BLD: 0 K/UL (ref 0–0.4)
EOSINOPHIL NFR BLD: 0 % (ref 0–7)
ERYTHROCYTE [DISTWIDTH] IN BLOOD BY AUTOMATED COUNT: 16.2 % (ref 11.5–14.5)
EST. AVERAGE GLUCOSE BLD GHB EST-MCNC: 214 MG/DL
ETHANOL SERPL-MCNC: <10 MG/DL
GAS FLOW.O2 O2 DELIVERY SYS: ABNORMAL L/MIN
GLOBULIN SER CALC-MCNC: 5.3 G/DL (ref 2–4)
GLUCOSE BLD STRIP.AUTO-MCNC: 420 MG/DL (ref 65–117)
GLUCOSE BLD STRIP.AUTO-MCNC: 439 MG/DL (ref 65–117)
GLUCOSE SERPL-MCNC: 577 MG/DL (ref 65–100)
HBA1C MFR BLD: 9.1 % (ref 4–5.6)
HCO3 BLD-SCNC: 24.8 MMOL/L (ref 22–26)
HCT VFR BLD AUTO: 46.6 % (ref 35–47)
HGB BLD-MCNC: 14.8 G/DL (ref 11.5–16)
IMM GRANULOCYTES # BLD AUTO: 0.1 K/UL (ref 0–0.04)
IMM GRANULOCYTES NFR BLD AUTO: 1 % (ref 0–0.5)
LYMPHOCYTES # BLD: 2.9 K/UL (ref 0.8–3.5)
LYMPHOCYTES NFR BLD: 18 % (ref 12–49)
MCH RBC QN AUTO: 27.4 PG (ref 26–34)
MCHC RBC AUTO-ENTMCNC: 31.8 G/DL (ref 30–36.5)
MCV RBC AUTO: 86.3 FL (ref 80–99)
MONOCYTES # BLD: 1 K/UL (ref 0–1)
MONOCYTES NFR BLD: 6 % (ref 5–13)
NEUTS SEG # BLD: 11.9 K/UL (ref 1.8–8)
NEUTS SEG NFR BLD: 75 % (ref 32–75)
NRBC # BLD: 0 K/UL (ref 0–0.01)
NRBC BLD-RTO: 0 PER 100 WBC
O2/TOTAL GAS SETTING VFR VENT: 21 %
P-R INTERVAL, ECG05: 114 MS
PCO2 BLD: 33.2 MMHG (ref 35–45)
PH BLD: 7.48 [PH] (ref 7.35–7.45)
PLATELET # BLD AUTO: 270 K/UL (ref 150–400)
PMV BLD AUTO: 11.8 FL (ref 8.9–12.9)
PO2 BLD: 97 MMHG (ref 80–100)
POTASSIUM SERPL-SCNC: 5.2 MMOL/L (ref 3.5–5.1)
PROT SERPL-MCNC: 8.5 G/DL (ref 6.4–8.2)
Q-T INTERVAL, ECG07: 386 MS
QRS DURATION, ECG06: 96 MS
QTC CALCULATION (BEZET), ECG08: 492 MS
RBC # BLD AUTO: 5.4 M/UL (ref 3.8–5.2)
SAMPLES BEING HELD,HOLD: NORMAL
SAO2 % BLD: 98.1 % (ref 92–97)
SERVICE CMNT-IMP: ABNORMAL
SERVICE CMNT-IMP: ABNORMAL
SODIUM SERPL-SCNC: 137 MMOL/L (ref 136–145)
SOURCE, COVRS: NORMAL
SPECIMEN TYPE: ABNORMAL
THERAPEUTIC RANGE,PTTT: NORMAL SECS (ref 58–77)
TROPONIN-HIGH SENSITIVITY: 6057 NG/L (ref 0–51)
TSH SERPL DL<=0.05 MIU/L-ACNC: 1.05 UIU/ML (ref 0.36–3.74)
VENTRICULAR RATE, ECG03: 98 BPM
WBC # BLD AUTO: 15.9 K/UL (ref 3.6–11)

## 2022-07-06 PROCEDURE — 80053 COMPREHEN METABOLIC PANEL: CPT

## 2022-07-06 PROCEDURE — 93005 ELECTROCARDIOGRAM TRACING: CPT

## 2022-07-06 PROCEDURE — 70450 CT HEAD/BRAIN W/O DYE: CPT

## 2022-07-06 PROCEDURE — 82077 ASSAY SPEC XCP UR&BREATH IA: CPT

## 2022-07-06 PROCEDURE — 65660000001 HC RM ICU INTERMED STEPDOWN

## 2022-07-06 PROCEDURE — 74011250636 HC RX REV CODE- 250/636: Performed by: EMERGENCY MEDICINE

## 2022-07-06 PROCEDURE — 87635 SARS-COV-2 COVID-19 AMP PRB: CPT

## 2022-07-06 PROCEDURE — 85730 THROMBOPLASTIN TIME PARTIAL: CPT

## 2022-07-06 PROCEDURE — 85025 COMPLETE CBC W/AUTO DIFF WBC: CPT

## 2022-07-06 PROCEDURE — 74011250636 HC RX REV CODE- 250/636: Performed by: FAMILY MEDICINE

## 2022-07-06 PROCEDURE — 99285 EMERGENCY DEPT VISIT HI MDM: CPT

## 2022-07-06 PROCEDURE — 84484 ASSAY OF TROPONIN QUANT: CPT

## 2022-07-06 PROCEDURE — 71045 X-RAY EXAM CHEST 1 VIEW: CPT

## 2022-07-06 PROCEDURE — 99223 1ST HOSP IP/OBS HIGH 75: CPT | Performed by: SPECIALIST

## 2022-07-06 PROCEDURE — 74011636637 HC RX REV CODE- 636/637: Performed by: EMERGENCY MEDICINE

## 2022-07-06 PROCEDURE — 74011636637 HC RX REV CODE- 636/637: Performed by: STUDENT IN AN ORGANIZED HEALTH CARE EDUCATION/TRAINING PROGRAM

## 2022-07-06 PROCEDURE — 83036 HEMOGLOBIN GLYCOSYLATED A1C: CPT

## 2022-07-06 PROCEDURE — 36415 COLL VENOUS BLD VENIPUNCTURE: CPT

## 2022-07-06 PROCEDURE — 84443 ASSAY THYROID STIM HORMONE: CPT

## 2022-07-06 PROCEDURE — 82803 BLOOD GASES ANY COMBINATION: CPT

## 2022-07-06 PROCEDURE — 82962 GLUCOSE BLOOD TEST: CPT

## 2022-07-06 PROCEDURE — 36600 WITHDRAWAL OF ARTERIAL BLOOD: CPT

## 2022-07-06 RX ORDER — NITROGLYCERIN 0.4 MG/1
0.4 TABLET SUBLINGUAL
Status: DISCONTINUED | OUTPATIENT
Start: 2022-07-06 | End: 2022-07-13 | Stop reason: HOSPADM

## 2022-07-06 RX ORDER — HEPARIN SODIUM 1000 [USP'U]/ML
4000 INJECTION, SOLUTION INTRAVENOUS; SUBCUTANEOUS ONCE
Status: DISCONTINUED | OUTPATIENT
Start: 2022-07-06 | End: 2022-07-06

## 2022-07-06 RX ORDER — INSULIN LISPRO 100 [IU]/ML
INJECTION, SOLUTION INTRAVENOUS; SUBCUTANEOUS EVERY 6 HOURS
Status: DISCONTINUED | OUTPATIENT
Start: 2022-07-06 | End: 2022-07-13 | Stop reason: HOSPADM

## 2022-07-06 RX ORDER — MAGNESIUM SULFATE 100 %
4 CRYSTALS MISCELLANEOUS AS NEEDED
Status: DISCONTINUED | OUTPATIENT
Start: 2022-07-06 | End: 2022-07-13 | Stop reason: HOSPADM

## 2022-07-06 RX ORDER — ASPIRIN 81 MG/1
81 TABLET ORAL DAILY
Status: DISCONTINUED | OUTPATIENT
Start: 2022-07-06 | End: 2022-07-13 | Stop reason: HOSPADM

## 2022-07-06 RX ORDER — SODIUM CHLORIDE 9 MG/ML
75 INJECTION, SOLUTION INTRAVENOUS CONTINUOUS
Status: DISCONTINUED | OUTPATIENT
Start: 2022-07-06 | End: 2022-07-09

## 2022-07-06 RX ORDER — SODIUM CHLORIDE 0.9 % (FLUSH) 0.9 %
5-40 SYRINGE (ML) INJECTION AS NEEDED
Status: DISCONTINUED | OUTPATIENT
Start: 2022-07-06 | End: 2022-07-13 | Stop reason: HOSPADM

## 2022-07-06 RX ORDER — METOPROLOL SUCCINATE 25 MG/1
50 TABLET, EXTENDED RELEASE ORAL DAILY
Status: DISCONTINUED | OUTPATIENT
Start: 2022-07-07 | End: 2022-07-13 | Stop reason: HOSPADM

## 2022-07-06 RX ORDER — INSULIN LISPRO 100 [IU]/ML
5 INJECTION, SOLUTION INTRAVENOUS; SUBCUTANEOUS ONCE
Status: COMPLETED | OUTPATIENT
Start: 2022-07-06 | End: 2022-07-06

## 2022-07-06 RX ORDER — SODIUM CHLORIDE 0.9 % (FLUSH) 0.9 %
5-40 SYRINGE (ML) INJECTION EVERY 8 HOURS
Status: DISCONTINUED | OUTPATIENT
Start: 2022-07-06 | End: 2022-07-13 | Stop reason: HOSPADM

## 2022-07-06 RX ORDER — AMLODIPINE BESYLATE 5 MG/1
2.5 TABLET ORAL DAILY
Status: DISCONTINUED | OUTPATIENT
Start: 2022-07-07 | End: 2022-07-09

## 2022-07-06 RX ORDER — HEPARIN SODIUM 10000 [USP'U]/100ML
12-25 INJECTION, SOLUTION INTRAVENOUS
Status: DISCONTINUED | OUTPATIENT
Start: 2022-07-06 | End: 2022-07-08 | Stop reason: ALTCHOICE

## 2022-07-06 RX ORDER — ASPIRIN 325 MG
325 TABLET ORAL DAILY
Status: DISCONTINUED | OUTPATIENT
Start: 2022-07-07 | End: 2022-07-06

## 2022-07-06 RX ADMIN — HEPARIN SODIUM AND DEXTROSE 12 UNITS/KG/HR: 10000; 5 INJECTION INTRAVENOUS at 21:36

## 2022-07-06 RX ADMIN — Medication 5 UNITS: at 22:38

## 2022-07-06 RX ADMIN — Medication 7 UNITS: at 19:32

## 2022-07-06 RX ADMIN — SODIUM CHLORIDE 75 ML/HR: 9 INJECTION, SOLUTION INTRAVENOUS at 21:43

## 2022-07-06 NOTE — ED PROVIDER NOTES
41-year-old female with history of hypertension, diabetes, renal hemorrhage who was brought to the ED by EMS from [facility] living for altered mental status and concern for STEMI. Per EMS, facility reports that the patient has been less responsive and not answering questions over the past several days. Patient was recently started on Risperdal.  On routine EKG, there was concern for ST elevation laterally and ST depression anteriorly. Patient denies chest pain, shortness of breath, nausea, diaphoresis. She is denying any complaints at this time. Cardiology team at bedside and does not feel that the patient needs to go to the Cath Lab at this time. Past Medical History:   Diagnosis Date    Diabetes ([facility]     Hypertension     Memory loss     Stroke ([facility]     cerebral hemorrhage,cerebral anurysm       Past Surgical History:   Procedure Laterality Date    HX ORTHOPAEDIC  1996    back surgery    NEUROLOGICAL PROCEDURE UNLISTED  2008    shunt placement         History reviewed. No pertinent family history. Social History     Socioeconomic History    Marital status: SINGLE     Spouse name: Not on file    Number of children: Not on file    Years of education: Not on file    Highest education level: Not on file   Occupational History    Not on file   Tobacco Use    Smoking status: Never Smoker    Smokeless tobacco: Never Used   Substance and Sexual Activity    Alcohol use: No    Drug use: No    Sexual activity: Not on file     Comment: single,no children,niece,POA,nitarsha hurtado   Other Topics Concern    Not on file   Social History Narrative    Not on file     Social Determinants of Health     Financial Resource Strain:     Difficulty of Paying Living Expenses: Not on file   Food Insecurity:     Worried About Running Out of Food in the Last Year: Not on file    Marie of Food in the Last Year: Not on file   Transportation Needs:     Lack of Transportation (Medical):  Not on file    Lack of Transportation (Non-Medical): Not on file   Physical Activity:     Days of Exercise per Week: Not on file    Minutes of Exercise per Session: Not on file   Stress:     Feeling of Stress : Not on file   Social Connections:     Frequency of Communication with Friends and Family: Not on file    Frequency of Social Gatherings with Friends and Family: Not on file    Attends Yarsanism Services: Not on file    Active Member of [address] [place] [address] or Organizations: Not on file    Attends Club or Organization Meetings: Not on file    Marital Status: Not on file   Intimate Partner Violence:     Fear of Current or Ex-Partner: Not on file    Emotionally Abused: Not on file    Physically Abused: Not on file    Sexually Abused: Not on file   Housing Stability:     Unable to Pay for Housing in the Last Year: Not on file    Number of [place] in the Last Year: Not on file    Unstable Housing in the Last Year: Not on file         ALLERGIES: Amoxicillin    Review of Systems   Constitutional: Negative for chills and fever. HENT: Negative for sore throat and trouble swallowing. Eyes: Negative for pain and visual disturbance. Respiratory: Negative for cough, chest tightness and shortness of breath. Gastrointestinal: Negative for abdominal pain, diarrhea, nausea and vomiting. Genitourinary: Negative for dysuria and hematuria. Musculoskeletal: Negative for back pain and neck pain. Skin: Negative for rash and wound. Neurological: Negative for syncope, speech difficulty, weakness, numbness and headaches. Psychiatric/Behavioral: Negative for confusion and suicidal ideas. Vitals:    07/06/22 1536 07/06/22 1537   BP:  121/88   Pulse:  (!) 102   Resp:  16   Temp:  97.5 °F (36.4 °C)   SpO2:  96%   Weight: 76.4 kg (168 lb 6.4 oz)    Height: 5' 5\" (1.651 m)             Physical Exam  Vitals and nursing note reviewed. Constitutional:       Comments: Slow to respond, but answering questions. HENT:      Head: Atraumatic. Eyes:      General: No visual field deficit. Extraocular Movements: Extraocular movements intact. Cardiovascular:      Rate and Rhythm: Normal rate and regular rhythm. Heart sounds: Normal heart sounds. Pulmonary:      Effort: Pulmonary effort is normal.      Breath sounds: Normal breath sounds. Abdominal:      Palpations: Abdomen is soft. Tenderness: There is no abdominal tenderness. There is no guarding. Musculoskeletal:         General: No swelling or tenderness. Cervical back: Neck supple. Skin:     General: Skin is warm and dry. Neurological:      Mental Status: She is alert and oriented to person, place, and time. Cranial Nerves: No cranial nerve deficit, dysarthria or facial asymmetry. Motor: No weakness. Psychiatric:      Comments: Flat affect          MDM  Number of Diagnoses or Management Options  Altered mental status, unspecified altered mental status type  NSTEMI (non-ST elevated myocardial infarction) [facility])  Diagnosis management comments: DDx:  Ams, nstemi, STEMI, hyperglycemia, medication side effect    CT HEAD WO CONT    Result Date: 7/6/2022  No acute abnormality. Chronic findings as above. XR CHEST PORT    Result Date: 7/6/2022  1. No radiographic evidence of acute cardiopulmonary disease.        Recent Results (from the past 12 hour(s))  -EKG, 12 LEAD, INITIAL:   Collection Time: 07/06/22  3:29 PM       Result                      Value             Ref Range           Ventricular Rate            98                BPM                 Atrial Rate                 98                BPM                 P-R Interval                114               ms                  QRS Duration                96                ms                  Q-T Interval                386               ms                  QTC Calculation (Bezet)     492               ms                  Calculated P Axis           58                degrees Calculated R Axis           -48               degrees             Calculated T Axis           74                degrees             Diagnosis                                                     Normal sinus rhythm Right atrial enlargement Pulmonary disease pattern Left anterior fascicular block ST & T wave abnormality, consider inferior ischemia ST & T wave abnormality, consider anterolateral ischemia Prolonged QT Abnormal ECG When compared with ECG of 02-JAN-2020 21:30, Left anterior fascicular block is now present T wave inversion now evident in Inferior leads T wave inversion now evident in Anterolateral leads minimal st elevation noted in inferior lateral leads Confirmed by Simone Coreas M.D., [facility] ([id number]) on 7/6/2022 3:56:27 PM   -CBC WITH AUTOMATED DIFF:   Collection Time: 07/06/22  4:04 PM       Result                      Value             Ref Range           WBC                         15.9 (H)          3.6 - 11.0 K*       RBC                         5.40 (H)          3.80 - 5.20 *       HGB                         14.8              11.5 - 16.0 *       HCT                         46.6              35.0 - 47.0 %       MCV                         86.3              80.0 - 99.0 *       MCH                         27.4              26.0 - 34.0 *       MCHC                        31.8              30.0 - 36.5 *       RDW                         16.2 (H)          11.5 - 14.5 %       PLATELET                    270               150 - 400 K/*       MPV                         11.8              8.9 - 12.9 FL       NRBC                        0.0               0  WBC       ABSOLUTE NRBC               0.00              0.00 - 0.01 *       NEUTROPHILS                 75                32 - 75 %           LYMPHOCYTES                 18                12 - 49 %           MONOCYTES                   6                 5 - 13 %            EOSINOPHILS                 0                 0 - 7 %             BASOPHILS 0                 0 - 1 %             IMMATURE GRANULOCYTES       1 (H)             0.0 - 0.5 %         ABS. NEUTROPHILS            11.9 (H)          1.8 - 8.0 K/*       ABS. LYMPHOCYTES            2.9               0.8 - 3.5 K/*       ABS. MONOCYTES              1.0               0.0 - 1.0 K/*       ABS. EOSINOPHILS            0.0               0.0 - 0.4 K/*       ABS. BASOPHILS              0.0               0.0 - 0.1 K/*       ABS. IMM. GRANS.            0.1 (H)           0.00 - 0.04 *       DF                          AUTOMATED                        -METABOLIC PANEL, COMPREHENSIVE:   Collection Time: 07/06/22  4:04 PM       Result                      Value             Ref Range           Sodium                      137               136 - 145 mm*       Potassium                   5.2 (H)           3.5 - 5.1 mm*       Chloride                    105               97 - 108 mmo*       CO2                         20 (L)            21 - 32 mmol*       Anion gap                   12                5 - 15 mmol/L       Glucose                     577 (H)           65 - 100 mg/*       BUN                         45 (H)            6 - 20 MG/DL        Creatinine                  1.80 (H)          0.55 - 1.02 *       BUN/Creatinine ratio        25 (H)            12 - 20             GFR est AA                  34 (L)            >60 ml/min/1*       GFR est non-AA              28 (L)            >60 ml/min/1*       Calcium                     10.7 (H)          8.5 - 10.1 M*       Bilirubin, total            0.5               0.2 - 1.0 MG*       ALT (SGPT)                  29                12 - 78 U/L         AST (SGOT)                  39 (H)            15 - 37 U/L         Alk.  phosphatase            114               45 - 117 U/L        Protein, total              8.5 (H)           6.4 - 8.2 g/*       Albumin                     3.2 (L)           3.5 - 5.0 g/*       Globulin                    5.3 (H) 2.0 - 4.0 g/*       A-G Ratio                   0.6 (L)           1.1 - 2.2      -TROPONIN-HIGH SENSITIVITY:   Collection Time: 07/06/22  4:04 PM       Result                      Value             Ref Range           Troponin-High Sensitiv*     6,057 (HH)        0 - 51 ng/L    -SAMPLES BEING HELD:   Collection Time: 07/06/22  4:04 PM       Result                      Value             Ref Range           SAMPLES BEING HELD          1BLU                                  COMMENT                                                       Add-on orders for these samples will be processed based on acceptable specimen integrity and analyte stability, which may vary by analyte. -ETHYL ALCOHOL:   Collection Time: 07/06/22  4:04 PM       Result                      Value             Ref Range           ALCOHOL(ETHYL),SERUM        <10               <10 MG/DL        1800 -cussed with cardiology (Dr. Robert Abreu) -recommends aspirin and heparin for NSTEMI (no bolus given hx of ICH/GIB). Admit to hospitalist service.        Amount and/or Complexity of Data Reviewed  Clinical lab tests: reviewed  Tests in the radiology section of CPT®: reviewed  Tests in the medicine section of CPT®: reviewed    Risk of Complications, Morbidity, and/or Mortality  Presenting problems: high  Diagnostic procedures: high  Management options: high    Patient Progress  Patient progress: improved         EKG    Date/Time: 7/6/2022 3:37 PM  Performed by: Misbah Duff MD  Authorized by: Misbah Duff MD     ECG reviewed by ED Physician in the absence of a cardiologist: yes    Previous ECG:     Previous ECG:  Unavailable  Interpretation:     Interpretation: abnormal    Rate:     ECG rate:  98    ECG rate assessment: normal    Rhythm:     Rhythm: sinus rhythm    QRS:     QRS axis:  Normal    QRS intervals:  Normal  Conduction:     Conduction: abnormal      Abnormal conduction: LAFB    ST segments:     ST segments:  Normal  T waves:     T waves: inverted      Inverted: II, III, aVF, V5 and V6  Critical Care  Performed by: Alek Mariano MD  Authorized by: Alek Mariano MD     Critical care provider statement:     Critical care time (minutes):  50    Critical care time was exclusive of:  Separately billable procedures and treating other patients    Critical care was necessary to treat or prevent imminent or life-threatening deterioration of the following conditions:  Cardiac failure    Critical care was time spent personally by me on the following activities:  Blood draw for specimens, development of treatment plan with patient or surrogate, discussions with consultants, examination of patient, obtaining history from patient or surrogate, ordering and performing treatments and interventions, ordering and review of laboratory studies, ordering and review of radiographic studies, pulse oximetry and re-evaluation of patient's condition    I assumed direction of critical care for this patient from another provider in my specialty: no

## 2022-07-06 NOTE — CONSULTS
[facility]  Cardiology Care Note                  [x]Initial visit     []Established visit     Patient Name: Balwinder Sykes - ZUB:4/76/2025 - ERU:[id number]  Primary Cardiologist: none  Consulting Cardiologist: Harmony Dominguez MD     Reason for consult: concern for STEMI    HPI:   Patient is a 73year old female who has a hx of HTN, DM, cerebral aneurysm with  shunt in 2007, multiple CVA events, acute right parietal intracranial hematoma/small right SDH in 10/19 which was managed conservatively, and she has hx of cognitive impairment/dementia after CVA events. She was started on Risperdal recently and her family has noticed worsening mental status changes. EMS was called today d/t these concerns. EKG en route was concerning for STEMI and code STEMI was called. Review of EKG did show some ischemic changes, but not a STEMI. She resides in an assisted living and the report to EMS was that she had denies CP, SOB, n/v, fever, chills. She did have some cognitive impairment in ED, but denied active CP/SOB. Repeat EKG showed continued changes, but not STEMI. SUBJECTIVE:    ROS primarily obtained from EMS/assisted living staff. Pt denies active CP/SOB. Assessment and Plan                     CARDIOLOGY ATTENDING ATTESTATION    CONSULT/PROGRESS NOTE      Patient seen on the day of progress note and examined  and agree with Advance Practice Provider (TANJA, NP,PA)  assessment and plans. Brief HPI: 73year old female who has a hx of HTN, DM, cerebral aneurysm with  shunt in 2007, multiple CVA events, acute right parietal intracranial hematoma/small right SDH in 10/19 which was managed conservatively, and she has hx of cognitive impairment/dementia after CVA events. She was started on Risperdal recently and her family has noticed worsening mental status changes.  EMS was called today d/t these concerns. The patient is completely asymptomatic have asked her several times although clearly not extremely reliable historian. But no chest pain or shortness of breath reported when asked by multiple people. A/P: 1. Abnormal EKG: EKG shows ischemic changes in the inferolateral leads with minimal ST elevation in the inferolateral leads. There are no dynamic changes on subsequent EKG    Proceed with serial troponins aspirin statin. Given previous significant history of cerebral hemorrhage and GI bleed intravenous intravenous heparin will be put on hold for now. Continue also Toprol-XL. Obtain echo            BP Readings from Last 3 Encounters:   07/06/22 121/88   04/19/22 125/80   04/11/21 (!) 179/92       Pulse Readings from Last 3 Encounters:   07/06/22 (!) 102   04/19/22 72   04/11/21 73       Visit Vitals  /88 (BP 1 Location: Left upper arm, BP Patient Position: At rest)   Pulse (!) 102   Temp 97.5 °F (36.4 °C)   Resp 16   Ht 5' 5\" (1.651 m)   Wt 168 lb 6.4 oz (76.4 kg)   SpO2 96%   BMI 28.02 kg/m²     General Appearance:  Well developed, well nourished,alert and oriented x 3, and individual in no acute distress. Ears/Nose/Mouth/Throat:   Hearing grossly normal.         Neck: Supple. Chest:   Lungs clear to auscultation bilaterally. Cardiovascular:  Regular rate and rhythm, S1, S2 normal, no murmur. Abdomen:   Soft, non-tender, bowel sounds are active. Extremities: No edema bilaterally. Skin: Warm and dry. Lab Results   Component Value Date/Time    Troponin-I, Qt. <0.05 01/03/2020 04:34 AM       Lab Results   Component Value Date/Time    Creatinine (POC) 0.8 10/18/2019 04:12 AM    Creatinine 1.07 (H) 03/11/2022 08:45 AM       Lab Results   Component Value Date/Time    HGB 14.8 07/06/2022 04:04 PM                         1. Acute ischemic EKG changes: denies current anginal symptoms nor were there symptoms like this reported at assisted living.  Would trend troponin, obtain typical labs. Noted head CT has been ordered and CXR. With her hx of cerebral hemorrhage hold off on starting heparin gtt, awaiting results of aforementioned tests first. Cont home Metoprolol, if no elevation in LFTs add statin, add ASA 81mg. 2. Hx of CVA and acute cerebral hemorrhage: see plan above. 3. HTN: cont home Metoprolol XL and Amlodipine. Cont to monitor  4. DM: management per primary team             ____________________________________________________________    Cardiac testing      Most recent HS troponins:  No results for input(s): TROPHS in the last 72 hours. No lab exists for component:  CKMB  ECG: SR, ST and T wave abnormality, consider inferior ischemia, ST and T wave abnormality, consider anterolateral ischemia. Review of Systems    []All other systems reviewed and all negative except as written in HPI    [x] Patient unable to provide secondary to condition         Past Medical History:   Diagnosis Date    Diabetes ([facility] [address]     Hypertension     Memory loss     Stroke ([facility] [address]     cerebral hemorrhage,cerebral anurysm     Past Surgical History:   Procedure Laterality Date    HX ORTHOPAEDIC  1996    back surgery    NEUROLOGICAL PROCEDURE UNLISTED  2008    shunt placement     Social Hx:  reports that she has never smoked. She has never used smokeless tobacco. She reports that she does not drink alcohol and does not use drugs. Family Hx: family history is not on file.   Allergies   Allergen Reactions    Amoxicillin Other (comments)     Aggressive behavior  and  hallucinating and itching          OBJECTIVE:  Wt Readings from Last 3 Encounters:   07/06/22 76.4 kg (168 lb 6.4 oz)   04/19/22 70.8 kg (156 lb 0.6 oz)   01/22/20 70.8 kg (156 lb)     No intake or output data in the 24 hours ending 07/06/22 1613      Physical Exam    Vitals:   Vitals:    07/06/22 1536 07/06/22 1537   BP:  121/88   Pulse:  (!) 102   Resp:  16   Temp:  97.5 °F (36.4 °C)   SpO2:  96%   Weight: 76.4 kg (168 lb 6.4 oz)    Height: 5' 5\" (1.651 m)        General:    Alert, cooperative, no distress, appears stated age. Neck:   Supple, no carotid bruit and no JVD. Back:     Symmetric,    Lungs:     clear to auscultation bilaterally. Heart[de-identified]    Regular rate and rhythm, S1, S2 normal, no murmur, click, rub or gallop. Abdomen:     Soft, non-tender. Bowel sounds normal.    Extremities:   Extremities normal, atraumatic, no cyanosis or edema. Vascular:   Pulses - sudha UE/LE equal   Skin:   Skin color normal. No rashes or lesions on visible areas   Neurologic:   Alert, Moves all extremities. Data Review:     Radiology:   XR Results (most recent):  Results from Hospital Encounter encounter on 04/19/22    XR CHEST PA LAT    Narrative  Exam:  2 view chest    Indication: Screening for TB.    COMPARISON: 1/2/2020 and 1/3/2020    PA and lateral views demonstrate normal heart size. There is no acute process in  the lung fields. The patient effect a shallow depth inspiration. Lung bases are  hypoaerated. Linear opacities each lung base are consistent with areas of  discoid atelectasis or scarring. However, there is a oval opacity medially at the left lung base in the  retrocardiac region measuring approximately 1.2 x 0.8 cm. This could represent a  pulmonary nodule. Further evaluation is suggested. No adenopathy. No pleural effusions. Osseous structures are unremarkable. Shunt tubing is noted. Impression  1. Possible pulmonary nodule medially at the left lung base. Noncontrast CT scan  of chest is suggested. 23X    CT Results (most recent):  Results from Hospital Encounter encounter on 04/11/21    CT MAXILLOFACIAL WO CONT    Narrative  EXAM: CT MAXILLOFACIAL WO CONT    INDICATION: Presumed injury with right periorbital ecchymosis, injury not  witnessed. COMPARISON: None. CONTRAST:   None.     TECHNIQUE:  Multislice helical CT of the facial bones was performed in the axial  plane without intravenous contrast administration. Coronal and sagittal  reformations were generated. CT dose reduction was achieved through use of a  standardized protocol tailored for this examination and automatic exposure  control for dose modulation. FINDINGS:    Bones: There is no fracture or other acute osseous abnormality. Incidentally  noted periapical lucencies around 1 right lower molar and around multiple upper  teeth in the maxilla. 1111    Paranasal sinuses: Clear. Orbits: The globes, optic nerves, and extraocular muscles are within normal  limits. .    Base of brain and soft tissues: Age-related change in the intracranial space  greater than expected for stated age. No acute abnormality. Soft tissue edema  over the right supraorbital rim corresponds with the clinical history of right  periorbital ecchymosis. .    Impression  1. No acute bony abnormality of the maxillofacial structures. 2. Mild soft tissue swelling over the right supraorbital rim. 3. Numerous periapical lucencies around teeth as described. Correlate with  dental history for abscess. 4. Age-related intracranial change greater than expected for stated age. MRI Results (most recent):  Results from [place] encounter on 11/22/16    MRI BRAIN WO CONT    Narrative  INDICATION:   rule out stroke    EXAMINATION:  MRI BRAIN WO CONTRAST    COMPARISON:  CT head 11/22/2016    TECHNIQUE:  MR imaging of the brain was performed with sagittal T1, axial T1,  T2, FLAIR, GRE, DWI/ADC, coronal T2.    FINDINGS:    There is a left frontal ventriculostomy catheter. The ventricles are midline  without hydrocephalus. There is remote hemorrhage within the bilateral thalami,  left basal ganglia, posterior left temporal lobe, left corona radiata, as well  as in the right frontal lobe along the tract of prior ventriculostomy catheter.   There are also a few areas of remote microhemorrhage in the cortical and  subcortical regions of the superior and lateral frontal lobes and right parietal  lobe. There is a focus of remote hemorrhage in the left damon. Encephalomalacia  and chronic hemosiderin deposition right cerebellum. There is extensive  periventricular T2 signal hyperintensity as well as involving the damon and left  medulla. There is no acute infarction. The major intracranial vascular  flow-voids are patent. Impression  IMPRESSION:  No acute infarction. Areas of chronic infarction, microvascular ischemic disease  and prior hemorrhages as above. Left frontal ventriculostomy catheter. No  hydrocephalus. No results for input(s): CPK, TROIQ in the last 72 hours. No lab exists for component: CKQMB, CPKMB, BMPP  No results for input(s): NA, K, CL, CO2, BUN, CREA, GLU, PHOS, CA in the last 72 hours. No results for input(s): WBC, HGB, HCT, PLT, HGBEXT, HCTEXT, PLTEXT in the last 72 hours. No results for input(s): PTP, INR, AP, INREXT in the last 72 hours. No lab exists for component: PTTP, GPT, SGOT  No results for input(s): CHOL, LDLC in the last 72 hours. No lab exists for component: TGL, HDLC,  HBA1C  No results for input(s): CRP, TSH, TSHEXT in the last 72 hours. No lab exists for component: ESR        Current meds:  No current facility-administered medications for this encounter. Current Outpatient Medications:     TRUEplus Lancets 28 gauge misc, USE AS DIRECTED CHECK BLOOD SUGAR TWICE DAILY, Disp: 100 Lancet, Rfl: 1    glucose blood VI test strips (Prodigy No Coding) strip, Use to check blood sugar twice daily, Disp: 100 Strip, Rfl: 2    metFORMIN (GLUCOPHAGE) 500 mg tablet, TAKE 1 TABLET BY MOUTH ONCE DAILY WITH A MEAL, Disp: 90 Tablet, Rfl: 0    metoprolol succinate (TOPROL-XL) 50 mg XL tablet, Take 1 tablet by mouth once daily, Disp: 90 Tablet, Rfl: 0    amLODIPine (NORVASC) 5 mg tablet, Take 1 Tablet by mouth daily. , Disp: 90 Tablet, Rfl: 1    TRUE METRIX GLUCOSE TEST STRIP strip, USE TO TEST BS BID, Disp: 100 Strip, Rfl: 12    TRUEPLUS LANCETS 33 gauge misc, USE AS DIRECTED TO CHECK BG QD, Disp: 100 Lancet, Rfl: Erica Quiroz 119, NP  [facility] [address] [address]  [place] [address] Berna Hernandez  [phone]      CC: No primary care provider on file.

## 2022-07-06 NOTE — ED TRIAGE NOTES
Patient arrives to ED from Assisted living facility, patient more altered than usual per niece. LKW \"a couple of days ago\" per EMS. EKS showing STEMI for EMS. Patient recently started Risperidol. Dr. Deanna Culver at bedside during triage.

## 2022-07-06 NOTE — H&P
[address] [facility] Adult  Hospitalist Group  History and Physical    Date of Service:  7/6/2022  Primary Care Provider: None  Source of information: The patient and Chart review    Chief Complaint: Altered mental status      History of Presenting Illness:   Letcher Koyanagi is a 73 y.o. female who presents with altered mental status    No meaningful history could be obtained from the patient, history was probably obtained from the niece who was present at the bedside, niece patient has history of multiple strokes, had an intracranial hemorrhage in 2019, has history of cognitive impairment since then, currently lives at [facility] living [facility], today the niece went to see her found her to be much less responsive, more confused, found her to be dehydrated, got concerned and called EMS, EKG was done, there was concern for STEMI, patient was brought to the [facility] ER, cardiologist evaluated patient, canceled STEMI, and patient was requested to be admitted to the hospitalist service, currently patient is resting in bed and cannot provide any history         REVIEW OF SYSTEMS:  Review of systems not obtained due to patient factors. Altered mental status    Past Medical History:   Diagnosis Date    Diabetes ([address]     Hypertension     Memory loss     NSTEMI (non-ST elevated myocardial infarction) ([facility] [address] 7/6/2022    Stroke ([facility] [address]     cerebral hemorrhage,cerebral anurysm      Past Surgical History:   Procedure Laterality Date    HX ORTHOPAEDIC  1996    back surgery    NEUROLOGICAL PROCEDURE UNLISTED  2008    shunt placement     Prior to Admission medications    Medication Sig Start Date End Date Taking?  Authorizing Provider   TRUEplus Lancets 28 gauge misc USE AS DIRECTED CHECK BLOOD SUGAR TWICE DAILY 5/26/22   Shamir Celis NP   glucose blood VI test strips (Prodigy No Coding) strip Use to check blood sugar twice daily 5/16/22   Serenity Mason MD   metFORMIN (GLUCOPHAGE) 500 mg tablet TAKE 1 TABLET BY MOUTH ONCE DAILY WITH A MEAL 4/14/22   Edgar Celis NP   metoprolol succinate (TOPROL-XL) 50 mg XL tablet Take 1 tablet by mouth once daily 3/3/22   Edgar Celis NP   amLODIPine (NORVASC) 5 mg tablet Take 1 Tablet by mouth daily. 1/31/22   Carlyn Storm MD   TRUE METRIX GLUCOSE TEST STRIP strip USE TO TEST BS BID 1/22/20   Carlyn Storm MD   TRUEPLUS LANCETS 33 gauge misc USE AS DIRECTED TO CHECK BG QD 1/22/20   Carlyn Storm MD     Allergies   Allergen Reactions    Amoxicillin Other (comments)     Aggressive behavior  and  hallucinating and itching      History reviewed. No pertinent family history. Social History:  reports that she has never smoked. She has never used smokeless tobacco. She reports that she does not drink alcohol and does not use drugs. Family and social history were personally reviewed, all pertinent and relevant details are outlined as above. Objective:     Visit Vitals  /88 (BP 1 Location: Left upper arm, BP Patient Position: At rest)   Pulse (!) 102   Temp 97.5 °F (36.4 °C)   Resp 16   Ht 5' 5\" (1.651 m)   Wt 76.4 kg (168 lb 6.4 oz)   SpO2 96%   BMI 28.02 kg/m²           PHYSICAL EXAM:   General: Confused  HEENT: PEERL moist mucus membranes  Neck: Supple, no JVD,   Chest: Scattered rhonchi  CVS: Tachycardic  Abd: Soft, non-tender, non-distended, +bowel sounds   Ext: No clubbing, no cyanosis, no edema  Neuro/Psych: Limited exam, moves all 4 but strength could not be tested, sensory appears to be grossly within normal limits  Cap refill: Brisk, less than 3 seconds  Pulses: 2+, symmetric in all extremities  Skin: Warm, dry, without rashes or lesions    Data Review: All diagnostic labs and studies have been reviewed. Abnormal Labs Reviewed   CBC WITH AUTOMATED DIFF - Abnormal; Notable for the following components:       Result Value    WBC 15.9 (*)     RBC 5.40 (*)     RDW 16.2 (*)     IMMATURE GRANULOCYTES 1 (*)     ABS. NEUTROPHILS 11.9 (*)     ABS. IMM. GRANS. 0.1 (*)     All other components within normal limits   METABOLIC PANEL, COMPREHENSIVE - Abnormal; Notable for the following components:    Potassium 5.2 (*)     CO2 20 (*)     Glucose 577 (*)     BUN 45 (*)     Creatinine 1.80 (*)     BUN/Creatinine ratio 25 (*)     GFR est AA 34 (*)     GFR est non-AA 28 (*)     Calcium 10.7 (*)     AST (SGOT) 39 (*)     Protein, total 8.5 (*)     Albumin 3.2 (*)     Globulin 5.3 (*)     A-G Ratio 0.6 (*)     All other components within normal limits   TROPONIN-HIGH SENSITIVITY - Abnormal; Notable for the following components:    Troponin-High Sensitivity 6,057 (*)     All other components within normal limits       All Micro Results     Procedure Component Value Units Date/Time    COVID-19 RAPID TEST [[id number]]     Order Status: Sent     URINE CULTURE HOLD SAMPLE [[id number]]     Order Status: Sent Specimen: Urine           IMAGING:   CT HEAD WO CONT   Final Result   No acute abnormality. Chronic findings as above. XR CHEST PORT   Final Result   1. No radiographic evidence of acute cardiopulmonary disease.                     ECG/ECHO:    Results for orders placed or performed during the hospital encounter of 07/06/22   EKG, 12 LEAD, INITIAL   Result Value Ref Range    Ventricular Rate 98 BPM    Atrial Rate 98 BPM    P-R Interval 114 ms    QRS Duration 96 ms    Q-T Interval 386 ms    QTC Calculation (Bezet) 492 ms    Calculated P Axis 58 degrees    Calculated R Axis -48 degrees    Calculated T Axis 74 degrees    Diagnosis       Normal sinus rhythm  Right atrial enlargement  Pulmonary disease pattern  Left anterior fascicular block  ST & T wave abnormality, consider inferior ischemia  ST & T wave abnormality, consider anterolateral ischemia  Prolonged QT  Abnormal ECG  When compared with ECG of 02-JAN-2020 21:30,  Left anterior fascicular block is now present  T wave inversion now evident in Inferior leads  T wave inversion now evident in Anterolateral leads  minimal st elevation noted in inferior lateral leads  Confirmed by Noble Diaz M.D., Nonda Manual ([id number]) on 7/6/2022 3:56:27 PM          Assessment:   Given the patient's current clinical presentation, there is a high level of concern for decompensation if discharged from the emergency department. Complex decision making was performed, which includes reviewing the patient's available past medical records, laboratory results, and imaging studies. Non-ST elevation MI  Diabetes mellitus type 2 with hyperglycemia  Dehydration  Normal anion gap metabolic acidosis  VIKKI  Hypercalcemia  Acute metabolic encephalopathy    Plan:   Patient will be admitted on a telemetry bed, patient with non-ST elevation MI, evaluated by cardiology, continue heparin GTT, aspirin, nitroglycerin, n.p.o. after midnight for possible cardiac cath in the morning, IV hydration, telemetry monitoring and further intervention per hospital course, high risk for decompensation as discussed with her niece  Sliding-scale insulin, Accu-Cheks, diet control, close monitoring, further intervention per hospital course  Gentle IV hydration, repeat labs in the morning  Likely secondary to above, IV hydration, close monitoring reassess as needed  Likely prerenal, renal ultrasound, avoid nephrotoxic medication, renally dose all medication, trend creatinine, repeat labs in the morning  Ionized calcium levels, repeat labs in the morning, EKG noted  Unclear etiology, CT of the head with no acute pathology, MRI of the brain, neurovascular checks, IV hydration, UA, if persist may consider further intervention or diagnostic        DIET: ADULT DIET Regular; 4 carb choices (60 gm/meal); Low Fat/Low Chol/High Fiber/ED; Low Sodium (2 gm);  Low Potassium (Less than 3000 mg/day)  DIET NPO   ISOLATION PRECAUTIONS: There are currently no Active Isolations  CODE STATUS: Full Code   DVT PROPHYLAXIS: Heparin  FUNCTIONAL STATUS PRIOR TO HOSPITALIZATION: Capable of only limited self-care; confined to bed or chair likely more than 50% of waking hours. EARLY MOBILITY ASSESSMENT: Recommend a consult to the Mobility Team  ANTICIPATED DISCHARGE: Greater than 48 hours. CRITICAL CARE WAS PERFORMED FOR THIS ENCOUNTER: YES. I had a face to face encounter with the patient, reviewed and interpreted patient data including clinical events, labs, images, vital signs, I/O's, and examined patient. I have discussed the case and the plan and management of the patient's care with the consulting services, the bedside nurses and necessary ancillary providers. This patient has a high probability of imminent, clinically significant deterioration, which requires the highest level of preparedness to intervene urgently. I participated in the decision-making and personally managed or directed the management of the following life and organ supporting interventions that required my frequent assessment to treat or prevent imminent deterioration. I personally spent 49 minutes of critical care time. This is time spent at this critically ill patient's bedside actively involved in patient care as well as the coordination of care and discussions with the patient's family. This does not include any procedural time which has been billed separately. Signed By: Thomas Edwards MD     July 6, 2022         Please note that this dictation may have been completed with Dragon, the computer voice recognition software. Quite often unanticipated grammatical, syntax, homophones, and other interpretive errors are inadvertently transcribed by the computer software. Please disregard these errors. Please excuse any errors that have escaped final proofreading.

## 2022-07-07 ENCOUNTER — APPOINTMENT (OUTPATIENT)
Dept: ULTRASOUND IMAGING | Age: 67
DRG: 280 | End: 2022-07-07
Attending: FAMILY MEDICINE
Payer: MEDICARE

## 2022-07-07 LAB
ANION GAP SERPL CALC-SCNC: 6 MMOL/L (ref 5–15)
APTT PPP: 111.2 SEC (ref 22.1–31)
APTT PPP: 34.5 SEC (ref 22.1–31)
APTT PPP: 52.5 SEC (ref 22.1–31)
BUN SERPL-MCNC: 48 MG/DL (ref 6–20)
BUN/CREAT SERPL: 40 (ref 12–20)
CALCIUM SERPL-MCNC: 9.7 MG/DL (ref 8.5–10.1)
CHLORIDE SERPL-SCNC: 112 MMOL/L (ref 97–108)
CHOLEST SERPL-MCNC: 286 MG/DL
CO2 SERPL-SCNC: 28 MMOL/L (ref 21–32)
CREAT SERPL-MCNC: 1.21 MG/DL (ref 0.55–1.02)
ERYTHROCYTE [DISTWIDTH] IN BLOOD BY AUTOMATED COUNT: 16 % (ref 11.5–14.5)
GLUCOSE BLD STRIP.AUTO-MCNC: 228 MG/DL (ref 65–117)
GLUCOSE BLD STRIP.AUTO-MCNC: 271 MG/DL (ref 65–117)
GLUCOSE BLD STRIP.AUTO-MCNC: 271 MG/DL (ref 65–117)
GLUCOSE BLD STRIP.AUTO-MCNC: 286 MG/DL (ref 65–117)
GLUCOSE BLD STRIP.AUTO-MCNC: 291 MG/DL (ref 65–117)
GLUCOSE SERPL-MCNC: 293 MG/DL (ref 65–100)
HCT VFR BLD AUTO: 42.5 % (ref 35–47)
HDLC SERPL-MCNC: 35 MG/DL
HDLC SERPL: 8.2 {RATIO} (ref 0–5)
HGB BLD-MCNC: 13.4 G/DL (ref 11.5–16)
LDLC SERPL CALC-MCNC: 220.6 MG/DL (ref 0–100)
MCH RBC QN AUTO: 27.2 PG (ref 26–34)
MCHC RBC AUTO-ENTMCNC: 31.5 G/DL (ref 30–36.5)
MCV RBC AUTO: 86.2 FL (ref 80–99)
NRBC # BLD: 0 K/UL (ref 0–0.01)
NRBC BLD-RTO: 0 PER 100 WBC
PLATELET # BLD AUTO: 292 K/UL (ref 150–400)
PMV BLD AUTO: 11.3 FL (ref 8.9–12.9)
POTASSIUM SERPL-SCNC: 3.3 MMOL/L (ref 3.5–5.1)
RBC # BLD AUTO: 4.93 M/UL (ref 3.8–5.2)
SERVICE CMNT-IMP: ABNORMAL
SODIUM SERPL-SCNC: 146 MMOL/L (ref 136–145)
THERAPEUTIC RANGE,PTTT: ABNORMAL SECS (ref 58–77)
TRIGL SERPL-MCNC: 152 MG/DL (ref ?–150)
TROPONIN-HIGH SENSITIVITY: 8226 NG/L (ref 0–51)
VLDLC SERPL CALC-MCNC: 30.4 MG/DL
WBC # BLD AUTO: 12.3 K/UL (ref 3.6–11)

## 2022-07-07 PROCEDURE — 85730 THROMBOPLASTIN TIME PARTIAL: CPT

## 2022-07-07 PROCEDURE — 74011250637 HC RX REV CODE- 250/637: Performed by: FAMILY MEDICINE

## 2022-07-07 PROCEDURE — 85027 COMPLETE CBC AUTOMATED: CPT

## 2022-07-07 PROCEDURE — 74011250636 HC RX REV CODE- 250/636: Performed by: EMERGENCY MEDICINE

## 2022-07-07 PROCEDURE — 74011636637 HC RX REV CODE- 636/637: Performed by: FAMILY MEDICINE

## 2022-07-07 PROCEDURE — 84484 ASSAY OF TROPONIN QUANT: CPT

## 2022-07-07 PROCEDURE — 77030020365 HC SOL INJ SOD CL 0.9% 50ML

## 2022-07-07 PROCEDURE — 82962 GLUCOSE BLOOD TEST: CPT

## 2022-07-07 PROCEDURE — 74011250636 HC RX REV CODE- 250/636: Performed by: FAMILY MEDICINE

## 2022-07-07 PROCEDURE — 76937 US GUIDE VASCULAR ACCESS: CPT

## 2022-07-07 PROCEDURE — 74011636637 HC RX REV CODE- 636/637: Performed by: STUDENT IN AN ORGANIZED HEALTH CARE EDUCATION/TRAINING PROGRAM

## 2022-07-07 PROCEDURE — 99233 SBSQ HOSP IP/OBS HIGH 50: CPT | Performed by: SPECIALIST

## 2022-07-07 PROCEDURE — 80048 BASIC METABOLIC PNL TOTAL CA: CPT

## 2022-07-07 PROCEDURE — 76770 US EXAM ABDO BACK WALL COMP: CPT

## 2022-07-07 PROCEDURE — 74011250637 HC RX REV CODE- 250/637: Performed by: NURSE PRACTITIONER

## 2022-07-07 PROCEDURE — 36415 COLL VENOUS BLD VENIPUNCTURE: CPT

## 2022-07-07 PROCEDURE — 65660000001 HC RM ICU INTERMED STEPDOWN

## 2022-07-07 PROCEDURE — 94760 N-INVAS EAR/PLS OXIMETRY 1: CPT

## 2022-07-07 PROCEDURE — 80061 LIPID PANEL: CPT

## 2022-07-07 PROCEDURE — 74011250637 HC RX REV CODE- 250/637: Performed by: STUDENT IN AN ORGANIZED HEALTH CARE EDUCATION/TRAINING PROGRAM

## 2022-07-07 PROCEDURE — C1751 CATH, INF, PER/CENT/MIDLINE: HCPCS

## 2022-07-07 PROCEDURE — 74011000250 HC RX REV CODE- 250: Performed by: FAMILY MEDICINE

## 2022-07-07 RX ORDER — INSULIN GLARGINE 100 [IU]/ML
10 INJECTION, SOLUTION SUBCUTANEOUS
Status: DISCONTINUED | OUTPATIENT
Start: 2022-07-07 | End: 2022-07-13 | Stop reason: HOSPADM

## 2022-07-07 RX ORDER — AMLODIPINE BESYLATE 2.5 MG/1
2.5 TABLET ORAL DAILY
COMMUNITY
End: 2022-07-13

## 2022-07-07 RX ORDER — ATORVASTATIN CALCIUM 40 MG/1
40 TABLET, FILM COATED ORAL
Status: DISCONTINUED | OUTPATIENT
Start: 2022-07-07 | End: 2022-07-09

## 2022-07-07 RX ORDER — RISPERIDONE 0.5 MG/1
0.5 TABLET, FILM COATED ORAL DAILY
COMMUNITY
End: 2022-07-13

## 2022-07-07 RX ORDER — ATORVASTATIN CALCIUM 20 MG/1
20 TABLET, FILM COATED ORAL
Status: DISCONTINUED | OUTPATIENT
Start: 2022-07-07 | End: 2022-07-07

## 2022-07-07 RX ORDER — POTASSIUM CHLORIDE 750 MG/1
40 TABLET, FILM COATED, EXTENDED RELEASE ORAL
Status: COMPLETED | OUTPATIENT
Start: 2022-07-07 | End: 2022-07-07

## 2022-07-07 RX ORDER — HEPARIN SODIUM 1000 [USP'U]/ML
4000 INJECTION, SOLUTION INTRAVENOUS; SUBCUTANEOUS ONCE
Status: COMPLETED | OUTPATIENT
Start: 2022-07-07 | End: 2022-07-07

## 2022-07-07 RX ADMIN — Medication 3 UNITS: at 00:40

## 2022-07-07 RX ADMIN — SODIUM CHLORIDE, PRESERVATIVE FREE 10 ML: 5 INJECTION INTRAVENOUS at 00:10

## 2022-07-07 RX ADMIN — Medication 5 UNITS: at 12:46

## 2022-07-07 RX ADMIN — SODIUM CHLORIDE 75 ML/HR: 9 INJECTION, SOLUTION INTRAVENOUS at 10:47

## 2022-07-07 RX ADMIN — SODIUM CHLORIDE, PRESERVATIVE FREE 10 ML: 5 INJECTION INTRAVENOUS at 05:55

## 2022-07-07 RX ADMIN — ATORVASTATIN CALCIUM 40 MG: 40 TABLET, FILM COATED ORAL at 21:42

## 2022-07-07 RX ADMIN — Medication 3 UNITS: at 06:11

## 2022-07-07 RX ADMIN — POTASSIUM CHLORIDE 40 MEQ: 750 TABLET, FILM COATED, EXTENDED RELEASE ORAL at 14:27

## 2022-07-07 RX ADMIN — INSULIN GLARGINE 10 UNITS: 100 INJECTION, SOLUTION SUBCUTANEOUS at 21:42

## 2022-07-07 RX ADMIN — HEPARIN SODIUM 4000 UNITS: 1000 INJECTION, SOLUTION INTRAVENOUS; SUBCUTANEOUS at 05:52

## 2022-07-07 RX ADMIN — Medication 5 UNITS: at 18:44

## 2022-07-07 RX ADMIN — ASPIRIN 81 MG: 81 TABLET, COATED ORAL at 08:21

## 2022-07-07 RX ADMIN — HEPARIN SODIUM AND DEXTROSE 16 UNITS/KG/HR: 10000; 5 INJECTION INTRAVENOUS at 18:45

## 2022-07-07 RX ADMIN — AMLODIPINE BESYLATE 2.5 MG: 5 TABLET ORAL at 08:22

## 2022-07-07 RX ADMIN — METOPROLOL SUCCINATE 50 MG: 25 TABLET, EXTENDED RELEASE ORAL at 08:22

## 2022-07-07 RX ADMIN — SODIUM CHLORIDE, PRESERVATIVE FREE 10 ML: 5 INJECTION INTRAVENOUS at 14:46

## 2022-07-07 NOTE — PROGRESS NOTES
0430:   Bedside shift change report given to Saul (oncoming nurse) by Mariana Pizano (offgoing nurse). Report included the following information SBAR, Kardex, MAR and Cardiac Rhythm NSR c ST inversion. Unable to obtain PTT due to poor vascular access for patient. Arterial stick ordered. RN assisted Resp Therapist. Sample collected with difficulty after 2 attempts. Informed by lab that sample hemolyzed and could not be processed. RN informed attending via [address] [place] [address]. Unable to obtain PTT at this time.

## 2022-07-07 NOTE — PROGRESS NOTES
[facility]  Cardiology Care Note                  []Initial visit     [x]Established visit     Patient Name: Lesli Negron - FXY:[id number] - ZNK:[id number]  Primary Cardiologist: none  Consulting Cardiologist: Guru Fair MD     Reason for consult: concern for STEMI    HPI:   Patient is a 73year old female who has a hx of HTN, DM, cerebral aneurysm with  shunt in 2007, multiple CVA events, acute right parietal intracranial hematoma/small right SDH in 10/19 which was managed conservatively, and she has hx of cognitive impairment/dementia after CVA events. She was started on Risperdal recently and her family has noticed worsening mental status changes. EMS was called today d/t these concerns. EKG en route was concerning for STEMI and code STEMI was called. Review of EKG did show some ischemic changes, but not a STEMI. She resides in an assisted living and the report to EMS was that she had denies CP, SOB, n/v, fever, chills. She did have some cognitive impairment in ED, but denied active CP/SOB. Repeat EKG showed continued changes, but not STEMI. SUBJECTIVE:    Patient denies CP/SOB, palpitations. Seems to be answering questions appropriately. Spoke with Meng Elizabeth, her niece who is her medical decision maker on the phone. She denies recent complaints of CP, SOB. She was lethargic last Thursday per the nurses, had continued essentially since then. She denies family hx of heart disease, only DM and elevated cholesterol. She gives verbal consent for MRI and cardiac cath if needed. She doesn't wish for her aunt to be full code. Advised she needs to come to hospital today to have these things documented/discussed. Assessment and Plan       Assessment/Plan/Discussion:Cardiology Attending:     Patient seen on the day of progress note, examined and reviewed  with Nurse Practitioner.     S:Neris HIDALGO Shawn Bynum is a 73 y.o. female with some recent angina, admit with high troponin and very ischemic looking EKG  Pt has issues with cognition   Several labs abnormal including trop of 8K and high Hgb A1c and high LDL   Hx of CNS bleeds and CVA  This am denies cp sob or edema  But somnolent, takes a while to become more alert in conversation , very limited   O: VS reviewed   NC AT no JVD, clear lungs, RRR smrg, no edema    L:  Lab Results   Component Value Date/Time    Creatinine (POC) 0.8 10/18/2019 04:12 AM    Creatinine 1.21 (H) 07/07/2022 03:27 AM     No results found for: BNP, BNPP, BNPPPOC, XBNPT, BNPNT  Lab Results   Component Value Date/Time    HGB 13.4 07/07/2022 03:27 AM       No results found for: BNP, BNPP, BNPPPOC, XBNPT, BNPNT  Lab Results   Component Value Date/Time    LDL, calculated 220.6 (H) 07/07/2022 03:27 AM        A/P:  NSTEMI with    CAD coronary artery disease native and some reports of angina  HTN- on BB and CCB  XOL- start high potency statin, will use Lipitor or Crestor, may need Zetia and later if high, then PCSK9  Cath seems most appropriate especially to look and stent any life threatening disease, I feel it best to proceed to cath in next 1-2 days , continue heparin and then be judicious in use of PCI given risk of CNS bleed with DAPT  Viki Shfafer MD        1. Acute ischemic EKG changes: continues to deny chest pain or other anginal symptoms. Niece denies reported complaints of anginal symptoms prior to arrival. Troponin elevated at [id number].  and higher A1C 9.1 placing her at higher risk for CAD. Currently NPO and on heparin gtt, ASA, BB. Recommend statin, trend LFTs. Venitaece has consented to cardiac cath. Plans likely for cath dependent on cath lab/physician availability. Time TBD. 2. Hx of CVA and acute cerebral hemorrhage: CT head neg for acute bleed or infarct. Possible MRI of brain today. Sandy has consented to MRI. Concerns for DOAC with hx of ICH, cerebral aneurysm.  With elevation in troponin and EKG changes risk vs benefits lean towards cardiac cath with conservative stenting. 3. HTN: cont home Metoprolol XL and Amlodipine. Cont to monitor  4. DM: management per primary team  5. HLD: , HDL 35, trig 152. Recommend high intensity statin. Start Lipitor 20mg daily. ____________________________________________________________    Cardiac testing      Most recent HS troponins:  Recent Labs     07/07/22  0327 07/06/22  1604   TROPHS 8,226* 6,057*     ECG: SR, ST and T wave abnormality, consider inferior ischemia, ST and T wave abnormality, consider anterolateral ischemia. Review of Systems    [x]All other systems reviewed and all negative except as written in HPI    [] Patient unable to provide secondary to condition         Past Medical History:   Diagnosis Date    [facility]     Hypertension     Memory loss     NSTEMI (non-ST elevated myocardial infarction) ([facility] 7/6/2022    Stroke ([facility]     cerebral hemorrhage,cerebral anurysm     Past Surgical History:   Procedure Laterality Date    HX ORTHOPAEDIC  1996    back surgery    NEUROLOGICAL PROCEDURE UNLISTED  2008    shunt placement     Social Hx:  reports that she has never smoked. She has never used smokeless tobacco. She reports that she does not drink alcohol and does not use drugs. Family Hx: family history is not on file.   Allergies   Allergen Reactions    Amoxicillin Other (comments)     Aggressive behavior  and  hallucinating and itching          OBJECTIVE:  Wt Readings from Last 3 Encounters:   07/06/22 76.4 kg (168 lb 6.4 oz)   04/19/22 70.8 kg (156 lb 0.6 oz)   01/22/20 70.8 kg (156 lb)     No intake or output data in the 24 hours ending 07/07/22 0917      Physical Exam    Vitals:   Vitals:    07/07/22 0430 07/07/22 0432 07/07/22 0600 07/07/22 0815   BP: 101/76 101/76 107/67 113/78   Pulse: 77 82 76 75   Resp: 13 9 12 14   Temp:  97.4 °F (36.3 °C) 97 °F (36.1 °C) 99.3 °F (37.4 °C)   SpO2: 94% 97% 97% 96%   Weight:       Height:           General:    Alert, cooperative, no distress, appears stated age. Neck:   Supple, no carotid bruit and no JVD. Back:     Symmetric,    Lungs:     clear to auscultation bilaterally. Heart[de-identified]    Regular rate and rhythm, S1, S2 normal, no murmur, click, rub or gallop. Abdomen:     Soft, non-tender. Bowel sounds normal.    Extremities:   Extremities normal, atraumatic, no cyanosis or edema. Vascular:   Pulses - sudha UE/LE equal   Skin:   Skin color normal. No rashes or lesions on visible areas   Neurologic:   Alert, Moves all extremities. Data Review:     Radiology:   XR Results (most recent):  Results from Hospital Encounter encounter on 07/06/22    XR CHEST PORT    Narrative  INDICATION: . ams, stemi  Additional history:  COMPARISON: Previous chest xray, 4/19/2022. LIMITATIONS: Portable technique. [place] FINDINGS: Single frontal view of the chest.  .  Lines/tubes/surgical: None. Heart/mediastinum: Ossifications in the aortic arch. Lungs/pleura:  No focal consolidation or mass. No visualized pleural effusion or  pneumothorax. Additional Comments: Tubing overlying the left neck, left chest and left upper  quadrant suggesting ventriculoperitoneal shunt. .    Impression  1. No radiographic evidence of acute cardiopulmonary disease. CT Results (most recent):  Results from Hospital Encounter encounter on 07/06/22    CT HEAD WO CONT    Narrative  EXAM: CT HEAD WO CONT    INDICATION: ams    COMPARISON: CT head 1/5/2021. CONTRAST: None. TECHNIQUE: Unenhanced CT of the head was performed using 5 mm images. Brain and  bone windows were generated. Coronal and sagittal reformats. CT dose reduction  was achieved through use of a standardized protocol tailored for this  examination and automatic exposure control for dose modulation. FINDINGS:  Left frontal approach intraventricular catheter. Generalized volume loss.  Right  frontal, parietal, and except Little encephalomalacia. Chronic mixed low and  high density in the right cerebellum. Chronic bilateral basal ganglia lacunar  infarcts. Widespread white matter hypodensities may reflect chronic  microangiopathic change. There is no intracranial hemorrhage, extra-axial  collection, or mass effect. The basilar cisterns are open. No CT evidence of  acute infarct. The bone windows demonstrate no abnormalities. The visualized portions of the  paranasal sinuses and mastoid air cells are clear. Impression  No acute abnormality. Chronic findings as above. MRI Results (most recent):  Results from [place] encounter on 11/22/16    MRI BRAIN WO CONT    Narrative  INDICATION:   rule out stroke    EXAMINATION:  MRI BRAIN WO CONTRAST    COMPARISON:  CT head 11/22/2016    TECHNIQUE:  MR imaging of the brain was performed with sagittal T1, axial T1,  T2, FLAIR, GRE, DWI/ADC, coronal T2.    FINDINGS:    There is a left frontal ventriculostomy catheter. The ventricles are midline  without hydrocephalus. There is remote hemorrhage within the bilateral thalami,  left basal ganglia, posterior left temporal lobe, left corona radiata, as well  as in the right frontal lobe along the tract of prior ventriculostomy catheter. There are also a few areas of remote microhemorrhage in the cortical and  subcortical regions of the superior and lateral frontal lobes and right parietal  lobe. There is a focus of remote hemorrhage in the left damon. Encephalomalacia  and chronic hemosiderin deposition right cerebellum. There is extensive  periventricular T2 signal hyperintensity as well as involving the damon and left  medulla. There is no acute infarction. The major intracranial vascular  flow-voids are patent. Impression  IMPRESSION:  No acute infarction. Areas of chronic infarction, microvascular ischemic disease  and prior hemorrhages as above. Left frontal ventriculostomy catheter. No  hydrocephalus.         No results for input(s): CPK, TROIQ in the last 72 hours.     No lab exists for component: CKQMB, CPKMB, BMPP  Recent Labs     07/07/22  0327 07/06/22  1604   * 137   K 3.3* 5.2*   * 105   CO2 28 20*   BUN 48* 45*   CREA 1.21* 1.80*   * 577*   CA 9.7 10.7*     Recent Labs     07/07/22  0327 07/06/22  1604   WBC 12.3* 15.9*   HGB 13.4 14.8   HCT 42.5 46.6    270     Recent Labs     07/06/22  1604        Recent Labs     07/07/22  0327   CHOL 286*   LDLC 220.6*     Recent Labs     07/06/22  1604   TSH 1.05           Current meds:    Current Facility-Administered Medications:     heparin 25,000 units in D5W 250 ml infusion, 12-25 Units/kg/hr, IntraVENous, TITRATE, HoSteven MD, Last Rate: 12.2 mL/hr at 07/07/22 0740, 16 Units/kg/hr at 07/07/22 0740    amLODIPine (NORVASC) tablet 2.5 mg, 2.5 mg, Oral, DAILY, Willie White MD, 2.5 mg at 07/07/22 0822    metoprolol succinate (TOPROL-XL) XL tablet 50 mg, 50 mg, Oral, DAILY, Willie White MD, 50 mg at 07/07/22 3253    sodium chloride (NS) flush 5-40 mL, 5-40 mL, IntraVENous, Q8H, Willie White MD, 10 mL at 07/07/22 0555    sodium chloride (NS) flush 5-40 mL, 5-40 mL, IntraVENous, PRN, Jaclyn Mccauley MD    0.9% sodium chloride infusion, 75 mL/hr, IntraVENous, CONTINUOUS, Willie White MD, Last Rate: 75 mL/hr at 07/06/22 2143, 75 mL/hr at 07/06/22 2143    aspirin delayed-release tablet 81 mg, 81 mg, Oral, DAILY, Jaclyn Mccauley MD, 81 mg at 07/07/22 1435    nitroglycerin (NITROSTAT) tablet 0.4 mg, 0.4 mg, SubLINGual, Q5MIN PRN, Jaclyn Mccauley MD    acetaminophen (TYLENOL) solution 650 mg, 650 mg, Oral, Q4H PRN, Jalcyn Mccauley MD    glucose chewable tablet 16 g, 4 Tablet, Oral, PRN, Jaclyn Mccauley MD    dextrose 10 % infusion 0-250 mL, 0-250 mL, IntraVENous, PRN, Jaclyn Mccauley MD    glucagon (GLUCAGEN) injection 1 mg, 1 mg, IntraMUSCular, PRN, Jaclyn Mccauley MD    insulin lispro (HUMALOG) injection, , SubCUTAneous, Q6H, Cindy Priyanka Villa MD, 3 Units at 07/07/22 15[address] [facility],   [facility] [address] [place] [address] 13, [address] [facility] [address]  Christiano Fountain  [phone]      CC:None

## 2022-07-07 NOTE — PROGRESS NOTES
1730: Shift report given from previous RN. This RN to resume care until 1930.    1815: Midline is okay to use per vascular access team. Per vascular, potassium and vancomycin cannot be administered through the line.

## 2022-07-07 NOTE — PROCEDURES
Midline Insertion and Progress Note    PRE-PROCEDURE VERIFICATION  Correct Procedure: yes  Correct Site:  yes  Temperature: Temp: 97.7 °F (36.5 °C), Temperature Source: Temp Source: Oral  Recent Labs     07/07/22  0327   BUN 48*   CREA 1.21*      WBC 12.3*     Allergies: Amoxicillin    PROCEDURE DETAIL  A single lumen midline IV catheter was started for vascular access. The following documentation is in addition to the Midline properties in the lines/airways flowsheet :  Xylocaine 1% used intradermally  Lot #: [id number]  Catheter Total Length: 13 (cm)  External Catheter Length: 0 (cm)  Circumference: 30 (cm)  Vein Selection for Midline :right brachial  Complication Related to Insertion: none    Line is okay to use.   Report given to Alexander Huynh

## 2022-07-07 NOTE — PROGRESS NOTES
NUTRITION  Reason for Assessment: BPA alert for \"unsure if lost weight\"      Recommendations/Interventions/Plan:   Continue 4 carb choice/meal, easy to chew diet as ordered      Will rescreen per policy       Past Medical History:   Diagnosis Date    Diabetes ([facility]     Hypertension     Memory loss     NSTEMI (non-ST elevated myocardial infarction) ([facility] 7/6/2022    Stroke ([facility]     cerebral hemorrhage,cerebral anurysm         Pt screened for BPA alert for unsure if lost weight. Chart/labs/meds reviewed. Admitted with NSTEMI (non-ST elevated myocardial infarction) ([facility] [I21.4]        Nutrition Related Findings:   Edema: No data recorded    Last BM:  ,      Skin: intact      Current Nutrition Therapies:  Diet:  4 carb choice/meal, easy to chew  Supplements:  none  Meal intake: No data found. Supplement intake: No data found.       Weight Hx:  Wt Readings from Last 10 Encounters:   07/06/22 76.4 kg (168 lb 6.4 oz)   04/19/22 70.8 kg (156 lb 0.6 oz)   01/22/20 70.8 kg (156 lb)   01/06/20 71.3 kg (157 lb 1.6 oz)   10/24/19 73.2 kg (161 lb 6.4 oz)   10/18/19 78.4 kg (172 lb 13.5 oz)   10/17/19 79.4 kg (175 lb)   12/22/17 73.5 kg (162 lb)   12/28/16 58.3 kg (128 lb 8 oz)   11/24/16 58.3 kg (128 lb 8.5 oz)         Estimated Nutrition Needs:   Energy:   23-25 kcals/kg Wt used:  76.4 kg  Protein:   1 gm pro/kg Wt used:   76.4 kg  Fluid:   1 ml/kcal      Recent Labs     07/07/22  0327 07/06/22  1604   * 577*   BUN 48* 45*   CREA 1.21* 1.80*   * 137   K 3.3* 5.2*   * 105   CO2 28 20*   CA 9.7 10.7*       Recent Labs     07/07/22  0600 07/07/22  0017 07/06/22  2148 07/06/22  1919   GLUCPOC 228* 291* 420* 439*       Lab Results   Component Value Date/Time    Hemoglobin A1c 9.1 (H) 07/06/2022 04:04 PM    Hemoglobin A1c 6.7 (H) 03/11/2022 08:45 AM    Hemoglobin A1c 6.3 (H) 10/04/2021 07:54 AM         Pierre Opitz Bossie, RD, CSP  Available via [employer]

## 2022-07-07 NOTE — ACP (ADVANCE CARE PLANNING)
Advance Care Planning     Advance Care Planning (ACP) Physician/NP/PA Conversation      Date of Conversation: 7/6/2022  Conducted with: Healthcare Decision Maker: Named in Advance Directive or Healthcare Power of [address] [place] [address] Ghassan:     Primary Decision Maker (Active): Jairo Piña - Other Relative - [phone]  Click here to complete [address] including selection of the Healthcare Decision Maker Relationship (ie \"Primary\")      Today we documented Decision Maker(s) consistent with ACP documents on file. Care Preferences:    Hospitalization: \"If your health worsens and it becomes clear that your chance of recovery is unlikely, what would be your preference regarding hospitalization? \"  The patient would prefer comfort-focused treatment without hospitalization. Ventilation: \"If you were unable to breathe on your own and your chance of recovery was unlikely, what would be your preference about the use of a ventilator (breathing machine) if it was available to you? \"   The patient would NOT desire the use of a ventilator. Resuscitation: \"In the event your heart stopped as a result of an underlying serious health condition, would you want attempts to be made to restart your heart, or would you prefer a natural death? \"   No, do NOT attempt to resuscitate.     Additional topics discussed: treatment goals, hospitalization preferences and resuscitation preferences    Conversation Outcomes / Follow-Up Plan:   ACP in process - information provided, considering goals and options  Reviewed DNR/DNI and patient elects DNR order - completed portable DNR form & placed order     Length of Voluntary ACP Conversation in minutes:  25 minutes    Lili Styles MD     Diagnosis: Dementia, previous ICH

## 2022-07-07 NOTE — PROGRESS NOTES
Patients has MRI scheduled same not done as pt unable to provide information and relative not present at thus time,spoke with radiology about same, patient currently having bedside ultrasound done.

## 2022-07-07 NOTE — PROGRESS NOTES
FORTINO:  1. RUR-14%  2. Return to [address] care unit when stable for discharge. Contact- Sue at [address], [id number]. The facility can accept over the weekend. 3. BLS vs family transport. Care Management Interventions  PCP Verified by CM: Yes  Mode of Transport at Discharge: BLS  MyChart Signup: Yes  Discharge Durable Medical Equipment: No  Health Maintenance Reviewed: Yes  Physical Therapy Consult: No  Occupational Therapy Consult: No  Speech Therapy Consult: No  Support Systems: Child(wicho)  Confirm Follow Up Transport: Family  The Patient and/or Patient Representative was Provided with a Choice of Provider and Agrees with the Discharge Plan?: Yes   Resource Information Provided?: No  Discharge Location  Patient Expects to be Discharged to[de-identified] Assisted Living    Reason for Admission:  NSTEMI                   14%  RUR Score:                     Plan for utilizing home health:      Return to [facility]    PCP: First and Last name:  Heidy Vieira MD     Name of Practice:    Are you a current patient: Yes/No:    Approximate date of last visit:    Can you participate in a virtual visit with your PCP:                     Current Advanced Directive/Advance Care Plan: DNR      Healthcare Decision Maker:                Primary Decision Maker (Active): Elsy Ta - Other Relative - [phone]                  Transition of Care Plan:                    CM spoke with Duane Chary at [facility], she confirmed this patient is in their memory care unit. She does require ADL assistance, and uses a walker or wheelchair. She is able to accept this patient back when medically stable. CM will continue to follow for discharge needs.

## 2022-07-07 NOTE — PROGRESS NOTES
Admission Medication Reconciliation:    Information obtained from:  Transfer papers from [facility]:  YES    Comments/Recommendations: Updated PTA meds/reviewed patient's allergies. 1)  Information obtained from transfer papers received from [facility] where the pt resides. 2)  Medication changes (since last review): Added  - Risperidone    Adjusted  -Amlodipine 2.5mg - dose decreased from 5mg     ¹[employer] pharmacy benefit data reflects medications filled and processed through the patient's insurance, however   this data does NOT capture whether the medication was picked up or is currently being taken by the patient. Allergies:  Amoxicillin    Significant PMH/Disease States:   Past Medical History:   Diagnosis Date    Diabetes ([facility]     Hypertension     Memory loss     NSTEMI (non-ST elevated myocardial infarction) ([facility] 7/6/2022    Stroke ([facility]     cerebral hemorrhage,cerebral anurysm     Chief Complaint for this Admission:    Chief Complaint   Patient presents with    Altered mental status     Prior to Admission Medications:   Prior to Admission Medications   Prescriptions Last Dose Informant Taking? amLODIPine (NORVASC) 2.5 mg tablet   Yes   Sig: Take 2.5 mg by mouth daily. metFORMIN (GLUCOPHAGE) 500 mg tablet   Yes   Sig: TAKE 1 TABLET BY MOUTH ONCE DAILY WITH A MEAL   metoprolol succinate (TOPROL-XL) 50 mg XL tablet   Yes   Sig: Take 1 tablet by mouth once daily   risperiDONE (RisperDAL) 0.5 mg tablet   Yes   Sig: Take 0.5 mg by mouth daily. Facility-Administered Medications: None     Please contact the main inpatient pharmacy with any questions or concerns at [phone] and we will direct you to the clinical pharmacist covering this patient's care while in-house.    Beckey Mins

## 2022-07-07 NOTE — PROGRESS NOTES
[facility]  Hospitalist Group                                                                                          Hospitalist Progress Note  Meredith Guzman MD  Answering service: [phone] OR [phone] from in house phone        Date of Service:  2022  NAME:  Miley Perez  :  1955  MRN:  [id number]      Admission Summary:   Miley Perez is a 73 y.o. female who presents with altered mental status     No meaningful history could be obtained from the patient, history was probably obtained from the niece who was present at the bedside, niece patient has history of multiple strokes, had an intracranial hemorrhage in 2019, has history of cognitive impairment since then, currently lives at [facility] living [facility], today the niece went to see her found her to be much less responsive, more confused, found her to be dehydrated, got concerned and called EMS, EKG was done, there was concern for STEMI, patient was brought to the [facility] ER, cardiologist evaluated patient, canceled STEMI, and patient was requested to be admitted to the hospitalist service, currently patient is resting in bed and cannot provide any history    Interval history / Subjective:   Patient seen and examined for follow-up of NSTEMI. Patient seen and examined earlier this morning by me. No acute complaints at the time of exam.  Patient sleeping and was initially somewhat difficult to arouse, but did wake up and is very pleasantly confused. No acute complaints     Assessment & Plan:     Non-ST elevation MI  Continue heparin drip  IMC with telemetry  Patient received aspirin, nitroglycerin  Cardiology on board. We will try to fit her in today for cath. Remain n.p.o.   No complaints of chest pain at this time    Diabetes mellitus type 2 with hyperglycemia  Continue sliding scale insulin  Does not seem to be on insulin outside the hospital, just metformin    Dehydration  Improving    Normal anion gap metabolic acidosis  Resolved  Continue fluids    VIKKI  Improving  Continue current management    Hypercalcemia  Resolved    Acute metabolic encephalopathy  Patient continues altered. Unclear etiology at this time. Continue neurovascular checks. We will continue to evaluate during stay      Code status:  Full  Prophylaxis: On heparin drip  Care Plan discussed with: Nurse, subspecialists, patient  Anticipated Disposition: TBD     Hospital Problems  Date Reviewed: 1/20/2022          Codes Class Noted POA    NSTEMI (non-ST elevated myocardial infarction) [facility]) ICD-10-CM: I21.4  ICD-9-CM: 410.70  7/6/2022 Unknown                Review of Systems:   A comprehensive review of systems was negative except for that written in the HPI. Vital Signs:    Last 24hrs VS reviewed since prior progress note. Most recent are:  Visit Vitals  /86 (BP 1 Location: Left upper arm, BP Patient Position: At rest)   Pulse 66   Temp 97.6 °F (36.4 °C)   Resp 13   Ht 5' 5\" (1.651 m)   Wt 76.4 kg (168 lb 6.4 oz)   SpO2 96%   BMI 28.02 kg/m²         Intake/Output Summary (Last 24 hours) at 7/7/2022 1134  Last data filed at 7/7/2022 1002  Gross per 24 hour   Intake 873.23 ml   Output 300 ml   Net 573.23 ml        Physical Examination:     I had a face to face encounter with this patient and independently examined them on 7/7/2022 as outlined below:          Constitutional:  No acute distress, cooperative, pleasant . Confused. ENT:  Oral mucosa moist, oropharynx benign. Resp:  CTA bilaterally. No wheezing/rhonchi/rales. No accessory muscle use. CV:  Regular rhythm, normal rate, no murmurs, gallops, rubs    GI:  Soft, non distended, non tender. normoactive bowel sounds, no hepatosplenomegaly     Musculoskeletal:  No edema, warm, 2+ pulses throughout    Neurologic:  Moves all extremities.   Disoriented            Data Review:    Review and/or order of clinical lab test  Review and/or order of tests in the radiology section of CPT  Review and/or order of tests in the medicine section of CPT      Labs:     Recent Labs     07/07/22 0327 07/06/22  1604   WBC 12.3* 15.9*   HGB 13.4 14.8   HCT 42.5 46.6    270     Recent Labs     07/07/22 0327 07/06/22  1604   * 137   K 3.3* 5.2*   * 105   CO2 28 20*   BUN 48* 45*   CREA 1.21* 1.80*   * 577*   CA 9.7 10.7*     Recent Labs     07/06/22  1604   ALT 29      TBILI 0.5   TP 8.5*   ALB 3.2*   GLOB 5.3*     Recent Labs     07/07/22 0327 07/06/22  1604   APTT 34.5* 25.9      No results for input(s): FE, TIBC, PSAT, FERR in the last 72 hours. No results found for: FOL, RBCF   No results for input(s): PH, PCO2, PO2 in the last 72 hours. No results for input(s): CPK, CKNDX, TROIQ in the last 72 hours.     No lab exists for component: CPKMB  Lab Results   Component Value Date/Time    Cholesterol, total 286 (H) 07/07/2022 03:27 AM    HDL Cholesterol 35 07/07/2022 03:27 AM    LDL, calculated 220.6 (H) 07/07/2022 03:27 AM    Triglyceride 152 (H) 07/07/2022 03:27 AM    CHOL/HDL Ratio 8.2 (H) 07/07/2022 03:27 AM     Lab Results   Component Value Date/Time    Glucose (POC) 228 (H) 07/07/2022 06:00 AM    Glucose (POC) 291 (H) 07/07/2022 12:17 AM    Glucose (POC) 420 (H) 07/06/2022 09:48 PM    Glucose (POC) 439 (H) 07/06/2022 07:19 PM    Glucose (POC) 114 (H) 01/06/2020 11:24 AM     Lab Results   Component Value Date/Time    Color Yellow 10/04/2021 07:54 AM    Appearance Clear 10/04/2021 07:54 AM    Specific gravity 1.015 01/05/2021 03:54 PM    Specific gravity 1.026 01/02/2020 09:41 PM    pH (UA) 5.5 10/04/2021 07:54 AM    Protein Negative 01/05/2021 03:54 PM    Glucose Negative 01/05/2021 03:54 PM    Ketone Negative 10/04/2021 07:54 AM    Bilirubin Negative 10/04/2021 07:54 AM    Urobilinogen 0.2 01/05/2021 03:54 PM    Nitrites Positive (A) 10/04/2021 07:54 AM    Leukocyte Esterase Negative 10/04/2021 07:54 AM    Epithelial cells FEW 01/05/2021 03:54 PM    Bacteria Many (A) 10/04/2021 07:54 AM    WBC 0-5 10/04/2021 07:54 AM    RBC None seen 10/04/2021 07:54 AM         Medications Reviewed:     Current Facility-Administered Medications   Medication Dose Route Frequency    atorvastatin (LIPITOR) tablet 20 mg  20 mg Oral QHS    heparin 25,000 units in D5W 250 ml infusion  12-25 Units/kg/hr IntraVENous TITRATE    amLODIPine (NORVASC) tablet 2.5 mg  2.5 mg Oral DAILY    metoprolol succinate (TOPROL-XL) XL tablet 50 mg  50 mg Oral DAILY    sodium chloride (NS) flush 5-40 mL  5-40 mL IntraVENous Q8H    sodium chloride (NS) flush 5-40 mL  5-40 mL IntraVENous PRN    0.9% sodium chloride infusion  75 mL/hr IntraVENous CONTINUOUS    aspirin delayed-release tablet 81 mg  81 mg Oral DAILY    nitroglycerin (NITROSTAT) tablet 0.4 mg  0.4 mg SubLINGual Q5MIN PRN    acetaminophen (TYLENOL) solution 650 mg  650 mg Oral Q4H PRN    glucose chewable tablet 16 g  4 Tablet Oral PRN    dextrose 10 % infusion 0-250 mL  0-250 mL IntraVENous PRN    glucagon (GLUCAGEN) injection 1 mg  1 mg IntraMUSCular PRN    insulin lispro (HUMALOG) injection   SubCUTAneous Q6H     ______________________________________________________________________  EXPECTED LENGTH OF STAY: - - -  ACTUAL LENGTH OF STAY:          1                 Colonel Jose MD

## 2022-07-07 NOTE — ED NOTES
Verbal shift change report given to Deidre Hoffman RN (oncoming nurse) by Blessing Hughes RN (offgoing nurse). Report included the following information SBAR, ED Summary, MAR and Recent Results.

## 2022-07-07 NOTE — ROUTINE PROCESS
TRANSFER - OUT REPORT:    Verbal report given to Trung Smith RN (name) on [facility] Zav[facility]  [facility] of [facility]       Report consisted of patients Situation, Background, Assessment and   Recommendations(SBAR). Information from the following report(s) SBAR, Kardex, ED Summary, Intake/Output, MAR, Recent Results and Cardiac Rhythm NSR was reviewed with the receiving nurse. Lines:   Peripheral IV 07/06/22 Right Hand (Active)   Site Assessment Clean, dry, & intact 07/06/22 1605   Phlebitis Assessment 0 07/06/22 1605   Infiltration Assessment 0 07/06/22 1605   Dressing Status Clean, dry, & intact 07/06/22 1605   Hub Color/Line Status Pink;Capped;Flushed 07/06/22 1605   Action Taken Blood drawn 07/06/22 1605        Opportunity for questions and clarification was provided.       Patient transported with:   Monitor  Registered Nurse

## 2022-07-08 ENCOUNTER — APPOINTMENT (OUTPATIENT)
Dept: MRI IMAGING | Age: 67
DRG: 280 | End: 2022-07-08
Attending: FAMILY MEDICINE
Payer: MEDICARE

## 2022-07-08 LAB
ANION GAP SERPL CALC-SCNC: 5 MMOL/L (ref 5–15)
APTT PPP: 69.8 SEC (ref 22.1–31)
APTT PPP: 79 SEC (ref 22.1–31)
BASOPHILS # BLD: 0 K/UL (ref 0–0.1)
BASOPHILS NFR BLD: 0 % (ref 0–1)
BUN SERPL-MCNC: 31 MG/DL (ref 6–20)
BUN/CREAT SERPL: 37 (ref 12–20)
CALCIUM SERPL-MCNC: 8.2 MG/DL (ref 8.5–10.1)
CHLORIDE SERPL-SCNC: 113 MMOL/L (ref 97–108)
CO2 SERPL-SCNC: 26 MMOL/L (ref 21–32)
CREAT SERPL-MCNC: 0.84 MG/DL (ref 0.55–1.02)
DIFFERENTIAL METHOD BLD: ABNORMAL
EOSINOPHIL # BLD: 0.1 K/UL (ref 0–0.4)
EOSINOPHIL NFR BLD: 1 % (ref 0–7)
ERYTHROCYTE [DISTWIDTH] IN BLOOD BY AUTOMATED COUNT: 15.9 % (ref 11.5–14.5)
GLUCOSE BLD STRIP.AUTO-MCNC: 135 MG/DL (ref 65–117)
GLUCOSE BLD STRIP.AUTO-MCNC: 160 MG/DL (ref 65–117)
GLUCOSE BLD STRIP.AUTO-MCNC: 186 MG/DL (ref 65–117)
GLUCOSE BLD STRIP.AUTO-MCNC: 187 MG/DL (ref 65–117)
GLUCOSE BLD STRIP.AUTO-MCNC: 249 MG/DL (ref 65–117)
GLUCOSE SERPL-MCNC: 211 MG/DL (ref 65–100)
HCT VFR BLD AUTO: 35.3 % (ref 35–47)
HGB BLD-MCNC: 10.9 G/DL (ref 11.5–16)
IMM GRANULOCYTES # BLD AUTO: 0.1 K/UL (ref 0–0.04)
IMM GRANULOCYTES NFR BLD AUTO: 1 % (ref 0–0.5)
LYMPHOCYTES # BLD: 4.3 K/UL (ref 0.8–3.5)
LYMPHOCYTES NFR BLD: 46 % (ref 12–49)
MCH RBC QN AUTO: 27 PG (ref 26–34)
MCHC RBC AUTO-ENTMCNC: 30.9 G/DL (ref 30–36.5)
MCV RBC AUTO: 87.6 FL (ref 80–99)
MONOCYTES # BLD: 0.5 K/UL (ref 0–1)
MONOCYTES NFR BLD: 6 % (ref 5–13)
NEUTS SEG # BLD: 4.2 K/UL (ref 1.8–8)
NEUTS SEG NFR BLD: 46 % (ref 32–75)
NRBC # BLD: 0 K/UL (ref 0–0.01)
NRBC BLD-RTO: 0 PER 100 WBC
PLATELET # BLD AUTO: 240 K/UL (ref 150–400)
PMV BLD AUTO: 11 FL (ref 8.9–12.9)
POTASSIUM SERPL-SCNC: 3.7 MMOL/L (ref 3.5–5.1)
RBC # BLD AUTO: 4.03 M/UL (ref 3.8–5.2)
SERVICE CMNT-IMP: ABNORMAL
SODIUM SERPL-SCNC: 144 MMOL/L (ref 136–145)
THERAPEUTIC RANGE,PTTT: ABNORMAL SECS (ref 58–77)
THERAPEUTIC RANGE,PTTT: ABNORMAL SECS (ref 58–77)
WBC # BLD AUTO: 9.2 K/UL (ref 3.6–11)

## 2022-07-08 PROCEDURE — 74011250637 HC RX REV CODE- 250/637: Performed by: FAMILY MEDICINE

## 2022-07-08 PROCEDURE — 74011636637 HC RX REV CODE- 636/637: Performed by: FAMILY MEDICINE

## 2022-07-08 PROCEDURE — 74011000250 HC RX REV CODE- 250: Performed by: INTERNAL MEDICINE

## 2022-07-08 PROCEDURE — 74011250636 HC RX REV CODE- 250/636: Performed by: INTERNAL MEDICINE

## 2022-07-08 PROCEDURE — 80048 BASIC METABOLIC PNL TOTAL CA: CPT

## 2022-07-08 PROCEDURE — B2111ZZ FLUOROSCOPY OF MULTIPLE CORONARY ARTERIES USING LOW OSMOLAR CONTRAST: ICD-10-PCS | Performed by: INTERNAL MEDICINE

## 2022-07-08 PROCEDURE — 74011250636 HC RX REV CODE- 250/636: Performed by: EMERGENCY MEDICINE

## 2022-07-08 PROCEDURE — 65660000001 HC RM ICU INTERMED STEPDOWN

## 2022-07-08 PROCEDURE — 85730 THROMBOPLASTIN TIME PARTIAL: CPT

## 2022-07-08 PROCEDURE — 82962 GLUCOSE BLOOD TEST: CPT

## 2022-07-08 PROCEDURE — 99233 SBSQ HOSP IP/OBS HIGH 50: CPT | Performed by: SPECIALIST

## 2022-07-08 PROCEDURE — 4A023N7 MEASUREMENT OF CARDIAC SAMPLING AND PRESSURE, LEFT HEART, PERCUTANEOUS APPROACH: ICD-10-PCS | Performed by: INTERNAL MEDICINE

## 2022-07-08 PROCEDURE — 70551 MRI BRAIN STEM W/O DYE: CPT

## 2022-07-08 PROCEDURE — 74011000250 HC RX REV CODE- 250: Performed by: FAMILY MEDICINE

## 2022-07-08 PROCEDURE — 74011636637 HC RX REV CODE- 636/637: Performed by: STUDENT IN AN ORGANIZED HEALTH CARE EDUCATION/TRAINING PROGRAM

## 2022-07-08 PROCEDURE — C1769 GUIDE WIRE: HCPCS | Performed by: INTERNAL MEDICINE

## 2022-07-08 PROCEDURE — 99152 MOD SED SAME PHYS/QHP 5/>YRS: CPT | Performed by: INTERNAL MEDICINE

## 2022-07-08 PROCEDURE — 77030040934 HC CATH DIAG DXTERITY MEDT -A: Performed by: INTERNAL MEDICINE

## 2022-07-08 PROCEDURE — 93458 L HRT ARTERY/VENTRICLE ANGIO: CPT | Performed by: INTERNAL MEDICINE

## 2022-07-08 PROCEDURE — 36415 COLL VENOUS BLD VENIPUNCTURE: CPT

## 2022-07-08 PROCEDURE — 85025 COMPLETE CBC W/AUTO DIFF WBC: CPT

## 2022-07-08 PROCEDURE — 2709999900 HC NON-CHARGEABLE SUPPLY: Performed by: INTERNAL MEDICINE

## 2022-07-08 PROCEDURE — C1894 INTRO/SHEATH, NON-LASER: HCPCS | Performed by: INTERNAL MEDICINE

## 2022-07-08 PROCEDURE — 74011250636 HC RX REV CODE- 250/636: Performed by: FAMILY MEDICINE

## 2022-07-08 PROCEDURE — 77030013744: Performed by: INTERNAL MEDICINE

## 2022-07-08 PROCEDURE — 74011000636 HC RX REV CODE- 636: Performed by: INTERNAL MEDICINE

## 2022-07-08 PROCEDURE — 74011250637 HC RX REV CODE- 250/637: Performed by: NURSE PRACTITIONER

## 2022-07-08 RX ORDER — LIDOCAINE HYDROCHLORIDE 10 MG/ML
INJECTION INFILTRATION; PERINEURAL AS NEEDED
Status: DISCONTINUED | OUTPATIENT
Start: 2022-07-08 | End: 2022-07-08 | Stop reason: HOSPADM

## 2022-07-08 RX ORDER — SODIUM CHLORIDE 9 MG/ML
INJECTION, SOLUTION INTRAVENOUS
Status: COMPLETED | OUTPATIENT
Start: 2022-07-08 | End: 2022-07-08

## 2022-07-08 RX ORDER — FENTANYL CITRATE 50 UG/ML
INJECTION, SOLUTION INTRAMUSCULAR; INTRAVENOUS AS NEEDED
Status: DISCONTINUED | OUTPATIENT
Start: 2022-07-08 | End: 2022-07-08 | Stop reason: HOSPADM

## 2022-07-08 RX ADMIN — INSULIN GLARGINE 10 UNITS: 100 INJECTION, SOLUTION SUBCUTANEOUS at 22:59

## 2022-07-08 RX ADMIN — Medication 2 UNITS: at 00:16

## 2022-07-08 RX ADMIN — SODIUM CHLORIDE 75 ML/HR: 9 INJECTION, SOLUTION INTRAVENOUS at 20:59

## 2022-07-08 RX ADMIN — HEPARIN SODIUM AND DEXTROSE 11 UNITS/KG/HR: 10000; 5 INJECTION INTRAVENOUS at 12:25

## 2022-07-08 RX ADMIN — Medication 2 UNITS: at 06:33

## 2022-07-08 RX ADMIN — METOPROLOL SUCCINATE 50 MG: 25 TABLET, EXTENDED RELEASE ORAL at 09:06

## 2022-07-08 RX ADMIN — Medication 7 UNITS: at 12:00

## 2022-07-08 RX ADMIN — ASPIRIN 81 MG: 81 TABLET, COATED ORAL at 09:06

## 2022-07-08 RX ADMIN — AMLODIPINE BESYLATE 2.5 MG: 5 TABLET ORAL at 09:05

## 2022-07-08 RX ADMIN — ATORVASTATIN CALCIUM 40 MG: 40 TABLET, FILM COATED ORAL at 22:43

## 2022-07-08 RX ADMIN — SODIUM CHLORIDE, PRESERVATIVE FREE 10 ML: 5 INJECTION INTRAVENOUS at 22:43

## 2022-07-08 NOTE — PROGRESS NOTES
Cardiac Cath Lab Recovery Arrival Note:      Malia Hilliard arrived to Cardiac Cath Lab, Recovery Area. Staff introduced to patient. Patient identifiers verified with NAME and DATE OF BIRTH. Procedure verified with patient. Consent forms reviewed and signed by patient or authorized representative and verified. Allergies verified. Patient and family oriented to department. Patient and family informed of procedure and plan of care. Questions answered with review. Patient prepped for procedure, per orders from physician, prior to arrival.    Patient on cardiac monitor, non-invasive blood pressure, SPO2 monitor. On RA. Patient is A&Ox 2. Patient reports no complaints. Patient in stretcher, in low position, with side rails up, call bell within reach, patient instructed to call if assistance as needed. Patient prep in: [address], [place] [address].    Patient family has pager # phone [phone]  Family in: not in hospital.   Prep by: CC

## 2022-07-08 NOTE — PROGRESS NOTES
Transfer to [address] [place] [address] from Procedure Area    Verbal report given to Chad Kennedy on Ilona Round being transferred to Cardiac Cath Lab  for routine progression of care   Patient is post [facility] procedure. Patient stable upon transfer to . Report consisted of patients Situation, Background, Assessment and   Recommendations(SBAR). Information from the following report(s) Procedure Summary, MAR and Recent Results was reviewed with the receiving nurse. Opportunity for questions and clarification was provided. Patient medicated during procedure with orders obtained and verified by Dr. Lizzette Vila. Refer to patient PROCEDURE REPORT for vital signs, assessment, status, and response during procedure.

## 2022-07-08 NOTE — PROGRESS NOTES
Cardiac Cath Lab Procedure Area Arrival Note:    Mihaela Babin arrived to Cardiac Cath Lab, Procedure Area. Patient identifiers verified with NAME and DATE OF BIRTH. Procedure verified with patient. Consent forms verified. Allergies verified. Patient informed of procedure and plan of care. Questions answered with review. Patient voiced understanding of procedure and plan of care. Patient on cardiac monitor, non-invasive blood pressure, SPO2 monitor. On RA. IV of NS on pump at 75 ml/hr. Patient status doing well without problems. Patient is A&Ox 2. Patient reports no CP or SOB. Patient medicated during procedure with orders obtained and verified by Dr. Faviola Ohara. Refer to patients Cardiac Cath Lab PROCEDURE REPORT for vital signs, assessment, status, and response during procedure, printed at end of case. Printed report on chart or scanned into chart.

## 2022-07-08 NOTE — PROGRESS NOTES
[facility]  Cardiology Care Note                  []Initial visit     [x]Established visit     Patient Name: Tory Ahn - XCN:8/22/2020 - GSC:[id number]  Primary Cardiologist: none  Consulting Cardiologist: Edith Regalado MD       Assessment/Plan/Discussion:Cardiology Attending:     Patient seen on the day of progress note, examined and reviewed  with Nurse Practitioner. Si Sandifer is a 73 y.o. female with  No complaints, no questions, going to cath today   O: VS reviewed   NC AT no JVD, clear lungs, RRR smrg, no edema    L:    Lab Results   Component Value Date/Time    Creatinine (POC) 0.8 10/18/2019 04:12 AM    Creatinine 0.84 07/08/2022 03:18 AM     No results found for: BNP, BNPP, BNPPPOC, XBNPT, BNPNT  Lab Results   Component Value Date/Time    HGB 10.9 (L) 07/08/2022 03:18 AM           A/P:  Troponin of 8K and ischemic EKG  CAD coronary artery disease native  Cath late afternoon shows severe diffuse dz, given her risks with prior CNS bleed, complexity of her CAD anatomy and lack of angina, will manage medically  Now DNR  Optimize medical Rx , home tomorrow? LIpitor, ASA, Toprol XL-hr is 60s so no change in dose  bp lower at 105 so hold amlodipine (Norvasc)        HTN, DM,  shunt , CVA with cognitive impairment  VIKKI improved back to baseline Creat 0.8-1.0 , was at 1.8   treat with high potency statin   High Hgb A1C, per primary team  Echo as IP or OP-note normal LVEDP, no LV gram,  we will fu    María Jules MD        Reason for consult: concern for STEMI    HPI:   Patient is a 73year old female who has a hx of HTN, DM, cerebral aneurysm with  shunt in 2007, multiple CVA events, acute right parietal intracranial hematoma/small right SDH in 10/19 which was managed conservatively, and she has hx of cognitive impairment/dementia after CVA events.  She was started on Risperdal recently and her family has noticed worsening mental status changes. EMS was called today d/t these concerns. EKG en route was concerning for STEMI and code STEMI was called. Review of EKG did show some ischemic changes, but not a STEMI. She resides in an assisted living and the report to EMS was that she had denies CP, SOB, n/v, fever, chills. She did have some cognitive impairment in ED, but denied active CP/SOB. Repeat EKG showed continued changes, but not STEMI. SUBJECTIVE:    Pt pleasant and denies CP, SOB, palpitations. She had MRI yesterday showing no acute infarction. Assessment and Plan     1. Acute ischemic EKG changes: She continues to deny chest pain or other anginal symptoms. Plans for cardiac cath this afternoon. NPO and on heparin gtt, ASA, BB. Recommend statin, trend LFTs. 2. Hx of CVA and acute cerebral hemorrhage: MRI/CT head neg for acute bleed or infarct. Concerns for DOAC with hx of ICH, cerebral aneurysm. 3. HTN: cont home Metoprolol XL and Amlodipine. Cont to monitor  4. DM: management per primary team  5. HLD: , HDL 35, trig 152. Recommend high intensity statin. cont Lipitor 40mg daily. ____________________________________________________________    Cardiac testing      Most recent HS troponins:  Recent Labs     07/07/22  0327 07/06/22  1604   TROPHS 8,226* 6,057*     ECG: SR, ST and T wave abnormality, consider inferior ischemia, ST and T wave abnormality, consider anterolateral ischemia.      Review of Systems    [x]All other systems reviewed and all negative except as written in HPI    [] Patient unable to provide secondary to condition         Past Medical History:   Diagnosis Date    Diabetes ([facility]     Hypertension     Memory loss     NSTEMI (non-ST elevated myocardial infarction) ([facility] 7/6/2022    Stroke ([facility]     cerebral hemorrhage,cerebral anurysm     Past Surgical History:   Procedure Laterality Date    HX ORTHOPAEDIC  1996    back surgery    NEUROLOGICAL PROCEDURE UNLISTED  2008    shunt placement     Social Hx:  reports that she has never smoked. She has never used smokeless tobacco. She reports that she does not drink alcohol and does not use drugs. Family Hx: family history is not on file. Allergies   Allergen Reactions    Amoxicillin Other (comments)     Aggressive behavior  and  hallucinating and itching          OBJECTIVE:  Wt Readings from Last 3 Encounters:   07/06/22 76.4 kg (168 lb 6.4 oz)   04/19/22 70.8 kg (156 lb 0.6 oz)   01/22/20 70.8 kg (156 lb)       Intake/Output Summary (Last 24 hours) at 7/8/2022 1207  Last data filed at 7/8/2022 7250  Gross per 24 hour   Intake --   Output 400 ml   Net -400 ml         Physical Exam    Vitals:   Vitals:    07/08/22 0800 07/08/22 0905 07/08/22 1000 07/08/22 1128   BP:  111/77  122/85   Pulse: 74  62 65   Resp:    14   Temp:    97.8 °F (36.6 °C)   SpO2:    99%   Weight:       Height:           General:    Alert, cooperative, no distress, appears stated age. Neck:   Supple, no carotid bruit and no JVD. Back:     Symmetric   Lungs:     clear to auscultation bilaterally. Heart[de-identified]    Regular rate and rhythm, S1, S2 normal, no murmur, click, rub or gallop. Abdomen:     Soft, non-tender. Bowel sounds normal.    Extremities:   Extremities normal, atraumatic, no cyanosis or edema. Vascular:   Pulses - sudha UE/LE equal   Skin:   Skin color normal. No rashes or lesions on visible areas   Neurologic:   Alert, Moves all extremities. Data Review:     Radiology:   XR Results (most recent):  Results from Hospital Encounter encounter on 07/06/22    XR CHEST PORT    Narrative  INDICATION: . ams, stemi  Additional history:  COMPARISON: Previous chest xray, 4/19/2022. LIMITATIONS: Portable technique. [place] FINDINGS: Single frontal view of the chest.  .  Lines/tubes/surgical: None. Heart/mediastinum: Ossifications in the aortic arch. Lungs/pleura:  No focal consolidation or mass.  No visualized pleural effusion or  pneumothorax. Additional Comments: Tubing overlying the left neck, left chest and left upper  quadrant suggesting ventriculoperitoneal shunt. .    Impression  1. No radiographic evidence of acute cardiopulmonary disease. CT Results (most recent):  Results from Hospital Encounter encounter on 07/06/22    CT HEAD WO CONT    Narrative  EXAM: CT HEAD WO CONT    INDICATION: ams    COMPARISON: CT head 1/5/2021. CONTRAST: None. TECHNIQUE: Unenhanced CT of the head was performed using 5 mm images. Brain and  bone windows were generated. Coronal and sagittal reformats. CT dose reduction  was achieved through use of a standardized protocol tailored for this  examination and automatic exposure control for dose modulation. FINDINGS:  Left frontal approach intraventricular catheter. Generalized volume loss. Right  frontal, parietal, and except Little encephalomalacia. Chronic mixed low and  high density in the right cerebellum. Chronic bilateral basal ganglia lacunar  infarcts. Widespread white matter hypodensities may reflect chronic  microangiopathic change. There is no intracranial hemorrhage, extra-axial  collection, or mass effect. The basilar cisterns are open. No CT evidence of  acute infarct. The bone windows demonstrate no abnormalities. The visualized portions of the  paranasal sinuses and mastoid air cells are clear. Impression  No acute abnormality. Chronic findings as above. MRI Results (most recent):  Results from [place] encounter on 07/06/22    MRI BRAIN WO CONT    Narrative  *PRELIMINARY REPORT*    No acute infarct. Multifocal old infarcts. Left frontal ventriculostomy  catheter. No hydrocephalus. Preliminary report was provided by Dr. Prakash Marquez, the on-call radiologist, at 2271  hours    Final report to follow.     END PRELIMINARY REPORT    FINAL REPORT:    INDICATION:   cva    EXAMINATION:  MRI BRAIN WO CONTRAST    COMPARISON:  CT head July 6, 2022    TECHNIQUE: Multiplanar multisequence acquisition without contrast of the brain. FINDINGS:    There is an indwelling left frontal ventriculostomy catheter with tip  terminating in the left lateral ventricle. No hydrocephalus or midline shift. Numerous areas of chronic hemorrhage, involving the right inferior cerebellum,  left posterior temporal lobe, right anteromedial parietal lobe, superior right  frontal lobe, bilateral thalami/left corona radiata. Numerous chronic  microhemorrhages involving the damon, right temporal occipital junction, left  frontal operculum, and throughout the periphery of the frontoparietal cortex. Extensive chronic periventricular T2 signal hyperintensity with areas of remote  infarction in the superior right frontal lobe, right occipital and parietal  lobes. There is FLAIR hyperintensity surrounding the left frontal  ventriculostomy catheter. Areas of encephalomalacia also involving the inferior  right cerebellum, and several chronic infarctions in the damon. Major  intracranial vascular flow-voids are patent. No acute infarction. Impression  No acute infarction. Extensive chronic white matter disease with areas of remote  infarction and hemorrhage as above. Left frontal ventriculostomy catheter with  no hydrocephalus. No results for input(s): CPK, TROIQ in the last 72 hours.     No lab exists for component: CKQMB, CPKMB, BMPP  Recent Labs     07/08/22  0318 07/07/22  0327    146*   K 3.7 3.3*   * 112*   CO2 26 28   BUN 31* 48*   CREA 0.84 1.21*   * 293*   CA 8.2* 9.7     Recent Labs     07/08/22  0318 07/07/22  0327   WBC 9.2 12.3*   HGB 10.9* 13.4   HCT 35.3 42.5    292     Recent Labs     07/06/22  1604        Recent Labs     07/07/22  0327   CHOL 286*   LDLC 220.6*     Recent Labs     07/06/22  1604   TSH 1.05           Current meds:    Current Facility-Administered Medications:     atorvastatin (LIPITOR) tablet 40 mg, 40 mg, Oral, QHS, Koreen Gums D, NP, 40 mg at 07/07/22 2142    insulin glargine (LANTUS) injection 10 Units, 10 Units, SubCUTAneous, QHS, Consuelo Reveal, Rei Betancourt MD, 10 Units at 07/07/22 2142    heparin 25,000 units in D5W 250 ml infusion, 12-25 Units/kg/hr, IntraVENous, TITRATE, Francisco Haas MD, Last Rate: 9.9 mL/hr at 07/07/22 2143, 13 Units/kg/hr at 07/07/22 2143    amLODIPine (NORVASC) tablet 2.5 mg, 2.5 mg, Oral, DAILY, Willie White MD, 2.5 mg at 07/08/22 0905    metoprolol succinate (TOPROL-XL) XL tablet 50 mg, 50 mg, Oral, DAILY, Leeann White MD, 50 mg at 07/08/22 0906    sodium chloride (NS) flush 5-40 mL, 5-40 mL, IntraVENous, Q8H, Willie White MD, 10 mL at 07/07/22 1446    sodium chloride (NS) flush 5-40 mL, 5-40 mL, IntraVENous, PRN, Lisa Smith MD    0.9% sodium chloride infusion, 75 mL/hr, IntraVENous, CONTINUOUS, Willie White MD, Last Rate: 75 mL/hr at 07/07/22 1047, 75 mL/hr at 07/07/22 1047    aspirin delayed-release tablet 81 mg, 81 mg, Oral, DAILY, Leeann White MD, 81 mg at 07/08/22 0906    nitroglycerin (NITROSTAT) tablet 0.4 mg, 0.4 mg, SubLINGual, Q5MIN PRN, Lisa Smith MD    acetaminophen (TYLENOL) solution 650 mg, 650 mg, Oral, Q4H PRN, Lisa Smith MD    glucose chewable tablet 16 g, 4 Tablet, Oral, PRN, Lisa Smith MD    dextrose 10 % infusion 0-250 mL, 0-250 mL, IntraVENous, PRN, Lisa Smith MD    glucagon (GLUCAGEN) injection 1 mg, 1 mg, IntraMUSCular, PRN, Lisa Smith MD    insulin lispro (HUMALOG) injection, , SubCUTAneous, Q6H, Lisa Smith MD, 2 Units at 07/08/22 [address], NP  [facility] [address] [facility] [address], [address] [address]  [place], [address]  [phone]      CC:Miri Blake MD

## 2022-07-08 NOTE — PROGRESS NOTES
Patient with multiple vessel CAD that was best to medically managed. Informed her niece Juanitaherberthus Perez of findings. Recommend continuing ASA, BB and statin. She can be d/c from hospital from heart standpoint once disposition has been determined.

## 2022-07-08 NOTE — PROGRESS NOTES
Occupational Therapy:    Chart reviewed and attempted to see for OT session, however patient off the floor at this time for RHC. Will continue to follow up and attempt as able.      Sofía Sneed, OT

## 2022-07-08 NOTE — PROGRESS NOTES
[facility] Adult  Hospitalist Group                                                                                          Hospitalist Progress Note  Ela Briggs MD  Answering service: [phone] OR [phone] from in house phone        Date of Service:  2022  NAME:  Peter Lopez  :  1955  MRN:  [id number]      Admission Summary:   Peter Lopez is a 73 y.o. female who presents with altered mental status     No meaningful history could be obtained from the patient, history was probably obtained from the niece who was present at the bedside, niece patient has history of multiple strokes, had an intracranial hemorrhage in 2019, has history of cognitive impairment since then, currently lives at [facility] living [facility], today the niece went to see her found her to be much less responsive, more confused, found her to be dehydrated, got concerned and called EMS, EKG was done, there was concern for STEMI, patient was brought to the [facility] ER, cardiologist evaluated patient, canceled STEMI, and patient was requested to be admitted to the hospitalist service, currently patient is resting in bed and cannot provide any history    Interval history / Subjective:   Patient seen and examined for follow-up of NSTEMI. Patient seen and examined earlier this morning by me. No complaints at the time of my exam. Patient is pleasant and in a good mood. No acute events overnight. Assessment & Plan:     Non-ST elevation MI  Continue heparin drip  IMC with telemetry  Patient received aspirin, nitroglycerin  Cardiology on board. We will try to fit her in today for cath. Remain n.p.o.   No complaints of chest pain at this time  Heart cath today    Diabetes mellitus type 2 with hyperglycemia  Continue sliding scale insulin  Does not seem to be on insulin outside the hospital, just metformin    Dehydration  Improving    Normal anion gap metabolic acidosis  Resolved  Continue fluids    VIKKI  Improving  Continue current management  Resolved     Hypercalcemia  Resolved    Acute metabolic encephalopathy  Patient continues altered. Unclear etiology at this time. Continue neurovascular checks. We will continue to evaluate during stay      Had to put in a midline yesterday due to inability to get lab draws and necessity for them due to being on heparin drip and having history of CVA and ICH. Code status:  Full  Prophylaxis: On heparin drip  Care Plan discussed with: Nurse, subspecialists, patient  Anticipated Disposition: TBD     Hospital Problems  Date Reviewed: 1/20/2022          Codes Class Noted POA    NSTEMI (non-ST elevated myocardial infarction) [facility]) ICD-10-CM: I21.4  ICD-9-CM: 410.70  7/6/2022 Unknown                Review of Systems:   A comprehensive review of systems was negative except for that written in the HPI. Vital Signs:    Last 24hrs VS reviewed since prior progress note. Most recent are:  Visit Vitals  /77   Pulse 62   Temp 97.5 °F (36.4 °C)   Resp 14   Ht 5' 5\" (1.651 m)   Wt 76.4 kg (168 lb 6.4 oz)   SpO2 93%   BMI 28.02 kg/m²         Intake/Output Summary (Last 24 hours) at 7/8/2022 1111  Last data filed at 7/8/2022 8260  Gross per 24 hour   Intake --   Output 400 ml   Net -400 ml        Physical Examination:     I had a face to face encounter with this patient and independently examined them on 7/8/2022 as outlined below:          Constitutional:  No acute distress, cooperative, pleasant . Confused. ENT:  Oral mucosa moist, oropharynx benign. Resp:  CTA bilaterally. No wheezing/rhonchi/rales. No accessory muscle use. CV:  Regular rhythm, normal rate, no murmurs, gallops, rubs    GI:  Soft, non distended, non tender. normoactive bowel sounds, no hepatosplenomegaly     Musculoskeletal:  No edema, warm, 2+ pulses throughout    Neurologic:  Moves all extremities.   Disoriented            Data Review:    Review and/or order of clinical lab test  Review and/or order of tests in the radiology section of CPT  Review and/or order of tests in the medicine section of CPT      Labs:     Recent Labs     07/08/22 0318 07/07/22  0327   WBC 9.2 12.3*   HGB 10.9* 13.4   HCT 35.3 42.5    292     Recent Labs     07/08/22  0318 07/07/22 0327 07/06/22  1604    146* 137   K 3.7 3.3* 5.2*   * 112* 105   CO2 26 28 20*   BUN 31* 48* 45*   CREA 0.84 1.21* 1.80*   * 293* 577*   CA 8.2* 9.7 10.7*     Recent Labs     07/06/22  1604   ALT 29      TBILI 0.5   TP 8.5*   ALB 3.2*   GLOB 5.3*     Recent Labs     07/08/22  0323 07/07/22 2058 07/07/22  1820   APTT 69.8* 52.5* 111.2*      No results for input(s): FE, TIBC, PSAT, FERR in the last 72 hours. No results found for: FOL, RBCF   No results for input(s): PH, PCO2, PO2 in the last 72 hours. No results for input(s): CPK, CKNDX, TROIQ in the last 72 hours.     No lab exists for component: CPKMB  Lab Results   Component Value Date/Time    Cholesterol, total 286 (H) 07/07/2022 03:27 AM    HDL Cholesterol 35 07/07/2022 03:27 AM    LDL, calculated 220.6 (H) 07/07/2022 03:27 AM    Triglyceride 152 (H) 07/07/2022 03:27 AM    CHOL/HDL Ratio 8.2 (H) 07/07/2022 03:27 AM     Lab Results   Component Value Date/Time    Glucose (POC) 186 (H) 07/08/2022 06:29 AM    Glucose (POC) 249 (H) 07/08/2022 12:12 AM    Glucose (POC) 271 (H) 07/07/2022 09:42 PM    Glucose (POC) 271 (H) 07/07/2022 06:19 PM    Glucose (POC) 286 (H) 07/07/2022 11:44 AM     Lab Results   Component Value Date/Time    Color Yellow 10/04/2021 07:54 AM    Appearance Clear 10/04/2021 07:54 AM    Specific gravity 1.015 01/05/2021 03:54 PM    Specific gravity 1.026 01/02/2020 09:41 PM    pH (UA) 5.5 10/04/2021 07:54 AM    Protein Negative 01/05/2021 03:54 PM    Glucose Negative 01/05/2021 03:54 PM    Ketone Negative 10/04/2021 07:54 AM    Bilirubin Negative 10/04/2021 07:54 AM    Urobilinogen 0.2 01/05/2021 03:54 PM    Nitrites Positive (A) 10/04/2021 07:54 AM    Leukocyte Esterase Negative 10/04/2021 07:54 AM    Epithelial cells FEW 01/05/2021 03:54 PM    Bacteria Many (A) 10/04/2021 07:54 AM    WBC 0-5 10/04/2021 07:54 AM    RBC None seen 10/04/2021 07:54 AM         Medications Reviewed:     Current Facility-Administered Medications   Medication Dose Route Frequency    atorvastatin (LIPITOR) tablet 40 mg  40 mg Oral QHS    insulin glargine (LANTUS) injection 10 Units  10 Units SubCUTAneous QHS    heparin 25,000 units in D5W 250 ml infusion  12-25 Units/kg/hr IntraVENous TITRATE    amLODIPine (NORVASC) tablet 2.5 mg  2.5 mg Oral DAILY    metoprolol succinate (TOPROL-XL) XL tablet 50 mg  50 mg Oral DAILY    sodium chloride (NS) flush 5-40 mL  5-40 mL IntraVENous Q8H    sodium chloride (NS) flush 5-40 mL  5-40 mL IntraVENous PRN    0.9% sodium chloride infusion  75 mL/hr IntraVENous CONTINUOUS    aspirin delayed-release tablet 81 mg  81 mg Oral DAILY    nitroglycerin (NITROSTAT) tablet 0.4 mg  0.4 mg SubLINGual Q5MIN PRN    acetaminophen (TYLENOL) solution 650 mg  650 mg Oral Q4H PRN    glucose chewable tablet 16 g  4 Tablet Oral PRN    dextrose 10 % infusion 0-250 mL  0-250 mL IntraVENous PRN    glucagon (GLUCAGEN) injection 1 mg  1 mg IntraMUSCular PRN    insulin lispro (HUMALOG) injection   SubCUTAneous Q6H     ______________________________________________________________________  EXPECTED LENGTH OF STAY: - - -  ACTUAL LENGTH OF STAY:          2                 Meredith Guzman MD

## 2022-07-08 NOTE — PROGRESS NOTES
Physical Therapy  PT consult received and chart reviewed. Per RN patient is about to be transported off the floor for cath lab. Will check back tmrw for evaluation.   Alejandro Overton, PT, DPT

## 2022-07-08 NOTE — PROCEDURES
Findings:   1. Severe native multivessel disease with high grade tandem lesion. 2)Normal LVEDP    Access: Right femoral: no issues. Right radial occluded    Recommendations  1)Best suited for medical mgm with high intensity statins and antiplatelet(Aspirin).

## 2022-07-09 LAB
ANION GAP SERPL CALC-SCNC: 0 MMOL/L (ref 5–15)
BASOPHILS # BLD: 0 K/UL (ref 0–0.1)
BASOPHILS NFR BLD: 0 % (ref 0–1)
BUN SERPL-MCNC: 15 MG/DL (ref 6–20)
BUN/CREAT SERPL: 20 (ref 12–20)
CALCIUM SERPL-MCNC: 8.8 MG/DL (ref 8.5–10.1)
CHLORIDE SERPL-SCNC: 108 MMOL/L (ref 97–108)
CO2 SERPL-SCNC: 27 MMOL/L (ref 21–32)
CREAT SERPL-MCNC: 0.74 MG/DL (ref 0.55–1.02)
DIFFERENTIAL METHOD BLD: ABNORMAL
EOSINOPHIL # BLD: 0.1 K/UL (ref 0–0.4)
EOSINOPHIL NFR BLD: 1 % (ref 0–7)
ERYTHROCYTE [DISTWIDTH] IN BLOOD BY AUTOMATED COUNT: 15.6 % (ref 11.5–14.5)
GLUCOSE BLD STRIP.AUTO-MCNC: 110 MG/DL (ref 65–117)
GLUCOSE BLD STRIP.AUTO-MCNC: 172 MG/DL (ref 65–117)
GLUCOSE BLD STRIP.AUTO-MCNC: 185 MG/DL (ref 65–117)
GLUCOSE BLD STRIP.AUTO-MCNC: 62 MG/DL (ref 65–117)
GLUCOSE BLD STRIP.AUTO-MCNC: 77 MG/DL (ref 65–117)
GLUCOSE SERPL-MCNC: 174 MG/DL (ref 65–100)
HCT VFR BLD AUTO: 36.4 % (ref 35–47)
HGB BLD-MCNC: 11.5 G/DL (ref 11.5–16)
IMM GRANULOCYTES # BLD AUTO: 0 K/UL (ref 0–0.04)
IMM GRANULOCYTES NFR BLD AUTO: 0 % (ref 0–0.5)
LYMPHOCYTES # BLD: 3 K/UL (ref 0.8–3.5)
LYMPHOCYTES NFR BLD: 45 % (ref 12–49)
MCH RBC QN AUTO: 27.5 PG (ref 26–34)
MCHC RBC AUTO-ENTMCNC: 31.6 G/DL (ref 30–36.5)
MCV RBC AUTO: 87.1 FL (ref 80–99)
MONOCYTES # BLD: 0.4 K/UL (ref 0–1)
MONOCYTES NFR BLD: 6 % (ref 5–13)
NEUTS SEG # BLD: 3.1 K/UL (ref 1.8–8)
NEUTS SEG NFR BLD: 48 % (ref 32–75)
NRBC # BLD: 0 K/UL (ref 0–0.01)
NRBC BLD-RTO: 0 PER 100 WBC
PLATELET # BLD AUTO: 121 K/UL (ref 150–400)
PLATELET COMMENTS,PCOM: ABNORMAL
POTASSIUM SERPL-SCNC: ABNORMAL MMOL/L (ref 3.5–5.1)
RBC # BLD AUTO: 4.18 M/UL (ref 3.8–5.2)
RBC MORPH BLD: ABNORMAL
SERVICE CMNT-IMP: ABNORMAL
SERVICE CMNT-IMP: NORMAL
SERVICE CMNT-IMP: NORMAL
SODIUM SERPL-SCNC: 135 MMOL/L (ref 136–145)
WBC # BLD AUTO: 6.6 K/UL (ref 3.6–11)

## 2022-07-09 PROCEDURE — 74011000258 HC RX REV CODE- 258: Performed by: NURSE PRACTITIONER

## 2022-07-09 PROCEDURE — 74011250637 HC RX REV CODE- 250/637: Performed by: FAMILY MEDICINE

## 2022-07-09 PROCEDURE — 65660000001 HC RM ICU INTERMED STEPDOWN

## 2022-07-09 PROCEDURE — 80048 BASIC METABOLIC PNL TOTAL CA: CPT

## 2022-07-09 PROCEDURE — 97530 THERAPEUTIC ACTIVITIES: CPT

## 2022-07-09 PROCEDURE — 97161 PT EVAL LOW COMPLEX 20 MIN: CPT

## 2022-07-09 PROCEDURE — 74011250637 HC RX REV CODE- 250/637: Performed by: INTERNAL MEDICINE

## 2022-07-09 PROCEDURE — 74011636637 HC RX REV CODE- 636/637: Performed by: FAMILY MEDICINE

## 2022-07-09 PROCEDURE — 36415 COLL VENOUS BLD VENIPUNCTURE: CPT

## 2022-07-09 PROCEDURE — 82962 GLUCOSE BLOOD TEST: CPT

## 2022-07-09 PROCEDURE — 85025 COMPLETE CBC W/AUTO DIFF WBC: CPT

## 2022-07-09 PROCEDURE — 99233 SBSQ HOSP IP/OBS HIGH 50: CPT | Performed by: INTERNAL MEDICINE

## 2022-07-09 PROCEDURE — 74011000250 HC RX REV CODE- 250: Performed by: FAMILY MEDICINE

## 2022-07-09 PROCEDURE — 74011636637 HC RX REV CODE- 636/637: Performed by: STUDENT IN AN ORGANIZED HEALTH CARE EDUCATION/TRAINING PROGRAM

## 2022-07-09 RX ORDER — ATORVASTATIN CALCIUM 40 MG/1
80 TABLET, FILM COATED ORAL
Status: DISCONTINUED | OUTPATIENT
Start: 2022-07-09 | End: 2022-07-13 | Stop reason: HOSPADM

## 2022-07-09 RX ORDER — LOSARTAN POTASSIUM 25 MG/1
12.5 TABLET ORAL DAILY
Status: DISCONTINUED | OUTPATIENT
Start: 2022-07-10 | End: 2022-07-13 | Stop reason: HOSPADM

## 2022-07-09 RX ORDER — DEXTROSE MONOHYDRATE AND SODIUM CHLORIDE 5; .9 G/100ML; G/100ML
75 INJECTION, SOLUTION INTRAVENOUS CONTINUOUS
Status: DISCONTINUED | OUTPATIENT
Start: 2022-07-10 | End: 2022-07-13 | Stop reason: HOSPADM

## 2022-07-09 RX ADMIN — INSULIN GLARGINE 10 UNITS: 100 INJECTION, SOLUTION SUBCUTANEOUS at 22:56

## 2022-07-09 RX ADMIN — ATORVASTATIN CALCIUM 80 MG: 40 TABLET, FILM COATED ORAL at 22:55

## 2022-07-09 RX ADMIN — SODIUM CHLORIDE, PRESERVATIVE FREE 10 ML: 5 INJECTION INTRAVENOUS at 06:45

## 2022-07-09 RX ADMIN — ASPIRIN 81 MG: 81 TABLET, COATED ORAL at 11:43

## 2022-07-09 RX ADMIN — DEXTROSE AND SODIUM CHLORIDE 75 ML/HR: 5; 900 INJECTION, SOLUTION INTRAVENOUS at 23:55

## 2022-07-09 RX ADMIN — SODIUM CHLORIDE, PRESERVATIVE FREE 10 ML: 5 INJECTION INTRAVENOUS at 22:56

## 2022-07-09 RX ADMIN — Medication 2 UNITS: at 06:45

## 2022-07-09 RX ADMIN — METOPROLOL SUCCINATE 50 MG: 25 TABLET, EXTENDED RELEASE ORAL at 11:43

## 2022-07-09 RX ADMIN — Medication 2 UNITS: at 12:00

## 2022-07-09 RX ADMIN — Medication 2 UNITS: at 18:00

## 2022-07-09 RX ADMIN — ACETAMINOPHEN ORAL SOLUTION 650 MG: 650 SOLUTION ORAL at 11:42

## 2022-07-09 RX ADMIN — SODIUM CHLORIDE, PRESERVATIVE FREE 10 ML: 5 INJECTION INTRAVENOUS at 14:00

## 2022-07-09 NOTE — PROGRESS NOTES
[facility]  Cardiology Care Note                  []Initial visit     [x]Established visit     Patient Name: Angel Pierre - HFV:6/06/2204 - SUW:[id number]  Primary Cardiologist: none  Consulting Cardiologist: Shawn Moy MD     Reason for consult: concern for STEMI    HPI:   Patient is a 73year old female who has a hx of HTN, DM, cerebral aneurysm with  shunt in 2007, multiple CVA events, acute right parietal intracranial hematoma/small right SDH in 10/19 which was managed conservatively, and she has hx of cognitive impairment/dementia after CVA events. She was started on Risperdal recently and her family has noticed worsening mental status changes. EMS was called today d/t these concerns. EKG en route was concerning for STEMI and code STEMI was called. Review of EKG did show some ischemic changes, but not a STEMI. She resides in an assisted living and the report to EMS was that she had denies CP, SOB, n/v, fever, chills. She did have some cognitive impairment in ED, but denied active CP/SOB. Repeat EKG showed continued changes, but not STEMI. SUBJECTIVE:    Pt pleasant and denies CP, SOB, palpitations. Does not report of any active chest discomfort. Assessment and Plan     1. Coronary artery disease: Multivessel coronary artery disease with multiple tandem lesions in all coronaries with diffuse nature of disease. Not suitable for PCI. I feel she is a better candidate for coronary artery bypass grafting. In that circumstance we would recommend conservative management with best possible medical therapy in the form of high intensity statins as well as aspirin to be continued long-term. Recommend echocardiogram prior to discharge to ensure if she has reduced LV function we can initiate her on guideline directed medical therapy for heart failure with reduced ejection fraction.  May consider empirically switching from Amlodipine to Low dose Losartan. 2. Hx of CVA and acute cerebral hemorrhage: MRI/CT head neg for acute bleed or infarct. Concerns for DOAC with hx of ICH, cerebral aneurysm. 3. HTN: cont home Metoprolol XL and Amlodipine. Cont to monitor  4. DM: management per primary team  5. HLD: , HDL 35, trig 152. Recommend high intensity statin. cont Lipitor 40mg daily. ____________________________________________________________    Cardiac testing      Most recent HS troponins:  Recent Labs     07/07/22  0327 07/06/22  1604   TROPHS 8,226* 6,057*     ECG: SR, ST and T wave abnormality, consider inferior ischemia, ST and T wave abnormality, consider anterolateral ischemia. Review of Systems    [x]All other systems reviewed and all negative except as written in HPI    [] Patient unable to provide secondary to condition         Past Medical History:   Diagnosis Date    Diabetes ([facility] [address]     Hypertension     Memory loss     NSTEMI (non-ST elevated myocardial infarction) ([facility] [address] 7/6/2022    Stroke ([facility] [address]     cerebral hemorrhage,cerebral anurysm     Past Surgical History:   Procedure Laterality Date    HX ORTHOPAEDIC  1996    back surgery    NEUROLOGICAL PROCEDURE UNLISTED  2008    shunt placement     Social Hx:  reports that she has never smoked. She has never used smokeless tobacco. She reports that she does not drink alcohol and does not use drugs. Family Hx: family history is not on file.   Allergies   Allergen Reactions    Amoxicillin Other (comments)     Aggressive behavior  and  hallucinating and itching          OBJECTIVE:  Wt Readings from Last 3 Encounters:   07/09/22 163 lb 14.4 oz (74.3 kg)   04/19/22 156 lb 0.6 oz (70.8 kg)   01/22/20 156 lb (70.8 kg)       Intake/Output Summary (Last 24 hours) at 7/9/2022 1158  Last data filed at 7/9/2022 0445  Gross per 24 hour   Intake 157.5 ml   Output 800 ml   Net -642.5 ml         Physical Exam    Vitals:   Vitals: 07/09/22 0800 07/09/22 0816 07/09/22 1000 07/09/22 1127   BP:  114/88  120/76   Pulse: (!) 58 65 94 89   Resp:  18  16   Temp:  97.5 °F (36.4 °C)  98.3 °F (36.8 °C)   SpO2:  100%  94%   Weight:       Height:           General:    Alert, cooperative, no distress, appears stated age. Neck:   Supple, no carotid bruit and no JVD. Back:     Symmetric   Lungs:     clear to auscultation bilaterally. Heart[de-identified]    Regular rate and rhythm, S1, S2 normal, no murmur, click, rub or gallop. Abdomen:     Soft, non-tender. Bowel sounds normal.    Extremities:   Extremities normal, atraumatic, no cyanosis or edema. Vascular:   Pulses - sudha UE/LE equal   Skin:   Skin color normal. No rashes or lesions on visible areas   Neurologic:   Alert, Moves all extremities. Data Review:     Radiology:   XR Results (most recent):  Results from Hospital Encounter encounter on 07/06/22    XR CHEST PORT    Narrative  INDICATION: . ams, stemi  Additional history:  COMPARISON: Previous chest xray, 4/19/2022. LIMITATIONS: Portable technique. Herve Calderaodore FINDINGS: Single frontal view of the chest.  .  Lines/tubes/surgical: None. Heart/mediastinum: Ossifications in the aortic arch. Lungs/pleura:  No focal consolidation or mass. No visualized pleural effusion or  pneumothorax. Additional Comments: Tubing overlying the left neck, left chest and left upper  quadrant suggesting ventriculoperitoneal shunt. .    Impression  1. No radiographic evidence of acute cardiopulmonary disease. CT Results (most recent):  Results from Hospital Encounter encounter on 07/06/22    CT HEAD WO CONT    Narrative  EXAM: CT HEAD WO CONT    INDICATION: ams    COMPARISON: CT head 1/5/2021. CONTRAST: None. TECHNIQUE: Unenhanced CT of the head was performed using 5 mm images. Brain and  bone windows were generated. Coronal and sagittal reformats.  CT dose reduction  was achieved through use of a standardized protocol tailored for this  examination and automatic exposure control for dose modulation. FINDINGS:  Left frontal approach intraventricular catheter. Generalized volume loss. Right  frontal, parietal, and except Little encephalomalacia. Chronic mixed low and  high density in the right cerebellum. Chronic bilateral basal ganglia lacunar  infarcts. Widespread white matter hypodensities may reflect chronic  microangiopathic change. There is no intracranial hemorrhage, extra-axial  collection, or mass effect. The basilar cisterns are open. No CT evidence of  acute infarct. The bone windows demonstrate no abnormalities. The visualized portions of the  paranasal sinuses and mastoid air cells are clear. Impression  No acute abnormality. Chronic findings as above. MRI Results (most recent):  Results from [place] encounter on 07/06/22    MRI BRAIN WO CONT    Narrative  *PRELIMINARY REPORT*    No acute infarct. Multifocal old infarcts. Left frontal ventriculostomy  catheter. No hydrocephalus. Preliminary report was provided by Dr. Gene Maldonado, the on-call radiologist, at 6755  hours    Final report to follow. END PRELIMINARY REPORT    FINAL REPORT:    INDICATION:   cva    EXAMINATION:  MRI BRAIN WO CONTRAST    COMPARISON:  CT head July 6, 2022    TECHNIQUE:  Multiplanar multisequence acquisition without contrast of the brain. FINDINGS:    There is an indwelling left frontal ventriculostomy catheter with tip  terminating in the left lateral ventricle. No hydrocephalus or midline shift. Numerous areas of chronic hemorrhage, involving the right inferior cerebellum,  left posterior temporal lobe, right anteromedial parietal lobe, superior right  frontal lobe, bilateral thalami/left corona radiata. Numerous chronic  microhemorrhages involving the damon, right temporal occipital junction, left  frontal operculum, and throughout the periphery of the frontoparietal cortex.   Extensive chronic periventricular T2 signal hyperintensity with areas of remote  infarction in the superior right frontal lobe, right occipital and parietal  lobes. There is FLAIR hyperintensity surrounding the left frontal  ventriculostomy catheter. Areas of encephalomalacia also involving the inferior  right cerebellum, and several chronic infarctions in the damon. Major  intracranial vascular flow-voids are patent. No acute infarction. Impression  No acute infarction. Extensive chronic white matter disease with areas of remote  infarction and hemorrhage as above. Left frontal ventriculostomy catheter with  no hydrocephalus. No results for input(s): CPK, TROIQ in the last 72 hours.     No lab exists for component: CKQMB, CPKMB, BMPP  Recent Labs     07/09/22  0415 07/08/22  0318   * 144   K Sample is hemolyzed (hemoglobin is 3.7    113*   CO2 27 26   BUN 15 31*   CREA 0.74 0.84   * 211*   CA 8.8 8.2*     Recent Labs     07/09/22  0415 07/08/22  0318   WBC 6.6 9.2   HGB 11.5 10.9*   HCT 36.4 35.3   * 240     Recent Labs     07/06/22  1604        Recent Labs     07/07/22  0327   CHOL 286*   LDLC 220.6*     Recent Labs     07/06/22  1604   TSH 1.05           Current meds:    Current Facility-Administered Medications:     atorvastatin (LIPITOR) tablet 40 mg, 40 mg, Oral, QHS, Meena MONSIVAIS NP, 40 mg at 07/08/22 2243    insulin glargine (LANTUS) injection 10 Units, 10 Units, SubCUTAneous, QHS, Jeronimo Valencia MD, 10 Units at 07/08/22 2259    [Held by provider] amLODIPine (NORVASC) tablet 2.5 mg, 2.5 mg, Oral, DAILY, Willie White MD, 2.5 mg at 07/08/22 0905    metoprolol succinate (TOPROL-XL) XL tablet 50 mg, 50 mg, Oral, DAILY, Willie White MD, 50 mg at 07/09/22 1143    sodium chloride (NS) flush 5-40 mL, 5-40 mL, IntraVENous, Q8H, Willie White MD, 10 mL at 07/09/22 0645    sodium chloride (NS) flush 5-40 mL, 5-40 mL, IntraVENous, PRN, Tereso Salas MD    0.9% sodium chloride infusion, 75 mL/hr, IntraVENous, CONTINUOUS, Rico Samano MD, Last Rate: 75 mL/hr at 07/08/22 2059, 75 mL/hr at 07/08/22 2059    aspirin delayed-release tablet 81 mg, 81 mg, Oral, DAILY, Rico Samano MD, 81 mg at 07/09/22 1143    nitroglycerin (NITROSTAT) tablet 0.4 mg, 0.4 mg, SubLINGual, Q5MIN PRN, Rico Samano MD    acetaminophen (TYLENOL) solution 650 mg, 650 mg, Oral, Q4H PRN, Rico Samano MD, 650 mg at 07/09/22 1142    glucose chewable tablet 16 g, 4 Tablet, Oral, PRN, Rico Samano MD    dextrose 10 % infusion 0-250 mL, 0-250 mL, IntraVENous, PRN, Rico Samano MD    glucagon (GLUCAGEN) injection 1 mg, 1 mg, IntraMUSCular, PRN, Rico Samano MD    insulin lispro (HUMALOG) injection, , SubCUTAneous, Q6H, Rico Samano MD, 2 Units at 07/09/22 0645    Brenda Werner MD  [facility] [address] [place] [address] 13, [address] [place] [address],[address]  [facility], [address] [facility]  [phone]      CC:Marga Blake MD

## 2022-07-09 NOTE — PROGRESS NOTES
Problem: Mobility Impaired (Adult and Pediatric)  Goal: *Acute Goals and Plan of Care (Insert Text)  Description: FUNCTIONAL STATUS PRIOR TO ADMISSION: Patient resides at [facility] (Memory Care Unit), Patient is a poor historian - Dementia with history of multiple CVAs. Per chart review, patient ambulates with a rolling walker and intermittently uses a wheelchair. Patient required assistance for all ADLs. HOME SUPPORT PRIOR TO ADMISSION: The patient lived with assisted living staff/caregivers. Physical Therapy Goals  Initiated 7/9/2022  1. Patient will move from supine to sit and sit to supine, scoot up and down, and roll side to side in bed with supervision/set-up within 7 day(s). 2.  Patient will transfer from bed to chair and chair to bed with supervision/set-up using the least restrictive device within 7 day(s). 3.  Patient will perform sit to stand with supervision/set-up within 7 day(s). 4.  Patient will ambulate with supervision/set-up for  feet with the least restrictive device within 7 day(s). Outcome: Not Met    PHYSICAL THERAPY EVALUATION  Patient: Aster Burris (75 y.o. female)  Date: 7/9/2022  Primary Diagnosis: NSTEMI (non-ST elevated myocardial infarction) ([facility] [address] [I21.4]  Procedure(s) (LRB):  LEFT HEART CATH / CORONARY ANGIOGRAPHY (N/A) 1 Day Post-Op   Precautions: DNR, Baseline Dementia    ASSESSMENT  Based on the objective data described below, the patient presents with hospital admission following NSTEMI. Patient oriented to self only. Patient pleasantly confused and agreeable to participation in therapy evaluation. Patient able to follow 1-step commands 100% of the time, but with slight delay. Patient required overall standby assist for bed mobility and contact guard assist for functional transfers and short-distance gait with a rolling walker. Patient completed both standing and seated therapeutic exercises (see below).  Unsure of patient's current functional baseline, however, patient resides in a memory care unit at [facility] - believe patient is nearing her functional baseline. Current Level of Function Impacting Discharge (mobility/balance): SBA bed mobility, CGA + RW transfers, CGA + RW short-distance gait    Functional Outcome Measure: The patient scored 25/100 on the Barthel Index outcome measure which is indicative of a 75% impaired ability to independently perform ADLs and functional tasks. Other factors to consider for discharge: Baseline dementia, Unsure of functional baseline     Patient will benefit from skilled therapy intervention to address the above noted impairments. PLAN :  Recommendations and Planned Interventions: bed mobility training, transfer training, gait training, patient and family training/education, and therapeutic activities      Frequency/Duration: Patient will be followed by physical therapy:  3 times a week to address goals. Recommendation for discharge: (in order for the patient to meet his/her long term goals)  No skilled physical therapy/ follow up rehabilitation needs identified at this time. This discharge recommendation:  Has not yet been discussed the attending provider and/or case management    IF patient discharges home will need the following DME: patient owns DME required for discharge         SUBJECTIVE:   Patient stated Sometimes. .. sometimes.     OBJECTIVE DATA SUMMARY:   HISTORY:    Past Medical History:   Diagnosis Date    Diabetes ([facility] [address]     Hypertension     Memory loss     NSTEMI (non-ST elevated myocardial infarction) ([facility] [address] 7/6/2022    Stroke ([facility] [address]     cerebral hemorrhage,cerebral anurysm     Past Surgical History:   Procedure Laterality Date    HX ORTHOPAEDIC  1996    back surgery    NEUROLOGICAL PROCEDURE UNLISTED  2008    shunt placement       Personal factors and/or comorbidities impacting plan of care: [facility]    Home Situation  Home Environment: Assisted living (Memory Care Unit)  Care Facility Name: [facility]  Living Alone: No  Support Systems: Assisted Living,Caregiver/Home Care Staff  Patient Expects to be Discharged to[de-identified] Assisted Living  Current DME Used/Available at Home: Ashlee Gadiel, bella,Wheelchair    EXAMINATION/PRESENTATION/DECISION MAKING:   Critical Behavior:  Neurologic State: Alert,Eyes open to voice  Orientation Level: Oriented to person,Disoriented to place,Disoriented to situation,Disoriented to time  Cognition: Follows commands,Memory loss    Range Of Motion:  AROM: Within functional limits                       Strength:    Strength: Generally decreased, functional                    Tone & Sensation:   Tone: Normal              Sensation:  (Unable to assess)               Coordination:  Coordination: Generally decreased, functional    Functional Mobility:  Bed Mobility:     Supine to Sit: Stand-by assistance  Sit to Supine: Stand-by assistance  Scooting: Stand-by assistance  Transfers:  Sit to Stand: Contact guard assistance; Adaptive equipment  Stand to Sit: Contact guard assistance; Adaptive equipment        Bed to Chair: Contact guard assistance; Adaptive equipment              Balance:   Sitting: Intact; Without support  Standing: Impaired; With support  Standing - Static: Good;Constant support  Standing - Dynamic : Fair;Constant support  Ambulation/Gait Training:  Distance (ft): 10 Feet (ft) (x 3)  Assistive Device: Gait belt;Walker, rolling  Ambulation - Level of Assistance: Contact guard assistance; Adaptive equipment     Gait Description (WDL): Exceptions to WDL  Gait Abnormalities: Decreased step clearance        Base of Support: Narrowed     Speed/Melissa: Shuffled  Step Length: Right shortened;Left shortened    Therapeutic Exercises:   Sit <> Stand tx in succession x 4 (to/from rolling walker)  Supine ankle pumps x 10  Verbal object identification in room   Seated shoulder flexion x 5  Standing marches 2 x 10 in succession while standing at rolling walker    Functional Measure:  Barthel Index:    Bathin  Bladder: 0  Bowels: 0  Groomin  Dressin  Feedin  Mobility: 0  Stairs: 0  Toilet Use: 5  Transfer (Bed to Chair and Back): 10  Total: 25/100       The Barthel ADL Index: Guidelines  1. The index should be used as a record of what a patient does, not as a record of what a patient could do. 2. The main aim is to establish degree of independence from any help, physical or verbal, however minor and for whatever reason. 3. The need for supervision renders the patient not independent. 4. A patient's performance should be established using the best available evidence. Asking the patient, friends/relatives and nurses are the usual sources, but direct observation and common sense are also important. However direct testing is not needed. 5. Usually the patient's performance over the preceding 24-48 hours is important, but occasionally longer periods will be relevant. 6. Middle categories imply that the patient supplies over 50 per cent of the effort. 7. Use of aids to be independent is allowed. Score Interpretation (from [address] [address])    Independent   60-79 Minimally independent   40-59 Partially dependent   20-39 Very dependent   <20 Totally dependent     -Harjinder Rush., Barthel, D.W. (1965). Functional evaluation: the Barthel Index. [address] [facility] ([address] (1997). The Barthel activities of daily living index: self-reporting versus actual performance in the old (> or = 75 years). Journal of [address] [place] [address] 45(7), [address] [facility], J.J.M.F, Myrla Holter., Noemy Torres. (1999). Measuring the change in disability after inpatient rehabilitation; comparison of the responsiveness of the Barthel Index and Functional Garrett Measure. Journal of Neurology, Neurosurgery, and Psychiatry, 66(4), 665-513. -SAMEERA Guthrie, KSENIA Pulido, Elizabeth Mahmood M.A. (2004) Assessment of post-stroke quality of life in cost-effectiveness studies: The usefulness of the Barthel Index and the EuroQoL-5D. Quality of Life Research, 13, 893-03     Based on the above components, the patient evaluation is determined to be of the following complexity level: LOW     Pain Rating:  Patient denied pain    Activity Tolerance:   Good and tolerates ADLs without rest breaks    After treatment patient left in no apparent distress:   Supine in bed, Call bell within reach, and Side rails x 3    COMMUNICATION/EDUCATION:   The patients plan of care was discussed with: Registered nurse. Fall prevention education was provided and the patient/caregiver indicated understanding., Patient/family have participated as able in goal setting and plan of care. , and Patient/family agree to work toward stated goals and plan of care.     Thank you for this referral.  Jamarcus Santiago, PT, DPT   Time Calculation: 25 mins

## 2022-07-09 NOTE — PROGRESS NOTES
[facility] Adult  Hospitalist Group                                                                                          Hospitalist Progress Note  Lili Styles MD  Answering service: [phone] OR [phone] from in house phone        Date of Service:  2022  NAME:  Malia Hilliard  :  1955  MRN:  [id number]      Admission Summary:   Malia Hilliard is a 73 y.o. female who presents with altered mental status     No meaningful history could be obtained from the patient, history was probably obtained from the niece who was present at the bedside, niece patient has history of multiple strokes, had an intracranial hemorrhage in 2019, has history of cognitive impairment since then, currently lives [facility] living [facility], today the niece went to see her found her to be much less responsive, more confused, found her to be dehydrated, got concerned and called EMS, EKG was done, there was concern for STEMI, patient was brought to the [facility] ER, cardiologist evaluated patient, canceled STEMI, and patient was requested to be admitted to the hospitalist service, currently patient is resting in bed and cannot provide any history    Interval history / Subjective:   Patient seen and examined for follow-up of NSTEMI. Patient seen and examined earlier this morning by me. No complaints at the time of my exam. No acute events overnight. Assessment & Plan:     Non-ST elevation MI  Continue heparin drip  IMC with telemetry  Patient received aspirin, nitroglycerin  Cardiology on board. We will try to fit her in today for cath. Remain n.p.o.   No complaints of chest pain at this time  Heart cath shows significant disease, but she will be managed with medical therapy given comorbidities and condition  Continue management    Diabetes mellitus type 2 with hyperglycemia  Continue sliding scale insulin  Does not seem to be on insulin outside the hospital, just metformin    Dehydration  Improving    Normal anion gap metabolic acidosis  Resolved  Continue fluids    VIKKI  Improving  Continue current management  Resolved     Hypercalcemia  Resolved    Acute metabolic encephalopathy  Patient continues altered. Unclear etiology at this time. Continue neurovascular checks. We will continue to evaluate during stay      I spoke to the patient's niece regarding disposition. She stated that she does not want the patient to return to the memory unit at [facility] because this is where she found her when this current event began. Pending PT/OT. Plan would then likely be SNF    Code status:  Full  Prophylaxis: On heparin drip  Care Plan discussed with: Nurse, subspecialists, patient  Anticipated Disposition: Medically cleared but will need SNF     Hospital Problems  Date Reviewed: 1/20/2022          Codes Class Noted POA    NSTEMI (non-ST elevated myocardial infarction) [facility]) ICD-10-CM: I21.4  ICD-9-CM: 410.70  7/6/2022 Unknown                Review of Systems:   A comprehensive review of systems was negative except for that written in the HPI. Vital Signs:    Last 24hrs VS reviewed since prior progress note. Most recent are:  Visit Vitals  /76   Pulse 72   Temp 98.3 °F (36.8 °C)   Resp 16   Ht 5' 5\" (1.651 m)   Wt 74.3 kg (163 lb 14.4 oz)   SpO2 94%   BMI 27.27 kg/m²         Intake/Output Summary (Last 24 hours) at 7/9/2022 1244  Last data filed at 7/9/2022 0445  Gross per 24 hour   Intake 157.5 ml   Output 800 ml   Net -642.5 ml        Physical Examination:     I had a face to face encounter with this patient and independently examined them on 7/9/2022 as outlined below:          Constitutional:  No acute distress, cooperative, pleasant . Confused. ENT:  Oral mucosa moist, oropharynx benign. Resp:  CTA bilaterally. No wheezing/rhonchi/rales. No accessory muscle use.     CV:  Regular rhythm, normal rate, no murmurs, gallops, rubs    GI:  Soft, non distended, non tender. normoactive bowel sounds, no hepatosplenomegaly     Musculoskeletal:  No edema, warm, 2+ pulses throughout    Neurologic:  Moves all extremities. Disoriented            Data Review:    Review and/or order of clinical lab test  Review and/or order of tests in the radiology section of [facility]  Review and/or order of tests in the medicine section of [facility]      Labs:     Recent Labs     07/09/22  0415 07/08/22  0318   WBC 6.6 9.2   HGB 11.5 10.9*   HCT 36.4 35.3   * 240     Recent Labs     07/09/22  0415 07/08/22  0318 07/07/22  0327   * 144 146*   K Sample is hemolyzed (hemoglobin is 3.7 3.3*    113* 112*   CO2 27 26 28   BUN 15 31* 48*   CREA 0.74 0.84 1.21*   * 211* 293*   CA 8.8 8.2* 9.7     Recent Labs     07/06/22  1604   ALT 29      TBILI 0.5   TP 8.5*   ALB 3.2*   GLOB 5.3*     Recent Labs     07/08/22  1016 07/08/22  0323 07/07/22 2058   APTT 79.0* 69.8* 52.5*      No results for input(s): FE, TIBC, PSAT, FERR in the last 72 hours. No results found for: FOL, RBCF   No results for input(s): PH, PCO2, PO2 in the last 72 hours. No results for input(s): CPK, CKNDX, TROIQ in the last 72 hours.     No lab exists for component: CPKMB  Lab Results   Component Value Date/Time    Cholesterol, total 286 (H) 07/07/2022 03:27 AM    HDL Cholesterol 35 07/07/2022 03:27 AM    LDL, calculated 220.6 (H) 07/07/2022 03:27 AM    Triglyceride 152 (H) 07/07/2022 03:27 AM    CHOL/HDL Ratio 8.2 (H) 07/07/2022 03:27 AM     Lab Results   Component Value Date/Time    Glucose (POC) 172 (H) 07/09/2022 11:09 AM    Glucose (POC) 185 (H) 07/09/2022 06:25 AM    Glucose (POC) 160 (H) 07/08/2022 10:47 PM    Glucose (POC) 135 (H) 07/08/2022 06:22 PM    Glucose (POC) 187 (H) 07/08/2022 11:30 AM     Lab Results   Component Value Date/Time    Color Yellow 10/04/2021 07:54 AM    Appearance Clear 10/04/2021 07:54 AM    Specific gravity 1.015 01/05/2021 03:54 PM    Specific gravity 1.026 01/02/2020 09:41 PM pH (UA) 5.5 10/04/2021 07:54 AM    Protein Negative 01/05/2021 03:54 PM    Glucose Negative 01/05/2021 03:54 PM    Ketone Negative 10/04/2021 07:54 AM    Bilirubin Negative 10/04/2021 07:54 AM    Urobilinogen 0.2 01/05/2021 03:54 PM    Nitrites Positive (A) 10/04/2021 07:54 AM    Leukocyte Esterase Negative 10/04/2021 07:54 AM    Epithelial cells FEW 01/05/2021 03:54 PM    Bacteria Many (A) 10/04/2021 07:54 AM    WBC 0-5 10/04/2021 07:54 AM    RBC None seen 10/04/2021 07:54 AM         Medications Reviewed:     Current Facility-Administered Medications   Medication Dose Route Frequency    atorvastatin (LIPITOR) tablet 80 mg  80 mg Oral QHS    [START ON 7/10/2022] losartan (COZAAR) tablet 12.5 mg  12.5 mg Oral DAILY    insulin glargine (LANTUS) injection 10 Units  10 Units SubCUTAneous QHS    metoprolol succinate (TOPROL-XL) XL tablet 50 mg  50 mg Oral DAILY    sodium chloride (NS) flush 5-40 mL  5-40 mL IntraVENous Q8H    sodium chloride (NS) flush 5-40 mL  5-40 mL IntraVENous PRN    0.9% sodium chloride infusion  75 mL/hr IntraVENous CONTINUOUS    aspirin delayed-release tablet 81 mg  81 mg Oral DAILY    nitroglycerin (NITROSTAT) tablet 0.4 mg  0.4 mg SubLINGual Q5MIN PRN    acetaminophen (TYLENOL) solution 650 mg  650 mg Oral Q4H PRN    glucose chewable tablet 16 g  4 Tablet Oral PRN    dextrose 10 % infusion 0-250 mL  0-250 mL IntraVENous PRN    glucagon (GLUCAGEN) injection 1 mg  1 mg IntraMUSCular PRN    insulin lispro (HUMALOG) injection   SubCUTAneous Q6H     ______________________________________________________________________  EXPECTED LENGTH OF STAY: - - -  ACTUAL LENGTH OF STAY:          3                 Aparna Mulligan MD

## 2022-07-10 LAB
ANION GAP SERPL CALC-SCNC: 3 MMOL/L (ref 5–15)
BUN SERPL-MCNC: 9 MG/DL (ref 6–20)
BUN/CREAT SERPL: 13 (ref 12–20)
CALCIUM SERPL-MCNC: 8.3 MG/DL (ref 8.5–10.1)
CHLORIDE SERPL-SCNC: 109 MMOL/L (ref 97–108)
CO2 SERPL-SCNC: 27 MMOL/L (ref 21–32)
CREAT SERPL-MCNC: 0.69 MG/DL (ref 0.55–1.02)
GLUCOSE BLD STRIP.AUTO-MCNC: 113 MG/DL (ref 65–117)
GLUCOSE BLD STRIP.AUTO-MCNC: 113 MG/DL (ref 65–117)
GLUCOSE BLD STRIP.AUTO-MCNC: 135 MG/DL (ref 65–117)
GLUCOSE BLD STRIP.AUTO-MCNC: 137 MG/DL (ref 65–117)
GLUCOSE BLD STRIP.AUTO-MCNC: 148 MG/DL (ref 65–117)
GLUCOSE BLD STRIP.AUTO-MCNC: 150 MG/DL (ref 65–117)
GLUCOSE BLD STRIP.AUTO-MCNC: 158 MG/DL (ref 65–117)
GLUCOSE BLD STRIP.AUTO-MCNC: 258 MG/DL (ref 65–117)
GLUCOSE BLD STRIP.AUTO-MCNC: 80 MG/DL (ref 65–117)
GLUCOSE SERPL-MCNC: 228 MG/DL (ref 65–100)
POTASSIUM SERPL-SCNC: 4.1 MMOL/L (ref 3.5–5.1)
SERVICE CMNT-IMP: ABNORMAL
SERVICE CMNT-IMP: NORMAL
SODIUM SERPL-SCNC: 139 MMOL/L (ref 136–145)

## 2022-07-10 PROCEDURE — 36415 COLL VENOUS BLD VENIPUNCTURE: CPT

## 2022-07-10 PROCEDURE — 74011250637 HC RX REV CODE- 250/637: Performed by: INTERNAL MEDICINE

## 2022-07-10 PROCEDURE — 80048 BASIC METABOLIC PNL TOTAL CA: CPT

## 2022-07-10 PROCEDURE — 74011250637 HC RX REV CODE- 250/637: Performed by: FAMILY MEDICINE

## 2022-07-10 PROCEDURE — 65270000046 HC RM TELEMETRY

## 2022-07-10 PROCEDURE — 74011000250 HC RX REV CODE- 250: Performed by: FAMILY MEDICINE

## 2022-07-10 PROCEDURE — 82962 GLUCOSE BLOOD TEST: CPT

## 2022-07-10 RX ADMIN — ASPIRIN 81 MG: 81 TABLET, COATED ORAL at 09:47

## 2022-07-10 RX ADMIN — SODIUM CHLORIDE, PRESERVATIVE FREE 10 ML: 5 INJECTION INTRAVENOUS at 23:00

## 2022-07-10 RX ADMIN — METOPROLOL SUCCINATE 50 MG: 25 TABLET, EXTENDED RELEASE ORAL at 09:48

## 2022-07-10 RX ADMIN — ATORVASTATIN CALCIUM 80 MG: 40 TABLET, FILM COATED ORAL at 22:59

## 2022-07-10 RX ADMIN — LOSARTAN POTASSIUM 12.5 MG: 25 TABLET, FILM COATED ORAL at 09:47

## 2022-07-10 RX ADMIN — SODIUM CHLORIDE, PRESERVATIVE FREE 10 ML: 5 INJECTION INTRAVENOUS at 14:00

## 2022-07-10 RX ADMIN — SODIUM CHLORIDE, PRESERVATIVE FREE 10 ML: 5 INJECTION INTRAVENOUS at 05:33

## 2022-07-10 NOTE — PROGRESS NOTES
This RN was notified by Kasia Adamson LPN that patient's blood glucose was 62mg/dL and patient was lethargic. Per LPN patient was able to take 1 pill scheduled without difficulty. LPN was able to give patient a few sips of orange juice. ANA PAULA Horvath notified of patient's blood glucose and patient's condition. Blood glucose rechecked 77mg/dL. Continuous infusion order changed to Dextrose 5% and 0.9% normal saline at 75ml/hr. Blood glucose was taken several times during the night to closely monitor patient's blood glucose levels and ANA PAULA Cast was updated. Patient became more alert and back to baseline during the night.

## 2022-07-10 NOTE — PROGRESS NOTES
[facility] Adult  Hospitalist Group                                                                                          Hospitalist Progress Note  Hernando Quijano MD  Answering service: [phone] OR [phone] from in house phone        Date of Service:  7/10/2022  NAME:  Lashanda Syed  :  1955  MRN:  [id number]      Admission Summary:   Lashanda Syed is a 73 y.o. female who presents with altered mental status     No meaningful history could be obtained from the patient, history was probably obtained from the niece who was present at the bedside, niece patient has history of multiple strokes, had an intracranial hemorrhage in 2019, has history of cognitive impairment since then, currently lives at [facility] living [facility], today the niece went to see her found her to be much less responsive, more confused, found her to be dehydrated, got concerned and called EMS, EKG was done, there was concern for STEMI, patient was brought to the [facility] ER, cardiologist evaluated patient, canceled STEMI, and patient was requested to be admitted to the hospitalist service, currently patient is resting in bed and cannot provide any history    Interval history / Subjective:   Patient seen and examined for follow-up of NSTEMI. Patient seen and examined earlier this morning by me. Patient sleeping comfortably and wakes up easily. Denies acute complaints. Apparently the patient did have low blood sugar during the night so was placed on a dextrose drip and all insulin helped. Assessment & Plan:     Non-ST elevation MI  Continue heparin drip  IMC with telemetry  Patient received aspirin, nitroglycerin  Cardiology on board. We will try to fit her in today for cath. Remain n.p.o.   No complaints of chest pain at this time  Heart cath shows significant disease, but she will be managed with medical therapy given comorbidities and condition  Continue management    Diabetes mellitus type 2 with hyperglycemia  Continue sliding scale insulin  Does not seem to be on insulin outside the hospital, just metformin    Hypoglycemia  Placed on D5W and insulin held  Blood sugars have gone up again. I am stopping the D5W drip for now. We will continue to hold insulin and continue to check her blood sugar. We will restart insulin once appropriate. Dehydration  Improving    Normal anion gap metabolic acidosis  Resolved  Continue fluids    VIKKI  Improving  Continue current management  Resolved     Hypercalcemia  Resolved    Acute metabolic encephalopathy  Patient continues altered. Unclear etiology at this time. Continue neurovascular checks. We will continue to evaluate during stay      Will pend arrangement of SNF. Pending PT/OT. Plan would then likely be SNF    Code status:  Full  Prophylaxis: On heparin drip  Care Plan discussed with: Nurse, subspecialists, patient  Anticipated Disposition: Medically cleared but will need SNF     Hospital Problems  Date Reviewed: 1/20/2022          Codes Class Noted POA    NSTEMI (non-ST elevated myocardial infarction) [facility]) ICD-10-CM: I21.4  ICD-9-CM: 410.70  7/6/2022 Unknown                Review of Systems:   A comprehensive review of systems was negative except for that written in the HPI. Vital Signs:    Last 24hrs VS reviewed since prior progress note. Most recent are:  Visit Vitals  /71   Pulse 61   Temp 97.4 °F (36.3 °C)   Resp 21   Ht 5' 5\" (1.651 m)   Wt 69 kg (152 lb 1.6 oz)   SpO2 95%   BMI 25.31 kg/m²         Intake/Output Summary (Last 24 hours) at 7/10/2022 1045  Last data filed at 7/10/2022 0708  Gross per 24 hour   Intake --   Output 800 ml   Net -800 ml        Physical Examination:     I had a face to face encounter with this patient and independently examined them on 7/10/2022 as outlined below:          Constitutional:  No acute distress, cooperative, pleasant . Confused. ENT:  Oral mucosa moist, oropharynx benign. Resp: CTA bilaterally. No wheezing/rhonchi/rales. No accessory muscle use. CV:  Regular rhythm, normal rate, no murmurs, gallops, rubs    GI:  Soft, non distended, non tender. normoactive bowel sounds, no hepatosplenomegaly     Musculoskeletal:  No edema, warm, 2+ pulses throughout    Neurologic:  Moves all extremities. Disoriented            Data Review:    Review and/or order of clinical lab test  Review and/or order of tests in the radiology section of CPT  Review and/or order of tests in the medicine section of CPT      Labs:     Recent Labs     07/09/22  0415 07/08/22 0318   WBC 6.6 9.2   HGB 11.5 10.9*   HCT 36.4 35.3   * 240     Recent Labs     07/10/22  0832 07/09/22  0415 07/08/22 0318    135* 144   K 4.1 Sample is hemolyzed (hemoglobin is 3.7   * 108 113*   CO2 27 27 26   BUN 9 15 31*   CREA 0.69 0.74 0.84   * 174* 211*   CA 8.3* 8.8 8.2*     No results for input(s): ALT, AP, TBIL, TBILI, TP, ALB, GLOB, GGT, AML, LPSE in the last 72 hours. No lab exists for component: SGOT, GPT, AMYP, HLPSE  Recent Labs     07/08/22  1016 07/08/22  0323 07/07/22 2058   APTT 79.0* 69.8* 52.5*      No results for input(s): FE, TIBC, PSAT, FERR in the last 72 hours. No results found for: FOL, RBCF   No results for input(s): PH, PCO2, PO2 in the last 72 hours. No results for input(s): CPK, CKNDX, TROIQ in the last 72 hours.     No lab exists for component: CPKMB  Lab Results   Component Value Date/Time    Cholesterol, total 286 (H) 07/07/2022 03:27 AM    HDL Cholesterol 35 07/07/2022 03:27 AM    LDL, calculated 220.6 (H) 07/07/2022 03:27 AM    Triglyceride 152 (H) 07/07/2022 03:27 AM    CHOL/HDL Ratio 8.2 (H) 07/07/2022 03:27 AM     Lab Results   Component Value Date/Time    Glucose (POC) 148 (H) 07/10/2022 07:13 AM    Glucose (POC) 158 (H) 07/10/2022 05:19 AM    Glucose (POC) 137 (H) 07/10/2022 02:55 AM    Glucose (POC) 113 07/10/2022 01:16 AM    Glucose (POC) 113 07/10/2022 12:38 AM Lab Results   Component Value Date/Time    Color Yellow 10/04/2021 07:54 AM    Appearance Clear 10/04/2021 07:54 AM    Specific gravity 1.015 01/05/2021 03:54 PM    Specific gravity 1.026 01/02/2020 09:41 PM    pH (UA) 5.5 10/04/2021 07:54 AM    Protein Negative 01/05/2021 03:54 PM    Glucose Negative 01/05/2021 03:54 PM    Ketone Negative 10/04/2021 07:54 AM    Bilirubin Negative 10/04/2021 07:54 AM    Urobilinogen 0.2 01/05/2021 03:54 PM    Nitrites Positive (A) 10/04/2021 07:54 AM    Leukocyte Esterase Negative 10/04/2021 07:54 AM    Epithelial cells FEW 01/05/2021 03:54 PM    Bacteria Many (A) 10/04/2021 07:54 AM    WBC 0-5 10/04/2021 07:54 AM    RBC None seen 10/04/2021 07:54 AM         Medications Reviewed:     Current Facility-Administered Medications   Medication Dose Route Frequency    atorvastatin (LIPITOR) tablet 80 mg  80 mg Oral QHS    losartan (COZAAR) tablet 12.5 mg  12.5 mg Oral DAILY    dextrose 5% and 0.9% NaCl infusion  75 mL/hr IntraVENous CONTINUOUS    [Held by provider] insulin glargine (LANTUS) injection 10 Units  10 Units SubCUTAneous QHS    metoprolol succinate (TOPROL-XL) XL tablet 50 mg  50 mg Oral DAILY    sodium chloride (NS) flush 5-40 mL  5-40 mL IntraVENous Q8H    sodium chloride (NS) flush 5-40 mL  5-40 mL IntraVENous PRN    aspirin delayed-release tablet 81 mg  81 mg Oral DAILY    nitroglycerin (NITROSTAT) tablet 0.4 mg  0.4 mg SubLINGual Q5MIN PRN    acetaminophen (TYLENOL) solution 650 mg  650 mg Oral Q4H PRN    glucose chewable tablet 16 g  4 Tablet Oral PRN    dextrose 10 % infusion 0-250 mL  0-250 mL IntraVENous PRN    glucagon (GLUCAGEN) injection 1 mg  1 mg IntraMUSCular PRN    [Held by provider] insulin lispro (HUMALOG) injection   SubCUTAneous Q6H     ______________________________________________________________________  EXPECTED LENGTH OF STAY: - - -  ACTUAL LENGTH OF STAY:          4                 Mainor Riggs MD

## 2022-07-10 NOTE — PROGRESS NOTES
Transition Plan of Care  RUR- 11%-Med  Disposition-spoke with sonam/Yeison Melina Forth [phone] regarding disposition plans. Prior to admit patient was residing at [id number] [place] [address]. Family would like for her to go to SNF rehab.  Referrals sent via [facility]ink to [employer], [facility] [employer] and [employer].

## 2022-07-10 NOTE — PROGRESS NOTES
[facility] NP notified that this RN and 2 other employees (1 LPN and RN) were unable to obtain blood work. This RN asked if Selena Guevara NP could place an order for an arterial blood sample from Respiratory. Order placed by Selena Guevara NP. Awaiting respiratory.

## 2022-07-11 ENCOUNTER — TRANSCRIBE ORDER (OUTPATIENT)
Dept: CARDIAC REHAB | Age: 67
End: 2022-07-11

## 2022-07-11 ENCOUNTER — APPOINTMENT (OUTPATIENT)
Dept: NON INVASIVE DIAGNOSTICS | Age: 67
DRG: 280 | End: 2022-07-11
Attending: INTERNAL MEDICINE
Payer: MEDICARE

## 2022-07-11 DIAGNOSIS — I21.4 ACUTE MYOCARDIAL INFARCTION, SUBENDOCARDIAL INFARCTION, INITIAL EPISODE OF CARE (HCC): Primary | ICD-10-CM

## 2022-07-11 LAB
ECHO AO ROOT DIAM: 3.4 CM
ECHO AO ROOT INDEX: 1.93 CM/M2
ECHO AR MAX VEL PISA: 3.9 M/S
ECHO AV AREA PEAK VELOCITY: 1.4 CM2
ECHO AV AREA/BSA PEAK VELOCITY: 0.8 CM2/M2
ECHO AV PEAK GRADIENT: 6 MMHG
ECHO AV PEAK VELOCITY: 1.2 M/S
ECHO AV REGURGITANT PHT: 917.4 MILLISECOND
ECHO AV VELOCITY RATIO: 0.92
ECHO EST RA PRESSURE: 10 MMHG
ECHO LA DIAMETER INDEX: 1.76 CM/M2
ECHO LA DIAMETER: 3.1 CM
ECHO LA TO AORTIC ROOT RATIO: 0.91
ECHO LV E' LATERAL VELOCITY: 5 CM/S
ECHO LV E' SEPTAL VELOCITY: 4 CM/S
ECHO LV FRACTIONAL SHORTENING: 23 % (ref 28–44)
ECHO LV INTERNAL DIMENSION DIASTOLE INDEX: 2.44 CM/M2
ECHO LV INTERNAL DIMENSION DIASTOLIC: 4.3 CM (ref 3.9–5.3)
ECHO LV INTERNAL DIMENSION SYSTOLIC INDEX: 1.88 CM/M2
ECHO LV INTERNAL DIMENSION SYSTOLIC: 3.3 CM
ECHO LV IVSD: 1.3 CM (ref 0.6–0.9)
ECHO LV MASS 2D: 184.7 G (ref 67–162)
ECHO LV MASS INDEX 2D: 104.9 G/M2 (ref 43–95)
ECHO LV POSTERIOR WALL DIASTOLIC: 1.1 CM (ref 0.6–0.9)
ECHO LV RELATIVE WALL THICKNESS RATIO: 0.51
ECHO LVOT AREA: 1.5 CM2
ECHO LVOT DIAM: 1.4 CM
ECHO LVOT PEAK GRADIENT: 5 MMHG
ECHO LVOT PEAK VELOCITY: 1.1 M/S
ECHO MV A VELOCITY: 0.82 M/S
ECHO MV AREA PHT: 5.1 CM2
ECHO MV E DECELERATION TIME (DT): 150 MS
ECHO MV E VELOCITY: 0.23 M/S
ECHO MV E/A RATIO: 0.28
ECHO MV E/E' LATERAL: 4.6
ECHO MV E/E' RATIO (AVERAGED): 5.18
ECHO MV E/E' SEPTAL: 5.75
ECHO MV PRESSURE HALF TIME (PHT): 43.5 MS
ECHO MV REGURGITANT ALIASING (NYQUIST) VELOCITY: 31 CM/S
ECHO MV REGURGITANT VELOCITY PISA: 5.6 M/S
ECHO MV REGURGITANT VTIA: 195.8 CM
ECHO PV MAX VELOCITY: 0.5 M/S
ECHO PV PEAK GRADIENT: 1 MMHG
ECHO RV FREE WALL PEAK S': 8 CM/S
ECHO RV TAPSE: 1.3 CM (ref 1.7–?)
GLUCOSE BLD STRIP.AUTO-MCNC: 105 MG/DL (ref 65–117)
GLUCOSE BLD STRIP.AUTO-MCNC: 121 MG/DL (ref 65–117)
GLUCOSE BLD STRIP.AUTO-MCNC: 152 MG/DL (ref 65–117)
GLUCOSE BLD STRIP.AUTO-MCNC: 160 MG/DL (ref 65–117)
GLUCOSE BLD STRIP.AUTO-MCNC: 88 MG/DL (ref 65–117)
SERVICE CMNT-IMP: ABNORMAL
SERVICE CMNT-IMP: NORMAL
SERVICE CMNT-IMP: NORMAL

## 2022-07-11 PROCEDURE — 74011000250 HC RX REV CODE- 250: Performed by: FAMILY MEDICINE

## 2022-07-11 PROCEDURE — 93306 TTE W/DOPPLER COMPLETE: CPT | Performed by: INTERNAL MEDICINE

## 2022-07-11 PROCEDURE — 82962 GLUCOSE BLOOD TEST: CPT

## 2022-07-11 PROCEDURE — 93306 TTE W/DOPPLER COMPLETE: CPT

## 2022-07-11 PROCEDURE — 74011636637 HC RX REV CODE- 636/637: Performed by: FAMILY MEDICINE

## 2022-07-11 PROCEDURE — 97530 THERAPEUTIC ACTIVITIES: CPT | Performed by: OCCUPATIONAL THERAPIST

## 2022-07-11 PROCEDURE — 97165 OT EVAL LOW COMPLEX 30 MIN: CPT | Performed by: OCCUPATIONAL THERAPIST

## 2022-07-11 PROCEDURE — 65270000046 HC RM TELEMETRY

## 2022-07-11 PROCEDURE — 74011250637 HC RX REV CODE- 250/637: Performed by: INTERNAL MEDICINE

## 2022-07-11 PROCEDURE — 97535 SELF CARE MNGMENT TRAINING: CPT | Performed by: OCCUPATIONAL THERAPIST

## 2022-07-11 PROCEDURE — 74011250637 HC RX REV CODE- 250/637: Performed by: FAMILY MEDICINE

## 2022-07-11 PROCEDURE — 97116 GAIT TRAINING THERAPY: CPT

## 2022-07-11 RX ADMIN — ATORVASTATIN CALCIUM 80 MG: 40 TABLET, FILM COATED ORAL at 22:16

## 2022-07-11 RX ADMIN — ASPIRIN 81 MG: 81 TABLET, COATED ORAL at 11:19

## 2022-07-11 RX ADMIN — SODIUM CHLORIDE, PRESERVATIVE FREE 10 ML: 5 INJECTION INTRAVENOUS at 08:37

## 2022-07-11 RX ADMIN — SODIUM CHLORIDE, PRESERVATIVE FREE 10 ML: 5 INJECTION INTRAVENOUS at 22:16

## 2022-07-11 RX ADMIN — Medication 2 UNITS: at 08:36

## 2022-07-11 RX ADMIN — METOPROLOL SUCCINATE 50 MG: 25 TABLET, EXTENDED RELEASE ORAL at 11:19

## 2022-07-11 RX ADMIN — LOSARTAN POTASSIUM 12.5 MG: 25 TABLET, FILM COATED ORAL at 11:19

## 2022-07-11 NOTE — PROGRESS NOTES
Bedside shift change report given to JANEEN Becerril (oncoming nurse) by Glo Villela LPN (offgoing nurse). Report included the following information SBAR, Kardex, OR Summary, Procedure Summary, Intake/Output, MAR, Recent Results, Med Rec Status and Cardiac Rhythm .

## 2022-07-11 NOTE — PROGRESS NOTES
Occupational Therapy  Order received and chart reviewed, attempted to see however patient having test in room. Will continue to follow.    Nisha Jonas OTR/L

## 2022-07-11 NOTE — PROGRESS NOTES
Problem: Mobility Impaired (Adult and Pediatric)  Goal: *Acute Goals and Plan of Care (Insert Text)  Description: FUNCTIONAL STATUS PRIOR TO ADMISSION: Patient resides at [facility] (Memory Care Unit), Patient is a poor historian - Dementia with history of multiple CVAs. Per chart review, patient ambulates with a rolling walker and intermittently uses a wheelchair. Patient required assistance for all ADLs. HOME SUPPORT PRIOR TO ADMISSION: The patient lived with assisted living staff/caregivers. Physical Therapy Goals  Initiated 7/9/2022  1. Patient will move from supine to sit and sit to supine, scoot up and down, and roll side to side in bed with supervision/set-up within 7 day(s). 2.  Patient will transfer from bed to chair and chair to bed with supervision/set-up using the least restrictive device within 7 day(s). 3.  Patient will perform sit to stand with supervision/set-up within 7 day(s). 4.  Patient will ambulate with supervision/set-up for  feet with the least restrictive device within 7 day(s). Outcome: Progressing Towards Goal   PHYSICAL THERAPY TREATMENT  Patient: Teodora Carlin (61 y.o. female)  Date: 7/11/2022  Diagnosis: NSTEMI (non-ST elevated myocardial infarction) ([facility] [I21.4] <principal problem not specified>  Procedure(s) (LRB):  LEFT HEART CATH / CORONARY ANGIOGRAPHY (N/A) 3 Days Post-Op  Precautions: Fall,Skin  Chart, physical therapy assessment, plan of care and goals were reviewed. ASSESSMENT  Patient continues with skilled PT services and is progressing towards goals. Pt is primarily limited by impaired cognition, alert and oriented X 1 with poor recall of information after a few minutes. Her medical history includes dementia. She was able to amb increased distance today. Utilized a rolling walker and at times she required assist for walker management (tended to veer to her right). Post session she remained up to the chair.  Disposition depending on how much assist the memory care unit where pt resides is able to provide. Per chart review, family is requesting SNF for rehab. .     Current Level of Function Impacting Discharge (mobility/balance): provided contact guard and occasional min assist    Other factors to consider for discharge: h/o CVAs         PLAN :  Patient continues to benefit from skilled intervention to address the above impairments. Continue treatment per established plan of care. to address goals. Recommendation for discharge: (in order for the patient to meet his/her long term goals)  Physical therapy at least 2 days/week in the home AND ensure assist and/or supervision for safety with mobility , if not then SNF  for rehab    This discharge recommendation:  A follow-up discussion with the attending provider and/or case management is planned    IF patient discharges home will need the following DME: patient owns DME required for discharge       SUBJECTIVE:   Patient stated Alan Grant when I asked her what month it was, this after having told her several minutes prior that it is July. OBJECTIVE DATA SUMMARY:   Chart checked, pt cleared by nursing  Critical Behavior:  Neurologic State: Alert,Confused  Orientation Level: Oriented to person,Disoriented to place,Disoriented to situation,Disoriented to time  Cognition: Follows commands,Memory loss  Safety/Judgement: Decreased insight into deficits  Functional Mobility Training:  Bed Mobility:                 Transfers:  Sit to Stand: Contact guard assistance  Stand to Sit: Contact guard assistance                      Balance:  Sitting: Intact; Without support  Standing: Impaired  Standing - Static: Constant support;Good  Standing - Dynamic : Constant support;Good  Ambulation/Gait Training:  Distance (ft): 200 Feet (ft)  Assistive Device: Gait belt;Walker, rolling  Ambulation - Level of Assistance: Contact guard assistance (occasional assist with walker managment)        Gait Abnormalities: Decreased step clearance        Base of Support: Narrowed     Speed/Melissa: Slow  Step Length: Left shortened;Right shortened                    Stairs: Therapeutic Exercises:     Pain Rating:  None mentioned     Activity Tolerance:   Good    After treatment patient left in no apparent distress:   Sitting in chair, Call bell within reach, and Bed / chair alarm activated    COMMUNICATION/COLLABORATION:   The patients plan of care was discussed with: Occupational therapist and Registered nurse.      Ann Kemp   Time Calculation: 20 mins

## 2022-07-11 NOTE — PROGRESS NOTES
[facility] Adult  Hospitalist Group                                                                                          Hospitalist Progress Note  Ela Briggs MD  Answering service: [phone] OR [phone] from in house phone        Date of Service:  2022  NAME:  Peter Lopez  :  1955  MRN:  [id number]      Admission Summary:   Peter Lopez is a 73 y.o. female who presents with altered mental status     No meaningful history could be obtained from the patient, history was probably obtained from the niece who was present at the bedside, niece patient has history of multiple strokes, had an intracranial hemorrhage in 2019, has history of cognitive impairment since then, currently lives at [facility] living [facility], today the niece went to see her found her to be much less responsive, more confused, found her to be dehydrated, got concerned and called EMS, EKG was done, there was concern for STEMI, patient was brought to the [facility] ER, cardiologist evaluated patient, canceled STEMI, and patient was requested to be admitted to the hospitalist service, currently patient is resting in bed and cannot provide any history    Interval history / Subjective:   Patient seen and examined for follow-up of NSTEMI. Patient seen and examined earlier this morning by me. Patient sleeping comfortably at the time my exam.  Patient wakes up easily and denies any acute complaints. Assessment & Plan:     Non-ST elevation MI  Continue heparin drip  IMC with telemetry  Patient received aspirin, nitroglycerin  Cardiology on board. We will try to fit her in today for cath. Remain n.p.o.   No complaints of chest pain at this time  Heart cath shows significant disease, but she will be managed with medical therapy given comorbidities and condition  Continue management  Echocardiogram today to measure EF    Diabetes mellitus type 2 with hyperglycemia  Continue sliding scale insulin  Does not seem to be on insulin outside the hospital, just metformin    Hypoglycemia  Placed on D5W and insulin held  Blood sugars have gone up again. I am stopping the D5W drip for now. We will continue to hold insulin and continue to check her blood sugar. We will restart insulin once appropriate. Dehydration  Improving    Normal anion gap metabolic acidosis  Resolved  Continue fluids    VIKKI  Improving  Continue current management  Resolved     Hypercalcemia  Resolved    Acute metabolic encephalopathy  Patient continues altered. Unclear etiology at this time. Continue neurovascular checks. We will continue to evaluate during stay    Stable    Pending SNF arrangement    Code status:  Full  Prophylaxis: On heparin drip  Care Plan discussed with: Nurse, subspecialists, patient  Anticipated Disposition: Medically cleared but will need SNF     Hospital Problems  Date Reviewed: 1/20/2022          Codes Class Noted POA    NSTEMI (non-ST elevated myocardial infarction) [facility]) ICD-10-CM: I21.4  ICD-9-CM: 410.70  7/6/2022 Unknown                Review of Systems:   A comprehensive review of systems was negative except for that written in the HPI. Vital Signs:    Last 24hrs VS reviewed since prior progress note. Most recent are:  Visit Vitals  BP (!) 118/95   Pulse 61   Temp 97.5 °F (36.4 °C)   Resp 25   Ht 5' 5\" (1.651 m)   Wt 68.9 kg (152 lb)   SpO2 100%   BMI 25.29 kg/m²         Intake/Output Summary (Last 24 hours) at 7/11/2022 1112  Last data filed at 7/11/2022 0849  Gross per 24 hour   Intake --   Output 675 ml   Net -675 ml        Physical Examination:     I had a face to face encounter with this patient and independently examined them on 7/11/2022 as outlined below:          Constitutional:  No acute distress, cooperative, pleasant . Confused. ENT:  Oral mucosa moist, oropharynx benign. Resp:  CTA bilaterally. No wheezing/rhonchi/rales. No accessory muscle use.     CV:  Regular rhythm, normal rate, no murmurs, gallops, rubs    GI:  Soft, non distended, non tender. normoactive bowel sounds, no hepatosplenomegaly     Musculoskeletal:  No edema, warm, 2+ pulses throughout    Neurologic:  Moves all extremities. Disoriented            Data Review:    Review and/or order of clinical lab test  Review and/or order of tests in the radiology section of CPT  Review and/or order of tests in the medicine section of CPT      Labs:     Recent Labs     07/09/22  0415   WBC 6.6   HGB 11.5   HCT 36.4   *     Recent Labs     07/10/22  0832 07/09/22  0415    135*   K 4.1 Sample is hemolyzed (hemoglobin is   * 108   CO2 27 27   BUN 9 15   CREA 0.69 0.74   * 174*   CA 8.3* 8.8     No results for input(s): ALT, AP, TBIL, TBILI, TP, ALB, GLOB, GGT, AML, LPSE in the last 72 hours. No lab exists for component: SGOT, GPT, AMYP, HLPSE  No results for input(s): INR, PTP, APTT, INREXT, INREXT in the last 72 hours. No results for input(s): FE, TIBC, PSAT, FERR in the last 72 hours. No results found for: FOL, RBCF   No results for input(s): PH, PCO2, PO2 in the last 72 hours. No results for input(s): CPK, CKNDX, TROIQ in the last 72 hours.     No lab exists for component: CPKMB  Lab Results   Component Value Date/Time    Cholesterol, total 286 (H) 07/07/2022 03:27 AM    HDL Cholesterol 35 07/07/2022 03:27 AM    LDL, calculated 220.6 (H) 07/07/2022 03:27 AM    Triglyceride 152 (H) 07/07/2022 03:27 AM    CHOL/HDL Ratio 8.2 (H) 07/07/2022 03:27 AM     Lab Results   Component Value Date/Time    Glucose (POC) 152 (H) 07/11/2022 08:00 AM    Glucose (POC) 160 (H) 07/11/2022 06:22 AM    Glucose (POC) 150 (H) 07/10/2022 11:03 PM    Glucose (POC) 135 (H) 07/10/2022 04:56 PM    Glucose (POC) 258 (H) 07/10/2022 11:39 AM     Lab Results   Component Value Date/Time    Color Yellow 10/04/2021 07:54 AM    Appearance Clear 10/04/2021 07:54 AM    Specific gravity 1.015 01/05/2021 03:54 PM    Specific gravity 1.026 01/02/2020 09:41 PM    pH (UA) 5.5 10/04/2021 07:54 AM    Protein Negative 01/05/2021 03:54 PM    Glucose Negative 01/05/2021 03:54 PM    Ketone Negative 10/04/2021 07:54 AM    Bilirubin Negative 10/04/2021 07:54 AM    Urobilinogen 0.2 01/05/2021 03:54 PM    Nitrites Positive (A) 10/04/2021 07:54 AM    Leukocyte Esterase Negative 10/04/2021 07:54 AM    Epithelial cells FEW 01/05/2021 03:54 PM    Bacteria Many (A) 10/04/2021 07:54 AM    WBC 0-5 10/04/2021 07:54 AM    RBC None seen 10/04/2021 07:54 AM         Medications Reviewed:     Current Facility-Administered Medications   Medication Dose Route Frequency    atorvastatin (LIPITOR) tablet 80 mg  80 mg Oral QHS    losartan (COZAAR) tablet 12.5 mg  12.5 mg Oral DAILY    dextrose 5% and 0.9% NaCl infusion  75 mL/hr IntraVENous CONTINUOUS    [Held by provider] insulin glargine (LANTUS) injection 10 Units  10 Units SubCUTAneous QHS    metoprolol succinate (TOPROL-XL) XL tablet 50 mg  50 mg Oral DAILY    sodium chloride (NS) flush 5-40 mL  5-40 mL IntraVENous Q8H    sodium chloride (NS) flush 5-40 mL  5-40 mL IntraVENous PRN    aspirin delayed-release tablet 81 mg  81 mg Oral DAILY    nitroglycerin (NITROSTAT) tablet 0.4 mg  0.4 mg SubLINGual Q5MIN PRN    acetaminophen (TYLENOL) solution 650 mg  650 mg Oral Q4H PRN    glucose chewable tablet 16 g  4 Tablet Oral PRN    dextrose 10 % infusion 0-250 mL  0-250 mL IntraVENous PRN    glucagon (GLUCAGEN) injection 1 mg  1 mg IntraMUSCular PRN    insulin lispro (HUMALOG) injection   SubCUTAneous Q6H     ______________________________________________________________________  EXPECTED LENGTH OF STAY: 4d 2h  ACTUAL LENGTH OF STAY:          5                 Sofy Moreland MD

## 2022-07-11 NOTE — PROGRESS NOTES
Patient/family seen: YES       Informed patient/family of BPCI-A Bundle Program. Also advised of potential outreach by Care Transitions Team.    Bundle Payment Care Improvement Beneficiary Letter Delivered to Beneficiary or Representative:YES.  Date BCPI -A was given:07/11/22

## 2022-07-11 NOTE — PROGRESS NOTES
Problem: Self Care Deficits Care Plan (Adult)  Goal: *Acute Goals and Plan of Care (Insert Text)  Description: FUNCTIONAL STATUS PRIOR TO ADMISSION: patient's niece reports prior to going to Memory care unit/correction patient was able to ambulate with at rolling walker and mostly required supervision for basic ADLs for cognitive assist. Reports she was getting weaker there and in bed or wheelchair versus walking. HOME SUPPORT: patient was in memory care prior to coming to hospital, niece reports dissatisfaction with care received as she was unable to mobilize very often and doing less for herself    Occupational Therapy Goals  Initiated 7/11/2022  1. Patient will perform grooming with SBA standing at the sink within 7 day(s). 2.  Patient will perform upper body dressing with supervision/set-up within 7 day(s). 3.  Patient will perform lower body dressing with SBA within 7 day(s). 4.  Patient will perform toilet transfers with SBA using rolling walker and min cues within 7 day(s). 5.  Patient will perform all aspects of toileting with moderate assistance  within 7 day(s). 6.  Patient will stand unsupported > or = 2 minutes with SBA for bilateral UE ROM exercises without loss of balance within 7 day(s). Outcome: Not Met     OCCUPATIONAL THERAPY EVALUATION  Patient: Heena Serrano (36 y.o. female)  Date: 7/11/2022  Primary Diagnosis: NSTEMI (non-ST elevated myocardial infarction) ([facility] [I21.4]  Procedure(s) (LRB):  LEFT HEART CATH / CORONARY ANGIOGRAPHY (N/A) 3 Days Post-Op   Precautions:   Fall,Skin    ASSESSMENT  Based on the objective data described below, the patient presents with good participation, supportive niece arrived at start of tx and patient recalled who she and her nephew were. Demonstrated need for increased assist for sit to stand however once on her feet overall min assist. Feel she has potential to increase her independence, balance, overall strength with increased therapy.     Current Level of Function Impacting Discharge (ADLs/self-care): max assist toileting, min assist LE dressing, sit to stand with mod assist, ADL mobility with min assist    Functional Outcome Measure: The patient scored 35/100 on the Barthel Index outcome measure which is indicative of very dependent. Other factors to consider for discharge: plan for her to go to rehab      Patient will benefit from skilled therapy intervention to address the above noted impairments. PLAN :  Recommendations and Planned Interventions: self care training, functional mobility training, therapeutic exercise, balance training, therapeutic activities, cognitive retraining, endurance activities, neuromuscular re-education, patient education, home safety training, and family training/education    Frequency/Duration: Patient will be followed by occupational therapy 5 times a week to address goals.     Recommendation for discharge: (in order for the patient to meet his/her long term goals)  Therapy up to 5 days/week in SNF setting    This discharge recommendation:  Has been made in collaboration with the attending provider and/or case management    IF patient discharges home will need the following DME: none       SUBJECTIVE:   Patient stated I don't have to go\" re: to the bathroom after ambulating into the bathroom    OBJECTIVE DATA SUMMARY:   HISTORY:   Past Medical History:   Diagnosis Date    Diabetes ([facility]     Hypertension     Memory loss     NSTEMI (non-ST elevated myocardial infarction) ([facility] 7/6/2022    Stroke ([facility]     cerebral hemorrhage,cerebral anurysm     Past Surgical History:   Procedure Laterality Date    HX ORTHOPAEDIC  1996    back surgery    NEUROLOGICAL PROCEDURE UNLISTED  2008    shunt placement       Expanded or extensive additional review of patient history:     Home Situation  Home Environment: Assisted living ([address] [facility])  [address] [facility] Name: [facility]  Living Alone: No  Support Systems: Assisted Living,Caregiver/Home Care Staff  Patient Expects to be Discharged to[de-identified] Skilled nursing facility  Current DME Used/Available at Home: Xu Ena, rolling,Wheelchair    Hand dominance:     EXAMINATION OF PERFORMANCE DEFICITS:  Cognitive/Behavioral Status:  Neurologic State: Alert  Orientation Level: Oriented to person  Cognition: Follows commands; Appropriate for age attention/concentration;Memory loss  Perception: Appears intact  Perseveration: No perseveration noted  Safety/Judgement: Decreased insight into deficits    Skin: intact    Edema: minimal LE's    Hearing: Auditory  Auditory Impairment: None    Vision/Perceptual:       Not formally assessed         Range of Motion:  AROM: Within functional limits        Strength:  Strength: Generally decreased, functional        Coordination:  Coordination: Generally decreased, functional          Tone & Sensation:  Tone: Normal        Balance:  Sitting: Intact; Without support  Standing: Impaired; Without support  Standing - Static: Constant support;Good  Standing - Dynamic : Fair;Constant support    Functional Mobility and Transfers for ADLs:  Bed Mobility:  Supine to Sit: Minimum assistance    Transfers:  Sit to Stand: Minimum assistance; Moderate assistance;Assist x1;Additional time (from low bed)  Stand to Sit: Minimum assistance;Assist x1  Bed to Chair: Minimum assistance;Assist x1;Adaptive equipment  Bathroom Mobility: Minimum assistance  Toilet Transfer : Minimum assistance; Adaptive equipment; Additional time;Assist x1    ADL Assessment:       Oral Facial Hygiene/Grooming: Setup;Supervision       Upper Body Dressing: Contact guard assistance    Lower Body Dressing: Minimum assistance; Other (comment) (able to cross LE's)    Toileting:  Total assistance (pur-wic, incontinence)           ADL Intervention and task modifications:        Educated patient and her niece on role of OT    Patient instructed and indicated understanding the benefits of maintaining activity tolerance, functional mobility, and independence with self care tasks during acute stay  to ensure safe return home and to baseline. Encouraged patient to increase frequency and duration OOB, be out of bed for all meals, perform daily ADLs (as approved by RN/MD regarding bathing etc), and performing functional mobility to/from bathroom. Instructed patient on use of call bell to call for assist and demonstrated ability to call. Educated on use of chair alarm on her chair for her safety to prevent fall and if she hears chair alarm to sit back down. Had patient squat to make alarm sound and patient returned to sitting demonstrating good carryover    Instructed to call for assist with toileting and transfer into the bathroom      Educated on skin protection and need to change brief if soiled to prevent skin breakdown/UTI    Changed brief in standing in the bathroom with cues to maintain UE support on rolling walker to maintain balance       Cognitive Retraining  Safety/Judgement: Decreased insight into deficits      Therapeutic Exercise:  Seated edge of bed facilitated knee extension initially with UE support and next with UE's on thighs to increase demand on core, completed with cue for muscle contraction at end range x's 10 reps    Seated unsupported instructed to march her LE's and completed with max cues-verbal and visual     Instructed in chair to perform UE and LE AROM exericses   Functional Measure:    Barthel Index:  Bathin  Bladder: 0  Bowels: 10  Groomin  Dressin  Feedin  Mobility: 0  Stairs: 0  Toilet Use: 5  Transfer (Bed to Chair and Back): 10  Total: 35/100      The Barthel ADL Index: Guidelines  1. The index should be used as a record of what a patient does, not as a record of what a patient could do. 2. The main aim is to establish degree of independence from any help, physical or verbal, however minor and for whatever reason.   3. The need for supervision renders the patient not independent. 4. A patient's performance should be established using the best available evidence. Asking the patient, friends/relatives and nurses are the usual sources, but direct observation and common sense are also important. However direct testing is not needed. 5. Usually the patient's performance over the preceding 24-48 hours is important, but occasionally longer periods will be relevant. 6. Middle categories imply that the patient supplies over 50 per cent of the effort. 7. Use of aids to be independent is allowed. Score Interpretation (from [address] [address])    Independent   60-79 Minimally independent   40-59 Partially dependent   20-39 Very dependent   <20 Totally dependent     -Harjinder Rush., Barthel, D.W. (1965). Functional evaluation: the Barthel Index. [address] [facility] ([address] (1997). The Barthel activities of daily living index: self-reporting versus actual performance in the old (> or = 75 years). Journal of [address] [place] [address] 45(7), [address] [facility], .BeverleyM.F, Eva Clifford., Ghada Hogan. (1999). Measuring the change in disability after inpatient rehabilitation; comparison of the responsiveness of the Barthel Index and Functional [place] Measure. Journal of Neurology, Neurosurgery, and Psychiatry, 66(4), 464-799. Sushant Rangel, N.J.A, KSENIA Pulido, & Vladimir Pichardo MBeverleyA. (2004) Assessment of post-stroke quality of life in cost-effectiveness studies: The usefulness of the Barthel Index and the EuroQoL-5D.  Quality of Life Research, 15, 316-43         Occupational Therapy Evaluation Charge Determination   History Examination Decision-Making   MEDIUM Complexity : Expanded review of history including physical, cognitive and psychosocial  history  MEDIUM Complexity : 3-5 performance deficits relating to physical, cognitive , or psychosocial skils that result in activity limitations and / or participation restrictions MEDIUM Complexity : Patient may present with comorbidities that affect occupational performnce. Miniml to moderate modification of tasks or assistance (eg, physical or verbal ) with assesment(s) is necessary to enable patient to complete evaluation       Based on the above components, the patient evaluation is determined to be of the following complexity level: MEDIUM  Pain Rating:  No complaint or sign of    Activity Tolerance:   Good    After treatment patient left in no apparent distress:    Sitting in chair, Call bell within reach, Bed / chair alarm activated, and Caregiver / family present    COMMUNICATION/EDUCATION:   The patients plan of care was discussed with: Physical therapist and Registered nurse. Patient/family have participated as able in goal setting and plan of care. This patients plan of care is appropriate for delegation to [facility].     Thank you for this referral.  Tariq Mitchell OTR/L  Time Calculation: 37 mins

## 2022-07-11 NOTE — PROGRESS NOTES
Pt is presently having testing done bedside. Will follow and see as able and appropriate.  Thank you, Doris Ace, PT

## 2022-07-12 LAB
ANION GAP SERPL CALC-SCNC: 7 MMOL/L (ref 5–15)
APTT PPP: 27.3 SEC (ref 22.1–31)
BASOPHILS # BLD: 0 K/UL (ref 0–0.1)
BASOPHILS NFR BLD: 0 % (ref 0–1)
BUN SERPL-MCNC: 14 MG/DL (ref 6–20)
BUN/CREAT SERPL: 17 (ref 12–20)
CALCIUM SERPL-MCNC: 9.4 MG/DL (ref 8.5–10.1)
CHLORIDE SERPL-SCNC: 107 MMOL/L (ref 97–108)
CO2 SERPL-SCNC: 25 MMOL/L (ref 21–32)
COVID-19 RAPID TEST, COVR: NOT DETECTED
CREAT SERPL-MCNC: 0.82 MG/DL (ref 0.55–1.02)
DIFFERENTIAL METHOD BLD: ABNORMAL
EOSINOPHIL # BLD: 0.2 K/UL (ref 0–0.4)
EOSINOPHIL NFR BLD: 2 % (ref 0–7)
ERYTHROCYTE [DISTWIDTH] IN BLOOD BY AUTOMATED COUNT: 15.5 % (ref 11.5–14.5)
GLUCOSE BLD STRIP.AUTO-MCNC: 112 MG/DL (ref 65–117)
GLUCOSE BLD STRIP.AUTO-MCNC: 115 MG/DL (ref 65–117)
GLUCOSE BLD STRIP.AUTO-MCNC: 136 MG/DL (ref 65–117)
GLUCOSE BLD STRIP.AUTO-MCNC: 196 MG/DL (ref 65–117)
GLUCOSE BLD STRIP.AUTO-MCNC: 196 MG/DL (ref 65–117)
GLUCOSE SERPL-MCNC: 129 MG/DL (ref 65–100)
HCT VFR BLD AUTO: 41 % (ref 35–47)
HGB BLD-MCNC: 13 G/DL (ref 11.5–16)
IMM GRANULOCYTES # BLD AUTO: 0 K/UL (ref 0–0.04)
IMM GRANULOCYTES NFR BLD AUTO: 0 % (ref 0–0.5)
LYMPHOCYTES # BLD: 3.7 K/UL (ref 0.8–3.5)
LYMPHOCYTES NFR BLD: 37 % (ref 12–49)
MCH RBC QN AUTO: 27.3 PG (ref 26–34)
MCHC RBC AUTO-ENTMCNC: 31.7 G/DL (ref 30–36.5)
MCV RBC AUTO: 86 FL (ref 80–99)
MONOCYTES # BLD: 0.7 K/UL (ref 0–1)
MONOCYTES NFR BLD: 7 % (ref 5–13)
NEUTS SEG # BLD: 5.4 K/UL (ref 1.8–8)
NEUTS SEG NFR BLD: 54 % (ref 32–75)
NRBC # BLD: 0 K/UL (ref 0–0.01)
NRBC BLD-RTO: 0 PER 100 WBC
PLATELET # BLD AUTO: 291 K/UL (ref 150–400)
PMV BLD AUTO: 10.9 FL (ref 8.9–12.9)
POTASSIUM SERPL-SCNC: 3.6 MMOL/L (ref 3.5–5.1)
RBC # BLD AUTO: 4.77 M/UL (ref 3.8–5.2)
SERVICE CMNT-IMP: ABNORMAL
SERVICE CMNT-IMP: NORMAL
SERVICE CMNT-IMP: NORMAL
SODIUM SERPL-SCNC: 139 MMOL/L (ref 136–145)
SOURCE, COVRS: NORMAL
THERAPEUTIC RANGE,PTTT: NORMAL SECS (ref 58–77)
WBC # BLD AUTO: 9.9 K/UL (ref 3.6–11)

## 2022-07-12 PROCEDURE — 74011636637 HC RX REV CODE- 636/637: Performed by: FAMILY MEDICINE

## 2022-07-12 PROCEDURE — 94760 N-INVAS EAR/PLS OXIMETRY 1: CPT

## 2022-07-12 PROCEDURE — 80048 BASIC METABOLIC PNL TOTAL CA: CPT

## 2022-07-12 PROCEDURE — 85730 THROMBOPLASTIN TIME PARTIAL: CPT

## 2022-07-12 PROCEDURE — 85025 COMPLETE CBC W/AUTO DIFF WBC: CPT

## 2022-07-12 PROCEDURE — 74011250637 HC RX REV CODE- 250/637: Performed by: FAMILY MEDICINE

## 2022-07-12 PROCEDURE — 74011000250 HC RX REV CODE- 250: Performed by: FAMILY MEDICINE

## 2022-07-12 PROCEDURE — 82962 GLUCOSE BLOOD TEST: CPT

## 2022-07-12 PROCEDURE — 74011250637 HC RX REV CODE- 250/637: Performed by: INTERNAL MEDICINE

## 2022-07-12 PROCEDURE — 36415 COLL VENOUS BLD VENIPUNCTURE: CPT

## 2022-07-12 PROCEDURE — 65270000046 HC RM TELEMETRY

## 2022-07-12 PROCEDURE — 97530 THERAPEUTIC ACTIVITIES: CPT

## 2022-07-12 PROCEDURE — 87635 SARS-COV-2 COVID-19 AMP PRB: CPT

## 2022-07-12 PROCEDURE — 97535 SELF CARE MNGMENT TRAINING: CPT

## 2022-07-12 RX ADMIN — SODIUM CHLORIDE, PRESERVATIVE FREE 10 ML: 5 INJECTION INTRAVENOUS at 06:00

## 2022-07-12 RX ADMIN — METOPROLOL SUCCINATE 50 MG: 25 TABLET, EXTENDED RELEASE ORAL at 09:55

## 2022-07-12 RX ADMIN — Medication 2 UNITS: at 12:44

## 2022-07-12 RX ADMIN — ASPIRIN 81 MG: 81 TABLET, COATED ORAL at 09:55

## 2022-07-12 RX ADMIN — ATORVASTATIN CALCIUM 80 MG: 40 TABLET, FILM COATED ORAL at 21:01

## 2022-07-12 RX ADMIN — LOSARTAN POTASSIUM 12.5 MG: 25 TABLET, FILM COATED ORAL at 09:55

## 2022-07-12 RX ADMIN — SODIUM CHLORIDE, PRESERVATIVE FREE 10 ML: 5 INJECTION INTRAVENOUS at 14:00

## 2022-07-12 NOTE — PROGRESS NOTES
Bedside shift change report given to Waldo Mike (oncoming nurse) by Bonilla Fournier RN (offgoing nurse). Report included the following information SBAR, MAR and Cardiac Rhythm SB/NSR.

## 2022-07-12 NOTE — PROGRESS NOTES
Spiritual Care Partner Volunteer visited patient in [facility] on 7.12.22.     Documented by Kraig Silveira, Staff , on 7.12.22  Please call PEARL ([phone]) to page  if needed

## 2022-07-12 NOTE — PROGRESS NOTES
[facility] Adult  Hospitalist Group                                                                                          Hospitalist Progress Note  Odell Moyer MD  Answering service: [phone] OR [phone] from in house phone        Date of Service:  2022  NAME:  Jayda Johnston  :  1955  MRN:  [id number]      Admission Summary:   Jayda Johnston is a 73 y.o. female who presents with altered mental status     No meaningful history could be obtained from the patient, history was probably obtained from the niece who was present at the bedside, niece patient has history of multiple strokes, had an intracranial hemorrhage in 2019, has history of cognitive impairment since then, currently lives at [facility], today the niece went to see her found her to be much less responsive, more confused, found her to be dehydrated, got concerned and called EMS, EKG was done, there was concern for STEMI, patient was brought to the [address] [place] [address] ER, cardiologist evaluated patient, canceled STEMI, and patient was requested to be admitted to the hospitalist service, currently patient is resting in bed and cannot provide any history    Interval history / Subjective:   Patient seen and examined for follow-up of NSTEMI. Patient seen and examined earlier this morning by me. No acute complaints at the time of exam.  Patient was sleeping comfortably wakes up easily to voice. Assessment & Plan:     Non-ST elevation MI  Continue heparin drip  IMC with telemetry  Patient received aspirin, nitroglycerin  Cardiology on board. We will try to fit her in today for cath. Remain n.p.o.   No complaints of chest pain at this time  Heart cath shows significant disease, but she will be managed with medical therapy given comorbidities and condition  Continue management  Echocardiogram today to measure EF  Continue current medical management  EF 45 to 50%    Diabetes mellitus type 2 with hyperglycemia  Continue sliding scale insulin  Does not seem to be on insulin outside the hospital, just metformin    Hypoglycemia  Placed on D5W and insulin held  Blood sugars have gone up again. I am stopping the D5W drip for now. We will continue to hold insulin and continue to check her blood sugar. We will restart insulin once appropriate. Dehydration  Improving    Normal anion gap metabolic acidosis  Resolved  Continue fluids    VIKKI  Improving  Continue current management  Resolved     Hypercalcemia  Resolved    Acute metabolic encephalopathy  Patient continues altered. Unclear etiology at this time. Continue neurovascular checks. We will continue to evaluate during stay    Stable    We are pending bed and SNF's for today. Likely discharge today once above is confirmed. Code status:  Full  Prophylaxis: On heparin drip  Care Plan discussed with: Nurse, subspecialists, patient  Anticipated Disposition: Medically cleared but will need SNF     Hospital Problems  Date Reviewed: 1/20/2022          Codes Class Noted POA    NSTEMI (non-ST elevated myocardial infarction) [facility]) ICD-10-CM: I21.4  ICD-9-CM: 410.70  7/6/2022 Unknown                Review of Systems:   A comprehensive review of systems was negative except for that written in the HPI. Vital Signs:    Last 24hrs VS reviewed since prior progress note. Most recent are:  Visit Vitals  /73   Pulse 88   Temp 98 °F (36.7 °C)   Resp 11   Ht 5' 5\" (1.651 m)   Wt 69.9 kg (154 lb 3.2 oz)   SpO2 100%   BMI 25.66 kg/m²       No intake or output data in the 24 hours ending 07/12/22 1001     Physical Examination:     I had a face to face encounter with this patient and independently examined them on 7/12/2022 as outlined below:          Constitutional:  No acute distress, cooperative, pleasant . Confused. ENT:  Oral mucosa moist, oropharynx benign. Resp:  CTA bilaterally. No wheezing/rhonchi/rales. No accessory muscle use.     CV: Regular rhythm, normal rate, no murmurs, gallops, rubs    GI:  Soft, non distended, non tender. normoactive bowel sounds, no hepatosplenomegaly     Musculoskeletal:  No edema, warm, 2+ pulses throughout    Neurologic:  Moves all extremities. Disoriented            Data Review:    Review and/or order of clinical lab test  Review and/or order of tests in the radiology section of [facility]  Review and/or order of tests in the medicine section of [facility]      Labs:     Recent Labs     07/12/22 0621   WBC 9.9   HGB 13.0   HCT 41.0        Recent Labs     07/12/22  0621 07/10/22  0832    139   K 3.6 4.1    109*   CO2 25 27   BUN 14 9   CREA 0.82 0.69   * 228*   CA 9.4 8.3*     No results for input(s): ALT, AP, TBIL, TBILI, TP, ALB, GLOB, GGT, AML, LPSE in the last 72 hours. No lab exists for component: SGOT, GPT, AMYP, HLPSE  Recent Labs     07/12/22 0621   APTT 27.3      No results for input(s): FE, TIBC, PSAT, FERR in the last 72 hours. No results found for: FOL, RBCF   No results for input(s): PH, PCO2, PO2 in the last 72 hours. No results for input(s): CPK, CKNDX, TROIQ in the last 72 hours.     No lab exists for component: CPKMB  Lab Results   Component Value Date/Time    Cholesterol, total 286 (H) 07/07/2022 03:27 AM    HDL Cholesterol 35 07/07/2022 03:27 AM    LDL, calculated 220.6 (H) 07/07/2022 03:27 AM    Triglyceride 152 (H) 07/07/2022 03:27 AM    CHOL/HDL Ratio 8.2 (H) 07/07/2022 03:27 AM     Lab Results   Component Value Date/Time    Glucose (POC) 112 07/12/2022 08:39 AM    Glucose (POC) 115 07/12/2022 06:20 AM    Glucose (POC) 121 (H) 07/11/2022 09:40 PM    Glucose (POC) 88 07/11/2022 04:51 PM    Glucose (POC) 105 07/11/2022 01:01 PM     Lab Results   Component Value Date/Time    Color Yellow 10/04/2021 07:54 AM    Appearance Clear 10/04/2021 07:54 AM    Specific gravity 1.015 01/05/2021 03:54 PM    Specific gravity 1.026 01/02/2020 09:41 PM    pH (UA) 5.5 10/04/2021 07:54 AM Protein Negative 01/05/2021 03:54 PM    Glucose Negative 01/05/2021 03:54 PM    Ketone Negative 10/04/2021 07:54 AM    Bilirubin Negative 10/04/2021 07:54 AM    Urobilinogen 0.2 01/05/2021 03:54 PM    Nitrites Positive (A) 10/04/2021 07:54 AM    Leukocyte Esterase Negative 10/04/2021 07:54 AM    Epithelial cells FEW 01/05/2021 03:54 PM    Bacteria Many (A) 10/04/2021 07:54 AM    WBC 0-5 10/04/2021 07:54 AM    RBC None seen 10/04/2021 07:54 AM         Medications Reviewed:     Current Facility-Administered Medications   Medication Dose Route Frequency    atorvastatin (LIPITOR) tablet 80 mg  80 mg Oral QHS    losartan (COZAAR) tablet 12.5 mg  12.5 mg Oral DAILY    dextrose 5% and 0.9% NaCl infusion  75 mL/hr IntraVENous CONTINUOUS    [Held by provider] insulin glargine (LANTUS) injection 10 Units  10 Units SubCUTAneous QHS    metoprolol succinate (TOPROL-XL) XL tablet 50 mg  50 mg Oral DAILY    sodium chloride (NS) flush 5-40 mL  5-40 mL IntraVENous Q8H    sodium chloride (NS) flush 5-40 mL  5-40 mL IntraVENous PRN    aspirin delayed-release tablet 81 mg  81 mg Oral DAILY    nitroglycerin (NITROSTAT) tablet 0.4 mg  0.4 mg SubLINGual Q5MIN PRN    acetaminophen (TYLENOL) solution 650 mg  650 mg Oral Q4H PRN    glucose chewable tablet 16 g  4 Tablet Oral PRN    dextrose 10 % infusion 0-250 mL  0-250 mL IntraVENous PRN    glucagon (GLUCAGEN) injection 1 mg  1 mg IntraMUSCular PRN    insulin lispro (HUMALOG) injection   SubCUTAneous Q6H     ______________________________________________________________________  EXPECTED LENGTH OF STAY: 4d 2h  ACTUAL LENGTH OF STAY:          6                 Farnaz Le MD

## 2022-07-12 NOTE — ROUTINE PROCESS
2030 Verbal bedside report given to LALO GASCA RN oncoming nurse by Chong Ryder RN off-going nurse. Report included current pt status and condition, recent results, hx of present illness, heart rate and rhythm, and respiratory status.

## 2022-07-12 NOTE — PROGRESS NOTES
Transition Plan of Care  RUR 9%-Low  Disposition-discharging  tommorow to [facility]. Confirmed bed with Nathan Ngo in admissions. [facility] transport at 1100.

## 2022-07-12 NOTE — PROGRESS NOTES
Problem: Self Care Deficits Care Plan (Adult)  Goal: *Acute Goals and Plan of Care (Insert Text)  Description: FUNCTIONAL STATUS PRIOR TO ADMISSION: patient's niece reports prior to going to Memory care unit/snf patient was able to ambulate with at rolling walker and mostly required supervision for basic ADLs for cognitive assist. Reports she was getting weaker there and in bed or wheelchair versus walking. HOME SUPPORT: patient was in memory care prior to coming to hospital, niece reports dissatisfaction with care received as she was unable to mobilize very often and doing less for herself    Occupational Therapy Goals  Initiated 7/11/2022  1. Patient will perform grooming with SBA standing at the sink within 7 day(s). 2.  Patient will perform upper body dressing with supervision/set-up within 7 day(s). 3.  Patient will perform lower body dressing with SBA within 7 day(s). 4.  Patient will perform toilet transfers with SBA using rolling walker and min cues within 7 day(s). 5.  Patient will perform all aspects of toileting with moderate assistance  within 7 day(s). 6.  Patient will stand unsupported > or = 2 minutes with SBA for bilateral UE ROM exercises without loss of balance within 7 day(s). Outcome: Progressing Towards Goal     OCCUPATIONAL THERAPY TREATMENT  Patient: Mihaela Babin (18 y.o. female)  Date: 7/12/2022  Diagnosis: [facility] (non-ST [facility] [I21.4] <principal problem not specified>  Procedure(s) (LRB):  LEFT HEART CATH / CORONARY ANGIOGRAPHY (N/A) 4 Days Post-Op  Precautions: Fall,Skin  Chart, occupational therapy assessment, plan of care, and goals were reviewed. ASSESSMENT  Patient continues with skilled OT services and is progressing towards goals. Pt cleared for therapy by nursing and received asleep in bed. Once awake pt interested in participating in therapy. Completed bed mobility to sit EOB and stood with RW to doff soiled brief.  Pt transferred to [facility] for toileting and hygiene (SBA). Donned clean brief and completed functional mobility in room to chair. Pt in chair at end of session with all needs met. Overall pt functioning at [address] A-SBA requiring verbal cues and short commands due to dementia. Continue to recommend SNF at discharge. Current Level of Function Impacting Discharge (ADLs): Min A-SBA     Other factors to consider for discharge: from memory care unit/detention         PLAN :  Patient continues to benefit from skilled intervention to address the above impairments. Continue treatment per established plan of care to address goals. Recommend with staff: OOB to chair and BSC with RW and Ax1, Pt participation in self care     Recommend next OT session: LB dressing/ADLs    Recommendation for discharge: (in order for the patient to meet his/her long term goals)  Therapy up to 5 days/week in SNF setting    This discharge recommendation:  Has been made in collaboration with the attending provider and/or case management    IF patient discharges home will need the following DME: patient owns DME required for discharge       SUBJECTIVE:   Patient stated I'd like to move some.     OBJECTIVE DATA SUMMARY:   Cognitive/Behavioral Status:  Neurologic State: Alert;Confused  Orientation Level: Oriented to person;Oriented to place; Disoriented to situation;Disoriented to time  Cognition: Follows commands;Memory loss        Safety/Judgement: Decreased awareness of environment    Functional Mobility and Transfers for ADLs:  Bed Mobility:  Supine to Sit: Stand-by assistance    Transfers:  Sit to Stand: Contact guard assistance     Bed to Chair: Contact guard assistance    Balance:  Sitting: Intact  Standing: Impaired  Standing - Static: Constant support;Good  Standing - Dynamic : Constant support;Good    ADL Intervention:     Lower Body Dressing Assistance  Protective Undergarmet:  Moderate assistance    Toileting  Toileting Assistance: Stand-by assistance  Bowel Hygiene: Stand-by assistance  Clothing Management: Stand-by assistance    Cognitive Retraining  Safety/Judgement: Decreased awareness of environment      Pain:  none    Activity Tolerance:   Good    After treatment patient left in no apparent distress:   Sitting in chair, Call bell within reach and Bed / chair alarm activated    COMMUNICATION/COLLABORATION:   The patients plan of care was discussed with: Physical therapist and Registered nurse.      Ashwin Hurley OT  Time Calculation: 24 mins

## 2022-07-12 NOTE — ROUTINE PROCESS
Bedside and Verbal shift change report given to Waldo Mike  (oncoming nurse) by Stefania Herman RN  (offgoing nurse). Report included the following information SBAR, Kardex, ED Summary, Procedure Summary, Intake/Output, MAR, Accordion, Recent Results, Med Rec Status, Cardiac Rhythm SR, Alarm Parameters , Pre Procedure Checklist, Procedure Verification, Quality Measures and Dual Neuro Assessment.      10:55 Rapid covid test done and sent to lab for resulting

## 2022-07-13 VITALS
WEIGHT: 154.2 LBS | RESPIRATION RATE: 13 BRPM | OXYGEN SATURATION: 99 % | DIASTOLIC BLOOD PRESSURE: 76 MMHG | HEIGHT: 65 IN | SYSTOLIC BLOOD PRESSURE: 120 MMHG | BODY MASS INDEX: 25.69 KG/M2 | HEART RATE: 74 BPM | TEMPERATURE: 97.7 F

## 2022-07-13 PROBLEM — I21.4 NSTEMI (NON-ST ELEVATED MYOCARDIAL INFARCTION) (HCC): Status: RESOLVED | Noted: 2022-07-06 | Resolved: 2022-07-13

## 2022-07-13 LAB
GLUCOSE BLD STRIP.AUTO-MCNC: 147 MG/DL (ref 65–117)
GLUCOSE BLD STRIP.AUTO-MCNC: 175 MG/DL (ref 65–117)
GLUCOSE BLD STRIP.AUTO-MCNC: NORMAL MG/DL (ref 65–117)
SERVICE CMNT-IMP: ABNORMAL
SERVICE CMNT-IMP: ABNORMAL
SERVICE CMNT-IMP: NORMAL

## 2022-07-13 PROCEDURE — 82962 GLUCOSE BLOOD TEST: CPT

## 2022-07-13 PROCEDURE — 74011250637 HC RX REV CODE- 250/637: Performed by: FAMILY MEDICINE

## 2022-07-13 PROCEDURE — 74011000250 HC RX REV CODE- 250: Performed by: FAMILY MEDICINE

## 2022-07-13 PROCEDURE — 74011636637 HC RX REV CODE- 636/637: Performed by: FAMILY MEDICINE

## 2022-07-13 PROCEDURE — 74011250637 HC RX REV CODE- 250/637: Performed by: INTERNAL MEDICINE

## 2022-07-13 RX ORDER — ASPIRIN 81 MG/1
81 TABLET ORAL DAILY
Qty: 30 TABLET | Refills: 0 | Status: SHIPPED
Start: 2022-07-13

## 2022-07-13 RX ORDER — NITROGLYCERIN 0.4 MG/1
0.4 TABLET SUBLINGUAL
Qty: 15 TABLET | Refills: 0 | Status: SHIPPED
Start: 2022-07-13

## 2022-07-13 RX ORDER — LOSARTAN POTASSIUM 25 MG/1
12.5 TABLET ORAL DAILY
Qty: 30 TABLET | Refills: 0 | Status: SHIPPED
Start: 2022-07-13 | End: 2022-10-11

## 2022-07-13 RX ORDER — ATORVASTATIN CALCIUM 80 MG/1
80 TABLET, FILM COATED ORAL
Qty: 30 TABLET | Refills: 0 | Status: SHIPPED
Start: 2022-07-13

## 2022-07-13 RX ADMIN — Medication 2 UNITS: at 06:37

## 2022-07-13 RX ADMIN — SODIUM CHLORIDE, PRESERVATIVE FREE 10 ML: 5 INJECTION INTRAVENOUS at 06:50

## 2022-07-13 RX ADMIN — Medication 2 UNITS: at 00:15

## 2022-07-13 RX ADMIN — LOSARTAN POTASSIUM 12.5 MG: 25 TABLET, FILM COATED ORAL at 09:25

## 2022-07-13 RX ADMIN — METOPROLOL SUCCINATE 50 MG: 25 TABLET, EXTENDED RELEASE ORAL at 09:25

## 2022-07-13 RX ADMIN — ASPIRIN 81 MG: 81 TABLET, COATED ORAL at 09:25

## 2022-07-13 NOTE — PROGRESS NOTES
Transition of Care Plan to SNF/Rehab    SNF/Rehab Transition:  Patient has been accepted to [facility] meets criteria for admission. Patient will transported by [facility] and expected to leave at 1100. Communication to Patient/Family:  Met with patient and nidiane Lainez (identified care giver) and they are agreeable to the transition plan. Communication to SNF/Rehab:  Bedside RN, , has been notified to update the transition plan to the facility and call report (phone number [phone]  Discharge information has been updated on the AVS.     Discharge instructions to be sent with transport     [] BCPI-A  Patient has been identified as part of the  BCPI-A Program.  For Care Coordination associated with that Bundle Program, please contact   Bundle information has been communication to     Nursing Please include all hard scripts for controlled substances, med rec and dc summary, and AVS in packet.      Reviewed and confirmed with facility, Annmarie Haley in admissions, can manage the patient care needs for the following:     SNF/Rehab Transition:  Patient to follow-up with Home Health: after rehab    Reviewed and confirmed with facility, [facility] they can manage the patient care needs for the following:     Merle Smart with (X) only those applicable:    Medication:  []  Medications will be available at the facility  []  IV Antibiotics   []  Controlled Substance - hard copy to be sent with patient   []  Weekly Labs   Documents:  [] Hard RX  [] MAR  [] Kardex  [] AVS  []Transfer Summary  []Discharge   Equipment:  []  CPAP/BiPAP  []  Wound Vacuum  []  Tristan or Urinary Device  []  PICC/Central Line  []  Nebulizer  []  Ventilator   Treatment:  []Isolation (for MRSA, VRE, etc.)  []Surgical Drain Management  []Tracheostomy Care  []Dressing Changes  []Dialysis with transportation and chair time  []PEG Care  []Oxygen  []Daily Weights for Heart Failure   Dietary:  []Any diet limitations  []Tube Feedings []Total Parenteral Management (TPN)   Eligible for Medicaid Long Term Services and Supports  Yes:  [] Eligible for medical assistance or will become eligible within 180 days and UAI completed. [] Provider/Patient and/or support system has requested screening. [] UAI copy provided to patient or responsible party,   [] UAI unavailable at discharge will send once processed to SNF provider. [] UAI unavailable at discharged mailed to patient  No:   [] Private pay and is not financially eligible for Medicaid within the next 180 days. [] Reside out-of-state. [] A residents of a state owned/operated facility that is licensed  by [facility] and [facility]al Services or [facility]  [] Enrollment in [facility] hospice services  [] [address]  [] Patient /Family declines to have screening completed or provide financial information for screening     Financial Resources:  Medicaid    [] Initiated and application pending   [] Full coverage     Advanced Care Plan:  []Surrogate Decision Maker of Care  []POA  []Communicated Code Status DDNR\",    Other   Discharging to [facility] at 1100. Nursing to call report to number above. CM spoke with sonam Lawson and she is agreeable to this discharge. Contact information for facility given to sonam. Medicare sonam Jordan  has received, reviewed, and signed 2nd IM letter informing them of their right to appeal the discharge. Signed copy has been placed on pt bedside chart.

## 2022-07-13 NOTE — PROGRESS NOTES
Physician Progress Note      Carissa Goldberg  CSN #:                  [id number]  :                       1955  ADMIT DATE:       2022 3:28 PM  [address] [place] DATE:  RESPONDING  PROVIDER #:        Sho Jensen MD          QUERY TEXT:    Patient admitted with NSTEMI. Noted documentation of diabetes with CKD on  hospital problem list . ? Please document in progress notes the clinical indicators to support this diagnosis on current admission or document if this diagnosis  has been ruled out after study. ? Please update active hospital problems appropriately to reflect response. The medical record reflects the following:  Risk Factors: HX of CKD  Clinical Indicators:  DC summary  VIKKI. Resolved. 2022 16:04  BUN: 45 (H)  Creatinine: 1.80 (H)  GFR est AA: 34 (L)      2022 06:21  BUN: 14  Creatinine: 0.82  GFR est AA: >60        Treatment: lab monitoring    Thank you,  Sultana Prado RN, CDI, CRCR, CCDS  Certified Clinical Documentation Integrity  Specialist  [phone]  You can also contact me via [employer]. Options provided:  -- CKD has been ruled out after study  -- CKD  is currently being treated/evaluated  -- Other - I will add my own diagnosis  -- Disagree - Not applicable / Not valid  -- Disagree - Clinically unable to determine / Unknown  -- Refer to Clinical Documentation Reviewer    PROVIDER RESPONSE TEXT:    CKD has been ruled out after study.     Query created by: Jori Cm on 2022 10:13 AM      Electronically signed by:  Sho Jensen MD 2022 10:52 AM

## 2022-07-13 NOTE — DISCHARGE SUMMARY
Discharge Summary       PATIENT ID: Dallas Johnston  MRN: [id number]   YOB: 1955    DATE OF ADMISSION: 7/6/2022  3:28 PM    DATE OF DISCHARGE: 7/13/22   PRIMARY CARE PROVIDER: Jennifer Gillette MD     ATTENDING PHYSICIAN: Javier Chery MD  DISCHARGING PROVIDER: Javier Chery MD    To contact this individual call [phone] and ask the  to page. If unavailable ask to be transferred the Adult Hospitalist Department.     CONSULTATIONS: None    PROCEDURES/SURGERIES: Procedure(s):  LEFT HEART CATH / CORONARY ANGIOGRAPHY    ADMITTING [address] [place] [address] COURSE:   Dallas Johnston is a 73 y.o. female who presents with altered mental status     No meaningful history could be obtained from the patient, history was probably obtained from the niece who was present at the bedside, niece patient has history of multiple strokes, had an intracranial hemorrhage in 2019, has history of cognitive impairment since then, currently lives at [facility] living [facility], today the niece went to see her found her to be much less responsive, more confused, found her to be dehydrated, got concerned and called EMS, EKG was done, there was concern for STEMI, patient was brought to the [facility] ER, cardiologist evaluated patient, canceled STEMI, and patient was requested to be admitted to the hospitalist service, currently patient is resting in bed and cannot provide any history    Non-ST elevation MI  Diabetes mellitus type 2 with hyperglycemia  Dehydration  Normal anion gap metabolic acidosis  VIKKI  Hypercalcemia  Acute metabolic encephalopathy    Patient will be admitted on a telemetry bed, patient with non-ST elevation MI, evaluated by cardiology, continue heparin GTT, aspirin, nitroglycerin, n.p.o. after midnight for possible cardiac cath in the morning, IV hydration, telemetry monitoring and further intervention per hospital course, high risk for decompensation as discussed with her niece  Sliding-scale insulin, Accu-Cheks, diet control, close monitoring, further intervention per hospital course  Gentle IV hydration, repeat labs in the morning  Likely secondary to above, IV hydration, close monitoring reassess as needed  Likely prerenal, renal ultrasound, avoid nephrotoxic medication, renally dose all medication, trend creatinine, repeat labs in the morning  Ionized calcium levels, repeat labs in the morning, EKG noted  Unclear etiology, CT of the head with no acute pathology, MRI of the brain, neurovascular checks, IV hydration, UA, if persist may consider further intervention or diagnostic    Hospital course  Patient was seen by cardiology and taken for heart cath. No chest pain. Mental status improved and seemed to be at baseline with her dementia and strokes. Cath showed significant multivessel disease and cardiology recommended medical management. Patient remained stable on medical management and was discharged to SNF. DISCHARGE DIAGNOSES / PLAN:      1.  NSTEMI. Resolved. Medical management. 2. Multivessel CAD. Medical management  3. DM2. Metformin   4. Hypoglycemia. Resolved  5. Dehydration. Resolved  6. NAGMA. Resolved  7. VIKKI. Resolved. 8. Acute metabolic encephalopathy. Back to baseline  9. Dementia  10. H/o CVA  11. Hypercalcemia. Resolved.        PENDING TEST RESULTS:   At the time of discharge the following test results are still pending: none    FOLLOW UP APPOINTMENTS:    Follow-up Information     Follow up With Specialties Details [address] [place] [address] [facility]    Diana Roman MD Internal Medicine Physician In 1 week Hospital follow up [address]  [address] [facility]  [phone]      Lauren Abbott MD Cardiovascular Disease Physician, Interventional Cardiology Physician  Call to schedule follow up [address] [place] [address]   [facility] [place] [address]  [phone]             ADDITIONAL CARE RECOMMENDATIONS: none    DIET: Diabetic Diet    ACTIVITY: Activity as tolerated    WOUND CARE: none    EQUIPMENT needed: none      DISCHARGE MEDICATIONS:  Current Discharge Medication List      START taking these medications    Details   aspirin delayed-release 81 mg tablet Take 1 Tablet by mouth daily. Qty: 30 Tablet, Refills: 0  Start date: 7/13/2022      atorvastatin (LIPITOR) 80 mg tablet Take 1 Tablet by mouth nightly. Qty: 30 Tablet, Refills: 0  Start date: 7/13/2022      losartan (COZAAR) 25 mg tablet Take 0.5 Tablets by mouth daily. Qty: 30 Tablet, Refills: 0  Start date: 7/13/2022      nitroglycerin (NITROSTAT) 0.4 mg SL tablet 1 Tablet by SubLINGual route every five (5) minutes as needed for Chest Pain. Up to 3 doses. Qty: 15 Tablet, Refills: 0  Start date: 7/13/2022         CONTINUE these medications which have NOT CHANGED    Details   risperiDONE (RisperDAL) 0.5 mg tablet Take 0.5 mg by mouth daily. metFORMIN (GLUCOPHAGE) 500 mg tablet TAKE 1 TABLET BY MOUTH ONCE DAILY WITH A MEAL  Qty: 90 Tablet, Refills: 0    Associated Diagnoses: Type 2 diabetes mellitus without complication, without long-term current use of insulin (HCC)      metoprolol succinate (TOPROL-XL) 50 mg XL tablet Take 1 tablet by mouth once daily  Qty: 90 Tablet, Refills: 0    Associated Diagnoses: Essential hypertension         STOP taking these medications       amLODIPine (NORVASC) 2.5 mg tablet Comments:   Reason for Stopping:                 NOTIFY YOUR PHYSICIAN FOR ANY OF THE FOLLOWING:   Fever over 101 degrees for 24 hours. Chest pain, shortness of breath, fever, chills, nausea, vomiting, diarrhea, change in mentation, falling, weakness, bleeding. Severe pain or pain not relieved by medications. Or, any other signs or symptoms that you may have questions about.     DISPOSITION:    Home With:   OT  PT  HH  RN      X Long term SNF/Inpatient Rehab    Independent/assisted living    Hospice    Other:       PATIENT CONDITION AT DISCHARGE:     Functional status    Poor    X Deconditioned     Independent      Cognition     Lucid     Forgetful    X Dementia      Catheters/lines (plus indication)    Tristan     PICC     PEG    X None      Code status     Full code    X DNR      PHYSICAL EXAMINATION AT DISCHARGE:  Constitutional:  No acute distress, cooperative, pleasant .  Confused. ENT:  Oral mucosa moist, oropharynx benign. Resp:  CTA bilaterally. No wheezing/rhonchi/rales. No accessory muscle use. CV:  Regular rhythm, normal rate, no murmurs, gallops, rubs    GI:  Soft, non distended, non tender. normoactive bowel sounds, no hepatosplenomegaly     Musculoskeletal:  No edema, warm, 2+ pulses throughout    Neurologic:  Moves all extremities.   Disoriented     CHRONIC MEDICAL DIAGNOSES:  Problem List as of 7/13/2022 Date Reviewed: 1/20/2022          Codes Class Noted - Resolved    Type 2 diabetes mellitus with chronic kidney disease ([facility] ICD-10-CM: E11.22  ICD-9-CM: 250.40, 585.9  4/19/2022 - Present        Sepsis ([facility] ICD-10-CM: A41.9  ICD-9-CM: 038.9, 995.91  1/2/2020 - Present        ICH (intracerebral hemorrhage) ([facility] ICD-10-CM: I61.9  ICD-9-CM: 780  10/18/2019 - Present        Intracranial hemorrhage ([facility] ICD-10-CM: I62.9  ICD-9-CM: 432.9  10/18/2019 - Present        Acute lower GI bleeding ICD-10-CM: K92.2  ICD-9-CM: 578.9  4/2/2017 - Present        Type 2 diabetes mellitus ([facility] ICD-10-CM: E11.9  ICD-9-CM: 250.00  4/2/2017 - Present        Intellectual delay ICD-10-CM: F81.9  ICD-9-CM: 315.9  4/2/2017 - Present        H/O cerebral aneurysm repair ICD-10-CM: Z98.890, Z86.79  ICD-9-CM: V45.89  4/2/2017 - Present        Altered mental status ICD-10-CM: R41.82  ICD-9-CM: 780.97  11/24/2016 - Present        Acute encephalopathy ICD-10-CM: G93.40  ICD-9-CM: 348.30  11/22/2016 - Present        Hypertension (Chronic) ICD-10-CM: I10  ICD-9-CM: 401.9  11/22/2016 - Present         (ventriculoperitoneal) shunt status (Chronic) ICD-10-CM: Z98.2  ICD-9-CM: V45.2  11/22/2016 - Present        Diabetes ([address] (Chronic) ICD-10-CM: E11.9  ICD-9-CM: 250.00  11/22/2016 - Present        RESOLVED: NSTEMI (non-ST elevated myocardial infarction) [facility]) ICD-10-CM: I21.4  ICD-9-CM: 410.70  7/6/2022 - 7/13/2022              Greater than 30 minutes were spent with the patient on counseling and coordination of care    Signed:   Meredith Guzman MD  7/13/2022  8:45 AM

## 2022-07-13 NOTE — PROGRESS NOTES
Bedside shift change report given to Doris Bright RN (oncoming nurse) by Josh Urrutia (offgoing nurse). Report included the following information SBAR, Kardex, ED Summary, Intake/Output, MAR and Cardiac Rhythm NSR.

## 2022-07-13 NOTE — ROUTINE PROCESS
TRANSFER - OUT REPORT:    Verbal report given to Kaylin Duncan (name) on Dixie Quiñones  being transferred to [facility] [employer]  (unit) for routine progression of care       Report consisted of patients Situation, Background, Assessment and   Recommendations(SBAR). Information from the following report(s) SBAR, Kardex, ED Summary, Procedure Summary, Intake/Output, MAR, Accordion, Recent Results, Med Rec Status, Cardiac Rhythm SR, Alarm Parameters , Pre Procedure Checklist, Procedure Verification, Quality Measures and Dual Neuro Assessment was reviewed with the receiving nurse. Lines:   Peripheral IV 07/06/22 Right Hand (Active)   Site Assessment Clean, dry, & intact 07/12/22 2054   Phlebitis Assessment 0 07/12/22 2054   Infiltration Assessment 0 07/12/22 2054   Dressing Status Dry; Intact 07/12/22 2054   Dressing Type Transparent;Tape 07/12/22 2054   Hub Color/Line Status Pink;Capped 07/12/22 2054   Action Taken Open ports on tubing capped 07/12/22 2054   Alcohol Cap Used Yes 07/12/22 2054        Opportunity for questions and clarification was provided.       Patient transported with:   Monitor via [facility]

## 2022-07-14 ENCOUNTER — PATIENT OUTREACH (OUTPATIENT)
Dept: CASE MANAGEMENT | Age: 67
End: 2022-07-14

## 2022-07-14 NOTE — PROGRESS NOTES
Care Transitions Initial Call    Call within 2 business days of discharge: Yes     Patient: Clifton Mckeon Patient : 1955 MRN: [id number]    Last Discharge [address]       Complaint Diagnosis Description Type Department Provider    22 Altered mental status NSTEMI (non-ST elevated myocardial infarction) ([facility] . .. ED to Hosp-Admission (Discharged) (ADMIT) Liana Mahmood MD; Joselito Correia... Was this an external facility discharge? No     Challenges to be reviewed by the provider   Additional needs identified to be addressed with provider: yes    NSTEMI- cardiology consulted- 22 heart cath done- severe multi vessel native disease- TX- conservative management. New medications:  ASA 81 mg daily; atorvastatin 40 mg daily; losartan 25 mg- 1/2 tablet daily; and NTG -SL PRN. Continue: Toprol XL 50 mg daily. Discontinue: amlodipine 2.5 mg. Attending cardiology follow up on - CTN working with SNF and family to secure transportation-attendance. Discharged to [address] [facility] for rehab. She has Medicare A&B only. No supplement and no RX insurance. Referral to DAVE- MARYA/POA-financial- needs assistance with options for an  [facility]-Memory care facility for her to move to- does not want her to return to previous [facility]. LOS will be 20 days- medicare covers and if remains would be out of pocket. Does not qualify for Medicaid. Method of communication with provider : chart routing    Discussed COVID-19 related testing which was available at this time. Test results were negative. Patient informed of results, if available? aware     Advance Care Planning:   Does patient have an Advance Directive: not on file. Inpatient Readmission Risk score: Unplanned Readmit Risk Score: 8.4 ( )    Was this a readmission?  no     Patients top risk factors for readmission: financial, functional cognitive ability, functional physical ability, lack of knowledge about disease, medical condition- , support system, transportation and to be further assessed   Interventions to address risk factors: Education of patient/family/caregiver/guardian to support self-management- , Assessment and support for treatment adherence and medication management- , Assistance in accessing community resources-  and communication with SNF and PCP     Care Transition Nurse (CTN) contacted the SNF staff and niece, Villa Dameon  by telephone to perform post hospital discharge assessment. Verified name and  with both as identifiers. Provided introduction to self, and explanation of the CTN role. CTN reviewed discharge instructions, medical action plan and red flags with SNF staff who verbalized understanding. Were discharge instructions available to patient? yes. Reviewed appropriate site of care based on symptoms and resources available to patient including: Specialist and SNF providers. SNF staff and niece given an opportunity to ask questions and does not have any further questions or concerns at this time. The family agrees to contact the PCP office for questions related to their healthcare. Medication reconciliation was performed with SNF staff nurse, Glenn Hernández, who verbalizes understanding of administration of home medications. Advised obtaining a 90-day supply of all daily and as-needed medications. Referral to Pharm D needed: no     Home Health/Outpatient orders at discharge: none  Discharged SNF- [facility] ordered at discharge: None    Was patient discharged with a pulse oximeter? no    Discussed follow-up appointments. If no appointment was previously scheduled, appointment scheduling offered: not needed. Is follow up appointment scheduled within 7 days of discharge? no.   [facility] follow up appointment(s):     SNF provider will assess and follow during stay at SNF.       Future Appointments   Date Time Provider Alysha Rogers   2022 10:40 AM Lizzy Leal ANP CAVREY BS [facility]   9/7/2022  1:00 PM CITLALI MCCOY [facility]   9/7/2022  2:20 PM MD KATY Yi [facility]     Non-[facility] follow up appointment(s): NA    Plan for follow-up call in 5-7 days based on severity of symptoms and risk factors. Plan for next call:   · Assess current symptoms including VS  · Plan for transportation for attending cardiology follow up appt on 7/22  · Status of referral to  for assisting niece with multiple items including [facility]. CTN provided contact information for future needs. Goals Addressed                 This Visit's Progress       Myocardial Infarction     To achieve and maintain optimum control of other co-morbid conditions (ie. diabetes, heart failure). 7/14/22- spoke with SNF staff- Marquita-her nurse for today. Her BP this morning was 114/76. She said Ms. Shawn Bynum was new to them and feels like she is adjusting well. Therapy team slated to evaluate her. Shared that BP meds had been changed to achieve BP control and support recovery from NSTEMI. Shared concern about documentation about: resperidone recently started at [facility]- documentation that family felt she had AMS and less awake- causing them to bring to hospital. It is listed as a continued medication on current orders. Closely monitor - nurse said she is alert to name only but is pleasant and cooperative, awake. Spoke later with niece, Nereyda Turner- she said her aunt developed increasing LE edema- felt that she stayed mostly in a  due to staffing. [place] that facility was not staffed to take care of her aunt. She was not sure about if a provider was seeing at the [facility]. She confirmed that her aunt's PCP is Dr. Any Shukla. [facility]          Post Hospitalization     Attends follow-up appointments as directed. 7/14/22-   PCP- Dr. Awa Reyez-   Cardiology- Scheduled to see SAMIA Vega NP on 7/22 at 10:40.                          Anjel on 9/7 with echo at 1 pm.                          [address] resources in place to maintain patient in the community (ie. Home Health, DME equipment, refer to, medication assistant plan, etc.)        7/14/22- discharged to [facility] [employer]. Recently moved to [facility]. April 2022. Sunitha MALHOTRA-documeted as POA. Financial POA paperwork on file. CTN spoke with sunitha Olmstead she does not want her aunt to return to [address] made referral to  to help with options for other [place]-memory care facilities.  Her aunt does no qualify for Medicaid- income is approx $3400 per month.  She does not have a supplement to Medicare and No RX medicare coverage. Has Part A&B.                    [employer]

## 2022-08-03 ENCOUNTER — TELEPHONE (OUTPATIENT)
Dept: INTERNAL MEDICINE CLINIC | Age: 67
End: 2022-08-03

## 2022-08-03 NOTE — TELEPHONE ENCOUNTER
Care team Richie Sabillon) of [facility] was called and verbalized understanding on note below.

## 2022-08-03 NOTE — TELEPHONE ENCOUNTER
[facility] wanting to know if Tad Casanova will follow patient and sign orders for home health. Please call them back at [phone]

## 2022-08-11 ENCOUNTER — PATIENT OUTREACH (OUTPATIENT)
Dept: CASE MANAGEMENT | Age: 67
End: 2022-08-11

## 2022-08-11 NOTE — PROGRESS NOTES
Care Transitions Follow Up Call    Challenges to be reviewed by the provider   Additional needs identified to be addressed with provider: yes    Patient has discharged from SNF- now living in private adult care home. She has established with new PCP. Updated Care team.   [facility]. CTN trying to clarify with Sonam if she is following up with cardiology as scheduled or will be seeing PCP and/or new cardiology. Sonam states she will transport-take her to this appointment. Method of communication with provider : chart routing    Care Transition Nurse (CTN) contacted the  by telephone to follow up after admission on 22. Verified name and  with  all  as identifiers. Addressed changes since last contact: refer to goals section    CTN was not able to review current symptoms, discharge instructions, medical action plan and red flags with  sonam  nor discuss any barriers to care and/or understanding of plan of care after discharge. [address] follow up appointment(s):   Future Appointments   Date Time Provider Alysha Rogers   2022  1:00 PM CITLALI MCCOY   2022  2:20 PM MD KATY Chan [facility]     Non-[facility] follow up appointment(s):   PCP- Dr. Tenzin Hunt- saw on . New PCP for this patient. CTN provided contact information for future needs. Plan for follow-up call in 7-10 days based on severity of symptoms and risk factors. Plan for next call:  complete follow up discharge assessment. Goals Addressed                   This Visit's Progress       Myocardial Infarction     To achieve and maintain optimum control of other co-morbid conditions (ie. diabetes, heart failure). 22- spoke with Isamar Delgado. Briefly she said that she is now living in a private group home and doing well. She was not able to talk further.   Texted her later and asked if there was someone I could talk with to check on her aunt. Asked her also about attending cardiology follow up appt. Now on 9/7- did not show for 7/22. Later received call from Camilla Masters. She states that her aunt is doing well- her ankles are not swollen. She feels that she is adjusting. She did not agree for this CTN to contact her to check on her aunt. Later received phone call from her niece a second time, she stated that she has spoken with Ms. Law Orr and it is okay for CTN to call to get updates from her. CTN reached Ms. Law Orr. Introduced self and role. She was asking for assistance on a few things- forwarded referral to  to contact to [facility]     7/14/22- spoke with SNF staff- Marquita-her nurse for today. Her BP this morning was 114/76. She said Ms. Davis To was new to them and feels like she is adjusting well. Therapy team slated to evaluate her. Shared that BP meds had been changed to achieve BP control and support recovery from NSTEMI. Shared concern about documentation about: resperidone recently started at [facility]- documentation that family felt she had AMS and less awake- causing them to bring to hospital. It is listed as a continued medication on current orders. Closely monitor - nurse said she is alert to name only but is pleasant and cooperative, awake. Spoke later with Camilla masters- she said her aunt developed increasing LE edema- felt that she stayed mostly in a  due to staffing. [place] that facility was not staffed to take care of her aunt. She was not sure about if a provider was seeing at the [facility]. She confirmed that her aunt's PCP is Dr. Tej Contreras. [facility]          Post Hospitalization     Attends follow-up appointments as directed. 8/12/22- spoke with Ms. Law Orr. She states that the agreement with the families of the residents that live in the home: she transports to PCP appointments. They have to transport to speciality appointments. She states Ms. Pacheco's niece states that she will be taking her to the cardiology appointment below:   9/7 at 1 and 2:20. Dr. Edward Walker and echo. 8/11/22-   PCP- CTN verified with Dr. Farhana Vasquez that he saw her on 8/9 and will be her PCP. He sees other patients who live in the private home where she now lives. Cardiology- Sonam states that she was told by [address] [place] [address] that they could not transport her aunt to the below appt on 7/22- it was going to be rescheduled. Explained it was not cancelled. She states that she will be communicating with her caregiver and she will attend planned follow up appt below on 9/7. [place]     7/14/22-   PCP- Dr. Brionna Cano-   Cardiology- Scheduled to see A. Romayne Crate, NP on 7/22 at 10:40. Dr. Houston Raygoza on 9/7 with echo at 1 pm.                          [place]        Supportive resources in place to maintain patient in the community (ie. Home Health, DME equipment, refer to, medication assistant plan, etc.)        8/12/22- received information from team DAVE. Called and shared with sonam, she asked for CTN to relay to Ms. Sienna Rosas. Spoke with Ms. Sienna Rosas- shared and encouraged her to contact [employer] to schedule for their benefits enrollment department to assist with securing:  supplement plan and RX coverage. Her niece confirms that she dropped the [facility] when it was open enrollment back in 2019 on the advice of team at [facility] court. The niece states that she has not received bills for co-pays/co-insurance amounts not paid to facilities and/or providers. Advised Ms. Sienna Rosas that there is FA available for monies owed to [facility]. CTN advised them to call phone number listed on the bill and they can help them apply. [place]     8/11/22- spoke with [id number] at [address] [facility]. She discharged from them on 8/2/22. Review of EMR shows that [address] called about PCP following orders for therapy. [facility]- they did [facility] on 8/4. SN and PT.   Nursing will be discharging on 8/17.  [facility] states they are seeing her at: address on [address]. Spoke with Sonam, briefly. She was in the car and traffic was heavy. She states that her Ralph Mckeon is in a private home and she is doing well. Later received call from Sonam. She states that she did not receive help with securing alternative living for her aunt - except at [facility]". Kenneth Clark at the SNF did give her two places to check out- the first one she did not like, the second is where she did feel comfortable for her aunt living. She feels the owner is caring and her aunt is adjusting well. She declined to share the name of the home where her aunt lives. Received a 2nd phone call from sonam. [facility]     7/14/22- discharged to [place] [address]. Recently moved to [facility]. April 2022. Sonam- Inna Baron- LNOK-documeted as POA. Financial POA paperwork on file. CTN spoke with sonam-  she does not want her aunt to return to [facility] [address] made referral to  to help with options for other AL-memory care facilities. Her aunt does no qualify for Medicaid- income is approx $3400 per month. She does not have a supplement to Medicare and No RX medicare coverage. Has Part A&B.                    LLC

## 2022-09-01 ENCOUNTER — PATIENT OUTREACH (OUTPATIENT)
Dept: CASE MANAGEMENT | Age: 67
End: 2022-09-01

## 2022-09-01 NOTE — PROGRESS NOTES
Care Transitions Follow Up Call    Challenges to be reviewed by the provider   Additional needs identified to be addressed with provider: yes    CTN was not able to reach caregiver to complete follow up outreach assessment. Method of communication with provider : none    Addressed changes since last contact: refer to goals section and above [facility] follow up appointment(s):   Future Appointments   Date Time Provider Alysha Sue   9/7/2022  1:00 PM CITLALI MCCOY   9/7/2022  2:20 PM MD KATY Bass [facility]     Non-[facility] follow up appointment(s): refer to goals section    CTN provided contact information for future needs. Plan for follow-up call in 10-14 days based on severity of symptoms and risk factors. Plan for next call: complete follow up outreach assessment; attended cardiology follow up appointment       Goals Addressed                   This Visit's Progress       Myocardial Infarction     To achieve and maintain optimum control of other co-morbid conditions (ie. diabetes, heart failure). 9/1/22- Left VM for Ms. Veronika Phelps to return call. [facility]     8/11/22- spoke with Cheng Delgado. Briefly she said that she is now living in a private group home and doing well. She was not able to talk further. Texted her later and asked if there was someone I could talk with to check on her aunt. Asked her also about attending cardiology follow up appt. Now on 9/7- did not show for 7/22. Later received call from Cheng Delgado. She states that her aunt is doing well- her ankles are not swollen. She feels that she is adjusting. She did not agree for this CTN to contact her to check on her aunt. Later received phone call from her niece a second time, she stated that she has spoken with Ms. Veronika Phelps and it is okay for CTN to call to get updates from her. CTN reached Ms. Veronika Phelps. Introduced self and role.  She was asking for assistance on a few things- forwarded referral to DAVE to contact to assist.         [facility]     7/14/22- spoke with SNF staff- Marquita-her nurse for today. Her BP this morning was 114/76. She said Ms. Thomas Mendoza was new to them and feels like she is adjusting well. Therapy team slated to evaluate her. Shared that BP meds had been changed to achieve BP control and support recovery from NSTEMI. Shared concern about documentation about: resperidone recently started at [facility]- documentation that family felt she had AMS and less awake- causing them to bring to hospital. It is listed as a continued medication on current orders. Closely monitor - nurse said she is alert to name only but is pleasant and cooperative, awake. Spoke later with niece, Delfino Kelly- she said her aunt developed increasing LE edema- felt that she stayed mostly in a  due to staffing. [employer] that facility was not staffed to take care of her aunt. She was not sure about if a provider was seeing at the [facility]. She confirmed that her aunt's PCP is Dr. Mihaela Jo. [facility]          Post Hospitalization     Supportive resources in place to maintain patient in the community (ie. Home Health, DME equipment, refer to, medication assistant plan, etc.)        9/1/22- Left  for Ms. Nimesh Whalen, owner/caregiver at [facility]. [facility]     8/12/22- received information from team DAVE. Called and shared with sonam, she asked for CTN to relay to Ms. Nimesh Whalen. Spoke with Ms. Nimesh Whalen- shared and encouraged her to contact [employer] to schedule for their benefits enrollment department to assist with securing:  supplement plan and RX coverage. Her niece confirms that she dropped the [address] [place] [address] when it was open enrollment back in 2019 on the advice of team at [facility] court. The niece states that she has not received bills for co-pays/co-insurance amounts not paid to facilities and/or providers. Advised Ms. Nimesh Whalen that there is FA available for monies owed to [facility].  CTN advised them to call phone number listed on the bill and they can help them apply. [facility]     8/11/22- spoke with [address] at [facility]. She discharged from them on 8/2/22. Review of EMR shows that [address] called about PCP following orders for therapy. Spoke with [facility] office- they did [facility] on 8/4. SN and PT. Nursing will be discharging on 8/17. [facility] states they are seeing her at: address on [address] [facility]. Spoke with Sonam, briefly. She was in the car and traffic was heavy. She states that her Kalyan Fam is in a private home and she is doing well. Later received call from Sonam. She states that she did not receive help with securing alternative living for her aunt - except at [facility]". Yohana Johnson at the SNF did give her two places to check out- the first one she did not like, the second is where she did feel comfortable for her aunt living. She feels the owner is caring and her aunt is adjusting well. She declined to share the name of the home where her aunt lives. Received a 2nd phone call from sonam. [facility]     7/14/22- discharged to [place] [address]. Recently moved to [facility]. April 2022. Sonam- Maximiliano MALHOTRA-documeted as POA. Financial POA paperwork on file. SUKHJINDER spoke with sonam-  she does not want her aunt to return to [address]  made referral to  to help with options for other AL-memory care facilities. Her aunt does no qualify for Medicaid- income is approx $3400 per month. She does not have a supplement to Medicare and No RX medicare coverage. Has Part A&B.                    LLC

## 2022-09-07 ENCOUNTER — OFFICE VISIT (OUTPATIENT)
Dept: CARDIOLOGY CLINIC | Age: 67
End: 2022-09-07
Payer: MEDICARE

## 2022-09-07 ENCOUNTER — ANCILLARY PROCEDURE (OUTPATIENT)
Dept: CARDIOLOGY CLINIC | Age: 67
End: 2022-09-07
Payer: MEDICARE

## 2022-09-07 VITALS
OXYGEN SATURATION: 98 % | BODY MASS INDEX: 24.16 KG/M2 | HEIGHT: 65 IN | DIASTOLIC BLOOD PRESSURE: 60 MMHG | WEIGHT: 145 LBS | RESPIRATION RATE: 16 BRPM | SYSTOLIC BLOOD PRESSURE: 110 MMHG | HEART RATE: 73 BPM

## 2022-09-07 VITALS
BODY MASS INDEX: 23.66 KG/M2 | WEIGHT: 142 LBS | HEIGHT: 65 IN | SYSTOLIC BLOOD PRESSURE: 120 MMHG | DIASTOLIC BLOOD PRESSURE: 76 MMHG

## 2022-09-07 DIAGNOSIS — Z98.2 VP (VENTRICULOPERITONEAL) SHUNT STATUS: ICD-10-CM

## 2022-09-07 DIAGNOSIS — I25.2 HISTORY OF NON-ST ELEVATION MYOCARDIAL INFARCTION (NSTEMI): ICD-10-CM

## 2022-09-07 DIAGNOSIS — E11.22 TYPE 2 DIABETES MELLITUS WITH CHRONIC KIDNEY DISEASE, WITH LONG-TERM CURRENT USE OF INSULIN, UNSPECIFIED CKD STAGE (HCC): ICD-10-CM

## 2022-09-07 DIAGNOSIS — I25.10 CORONARY ARTERY DISEASE INVOLVING NATIVE CORONARY ARTERY OF NATIVE HEART WITHOUT ANGINA PECTORIS: Primary | ICD-10-CM

## 2022-09-07 DIAGNOSIS — I10 HYPERTENSION, ESSENTIAL: ICD-10-CM

## 2022-09-07 DIAGNOSIS — Z79.4 TYPE 2 DIABETES MELLITUS WITH CHRONIC KIDNEY DISEASE, WITH LONG-TERM CURRENT USE OF INSULIN, UNSPECIFIED CKD STAGE (HCC): ICD-10-CM

## 2022-09-07 DIAGNOSIS — I67.9 CEREBRAL VASCULAR DISEASE: ICD-10-CM

## 2022-09-07 DIAGNOSIS — E78.00 HYPERCHOLESTEREMIA: ICD-10-CM

## 2022-09-07 DIAGNOSIS — I25.10 CORONARY ARTERY DISEASE INVOLVING NATIVE CORONARY ARTERY OF NATIVE HEART WITHOUT ANGINA PECTORIS: ICD-10-CM

## 2022-09-07 PROCEDURE — G8427 DOCREV CUR MEDS BY ELIG CLIN: HCPCS | Performed by: SPECIALIST

## 2022-09-07 PROCEDURE — G8536 NO DOC ELDER MAL SCRN: HCPCS | Performed by: SPECIALIST

## 2022-09-07 PROCEDURE — 99214 OFFICE O/P EST MOD 30 MIN: CPT | Performed by: SPECIALIST

## 2022-09-07 PROCEDURE — 93000 ELECTROCARDIOGRAM COMPLETE: CPT | Performed by: SPECIALIST

## 2022-09-07 PROCEDURE — 1090F PRES/ABSN URINE INCON ASSESS: CPT | Performed by: SPECIALIST

## 2022-09-07 PROCEDURE — 1101F PT FALLS ASSESS-DOCD LE1/YR: CPT | Performed by: SPECIALIST

## 2022-09-07 PROCEDURE — 3046F HEMOGLOBIN A1C LEVEL >9.0%: CPT | Performed by: SPECIALIST

## 2022-09-07 PROCEDURE — G9899 SCRN MAM PERF RSLTS DOC: HCPCS | Performed by: SPECIALIST

## 2022-09-07 PROCEDURE — G8510 SCR DEP NEG, NO PLAN REQD: HCPCS | Performed by: SPECIALIST

## 2022-09-07 PROCEDURE — G8420 CALC BMI NORM PARAMETERS: HCPCS | Performed by: SPECIALIST

## 2022-09-07 PROCEDURE — 3017F COLORECTAL CA SCREEN DOC REV: CPT | Performed by: SPECIALIST

## 2022-09-07 PROCEDURE — G8752 SYS BP LESS 140: HCPCS | Performed by: SPECIALIST

## 2022-09-07 PROCEDURE — 2022F DILAT RTA XM EVC RTNOPTHY: CPT | Performed by: SPECIALIST

## 2022-09-07 PROCEDURE — 1123F ACP DISCUSS/DSCN MKR DOCD: CPT | Performed by: SPECIALIST

## 2022-09-07 PROCEDURE — G8754 DIAS BP LESS 90: HCPCS | Performed by: SPECIALIST

## 2022-09-07 PROCEDURE — G8400 PT W/DXA NO RESULTS DOC: HCPCS | Performed by: SPECIALIST

## 2022-09-07 RX ORDER — AMLODIPINE BESYLATE 2.5 MG/1
TABLET ORAL
COMMUNITY
Start: 2022-08-04

## 2022-09-07 NOTE — Clinical Note
10/8/2022    Patient: Tim Harden   YOB: 1955   Date of Visit: 9/7/2022     David Ferrell MD  [address] [address]  [address] [facility] [address]    Dear David Ferrell MD,      Thank you for referring Ms. Tammy Muhammad to [facility] for evaluation. My notes for this consultation are attached. If you have questions, please do not hesitate to call me. I look forward to following your patient along with you.       Sincerely,    Gilberto Rouse MD

## 2022-09-07 NOTE — PROGRESS NOTES
Keyon Del Cid     1955       Ester Hill MD, [facility]  Date of Visit-09/07/2022   PCP is Sher Tomlin MD   [facility] [place]  [facility]  HPI:  Keyon Del Cid is a 73 y.o. female   Hospital follow-up,STEMI  Today returns noting some mild ankle edema but otherwise no complaints. He has no shortness of breath, chest pain, syncope, palpitations or tachycardia. Patient is using medicines regularly and accompanied by daughter today. They are happy with her new living arrangement seems to be in more of an assisted living situation and seems more independent. She had no angina. They understand that she had a heart attack and that she is on medical therapy. Exam is notable for 1+ edema confined to both ankles. She has clear lungs and normal heart sounds. She is a bit tired and flat affect today but her daughter reports that not usual and she just was up late last night. EKG sinus rhythm low voltage left axis deviation inferior apical infarction extensive T wave abnormalities in the anterolateral inferior walls    07/06/22  ECHO ADULT COMPLETE 07/11/2022 7/11/2022    Interpretation Summary  Formatting of this result is different from the original.      Left Ventricle: Mildly reduced left ventricular systolic function with small area of inferobasal hypokinesis with a visually estimated EF of 45 - 50%. Findings consistent with mild concentric hypertrophy. Aortic Valve: Mild regurgitation. Mitral Valve: Mild regurgitation with a posterior directed jet. 07/06/22  CARDIAC PROCEDURE 07/08/2022 7/8/2022  Conclusion  Findings:  1. Severe native multivessel disease with high grade tandem lesion. 2)Normal LVEDP    Access: Right femoral: no issues. Right radial occluded    Recommendations  1)Best suited for medical mgm with high intensity statins and antiplatelet(Aspirin).       Assessment/Plan:     Patient Instructions   We will see you back in 6 months. Coronary disease with multivessel disease   medical management seems to doing well with no angina   continue statin aspirin beta-blocker   LDL was 220 now on atorvastatin 80 we will need refills  EKG is reviewed   follow-up 6 months    Troponin of 8K and ischemic EKG  CAD coronary artery disease native  Cath late afternoon shows severe diffuse dz, given her risks with prior CNS bleed, complexity of her CAD anatomy and lack of angina, will manage medically  Now DNR  [facility]imize medical Rx ,   LIpitor, ASA, Toprol XL-hr is 60s so no change in dose  bp was lower at 105 so held amlodipine (Norvasc)    hx of HTN, DM, cerebral aneurysm with  shunt in 2007, multiple CVA events, acute right parietal intracranial hematoma/small right SDH in 10/19 which was managed conservatively, and she has hx of cognitive impairment/dementia after CVA events. Impression:   1. Coronary artery disease involving native coronary artery of native heart without angina pectoris    2. Hypercholesteremia    3. History of non-ST elevation myocardial infarction (NSTEMI)    4. Hypertension, essential    5. Cerebral vascular disease    6.  (ventriculoperitoneal) shunt status    7. Type 2 diabetes mellitus with chronic kidney disease, with long-term current use of insulin, unspecified CKD stage [facility])       Cardiac History:   No specialty comments available. Future Appointments   Date Time Provider Alysha Rogers   3/9/2023  2:20 PM [facility]      Patient Care Team:  Moose Garcia MD as PCP - General (Internal Medicine Physician)  Denise Merlos MD as PCP - [facility] Provider  Devyn Shepard RN as Care Transitions Nurse  Neil Iraheta MD (Cardiovascular Disease Physician)     ROS-except as noted above. . A complete cardiac and respiratory are reviewed and negative except as above ; Resp-denies wheezing  or productive cough,.  Const- No unusual weight loss or fever; Neuro-no recent seizure or CVA ; GI- No BRBPR, abdom pain, bloating ; - no  hematuria   see supplement sheet, initialed and to be scanned by staff    Past Medical History:   Diagnosis Date    [facility] ([facility]     Hypertension     Memory loss     NSTEMI (non-ST elevated myocardial infarction) ([facility] 7/6/2022    Stroke ([facility]     cerebral hemorrhage,cerebral anurysm      Social Hx= reports that she has never smoked. She has never used smokeless tobacco. She reports that she does not drink alcohol and does not use drugs. Family Hx- family history is not on file. Allergies   Allergen Reactions    Amoxicillin Other (comments)     Aggressive behavior  and  hallucinating and itching        Exam and Labs:  Visit Vitals  /60   Pulse 73   Resp 16   Ht 5' 5\" (1.651 m)   Wt 145 lb (65.8 kg)   SpO2 98%   BMI 24.13 kg/m²    @Constitutional:  NAD, comfortable  Head: NC,AT. Eyes: No scleral icterus. Neck:  Neck supple. No JVD present. Throat: moist mucous membranes. Chest: Effort normal & normal respiratory excursion . Neurological: alert, conversant and oriented . Skin: Skin is not cold. No obvious systemic rash noted. Not diaphoretic. No erythema. Psychiatric:  Grossly normal mood and affect. Behavior appears normal. Extremities:  no clubbing or cyanosis. Abdomen: non distended    Lungs:breath sounds normal. No stridor. distress, wheezes or  Rales. Heart:    normal rate, regular rhythm, normal S1, S2, no murmurs, rubs, clicks or gallops , PMI non displaced. Edema: Edema is none.       Lab Results   Component Value Date/Time    Cholesterol, total 286 (H) 07/07/2022 03:27 AM    HDL Cholesterol 35 07/07/2022 03:27 AM    LDL, calculated 220.6 (H) 07/07/2022 03:27 AM    Triglyceride 152 (H) 07/07/2022 03:27 AM    CHOL/HDL Ratio 8.2 (H) 07/07/2022 03:27 AM     Lab Results   Component Value Date/Time    Sodium 139 07/12/2022 06:21 AM    Potassium 3.6 07/12/2022 06:21 AM    Chloride 107 07/12/2022 06:21 AM    CO2 25 07/12/2022 06:21 AM    Anion gap 7 07/12/2022 06:21 AM    Glucose 129 (H) 07/12/2022 06:21 AM    BUN 14 07/12/2022 06:21 AM    Creatinine 0.82 07/12/2022 06:21 AM    BUN/Creatinine ratio 17 07/12/2022 06:21 AM    GFR est AA >60 07/12/2022 06:21 AM    GFR est non-AA >60 07/12/2022 06:21 AM    Calcium 9.4 07/12/2022 06:21 AM      Wt Readings from Last 3 Encounters:   09/07/22 145 lb (65.8 kg)   09/07/22 142 lb (64.4 kg)   07/12/22 154 lb 3.2 oz (69.9 kg)      BP Readings from Last 3 Encounters:   09/07/22 110/60   09/07/22 120/76   07/13/22 120/76      Current Outpatient Medications   Medication Sig    amLODIPine (NORVASC) 2.5 mg tablet     aspirin delayed-release 81 mg tablet Take 1 Tablet by mouth daily. atorvastatin (LIPITOR) 80 mg tablet Take 1 Tablet by mouth nightly. losartan (COZAAR) 25 mg tablet Take 0.5 Tablets by mouth daily. nitroglycerin (NITROSTAT) 0.4 mg SL tablet 1 Tablet by SubLINGual route every five (5) minutes as needed for Chest Pain. Up to 3 doses. metFORMIN (GLUCOPHAGE) 500 mg tablet TAKE 1 TABLET BY MOUTH ONCE DAILY WITH A MEAL    metoprolol succinate (TOPROL-XL) 50 mg XL tablet Take 1 tablet by mouth once daily     No current facility-administered medications for this visit. Impression see above. no

## 2022-09-20 LAB
ECHO AO ASC DIAM: 3.6 CM
ECHO AO ASCENDING AORTA INDEX: 2.11 CM/M2
ECHO AO ROOT DIAM: 3.2 CM
ECHO AO ROOT INDEX: 1.87 CM/M2
ECHO AR MAX VEL PISA: 3.7 M/S
ECHO AV PEAK GRADIENT: 6 MMHG
ECHO AV PEAK VELOCITY: 1.2 M/S
ECHO AV REGURGITANT PHT: 612.5 MILLISECOND
ECHO AV VELOCITY RATIO: 0.83
ECHO EST RA PRESSURE: 3 MMHG
ECHO LA DIAMETER INDEX: 1.93 CM/M2
ECHO LA DIAMETER: 3.3 CM
ECHO LA TO AORTIC ROOT RATIO: 1.03
ECHO LA VOL 2C: 44 ML (ref 22–52)
ECHO LA VOL 4C: 36 ML (ref 22–52)
ECHO LA VOL BP: 43 ML (ref 22–52)
ECHO LA VOL/BSA BIPLANE: 25 ML/M2 (ref 16–34)
ECHO LA VOLUME AREA LENGTH: 46 ML
ECHO LA VOLUME INDEX A2C: 26 ML/M2 (ref 16–34)
ECHO LA VOLUME INDEX A4C: 21 ML/M2 (ref 16–34)
ECHO LA VOLUME INDEX AREA LENGTH: 27 ML/M2 (ref 16–34)
ECHO LV E' LATERAL VELOCITY: 6 CM/S
ECHO LV E' SEPTAL VELOCITY: 4 CM/S
ECHO LV EDV A2C: 86 ML
ECHO LV EDV A4C: 90 ML
ECHO LV EDV BP: 88 ML (ref 56–104)
ECHO LV EDV INDEX A4C: 53 ML/M2
ECHO LV EDV INDEX BP: 51 ML/M2
ECHO LV EDV NDEX A2C: 50 ML/M2
ECHO LV EJECTION FRACTION A2C: 39 %
ECHO LV EJECTION FRACTION A4C: 27 %
ECHO LV EJECTION FRACTION BIPLANE: 32 % (ref 55–100)
ECHO LV ESV A2C: 52 ML
ECHO LV ESV A4C: 66 ML
ECHO LV ESV BP: 60 ML (ref 19–49)
ECHO LV ESV INDEX A2C: 30 ML/M2
ECHO LV ESV INDEX A4C: 39 ML/M2
ECHO LV ESV INDEX BP: 35 ML/M2
ECHO LV FRACTIONAL SHORTENING: 23 % (ref 28–44)
ECHO LV INTERNAL DIMENSION DIASTOLE INDEX: 2.51 CM/M2
ECHO LV INTERNAL DIMENSION DIASTOLIC: 4.3 CM (ref 3.9–5.3)
ECHO LV INTERNAL DIMENSION SYSTOLIC INDEX: 1.93 CM/M2
ECHO LV INTERNAL DIMENSION SYSTOLIC: 3.3 CM
ECHO LV ISOVOLUMETRIC RELAXATION TIME (IVRT): 135.1 MS
ECHO LV IVSD: 1 CM (ref 0.6–0.9)
ECHO LV MASS 2D: 142.5 G (ref 67–162)
ECHO LV MASS INDEX 2D: 83.3 G/M2 (ref 43–95)
ECHO LV POSTERIOR WALL DIASTOLIC: 1 CM (ref 0.6–0.9)
ECHO LV RELATIVE WALL THICKNESS RATIO: 0.47
ECHO LVOT PEAK GRADIENT: 4 MMHG
ECHO LVOT PEAK VELOCITY: 1 M/S
ECHO MV "A" WAVE DURATION: 119.9 MSEC
ECHO MV A VELOCITY: 0.77 M/S
ECHO MV E DECELERATION TIME (DT): 228.8 MS
ECHO MV E VELOCITY: 0.34 M/S
ECHO MV E/A RATIO: 0.44
ECHO MV E/E' LATERAL: 5.67
ECHO MV E/E' RATIO (AVERAGED): 7.08
ECHO MV E/E' SEPTAL: 8.5
ECHO PVEIN A DURATION: 100.9 MS
ECHO PVEIN A VELOCITY: 0.3 M/S
ECHO RIGHT VENTRICULAR SYSTOLIC PRESSURE (RVSP): 23 MMHG
ECHO RV FREE WALL PEAK S': 9 CM/S
ECHO RV TAPSE: 1.6 CM (ref 1.7–?)
ECHO TV REGURGITANT MAX VELOCITY: 2.24 M/S
ECHO TV REGURGITANT PEAK GRADIENT: 20 MMHG

## 2022-09-20 PROCEDURE — 93306 TTE W/DOPPLER COMPLETE: CPT | Performed by: SPECIALIST

## 2022-09-21 ENCOUNTER — PATIENT OUTREACH (OUTPATIENT)
Dept: CASE MANAGEMENT | Age: 67
End: 2022-09-21

## 2022-09-21 NOTE — PROGRESS NOTES
Care Transitions Follow Up Call    Challenges to be reviewed by the provider   Additional needs identified to be addressed with provider: yes    CTN has not received return call from caregiver to complete follow up outreach assessment. Method of communication with provider : none    Care Transition Nurse (CTN) contacted the caregiver by telephone to follow up after admission on 22. Verified name and  with caregiver as identifiers. Addressed changes since last contact: refer to above yellow box and goals section    [facility] follow up appointment(s):   Future Appointments   Date Time Provider Alysha Sue   3/9/2023  2:20 PM MD KATY Cintron [facility]     Non-[facility] follow up appointment(s):   PCP- Dr. Oscar Biswas provided contact information for future needs. Plan for follow-up call in 7-10 days based on severity of symptoms and risk factors. Plan for next call:   Assess current symptoms including daily monitoring items. Date of next PCP appointment  Assess current resources needed, referral to ACM if needed. Resolve post MI bundle episode. Goals Addressed                   This Visit's Progress       Myocardial Infarction     To achieve and maintain optimum control of other co-morbid conditions (ie. diabetes, heart failure). 22- review of EMR shows: attended OV with Dr. Gilberto Black- VS- 110/60, HR 73  wt- 145 lbs. EKG done, reviewed. C/o mild ankle edema: PE shows 1+ sudha ankle edema. Reports no angina. Follow up in 6 mo. - 3/9/2023 at 2:20.       22- Left VM for Ms. Dawn Umanzor to return call. [employer]     22- spoke with Peggy Delgado. Briefly she said that she is now living in a private group home and doing well. She was not able to talk further. Texted her later and asked if there was someone I could talk with to check on her aunt. Asked her also about attending cardiology follow up appt. Now on - did not show for .     Later received call from Niece, Kinjal Duque. She states that her aunt is doing well- her ankles are not swollen. She feels that she is adjusting. She did not agree for this CTN to contact her to check on her aunt. Later received phone call from her niece a second time, she stated that she has spoken with Ms. Carole Davis and it is okay for CTN to call to get updates from her. CTN reached Ms. Carole Davis. Introduced self and role. She was asking for assistance on a few things- forwarded referral to  to contact to [employer]     7/14/22- spoke with SNF staff- Marquita-her nurse for today. Her BP this morning was 114/76. She said Ms. [facility] was new to them and feels like she is adjusting well. Therapy team slated to evaluate her. Shared that BP meds had been changed to achieve BP control and support recovery from NSTEMI. Shared concern about documentation about: resperidone recently started at [facility]- documentation that family felt she had AMS and less awake- causing them to bring to hospital. It is listed as a continued medication on current orders. Closely monitor - nurse said she is alert to name only but is pleasant and cooperative, awake. Spoke later with niece, Kinjal Duque- she said her aunt developed increasing LE edema- felt that she stayed mostly in a  due to staffing. [place] that facility was not staffed to take care of her aunt. She was not sure about if a provider was seeing at the [facility]. She confirmed that her aunt's PCP is Dr. Jamarcus Caldwell. [facility]          Post Hospitalization     Attends follow-up appointments as directed. 9/21/22- attended 9/7 OV with Dr. Deborah Dupree     8/12/22- spoke with Ms. Carole Davis. She states that the agreement with the families of the residents that live in the home: she transports to PCP appointments. They have to transport to speciality appointments. She states Ms. Pacheco's niece states that she will be taking her to the cardiology appointment below:   9/7 at 1 and 2:20. Dr. Parag Barnes and gigi. 8/11/22-   PCP- CTN verified with Dr. Tenzin Hunt that he saw her on 8/9 and will be her PCP. He sees other patients who live in the private home where she now lives. Cardiology- Niece states that she was told by [facility] that they could not transport her aunt to the below appt on 7/22- it was going to be rescheduled. Explained it was not cancelled. She states that she will be communicating with her caregiver and she will attend planned follow up appt below on 9/7. [facility]     7/14/22-   PCP- Dr. Liz Garcia-   Cardiology- Scheduled to see SAMIA Allen NP on 7/22 at 10:40.                         Dr. Alfonzo Mccauley on 9/7 with echo at 1 pm.                          [facility]

## 2022-09-23 NOTE — PROGRESS NOTES
HgbA1c elevated to 7.2. Increase metformin 500 mg tablet to 1 tab in the morning and 2 tabs in the evening. Repeat BMP in 1 month. Otherwise, labs within normal range. no

## 2022-10-11 ENCOUNTER — DOCUMENTATION ONLY (OUTPATIENT)
Dept: CARDIOLOGY CLINIC | Age: 67
End: 2022-10-11

## 2022-10-11 ENCOUNTER — TELEPHONE (OUTPATIENT)
Dept: CARDIOLOGY CLINIC | Age: 67
End: 2022-10-11

## 2022-10-11 ENCOUNTER — PATIENT OUTREACH (OUTPATIENT)
Dept: CASE MANAGEMENT | Age: 67
End: 2022-10-11

## 2022-10-11 RX ORDER — SACUBITRIL AND VALSARTAN 24; 26 MG/1; MG/1
1 TABLET, FILM COATED ORAL 2 TIMES DAILY
Qty: 60 TABLET | Refills: 0 | Status: SHIPPED | OUTPATIENT
Start: 2022-10-11 | End: 2022-10-12

## 2022-10-11 NOTE — PROGRESS NOTES
Spoke with patient's niece Ruperto Zapata  Patient is in assisted living situation now  Discussed lower EF and the recommendation to switch from losartan to Entresto 24/26 BID  Ruperto Zapata said she is getting ready to change patient's insurance during the upcoming open enrollment to include better prescription coverage  We discussed how that probably would not take effect until Jan 1, 2023  We agreed to send the Renzo Khan Rx to [facility] today and she will call and find out her copay for Entresto. If it is too expensive we will delay starting it until after Jan 1, 2023. We discussed the 30 day free trial but she wants to know how much it will cost after that. We discussed how she will stop losartan if she begins Entresto  I also told her the patient will need to come in for a visit with me in November if she starts Therese Keenan said getting her to appointments right now is difficult due to her work schedule, son's appointments, etc.  Appointments later in the day work best.  I offered to write her a letter excusing her from work on the day of the patient's appt if that would help. Ruperto Zapata will check copay for Entresto and then call back to update us. Haydee - can you go ahead and get her an appt for an echo in late January for CHF and then appt with Dr. Lilian Stinson at least a week after that? We need to reassess her EF in 3 months regardless of whether or not she starts Entresto.

## 2022-10-11 NOTE — TELEPHONE ENCOUNTER
Patient's niece called back regarding information sacubitriL-valsartan Valliedwin Singh) 24-26 mg tablet. She states the medication is $811, which is unaffordable for the patient.       QDYKX:[phone]

## 2022-10-11 NOTE — PROGRESS NOTES
Spoke with patient's niece Hayden Chavez  Patient is in assisted living situation now  Discussed lower EF and the recommendation to switch from losartan to Entresto 24/26 BID  Hayden Chavez said she is getting ready to change patient's insurance during the upcoming open enrollment to include better prescription coverage  We discussed how that probably would not take effect until Jan 1, 2023  We agreed to send the Maldonado-Cline Rx to [facility] today and she will call and find out her copay for Entresto. If it is too expensive we will delay starting it until after Jan 1, 2023. We discussed the 30 day free trial but she wants to know how much it will cost after that. We discussed how she will stop losartan if she begins Entresto  I also told her the patient will need to come in for a visit with me in November if she starts Hamp Reema said getting her to appointments right now is difficult due to her work schedule, son's appointments, etc.  Appointments later in the day work best.  I offered to write her a letter excusing her from work on the day of the patient's appt if that would help. Hayden Chavez will check copay for Entresto and then call back to update us. Haydee - can you go ahead and get her an appt for an echo in late January for CHF and then appt with Dr. Iftikhar Adame at least a week after that? We need to reassess her EF in 3 months regardless of whether or not she starts Entresto.

## 2022-10-11 NOTE — PROGRESS NOTES
Patient has graduated from the Bundle program on 10/11/22. Patient/family has the ability to self-manage at this time Care management goals have been completed. Patient was not referred to the [facility] team for further management. Goals Addressed                   This Visit's Progress       Myocardial Infarction     COMPLETED: To achieve and maintain optimum control of other co-morbid conditions (ie. diabetes, heart failure). 9/21/22- review of EMR shows: attended OV with Dr. Gianna You- VS- 110/60, HR 73  wt- 145 lbs. EKG done, reviewed. C/o mild ankle edema: PE shows 1+ sudha ankle edema. Reports no angina. Follow up in 6 mo. - 3/9/2023 at 2:20.       9/1/22- Left VM for Ms. Carolyn Barreto to return call. [facility]     8/11/22- spoke with Marisa Delgado. Briefly she said that she is now living in a private group home and doing well. She was not able to talk further. Texted her later and asked if there was someone I could talk with to check on her aunt. Asked her also about attending cardiology follow up appt. Now on 9/7- did not show for 7/22. Later received call from Marisa Delgado. She states that her aunt is doing well- her ankles are not swollen. She feels that she is adjusting. She did not agree for this CTN to contact her to check on her aunt. Later received phone call from her niece a second time, she stated that she has spoken with Ms. Carolyn Barreto and it is okay for [facility] to call to get updates from her. CTN reached Ms. Carolyn Barreto. Introduced self and role. She was asking for assistance on a few things- forwarded referral to  to contact to [employer]     7/14/22- spoke with SNF staff- Marquita-her nurse for today. Her BP this morning was 114/76. She said Ms. Trevon Zeng was new to them and feels like she is adjusting well. Therapy team slated to evaluate her. Shared that BP meds had been changed to achieve BP control and support recovery from NSTEMI.   Shared concern about documentation about: resperidone recently started at [facility]- documentation that family felt she had AMS and less awake- causing them to bring to hospital. It is listed as a continued medication on current orders. Closely monitor - nurse said she is alert to name only but is pleasant and cooperative, awake. Spoke later with niece, Thom Marie- she said her aunt developed increasing LE edema- felt that she stayed mostly in a WC due to staffing. [place] that facility was not staffed to take care of her aunt. She was not sure about if a provider was seeing at the [facility]. She confirmed that her aunt's PCP is Dr. Traci Cardona. [facility]          Post Hospitalization     COMPLETED: Attends follow-up appointments as directed. 9/21/22- attended 9/7 OV with Dr. Augusto Ma     8/12/22- spoke with Ms. Alton Kumar. She states that the agreement with the families of the residents that live in the home: she transports to PCP appointments. They have to transport to speciality appointments. She states Ms. Pacheco's niece states that she will be taking her to the cardiology appointment below:   9/7 at 1 and 2:20. Dr. Ella Jones and echo. 8/11/22-   PCP- CTN verified with Dr. Vale Abad that he saw her on 8/9 and will be her PCP. He sees other patients who live in the private home where she now lives. Cardiology- Sandy states that she was told by [facility] that they could not transport her aunt to the below appt on 7/22- it was going to be rescheduled. Explained it was not cancelled. She states that she will be communicating with her caregiver and she will attend planned follow up appt below on 9/7. [facility]     7/14/22-   PCP- Dr. Екатерина Mares-   Cardiology- Scheduled to see SAMIA Abarca NP on 7/22 at 10:40. Dr. Saurabh Orellana on 9/7 with echo at 1 pm.                          [employer]        COMPLETED: Supportive resources in place to maintain patient in the community (ie.  Home Health, DME equipment, refer to, medication assistant plan, etc.) 10/11/22- spoke briefly with Ms. Lidia Delaney. She said Ms. Charo Bonilla is doing well. She mentioned that her niece may need help with securing access to one of the medications for her decreased heart function. Review of EMR, shows that call received and routed to nurseHaydee to help with applying for PA for her Entresto. CTRAFITA shared with Ms. Lidia Delaney that Haydee would be in touch with nidiane to [address]     9/21/22- reached Ms. Lidia Delaney- she states she is at [facility] and states she will call CTN shortly. [facility]     9/1/22- Left  for Ms. Lidia Delaney, owner/caregiver at [facility]. [facility]     8/12/22- received information from team DAVE. Called and shared with nidiane, she asked for CTN to relay to Ms. Lidia Delaney. Spoke with Ms. Lidia Delaney- shared and encouraged her to contact [employer] to schedule for their benefits enrollment department to assist with securing:  supplement plan and RX coverage. Her niece confirms that she dropped the [address] [place] [address] when it was open enrollment back in 2019 on the advice of team at [facility]. The niece states that she has not received bills for co-pays/co-insurance amounts not paid to facilities and/or providers. Advised Ms. Lidia Delaney that there is FA available for monies owed to [address]. CTN advised them to call phone number listed on the bill and they can help them apply. [facility]     8/11/22- spoke with [facility] at [address] ([facility]). She discharged from them on 8/2/22. Review of EMR shows that [address] called about PCP following orders for therapy. Spoke with [facility] office- they did Kavitha on 8/4. SN and PT. Nursing will be discharging on 8/17. [facility] states they are seeing her at: address on [facility]. Spoke with Nidiane, briefly. She was in the car and traffic was heavy. She states that her Frances Ruiz is in a private home and she is doing well. Later received call from Niece.  She states that she did not receive help with securing alternative living for her aunt - except [facility] Sharif Johnson at the SNF did give her two places to check out- the first one she did not like, the second is where she did feel comfortable for her aunt living. She feels the owner is caring and her aunt is adjusting well. She declined to share the name of the home where her aunt lives. Received a 2nd phone call from sonam. [facility]     7/14/22- discharged to [place] [address]. Recently moved to [facility]. April 2022. Nidiane- Leann MALHOTRA-documeted as POA. Financial POA paperwork on file. CTN spoke with niece-  she does not want her aunt to return to [facility] [address] made referral to  to help with options for other AL-memory care facilities. Her aunt does no qualify for Medicaid- income is approx $3400 per month. She does not have a supplement to Medicare and No RX medicare coverage. Has [employer]              Patient has Care Transition Nurse's contact information for any further questions, concerns, or needs.   Patients upcoming visits:    Future Appointments   Date Time Provider Alysha Rogers   1/19/2023  3:45 PM CITLALI YEH BS AMB   1/26/2023  2:40 PM MD KATY Charles BS AMB   3/9/2023  2:20 PM MD KATY Sales BS AMB

## 2022-10-12 RX ORDER — LOSARTAN POTASSIUM 25 MG/1
12.5 TABLET ORAL
Qty: 45 TABLET | Refills: 3 | Status: SHIPPED | OUTPATIENT
Start: 2022-10-12

## 2022-10-12 NOTE — TELEPHONE ENCOUNTER
Verified patient with two types of identifiers. Let Yoana Mackenzie know that until she has Rx coverage for her aunt she should remain on Losartan. We can readdress Entresto once she has Rx coverage. She was appreciative.      Future Appointments   Date Time Provider Alysha Sue   1/19/2023  3:45 PM Rashawn YEH BS AMB   1/26/2023  2:40 PM MD KATY Coyne AMB   3/9/2023  2:20 PM MD KATY Costa AMB

## 2023-01-18 NOTE — DISCHARGE INSTRUCTIONS
Patient Education     Altered Mental Status: Care Instructions  Your Care Instructions  Altered mental status is a change in how well your brain is working. As a result, you may be confused, be less alert than usual, or act in odd ways. This may include seeing or hearing things that aren't really there (hallucinations). A mental status change has many possible causes. For example, it may be the result of an infection, an imbalance of chemicals in the body, or a chronic disease such as diabetes or COPD. It can also be caused by things such as a head injury, taking certain medicines, or using alcohol or drugs. The doctor may do tests to look for the cause. These tests may include urine tests, blood tests, and imaging tests such as a CT scan. Sometimes a clear cause isn't found. But tests can help the doctor rule out a serious cause of your symptoms. A change in mental status can be scary. But mental status will often return to normal when the cause is treated. So it is important to get any follow-up testing or treatment the doctor has suggested. The doctor has checked you carefully, but problems can develop later. If you notice any problems or new symptoms, get medical treatment right away. Follow-up care is a key part of your treatment and safety. Be sure to make and go to all appointments, and call your doctor if you are having problems. It's also a good idea to know your test results and keep a list of the medicines you take. How can you care for yourself at home? · Be safe with medicines. Take your medicines exactly as prescribed. Call your doctor if you think you are having a problem with your medicine. · Have another adult stay with you until you are better. This can help keep you safe. Ask that person to watch for signs that your mental status is getting worse. When should you call for help? Call 911 anytime you think you may need emergency care.  For example, call if:  · You passed out (lost Physician Progress Note      Aby Reyes  [facility] #:                  [id number]  :                       1946  ADMIT DATE:       2023 6:05 PM  [address] [place] DATE:  RESPONDING  PROVIDER #:        Joshua Linda MD          QUERY TEXT:    Patient admitted with TIA, noted to have chronic atrial fibrillation and is   maintained on Eliquis. If possible, please document in progress notes and   discharge summary if you are evaluating and/or treating any of the following: The medical record reflects the following:  Risk Factors: chronic atrial fibrillation  Clinical Indicators: per H/P \"Patient has history of chronic A. fib for which   he is on Eliquis\", Pro-time=14.8, INR=1.16  Treatment: CBC, Protime w/ INR, inpatient meds- Eliquis 5 mg PO 2 times daily  Options provided:  -- Secondary hypercoagulable state in a patient with atrial fibrillation  -- Other - I will add my own diagnosis  -- Disagree - Not applicable / Not valid  -- Disagree - Clinically unable to determine / Unknown  -- Refer to Clinical Documentation Reviewer    PROVIDER RESPONSE TEXT:    This patient has secondary hypercoagulable state in a patient with atrial   fibrillation.     Query created by: Hoa Mendieta on 2023 11:35 AM      Electronically signed by:  Joshua Linda MD 2023 11:39 AM consciousness). Call your doctor now or seek immediate medical care if:  · Your mental status is getting worse. · You have new symptoms, such as a fever, chills, or shortness of breath. · You do not feel safe. Watch closely for changes in your health, and be sure to contact your doctor if:  · You do not get better as expected. Where can you learn more? Go to [facility]  Enter J452 in the search box to learn more about \"Altered Mental Status: Care Instructions. \"   © 2220-8338 Healthwise, Incorporated. Care instructions adapted under license by [address] [place] [address] (which disclaims liability or warranty for this information). This care instruction is for use with your licensed healthcare professional. If you have questions about a medical condition or this instruction, always ask your healthcare professional. [place] [address] any warranty or liability for your use of this information.   Content Version: 53.5.765854; Current as of: November 20, 2015

## 2023-05-26 RX ORDER — LOSARTAN POTASSIUM 25 MG/1
12.5 TABLET ORAL
COMMUNITY
Start: 2022-10-12

## 2023-05-26 RX ORDER — NITROGLYCERIN 0.4 MG/1
0.4 TABLET SUBLINGUAL
COMMUNITY
Start: 2022-07-13

## 2023-05-26 RX ORDER — ATORVASTATIN CALCIUM 80 MG/1
1 TABLET, FILM COATED ORAL NIGHTLY
COMMUNITY
Start: 2022-07-13

## 2023-05-26 RX ORDER — AMLODIPINE BESYLATE 2.5 MG/1
TABLET ORAL
COMMUNITY
Start: 2022-08-04

## 2023-05-26 RX ORDER — METOPROLOL SUCCINATE 50 MG/1
1 TABLET, EXTENDED RELEASE ORAL DAILY
COMMUNITY
Start: 2022-03-03

## 2023-05-26 RX ORDER — ASPIRIN 81 MG/1
81 TABLET ORAL DAILY
COMMUNITY
Start: 2022-07-13

## 2023-12-08 NOTE — PROGRESS NOTES
IUP at 16.5 weeks patient feeling occasional flutters  Patient seen in ED last week, diagnosed with UTI and placed on Keflex.  Patient was having cramping, states she is feeling better.  Denies fever or chills.  Urine culture not sent  No other concerns.    PLAN:  Finish Keflex  Follow-up with routine OB care   Verbal report given to Janes Wilson RN(name) on Wyatta Chimera  being transferred to [facility]) for routine progression of care       Report consisted of patients Situation, Background, Assessment and   Recommendations(SBAR). Information from the following report(s) Procedure Summary, MAR and Recent Results was reviewed with the receiving nurse. Lines:       Opportunity for questions and clarification was provided.       Patient transported with:   Monitor  Registered Nurse

## 2024-01-05 ENCOUNTER — HOSPITAL ENCOUNTER (INPATIENT)
Facility: HOSPITAL | Age: 69
LOS: 5 days | Discharge: NURSING, REHAB OR LONG-TERM CARE | DRG: 100 | End: 2024-01-10
Attending: EMERGENCY MEDICINE | Admitting: INTERNAL MEDICINE
Payer: MEDICARE

## 2024-01-05 ENCOUNTER — APPOINTMENT (OUTPATIENT)
Facility: HOSPITAL | Age: 69
DRG: 100 | End: 2024-01-05
Payer: MEDICARE

## 2024-01-05 DIAGNOSIS — R41.82 ALTERED MENTAL STATUS, UNSPECIFIED ALTERED MENTAL STATUS TYPE: ICD-10-CM

## 2024-01-05 DIAGNOSIS — R56.9 SEIZURE (HCC): Primary | ICD-10-CM

## 2024-01-05 LAB
ALBUMIN SERPL-MCNC: 3.7 G/DL (ref 3.5–5)
ALBUMIN/GLOB SERPL: 0.8 (ref 1.1–2.2)
ALP SERPL-CCNC: 110 U/L (ref 45–117)
ALT SERPL-CCNC: 28 U/L (ref 12–78)
ANION GAP SERPL CALC-SCNC: 2 MMOL/L (ref 5–15)
APPEARANCE UR: CLEAR
AST SERPL-CCNC: 26 U/L (ref 15–37)
BACTERIA URNS QL MICRO: NEGATIVE /HPF
BASOPHILS # BLD: 0 K/UL (ref 0–0.1)
BASOPHILS NFR BLD: 0 % (ref 0–1)
BILIRUB SERPL-MCNC: 0.8 MG/DL (ref 0.2–1)
BILIRUB UR QL: NEGATIVE
BUN SERPL-MCNC: 11 MG/DL (ref 6–20)
BUN/CREAT SERPL: 15 (ref 12–20)
CALCIUM SERPL-MCNC: 10.1 MG/DL (ref 8.5–10.1)
CHLORIDE SERPL-SCNC: 102 MMOL/L (ref 97–108)
CO2 SERPL-SCNC: 33 MMOL/L (ref 21–32)
COLOR UR: ABNORMAL
CREAT SERPL-MCNC: 0.74 MG/DL (ref 0.55–1.02)
DIFFERENTIAL METHOD BLD: ABNORMAL
EOSINOPHIL # BLD: 0 K/UL (ref 0–0.4)
EOSINOPHIL NFR BLD: 1 % (ref 0–7)
EPITH CASTS URNS QL MICRO: ABNORMAL /LPF
ERYTHROCYTE [DISTWIDTH] IN BLOOD BY AUTOMATED COUNT: 17.6 % (ref 11.5–14.5)
GLOBULIN SER CALC-MCNC: 4.8 G/DL (ref 2–4)
GLUCOSE BLD STRIP.AUTO-MCNC: 81 MG/DL (ref 65–117)
GLUCOSE SERPL-MCNC: 94 MG/DL (ref 65–100)
GLUCOSE UR STRIP.AUTO-MCNC: NEGATIVE MG/DL
HCT VFR BLD AUTO: 40.9 % (ref 35–47)
HGB BLD-MCNC: 12.5 G/DL (ref 11.5–16)
HGB UR QL STRIP: NEGATIVE
HYALINE CASTS URNS QL MICRO: ABNORMAL /LPF (ref 0–2)
IMM GRANULOCYTES # BLD AUTO: 0 K/UL (ref 0–0.04)
IMM GRANULOCYTES NFR BLD AUTO: 0 % (ref 0–0.5)
KETONES UR QL STRIP.AUTO: NEGATIVE MG/DL
LEUKOCYTE ESTERASE UR QL STRIP.AUTO: NEGATIVE
LYMPHOCYTES # BLD: 2.5 K/UL (ref 0.8–3.5)
LYMPHOCYTES NFR BLD: 32 % (ref 12–49)
MCH RBC QN AUTO: 26.2 PG (ref 26–34)
MCHC RBC AUTO-ENTMCNC: 30.6 G/DL (ref 30–36.5)
MCV RBC AUTO: 85.6 FL (ref 80–99)
MONOCYTES # BLD: 0.4 K/UL (ref 0–1)
MONOCYTES NFR BLD: 5 % (ref 5–13)
NEUTS SEG # BLD: 4.7 K/UL (ref 1.8–8)
NEUTS SEG NFR BLD: 62 % (ref 32–75)
NITRITE UR QL STRIP.AUTO: NEGATIVE
NRBC # BLD: 0 K/UL (ref 0–0.01)
NRBC BLD-RTO: 0 PER 100 WBC
PH UR STRIP: 8 (ref 5–8)
PLATELET # BLD AUTO: 259 K/UL (ref 150–400)
PMV BLD AUTO: 11.2 FL (ref 8.9–12.9)
POTASSIUM SERPL-SCNC: 3.2 MMOL/L (ref 3.5–5.1)
PROT SERPL-MCNC: 8.5 G/DL (ref 6.4–8.2)
PROT UR STRIP-MCNC: ABNORMAL MG/DL
RBC # BLD AUTO: 4.78 M/UL (ref 3.8–5.2)
RBC #/AREA URNS HPF: ABNORMAL /HPF (ref 0–5)
SERVICE CMNT-IMP: NORMAL
SODIUM SERPL-SCNC: 137 MMOL/L (ref 136–145)
SP GR UR REFRACTOMETRY: 1.01
TROPONIN I SERPL HS-MCNC: 18 NG/L (ref 0–51)
URINE CULTURE IF INDICATED: ABNORMAL
UROBILINOGEN UR QL STRIP.AUTO: 0.2 EU/DL (ref 0.2–1)
WBC # BLD AUTO: 7.6 K/UL (ref 3.6–11)
WBC URNS QL MICRO: ABNORMAL /HPF (ref 0–4)

## 2024-01-05 PROCEDURE — 99285 EMERGENCY DEPT VISIT HI MDM: CPT

## 2024-01-05 PROCEDURE — 6370000000 HC RX 637 (ALT 250 FOR IP): Performed by: INTERNAL MEDICINE

## 2024-01-05 PROCEDURE — 95816 EEG AWAKE AND DROWSY: CPT

## 2024-01-05 PROCEDURE — 85025 COMPLETE CBC W/AUTO DIFF WBC: CPT

## 2024-01-05 PROCEDURE — 70450 CT HEAD/BRAIN W/O DYE: CPT

## 2024-01-05 PROCEDURE — 99223 1ST HOSP IP/OBS HIGH 75: CPT | Performed by: PSYCHIATRY & NEUROLOGY

## 2024-01-05 PROCEDURE — 36415 COLL VENOUS BLD VENIPUNCTURE: CPT

## 2024-01-05 PROCEDURE — 95706 EEG WO VID 2-12HR INTMT MNTR: CPT

## 2024-01-05 PROCEDURE — 1100000003 HC PRIVATE W/ TELEMETRY

## 2024-01-05 PROCEDURE — 82962 GLUCOSE BLOOD TEST: CPT

## 2024-01-05 PROCEDURE — 96374 THER/PROPH/DIAG INJ IV PUSH: CPT

## 2024-01-05 PROCEDURE — 2580000003 HC RX 258: Performed by: INTERNAL MEDICINE

## 2024-01-05 PROCEDURE — 80053 COMPREHEN METABOLIC PANEL: CPT

## 2024-01-05 PROCEDURE — 6360000002 HC RX W HCPCS: Performed by: EMERGENCY MEDICINE

## 2024-01-05 PROCEDURE — 6360000002 HC RX W HCPCS: Performed by: INTERNAL MEDICINE

## 2024-01-05 PROCEDURE — 84484 ASSAY OF TROPONIN QUANT: CPT

## 2024-01-05 PROCEDURE — 95717 EEG PHYS/QHP 2-12 HR W/O VID: CPT | Performed by: PSYCHIATRY & NEUROLOGY

## 2024-01-05 PROCEDURE — 81001 URINALYSIS AUTO W/SCOPE: CPT

## 2024-01-05 RX ORDER — ATORVASTATIN CALCIUM 40 MG/1
80 TABLET, FILM COATED ORAL NIGHTLY
Status: DISCONTINUED | OUTPATIENT
Start: 2024-01-05 | End: 2024-01-10 | Stop reason: HOSPADM

## 2024-01-05 RX ORDER — LOSARTAN POTASSIUM 25 MG/1
12.5 TABLET ORAL DAILY
Status: DISCONTINUED | OUTPATIENT
Start: 2024-01-05 | End: 2024-01-07

## 2024-01-05 RX ORDER — LEVETIRACETAM 500 MG/5ML
2000 INJECTION, SOLUTION, CONCENTRATE INTRAVENOUS EVERY 12 HOURS
Status: DISCONTINUED | OUTPATIENT
Start: 2024-01-05 | End: 2024-01-05

## 2024-01-05 RX ORDER — AMLODIPINE BESYLATE 5 MG/1
2.5 TABLET ORAL DAILY
Status: DISCONTINUED | OUTPATIENT
Start: 2024-01-05 | End: 2024-01-07

## 2024-01-05 RX ORDER — SODIUM CHLORIDE 9 MG/ML
INJECTION, SOLUTION INTRAVENOUS CONTINUOUS
Status: ACTIVE | OUTPATIENT
Start: 2024-01-05 | End: 2024-01-07

## 2024-01-05 RX ORDER — SODIUM CHLORIDE 0.9 % (FLUSH) 0.9 %
5-40 SYRINGE (ML) INJECTION PRN
Status: DISCONTINUED | OUTPATIENT
Start: 2024-01-05 | End: 2024-01-10 | Stop reason: HOSPADM

## 2024-01-05 RX ORDER — HYDRALAZINE HYDROCHLORIDE 20 MG/ML
10 INJECTION INTRAMUSCULAR; INTRAVENOUS EVERY 6 HOURS PRN
Status: DISCONTINUED | OUTPATIENT
Start: 2024-01-05 | End: 2024-01-10 | Stop reason: HOSPADM

## 2024-01-05 RX ORDER — ACETAMINOPHEN 325 MG/1
650 TABLET ORAL EVERY 6 HOURS PRN
Status: DISCONTINUED | OUTPATIENT
Start: 2024-01-05 | End: 2024-01-10 | Stop reason: HOSPADM

## 2024-01-05 RX ORDER — ENOXAPARIN SODIUM 100 MG/ML
40 INJECTION SUBCUTANEOUS DAILY
Status: DISCONTINUED | OUTPATIENT
Start: 2024-01-05 | End: 2024-01-10 | Stop reason: HOSPADM

## 2024-01-05 RX ORDER — POTASSIUM CHLORIDE 7.45 MG/ML
10 INJECTION INTRAVENOUS PRN
Status: DISCONTINUED | OUTPATIENT
Start: 2024-01-05 | End: 2024-01-10 | Stop reason: HOSPADM

## 2024-01-05 RX ORDER — ASPIRIN 81 MG/1
81 TABLET ORAL DAILY
Status: DISCONTINUED | OUTPATIENT
Start: 2024-01-05 | End: 2024-01-10 | Stop reason: HOSPADM

## 2024-01-05 RX ORDER — CASTOR OIL AND BALSAM, PERU 788; 87 MG/G; MG/G
OINTMENT TOPICAL 2 TIMES DAILY
Status: DISCONTINUED | OUTPATIENT
Start: 2024-01-05 | End: 2024-01-10 | Stop reason: HOSPADM

## 2024-01-05 RX ORDER — SODIUM CHLORIDE 9 MG/ML
INJECTION, SOLUTION INTRAVENOUS PRN
Status: DISCONTINUED | OUTPATIENT
Start: 2024-01-05 | End: 2024-01-10 | Stop reason: HOSPADM

## 2024-01-05 RX ORDER — LEVETIRACETAM 500 MG/5ML
500 INJECTION, SOLUTION, CONCENTRATE INTRAVENOUS EVERY 12 HOURS
Status: DISCONTINUED | OUTPATIENT
Start: 2024-01-05 | End: 2024-01-05

## 2024-01-05 RX ORDER — LORAZEPAM 2 MG/ML
2 INJECTION INTRAMUSCULAR ONCE
Status: DISCONTINUED | OUTPATIENT
Start: 2024-01-05 | End: 2024-01-10 | Stop reason: HOSPADM

## 2024-01-05 RX ORDER — POLYETHYLENE GLYCOL 3350 17 G/17G
17 POWDER, FOR SOLUTION ORAL DAILY PRN
Status: DISCONTINUED | OUTPATIENT
Start: 2024-01-05 | End: 2024-01-10 | Stop reason: HOSPADM

## 2024-01-05 RX ORDER — ONDANSETRON 4 MG/1
4 TABLET, ORALLY DISINTEGRATING ORAL EVERY 8 HOURS PRN
Status: DISCONTINUED | OUTPATIENT
Start: 2024-01-05 | End: 2024-01-10 | Stop reason: HOSPADM

## 2024-01-05 RX ORDER — POTASSIUM CHLORIDE 20 MEQ/1
40 TABLET, EXTENDED RELEASE ORAL PRN
Status: DISCONTINUED | OUTPATIENT
Start: 2024-01-05 | End: 2024-01-10 | Stop reason: HOSPADM

## 2024-01-05 RX ORDER — LEVETIRACETAM 500 MG/5ML
2000 INJECTION, SOLUTION, CONCENTRATE INTRAVENOUS ONCE
Status: COMPLETED | OUTPATIENT
Start: 2024-01-05 | End: 2024-01-06

## 2024-01-05 RX ORDER — METOPROLOL SUCCINATE 50 MG/1
50 TABLET, EXTENDED RELEASE ORAL DAILY
Status: DISCONTINUED | OUTPATIENT
Start: 2024-01-05 | End: 2024-01-10 | Stop reason: HOSPADM

## 2024-01-05 RX ORDER — SODIUM CHLORIDE 0.9 % (FLUSH) 0.9 %
5-40 SYRINGE (ML) INJECTION EVERY 12 HOURS SCHEDULED
Status: DISCONTINUED | OUTPATIENT
Start: 2024-01-05 | End: 2024-01-10 | Stop reason: HOSPADM

## 2024-01-05 RX ORDER — ONDANSETRON 2 MG/ML
4 INJECTION INTRAMUSCULAR; INTRAVENOUS EVERY 6 HOURS PRN
Status: DISCONTINUED | OUTPATIENT
Start: 2024-01-05 | End: 2024-01-10 | Stop reason: HOSPADM

## 2024-01-05 RX ORDER — MAGNESIUM SULFATE IN WATER 40 MG/ML
2000 INJECTION, SOLUTION INTRAVENOUS PRN
Status: DISCONTINUED | OUTPATIENT
Start: 2024-01-05 | End: 2024-01-10 | Stop reason: HOSPADM

## 2024-01-05 RX ORDER — LEVETIRACETAM 500 MG/5ML
1000 INJECTION, SOLUTION, CONCENTRATE INTRAVENOUS EVERY 12 HOURS
Status: DISCONTINUED | OUTPATIENT
Start: 2024-01-06 | End: 2024-01-08

## 2024-01-05 RX ORDER — DIAZEPAM 5 MG/ML
2.5 INJECTION, SOLUTION INTRAMUSCULAR; INTRAVENOUS
Status: DISCONTINUED | OUTPATIENT
Start: 2024-01-05 | End: 2024-01-10 | Stop reason: HOSPADM

## 2024-01-05 RX ORDER — ACETAMINOPHEN 650 MG/1
650 SUPPOSITORY RECTAL EVERY 6 HOURS PRN
Status: DISCONTINUED | OUTPATIENT
Start: 2024-01-05 | End: 2024-01-10 | Stop reason: HOSPADM

## 2024-01-05 RX ADMIN — SODIUM CHLORIDE, PRESERVATIVE FREE 10 ML: 5 INJECTION INTRAVENOUS at 20:36

## 2024-01-05 RX ADMIN — SODIUM CHLORIDE: 9 INJECTION, SOLUTION INTRAVENOUS at 17:43

## 2024-01-05 RX ADMIN — Medication: at 22:47

## 2024-01-05 RX ADMIN — LEVETIRACETAM 2000 MG: 100 INJECTION INTRAVENOUS at 13:19

## 2024-01-05 RX ADMIN — HYDRALAZINE HYDROCHLORIDE 10 MG: 20 INJECTION INTRAMUSCULAR; INTRAVENOUS at 18:28

## 2024-01-05 ASSESSMENT — PAIN SCALES - WONG BAKER: WONGBAKER_NUMERICALRESPONSE: 0

## 2024-01-05 NOTE — CONSULTS
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  She is presently unresponsive likely related to postictal phenomenon plus benzodiazepine.  She has been loaded with 2000 mg of IV levetiracetam.    Plan and Recommendations: Will continue levetiracetam at 1000 mg twice a day.  EEG and MRI of the brain are pending.    Barrier to Discharge from Neurologic Perspective: Test results and neurologic stabilization.    Outpatient Neurology Follow Up: To be determined.      Shanell Muñoz., M.D.      Diplomate, American Board of Neurology and Psychiatry  Diplomate, American Board of Electrodiagnostic Medicine  Subspecialty Certification, Headache Medicine, [facility]  ______________________________________________________________________    Reason for Consultation: Mary Cruz is a 68 y.o. y/o female who we are asked to see in consultation for seizures.    History of Present Illness: This patient presented with reports of generalized tonic-clonic seizure.  Further details are not clarified in the medical record and no one is available for additional information in the emergency room with the only additional detail of sudden onset of difficulty speaking, generally weak followed by gaze deviation to the right followed by generalized tonic-clonic seizure.  She has had some increase in blood pressures to a moderate degree.  She has not been febrile.    Past medical History:  Past Medical History:   Diagnosis Date    Diabetes (HCC)     Hypertension     Memory loss     NSTEMI (non-ST  currently unresponsive in the emergency room.  Pupils are 3 mm and nonreactive.  She does not respond to noxious stimuli.  There is still a bit of spontaneous movement in the right upper extremity which appears to be somewhat spastic.  There is no evidence of head injury.  The  shunt valve is palpable over the left frontal hip region.  There is no evidence of head injury.  No carotid bruits are appreciated no cardiac murmurs noted.  There is no deformity of the limbs.  Muscle tone is otherwise normal throughout the left upper extremity and both lower extremities.  She is areflexic throughout.  No abnormal involuntary movements are noted.    Laboratory Studies:   Admission on 01/05/2024   Component Date Value Ref Range Status    WBC 01/05/2024 7.6  3.6 - 11.0 K/uL Final    RBC 01/05/2024 4.78  3.80 - 5.20 M/uL Final    Hemoglobin 01/05/2024 12.5  11.5 - 16.0 g/dL Final    Hematocrit 01/05/2024 40.9  35.0 - 47.0 % Final    MCV 01/05/2024 85.6  80.0 - 99.0 FL Final    MCH 01/05/2024 26.2  26.0 - 34.0 PG Final    MCHC 01/05/2024 30.6  30.0 - 36.5 g/dL Final    RDW 01/05/2024 17.6 (H)  11.5 - 14.5 % Final    Platelets 01/05/2024 259  150 - 400 K/uL Final    MPV 01/05/2024 11.2  8.9 - 12.9 FL Final    Nucleated RBCs 01/05/2024 0.0  0  WBC Final    nRBC 01/05/2024 0.00  0.00 - 0.01 K/uL Final    Neutrophils % 01/05/2024 62  32 - 75 % Final    Lymphocytes % 01/05/2024 32  12 - 49 % Final    Monocytes % 01/05/2024 5  5 - 13 % Final    Eosinophils % 01/05/2024 1  0 - 7 % Final    Basophils % 01/05/2024 0  0 - 1 % Final    Immature Granulocytes 01/05/2024 0  0.0 - 0.5 % Final    Neutrophils Absolute 01/05/2024 4.7  1.8 - 8.0 K/UL Final    Lymphocytes Absolute 01/05/2024 2.5  0.8 - 3.5 K/UL Final    Monocytes Absolute 01/05/2024 0.4  0.0 - 1.0 K/UL Final    Eosinophils Absolute 01/05/2024 0.0  0.0 - 0.4 K/UL Final    Basophils Absolute 01/05/2024 0.0  0.0 - 0.1 K/UL Final    Absolute Immature Granulocyte 01/05/2024 0.0

## 2024-01-05 NOTE — H&P
Hospitalist Admission Note    NAME:   Mary Cruz   : 1955   MRN: [id number]     Date/Time: 2024 2:45 PM    Patient PCP: Gibson Triana III, MD    ______________________________________________________________________  Given the patient's current clinical presentation, I have a high level of concern for decompensation if discharged from the emergency department.  Complex decision making was performed, which includes reviewing the patient's available past medical records, laboratory results, and x-ray films.       My assessment of this patient's clinical condition and my plan of care is as follows.    Assessment / Plan:  Suspected seizures  Acute metabolic encephalopathy due to post ictal state  -CT head shows no acute process.  Check MRI of the brain.  Start Ativan as needed for seizures.  Check EEG.  Seizure precautions.  Fall precautions.  Start Keppra.  Consult neurology.  Check CK level.  Start IV fluids.    Hypokalemia  -KCl replaced.  Recheck in a.m. tomorrow    Hypertension  Dyslipidemia  -Continue Norvasc and Lipitor    Diabetes mellitus  -Hold home metformin.  Start insulin sliding scale with blood sugar checks      Hx of Memory Loss  Hx of CVA  Hx of Cerebral Aneurysm  -Details are not clear as she is confused              Medical Decision Making:   I personally reviewed labs: CBC, BMP  I personally reviewed imaging: CT head  I personally reviewed EKG:  Toxic drug monitoring: Monitor hemoglobin while on Lovenox  Discussed case with: ED provider. After discussion I am in agreement that acuity of patient's medical condition necessitates hospital stay.      Code Status: Full code  DVT Prophylaxis: Lovenox  Baseline: From group home    Subjective:   CHIEF COMPLAINT: Seizures    HISTORY OF PRESENT ILLNESS:     Mary Cruz is a 68 y.o.  female with PMHx significant for hypertension, diabetes mellitus, dyslipidemia is coming the hospital chief complaints of altered mental status.  Patient was  Lymphocytes % 32 12 - 49 %    Monocytes % 5 5 - 13 %    Eosinophils % 1 0 - 7 %    Basophils % 0 0 - 1 %    Immature Granulocytes 0 0.0 - 0.5 %    Neutrophils Absolute 4.7 1.8 - 8.0 K/UL    Lymphocytes Absolute 2.5 0.8 - 3.5 K/UL    Monocytes Absolute 0.4 0.0 - 1.0 K/UL    Eosinophils Absolute 0.0 0.0 - 0.4 K/UL    Basophils Absolute 0.0 0.0 - 0.1 K/UL    Absolute Immature Granulocyte 0.0 0.00 - 0.04 K/UL    Differential Type AUTOMATED     Comprehensive Metabolic Panel    Collection Time: 01/05/24 12:38 PM   Result Value Ref Range    Sodium 137 136 - 145 mmol/L    Potassium 3.2 (L) 3.5 - 5.1 mmol/L    Chloride 102 97 - 108 mmol/L    CO2 33 (H) 21 - 32 mmol/L    Anion Gap 2 (L) 5 - 15 mmol/L    Glucose 94 65 - 100 mg/dL    BUN 11 6 - 20 MG/DL    Creatinine 0.74 0.55 - 1.02 MG/DL    Bun/Cre Ratio 15 12 - 20      Est, Glom Filt Rate >60 >60 ml/min/1.73m2    Calcium 10.1 8.5 - 10.1 MG/DL    Total Bilirubin 0.8 0.2 - 1.0 MG/DL    ALT 28 12 - 78 U/L    AST 26 15 - 37 U/L    Alk Phosphatase 110 45 - 117 U/L    Total Protein 8.5 (H) 6.4 - 8.2 g/dL    Albumin 3.7 3.5 - 5.0 g/dL    Globulin 4.8 (H) 2.0 - 4.0 g/dL    Albumin/Globulin Ratio 0.8 (L) 1.1 - 2.2     Troponin    Collection Time: 01/05/24 12:38 PM   Result Value Ref Range    Troponin, High Sensitivity 18 0 - 51 ng/L         CT HEAD WO CONTRAST    Result Date: 1/5/2024  EXAM:  CT CODE NEURO HEAD WO CONTRAST INDICATION:   Stroke COMPARISON: MRI brain 7/8/2022, CT head 7/6/2022. TECHNIQUE: Unenhanced CT of the head was performed using 5 mm images. Brain and bone windows were generated.  CT dose reduction was achieved through use of a standardized protocol tailored for this examination and automatic exposure control for dose modulation. FINDINGS: Stable positioning of left frontal approach ventricular shunt catheter with tip terminating in the body of the left lateral ventricle. The ventricles are unchanged in size without hydrocephalus. There is unchanged

## 2024-01-05 NOTE — ED NOTES
BIBEMS after pt was witness to have sudden onset aphasia, weakness in all extremities, and gaze deviated to the R. LKW approx 1140. Staff at pt's facility reported to EMS that pt is usually conversational and \"able to walk about\"    1230 - Doc box eval with Dr. Guthrie. Code S level 1 called overhead, CT notified.    1232 - Pt began having seizure-like activity ( L arm tremors, turning head to R side) while in hallway waiting to start CT scan. Dr. Guthrie notified. Sxs resolved after approx 30 seconds.    1235 - CT w/o contrast complete    1238 - 2 mg IV ativan given by Dwayne WYATT per verbal orders from Dr. Guthrie after pt began having L arm tremors again. Sxs resolved within 1 minute.    1240 - Dr. Lockhart on screen for teleneuro eval. Proceed directly back to ED room per Dr. Guthrie, no CTA at this time.    1250- Back to ED room, bed locked and low, BRx2. Ceribell applied. Monitor x3. Pt now responds with her name when asked, tracks with pupils.

## 2024-01-05 NOTE — ED NOTES
Verbal shift change report given to Nuzhat (oncoming nurse) by Dwayne (offgoing nurse). Report included the following information Nurse Handoff Report, ED Encounter Summary, ED SBAR, MAR, and Neuro Assessment.     Hand-off NIH provided over phone.

## 2024-01-05 NOTE — PROGRESS NOTES
MRI PENDING    Completion of MRI Screening Sheet    Fax to [phone] when completed    Please call [phone]  When this is done    Thank You

## 2024-01-06 ENCOUNTER — APPOINTMENT (OUTPATIENT)
Facility: HOSPITAL | Age: 69
DRG: 100 | End: 2024-01-06
Payer: MEDICARE

## 2024-01-06 PROBLEM — I67.9 CEREBRAL VASCULAR DISEASE: Status: ACTIVE | Noted: 2024-01-06

## 2024-01-06 LAB
ANION GAP SERPL CALC-SCNC: 9 MMOL/L (ref 5–15)
BASOPHILS # BLD: 0 K/UL (ref 0–0.1)
BASOPHILS NFR BLD: 1 % (ref 0–1)
BUN SERPL-MCNC: 9 MG/DL (ref 6–20)
BUN/CREAT SERPL: 15 (ref 12–20)
CALCIUM SERPL-MCNC: 9.5 MG/DL (ref 8.5–10.1)
CHLORIDE SERPL-SCNC: 105 MMOL/L (ref 97–108)
CK SERPL-CCNC: 104 U/L (ref 26–192)
CO2 SERPL-SCNC: 22 MMOL/L (ref 21–32)
CREAT SERPL-MCNC: 0.61 MG/DL (ref 0.55–1.02)
DIFFERENTIAL METHOD BLD: ABNORMAL
EOSINOPHIL # BLD: 0.1 K/UL (ref 0–0.4)
EOSINOPHIL NFR BLD: 1 % (ref 0–7)
ERYTHROCYTE [DISTWIDTH] IN BLOOD BY AUTOMATED COUNT: 17.5 % (ref 11.5–14.5)
GLUCOSE BLD STRIP.AUTO-MCNC: 102 MG/DL (ref 65–117)
GLUCOSE BLD STRIP.AUTO-MCNC: 62 MG/DL (ref 65–117)
GLUCOSE BLD STRIP.AUTO-MCNC: 63 MG/DL (ref 65–117)
GLUCOSE BLD STRIP.AUTO-MCNC: 88 MG/DL (ref 65–117)
GLUCOSE SERPL-MCNC: 69 MG/DL (ref 65–100)
HCT VFR BLD AUTO: 35.7 % (ref 35–47)
HGB BLD-MCNC: 11 G/DL (ref 11.5–16)
IMM GRANULOCYTES # BLD AUTO: 0 K/UL (ref 0–0.04)
IMM GRANULOCYTES NFR BLD AUTO: 0 % (ref 0–0.5)
LYMPHOCYTES # BLD: 2.6 K/UL (ref 0.8–3.5)
LYMPHOCYTES NFR BLD: 39 % (ref 12–49)
MAGNESIUM SERPL-MCNC: 1.7 MG/DL (ref 1.6–2.4)
MCH RBC QN AUTO: 26.3 PG (ref 26–34)
MCHC RBC AUTO-ENTMCNC: 30.8 G/DL (ref 30–36.5)
MCV RBC AUTO: 85.2 FL (ref 80–99)
MONOCYTES # BLD: 0.5 K/UL (ref 0–1)
MONOCYTES NFR BLD: 8 % (ref 5–13)
NEUTS SEG # BLD: 3.4 K/UL (ref 1.8–8)
NEUTS SEG NFR BLD: 51 % (ref 32–75)
NRBC # BLD: 0 K/UL (ref 0–0.01)
NRBC BLD-RTO: 0 PER 100 WBC
PLATELET # BLD AUTO: 241 K/UL (ref 150–400)
PMV BLD AUTO: 10.7 FL (ref 8.9–12.9)
POTASSIUM SERPL-SCNC: 3 MMOL/L (ref 3.5–5.1)
RBC # BLD AUTO: 4.19 M/UL (ref 3.8–5.2)
SERVICE CMNT-IMP: ABNORMAL
SERVICE CMNT-IMP: ABNORMAL
SERVICE CMNT-IMP: NORMAL
SERVICE CMNT-IMP: NORMAL
SODIUM SERPL-SCNC: 136 MMOL/L (ref 136–145)
WBC # BLD AUTO: 6.6 K/UL (ref 3.6–11)

## 2024-01-06 PROCEDURE — 6360000002 HC RX W HCPCS: Performed by: PSYCHIATRY & NEUROLOGY

## 2024-01-06 PROCEDURE — 6360000004 HC RX CONTRAST MEDICATION: Performed by: INTERNAL MEDICINE

## 2024-01-06 PROCEDURE — 70553 MRI BRAIN STEM W/O & W/DYE: CPT

## 2024-01-06 PROCEDURE — 6360000002 HC RX W HCPCS: Performed by: INTERNAL MEDICINE

## 2024-01-06 PROCEDURE — 85025 COMPLETE CBC W/AUTO DIFF WBC: CPT

## 2024-01-06 PROCEDURE — 6370000000 HC RX 637 (ALT 250 FOR IP): Performed by: INTERNAL MEDICINE

## 2024-01-06 PROCEDURE — 80048 BASIC METABOLIC PNL TOTAL CA: CPT

## 2024-01-06 PROCEDURE — 82550 ASSAY OF CK (CPK): CPT

## 2024-01-06 PROCEDURE — 82962 GLUCOSE BLOOD TEST: CPT

## 2024-01-06 PROCEDURE — 36415 COLL VENOUS BLD VENIPUNCTURE: CPT

## 2024-01-06 PROCEDURE — 2580000003 HC RX 258: Performed by: INTERNAL MEDICINE

## 2024-01-06 PROCEDURE — 1100000003 HC PRIVATE W/ TELEMETRY

## 2024-01-06 PROCEDURE — 83735 ASSAY OF MAGNESIUM: CPT

## 2024-01-06 PROCEDURE — 99233 SBSQ HOSP IP/OBS HIGH 50: CPT | Performed by: PSYCHIATRY & NEUROLOGY

## 2024-01-06 PROCEDURE — A9579 GAD-BASE MR CONTRAST NOS,1ML: HCPCS | Performed by: INTERNAL MEDICINE

## 2024-01-06 RX ADMIN — POTASSIUM CHLORIDE 10 MEQ: 7.46 INJECTION, SOLUTION INTRAVENOUS at 18:23

## 2024-01-06 RX ADMIN — LEVETIRACETAM 1000 MG: 500 INJECTION, SOLUTION, CONCENTRATE INTRAVENOUS at 15:03

## 2024-01-06 RX ADMIN — SODIUM CHLORIDE, PRESERVATIVE FREE 10 ML: 5 INJECTION INTRAVENOUS at 01:22

## 2024-01-06 RX ADMIN — SODIUM CHLORIDE: 9 INJECTION, SOLUTION INTRAVENOUS at 16:00

## 2024-01-06 RX ADMIN — METOPROLOL SUCCINATE 50 MG: 50 TABLET, EXTENDED RELEASE ORAL at 10:23

## 2024-01-06 RX ADMIN — DIAZEPAM 2.5 MG: 5 INJECTION, SOLUTION INTRAMUSCULAR; INTRAVENOUS at 23:52

## 2024-01-06 RX ADMIN — SODIUM CHLORIDE, PRESERVATIVE FREE 10 ML: 5 INJECTION INTRAVENOUS at 23:55

## 2024-01-06 RX ADMIN — SODIUM CHLORIDE, PRESERVATIVE FREE 10 ML: 5 INJECTION INTRAVENOUS at 11:01

## 2024-01-06 RX ADMIN — Medication: at 10:24

## 2024-01-06 RX ADMIN — LEVETIRACETAM 2000 MG: 100 INJECTION INTRAVENOUS at 10:55

## 2024-01-06 RX ADMIN — SODIUM CHLORIDE: 9 INJECTION, SOLUTION INTRAVENOUS at 03:48

## 2024-01-06 RX ADMIN — SODIUM CHLORIDE: 9 INJECTION, SOLUTION INTRAVENOUS at 20:25

## 2024-01-06 RX ADMIN — LOSARTAN POTASSIUM 12.5 MG: 25 TABLET, FILM COATED ORAL at 10:21

## 2024-01-06 RX ADMIN — POTASSIUM CHLORIDE 10 MEQ: 7.46 INJECTION, SOLUTION INTRAVENOUS at 12:23

## 2024-01-06 RX ADMIN — HYDRALAZINE HYDROCHLORIDE 10 MG: 20 INJECTION INTRAMUSCULAR; INTRAVENOUS at 23:53

## 2024-01-06 RX ADMIN — LEVETIRACETAM 1000 MG: 500 INJECTION, SOLUTION, CONCENTRATE INTRAVENOUS at 01:21

## 2024-01-06 RX ADMIN — LEVETIRACETAM 1000 MG: 500 INJECTION, SOLUTION, CONCENTRATE INTRAVENOUS at 23:54

## 2024-01-06 RX ADMIN — POTASSIUM CHLORIDE 10 MEQ: 7.46 INJECTION, SOLUTION INTRAVENOUS at 16:04

## 2024-01-06 RX ADMIN — ASPIRIN 81 MG: 81 TABLET, COATED ORAL at 10:22

## 2024-01-06 RX ADMIN — GADOTERIDOL 10 ML: 279.3 INJECTION, SOLUTION INTRAVENOUS at 14:15

## 2024-01-06 RX ADMIN — AMLODIPINE BESYLATE 2.5 MG: 5 TABLET ORAL at 10:18

## 2024-01-06 RX ADMIN — POTASSIUM CHLORIDE 10 MEQ: 7.46 INJECTION, SOLUTION INTRAVENOUS at 20:26

## 2024-01-06 RX ADMIN — Medication: at 20:14

## 2024-01-06 RX ADMIN — POTASSIUM CHLORIDE 10 MEQ: 7.46 INJECTION, SOLUTION INTRAVENOUS at 21:49

## 2024-01-06 RX ADMIN — POTASSIUM CHLORIDE 10 MEQ: 7.46 INJECTION, SOLUTION INTRAVENOUS at 17:14

## 2024-01-06 RX ADMIN — ATORVASTATIN CALCIUM 80 MG: 40 TABLET, FILM COATED ORAL at 20:12

## 2024-01-06 RX ADMIN — ACETAMINOPHEN 650 MG: 325 TABLET ORAL at 20:12

## 2024-01-06 ASSESSMENT — PAIN SCALES - GENERAL
PAINLEVEL_OUTOF10: 0
PAINLEVEL_OUTOF10: 2
PAINLEVEL_OUTOF10: 0
PAINLEVEL_OUTOF10: 0

## 2024-01-06 ASSESSMENT — PAIN SCALES - WONG BAKER
WONGBAKER_NUMERICALRESPONSE: 0
WONGBAKER_NUMERICALRESPONSE: 0

## 2024-01-06 ASSESSMENT — PAIN DESCRIPTION - LOCATION: LOCATION: HEAD

## 2024-01-06 ASSESSMENT — PAIN - FUNCTIONAL ASSESSMENT: PAIN_FUNCTIONAL_ASSESSMENT: ACTIVITIES ARE NOT PREVENTED

## 2024-01-06 ASSESSMENT — PAIN DESCRIPTION - DESCRIPTORS: DESCRIPTORS: ACHING

## 2024-01-06 NOTE — PROCEDURES
[facility]  EEG    Name:  PRASHANT VELIZ  MR#:  [id number]  :  1955  ACCOUNT #:  [id number]  DATE OF SERVICE:  2024    CLINICAL INDICATION:  The patient is a 68-year-old female with a history of altered mental status and possible seizures.  The patient with a previous left ventriculoperitoneal shunt.  EEG to rule out seizures, rule out cortical abnormality.    EEG CLASSIFICATION:  1.  On this patient is dysrhythmia grade II, left hemisphere, maximum frontotemporal.  2.  Dysrhythmia grade 1, generalized.    DESCRIPTION OF THE RECORD:  This is a 16-channel EEG recording on the patient to evaluate for possible seizures.  This study showed a posteriorly located occipital alpha rhythm of 8-9 Hz that did attenuate some with eye opening in awake state.  The patient did have a slight increase in some 2-6 Hz activity seen throughout the recording in a generalized fashion.  In addition, there appeared to be slightly more prominent focal slowing and theta activity seen in the left frontotemporal head region, but no clear areas of spike or spike-and-wave discharges and no electrographic spells or dysrhythmic activity otherwise was seen.  The patient did enter brief states of sleep with K complexes and sleep spindles seen in the central head regions.  Hyperventilation was not performed.  Photic stimulation produced a mild driving response in the posterior head regions.    INTERPRETATION:  This study shows no clear spike or spike-and-wave discharges, no recorded electrographic or dysrhythmic spells.  There was some generalized slowing slightly more prominent on the left frontotemporal region suggesting an area of cortical disturbance seen there on top of a generalized encephalopathy of toxic, metabolic or degenerative type.  Clinical correlation recommended.        EDE HARDIN MD      [id number]  D:  2024 21:56  T:  2024 23:37  JOB #:  [id number]

## 2024-01-06 NOTE — PROCEDURES
Test Date: January 5, 2024    History: Patient with acute altered mental status, patient with possible partial complex seizures, EEG to rule out seizures rule out cortical abnormality.    Medications: See chart    Patient consent: Correct patient identified    Description of procedure: This EEG was obtained using a 10 lead, 8 channel system positioned circumferentially without any parasagittal coverage (rapid EEG). Computer selected EEG is reviewed as well as background features and all clinically significant events. Clarity algorithm utilized and implemented to provide analysis of underlying activity and seizure detection used to facilitate reading.    Description of recording: This is a rapid EEG on patient to evaluate for possible seizures, and the patient with altered mental status.  This study showed a posteriorly located occipital alpha rhythm of 8 to 9 Hz that was somewhat poorly formed but did attenuate some with eye opening and awake state.  During recording there is a mild increase in generalized 2 to 6 Hz activity seen, but no clear areas of focal slowing no clear spike or spike and wave discharges no electrographic or dysrhythmic spells were recorded.  The patient did enter stage II sleep with K complexes and sleep spindles seen in the central head regions.  There was some movement muscle and electrode artifact in the recording but overall the study was technically good quality and interpretable.  This study began on January 5, 2024 at approximately 1:01 PM and ended on January 5, 2024 at approximately 3:38 PM for total time of study of 2 hours and 37 minutes.  Neither hyperventilation nor 40 stimulation were performed.  No epileptiform discharges, focal slowing, or electrographic seizures were noted throughout the recording.    Impression: Dysrhythmia grade 1 generalized    Interpretation: This is a mildly abnormal echocardiogram due to the very mild generalized slow activity seen most consistent with

## 2024-01-06 NOTE — PROGRESS NOTES
Nursing notified patient very sedated and hard to arouse, received seizure meds earlier, asking if they should be held. No other concerns reported. On bedside exam patient is arousable with mild sternal rub, but had to encourage her to open her eyes. Per nursing no recent reported seizure, doesn't appear post-ictal at this time. VSS. No acute distress noted on bedside exam. Ok for nursing to give seizure meds as ordered at this time, monitor, no respiratory depression just sedation noted on exam. Patient denies any further complaints or concerns. No acute distress reported. Nursing to notify Hospitalist for further/continued concerns. Will remain available overnight for further concerns if nursing/patient needs. Will defer further evaluation/management to the day shift team.    Non-billable note.

## 2024-01-06 NOTE — PROGRESS NOTES
End of Shift Note    Bedside shift change report given to Brian (oncoming nurse) by LIZA PAYNE RN (offgoing nurse).  Report included the following information Nurse Handoff Report, Intake/Output, and MAR      Shift worked:  7p-7a   Shift summary and any significant changes:     New admit       Concerns for physician to address: New admit   Zone phone for oncoming shift:  [phone]     Patient Information  Mary Cruz  68 y.o.  1/5/2024 12:26 PM by Ronnie Petit MD. Mary Cruz was admitted from Home    Problem List  Patient Active Problem List    Diagnosis Date Noted    Seizures ([facility]) 01/05/2024    Acute alteration in mental status 01/05/2024    Seizure ([facility]) 01/05/2024    Type 2 diabetes mellitus with chronic kidney disease ([facility]) 04/19/2022    Sepsis ([facility]) 01/02/2020    ICH (intracerebral hemorrhage) ([facility]) 10/18/2019    Intracranial hemorrhage ([facility]) 10/18/2019    H/O cerebral aneurysm repair 04/02/2017    Type 2 diabetes mellitus ([facility]) 04/02/2017    Acute lower GI bleeding 04/02/2017    Intellectual delay 04/02/2017    Altered mental status 11/24/2016    Diabetes ([facility]) 11/22/2016    Acute encephalopathy 11/22/2016    Hypertension 11/22/2016     (ventriculoperitoneal) shunt status 11/22/2016     Past Medical History:   Diagnosis Date    Diabetes ([facility])     Hypertension     Memory loss     NSTEMI (non-ST elevated myocardial infarction) ([facility]) 7/6/2022    Seizures ([facility]) 1/5/2024    Stroke ([facility])     cerebral hemorrhage,cerebral anurysm       Core Measures:      Activity:   Bed rest    Cardiac:   Cardiac Monitoring: Yes      Cardiac Rhythm: Sinus rhythm    Access:   Current line(s): PIV      Genitourinary:   Urinary status: voiding       Respiratory:   O2 Device: None (Room air)         GI:     Current diet:  ADULT DIET; Regular/NPO for now         Pain Management:   Patient states pain is manageable on current regimen: N/A    Skin:     Interventions: turn team, specialty bed, increase time out of bed, PT/OT  consult, and nutritional support    Patient Safety:  Fall Score:    Interventions: bed/chair alarm, assistive devices (walker, cane, etc.), gripper socks, pt to call before getting OOB, and stay with me (per policy)     @Rollbelt  @dexterity to release roll belt  Yes/No ( must document dexterity  here by stating Yes or No here, otherwise this is a restraint and must follow restraint documentation policy.)    DVT prophylaxis:  DVT prophylaxis Med- yes  DVT prophylaxis SCD or GEMINI- N/A     Wounds: (If Applicable)  Wounds- yes  Location Sacrum/bilat feet, under L breast    Active Consults:  IP CONSULT TO NEUROLOGY    Length of Stay:  Expected LOS: 2  Actual LOS: 1  Discharge Plan: yes when stable      LIZA PAYNE RN

## 2024-01-06 NOTE — PROGRESS NOTES
MRI Pending completion of screening form    -Please complete MRI History and Safety Screening Form   - Patient cannot be scanned until this form is completed, including signatures, and reviewed in MRI to ensure patient is SAFE and eligible for MRI.  - CALL MRI when this has been successfully completed at [phone].

## 2024-01-06 NOTE — PROGRESS NOTES
Neurology Note    Patient ID:  aMry Cruz  [id number]  68 y.o.  1955      Date of Consultation:  January 6, 2024        Assessment and Plan:    The patient is a pleasant 68-year-old female admitted with new onset seizure.  Her current examination does reveal significant cognitive impairment, generalized weakness with asymmetry.  Right side is weaker than left.    New onset seizures:  Patient does have prior cortical brain injury suggesting a possible focus for seizures  Continue with current dosing of Keppra 1000 mg twice a day  Etiology for seizure includes prior CNS insult.  Also would need to be concerned about a new acute stroke and metabolic derangements decreasing seizure threshold  EEG results have been reviewed  Head CT with no acute abnormality but significant chronic changes in ventricular catheter in place  Brain MRI ordered  Correct underlying metabolic derangements  Continue 81 mg aspirin  I have ordered updated lipid panel and hemoglobin A1c  Depending on results of the MRI, additional testing may need to be considered.      Hypertension  Dyslipidemia  Diabetes    Neurology will continue to follow closely in this complicated patient with ongoing neurological symptoms necessitating additional testing. Updated orders placed.  Care plan discussed with primary team,Dr. Scott           Subjective: I do not know what happened       History of Present Illness:   Mary Cruz is a 68 y.o. female with a history of hypertension, diabetes and dyslipidemia who presented to the hospital with acute onset of altered mental status and concern for new onset seizure.  She did have gaze deviation to the right.  She also did have generalized tonic-clonic seizure activity.  She has been followed by one of my colleagues, Dr. Phillips, since admission.  The patient did have a EEG performed.  This did reveal generalized slowing in the left frontotemporal region suggesting an area of cortical disturbance.  The  motor: normal and symmetric  Hearing intact  SCM strength intact  Tongue: midline without fasciculations    Motor:   Tone normal    No evidence of fasciculations    Strength testing:  The patient does appear to have mild generalized weakness.  There is an asymmetry noted with the right upper and lower extremity being weaker than the left.  She has a difficult time with participating fully with detailed motor testing      Sensory:  Upper extremity: intact to pp,  Lower extremity: intact to pp,    Reflexes:    Right Left  Biceps  2 2  Triceps 2 2  Brachiorad. 2 2  Patella  2 2  Achilles 1 1    Plantar response:  flexor bilaterally      Cerebellar testing:  no tremor apparent, finger/nose and leelee were intact. Slower on the right      Labs:     Lab Results   Component Value Date/Time     01/06/2024 04:02 AM    K 3.0 01/06/2024 04:02 AM     01/06/2024 04:02 AM    BUN 9 01/06/2024 04:02 AM    WBC 6.6 01/06/2024 11:21 AM    HCT 35.7 01/06/2024 11:21 AM    HGB 11.0 01/06/2024 11:21 AM     01/06/2024 11:21 AM           I spent  52   minutes providing care to this  acutely ill inpatient with > 50% of the time counseling and assisting in the coordination of care of the patient on the patient's hospital floor/unit.           Principal Problem:    Seizures (HCC)  Active Problems:    Acute alteration in mental status    Seizure (HCC)  Resolved Problems:    * No resolved hospital problems. *                 Signed By:  Vinod Holguin DO FAAN    January 6, 2024

## 2024-01-06 NOTE — PROGRESS NOTES
End of Shift Note    Bedside shift change report given to EDMUNDO Ramos (oncoming nurse) by Marva Spain RN (offgoing nurse).  Report included the following information Nurse Handoff Report, MAR, Recent Results, Quality Measures, Neuro Assessment, and Event Log      Shift worked:  7a-7p   Shift summary and any significant changes:     Patient lethargic/post-ictal, unable to wake and answer questions or complete swallow screen.       Concerns for physician to address:  Results/tests   Zone phone for oncoming shift:   [phone]     Patient Information  Mary Cruz  68 y.o.  1/5/2024 12:26 PM by Ronnie Petit MD. Mary Cruz was admitted from [facility]isted [facility]    Problem List  Patient Active Problem List    Diagnosis Date Noted    Seizures ([facility]) 01/05/2024    Type 2 diabetes mellitus with chronic kidney disease ([facility]) 04/19/2022    Sepsis ([facility]) 01/02/2020    ICH (intracerebral hemorrhage) ([facility]) 10/18/2019    Intracranial hemorrhage ([facility]) 10/18/2019    H/O cerebral aneurysm repair 04/02/2017    Type 2 diabetes mellitus ([facility]) 04/02/2017    Acute lower GI bleeding 04/02/2017    Intellectual delay 04/02/2017    Altered mental status 11/24/2016    Diabetes ([facility]) 11/22/2016    Acute encephalopathy 11/22/2016    Hypertension 11/22/2016     (ventriculoperitoneal) shunt status 11/22/2016     Past Medical History:   Diagnosis Date    Diabetes ([facility])     Hypertension     Memory loss     NSTEMI (non-ST elevated myocardial infarction) ([facility]) 7/6/2022    Seizures ([facility]) 1/5/2024    Stroke ([facility])     cerebral hemorrhage,cerebral anurysm       Core Measures:  CVA: No Yes  CHF:No No  PNA:NoNo    Activity:     Number times ambulated in hallways past shift: 0  Number of times OOB to chair past shift: 0    Cardiac:   Cardiac Monitoring: Yes           Access:   Current line(s): PIV  Central Line? No     Genitourinary:   Urinary status: voiding , incontinent, and external catheter  Urinary Catheter? No    Respiratory:   O2 Device: None (Room

## 2024-01-06 NOTE — PROGRESS NOTES
Patient is EEG just showed mild generalized slowing slightly more prominent the left frontal temporal region but no spike or spike and wave discharges and no electrographic spells of any type seen.

## 2024-01-07 LAB
ANION GAP SERPL CALC-SCNC: 5 MMOL/L (ref 5–15)
BUN SERPL-MCNC: 11 MG/DL (ref 6–20)
BUN/CREAT SERPL: 14 (ref 12–20)
CALCIUM SERPL-MCNC: 9.6 MG/DL (ref 8.5–10.1)
CHLORIDE SERPL-SCNC: 106 MMOL/L (ref 97–108)
CO2 SERPL-SCNC: 27 MMOL/L (ref 21–32)
CREAT SERPL-MCNC: 0.8 MG/DL (ref 0.55–1.02)
EKG ATRIAL RATE: 59 BPM
EKG DIAGNOSIS: NORMAL
EKG P AXIS: 28 DEGREES
EKG P-R INTERVAL: 116 MS
EKG Q-T INTERVAL: 470 MS
EKG QRS DURATION: 92 MS
EKG QTC CALCULATION (BAZETT): 465 MS
EKG R AXIS: -44 DEGREES
EKG T AXIS: 213 DEGREES
EKG VENTRICULAR RATE: 59 BPM
GLUCOSE BLD STRIP.AUTO-MCNC: 104 MG/DL (ref 65–117)
GLUCOSE BLD STRIP.AUTO-MCNC: 109 MG/DL (ref 65–117)
GLUCOSE BLD STRIP.AUTO-MCNC: 120 MG/DL (ref 65–117)
GLUCOSE BLD STRIP.AUTO-MCNC: 120 MG/DL (ref 65–117)
GLUCOSE SERPL-MCNC: 96 MG/DL (ref 65–100)
POTASSIUM SERPL-SCNC: 3.8 MMOL/L (ref 3.5–5.1)
SERVICE CMNT-IMP: ABNORMAL
SERVICE CMNT-IMP: ABNORMAL
SERVICE CMNT-IMP: NORMAL
SERVICE CMNT-IMP: NORMAL
SODIUM SERPL-SCNC: 138 MMOL/L (ref 136–145)

## 2024-01-07 PROCEDURE — 99232 SBSQ HOSP IP/OBS MODERATE 35: CPT | Performed by: PSYCHIATRY & NEUROLOGY

## 2024-01-07 PROCEDURE — 2580000003 HC RX 258: Performed by: INTERNAL MEDICINE

## 2024-01-07 PROCEDURE — 1100000003 HC PRIVATE W/ TELEMETRY

## 2024-01-07 PROCEDURE — 6360000002 HC RX W HCPCS: Performed by: INTERNAL MEDICINE

## 2024-01-07 PROCEDURE — 36415 COLL VENOUS BLD VENIPUNCTURE: CPT

## 2024-01-07 PROCEDURE — 6360000002 HC RX W HCPCS: Performed by: PSYCHIATRY & NEUROLOGY

## 2024-01-07 PROCEDURE — 82962 GLUCOSE BLOOD TEST: CPT

## 2024-01-07 PROCEDURE — 80048 BASIC METABOLIC PNL TOTAL CA: CPT

## 2024-01-07 PROCEDURE — 6370000000 HC RX 637 (ALT 250 FOR IP): Performed by: INTERNAL MEDICINE

## 2024-01-07 RX ORDER — LOSARTAN POTASSIUM 25 MG/1
25 TABLET ORAL DAILY
Status: DISCONTINUED | OUTPATIENT
Start: 2024-01-08 | End: 2024-01-10 | Stop reason: HOSPADM

## 2024-01-07 RX ORDER — AMLODIPINE BESYLATE 5 MG/1
10 TABLET ORAL DAILY
Status: DISCONTINUED | OUTPATIENT
Start: 2024-01-08 | End: 2024-01-10 | Stop reason: HOSPADM

## 2024-01-07 RX ADMIN — SODIUM CHLORIDE, PRESERVATIVE FREE 10 ML: 5 INJECTION INTRAVENOUS at 09:41

## 2024-01-07 RX ADMIN — METOPROLOL SUCCINATE 50 MG: 50 TABLET, EXTENDED RELEASE ORAL at 09:37

## 2024-01-07 RX ADMIN — LOSARTAN POTASSIUM 12.5 MG: 25 TABLET, FILM COATED ORAL at 09:38

## 2024-01-07 RX ADMIN — ASPIRIN 81 MG: 81 TABLET, COATED ORAL at 09:40

## 2024-01-07 RX ADMIN — LEVETIRACETAM 1000 MG: 500 INJECTION, SOLUTION, CONCENTRATE INTRAVENOUS at 12:47

## 2024-01-07 RX ADMIN — ATORVASTATIN CALCIUM 80 MG: 40 TABLET, FILM COATED ORAL at 20:34

## 2024-01-07 RX ADMIN — SODIUM CHLORIDE, PRESERVATIVE FREE 10 ML: 5 INJECTION INTRAVENOUS at 20:35

## 2024-01-07 RX ADMIN — HYDRALAZINE HYDROCHLORIDE 10 MG: 20 INJECTION INTRAMUSCULAR; INTRAVENOUS at 18:22

## 2024-01-07 RX ADMIN — AMLODIPINE BESYLATE 2.5 MG: 5 TABLET ORAL at 09:39

## 2024-01-07 RX ADMIN — ENOXAPARIN SODIUM 40 MG: 100 INJECTION SUBCUTANEOUS at 09:40

## 2024-01-07 RX ADMIN — Medication: at 20:34

## 2024-01-07 RX ADMIN — Medication: at 09:40

## 2024-01-07 NOTE — PROGRESS NOTES
End of Shift Note    Bedside shift change report given to EDMUNDO Burgess (oncoming nurse) by Brian Triana RN (offgoing nurse).  Report included the following information Nurse Handoff Report, Intake/Output, and MAR      Shift worked:  7a-7p   Shift summary and any significant changes:     Potassium 3.0. given potassium IVPB per order. Patient requires assistance for feeding. Patients blood sugar was 62 at around 530 pm. Followed 15-15 rule. Patient blood sugar 88 after 30 minutes. MRI completed today. Still waiting to complete EEG.        Concerns for physician to address: See above.    Zone phone for oncoming shift:  [phone]     Patient Information  Mary Cruz  68 y.o.  1/5/2024 12:26 PM by Ronnie Petit MD. Mary Cruz was admitted from Home    Problem List  Patient Active Problem List    Diagnosis Date Noted    Cerebral vascular disease 01/06/2024    Seizures ([facility]) 01/05/2024    Acute alteration in mental status 01/05/2024    Seizure ([facility]) 01/05/2024    Type 2 diabetes mellitus with chronic kidney disease ([facility]) 04/19/2022    Sepsis ([facility]) 01/02/2020    ICH (intracerebral hemorrhage) ([facility]) 10/18/2019    Intracranial hemorrhage ([facility]) 10/18/2019    H/O cerebral aneurysm repair 04/02/2017    Type 2 diabetes mellitus ([facility]) 04/02/2017    Acute lower GI bleeding 04/02/2017    Intellectual delay 04/02/2017    Altered mental status 11/24/2016    Diabetes ([facility]) 11/22/2016    Acute encephalopathy 11/22/2016    Hypertension 11/22/2016     (ventriculoperitoneal) shunt status 11/22/2016     Past Medical History:   Diagnosis Date    Diabetes ([facility])     Hypertension     Memory loss     NSTEMI (non-ST elevated myocardial infarction) ([facility]) 7/6/2022    Seizures ([facility]) 1/5/2024    Stroke ([facility])     cerebral hemorrhage,cerebral anurysm       Core Measures:      Activity:  Activity: In bedBed rest    Cardiac:   Cardiac Monitoring: Yes      Cardiac Rhythm: Sinus rhythm    Access:   Current line(s): PIV      Genitourinary:   Urinary  status: voiding       Respiratory:   O2 Device: None (Room air)         GI:     Current diet:  ADULT DIET; Regular/NPO for now         Pain Management:   Patient states pain is manageable on current regimen: N/A    Skin:  Ady Scale Score: 13  Interventions: turn team, specialty bed, increase time out of bed, PT/OT consult, and nutritional support    Patient Safety:  Fall Score: Schuler Total Score: 75  Interventions: bed/chair alarm, assistive devices (walker, cane, etc.), gripper socks, pt to call before getting OOB, and stay with me (per policy)     @Rollbelt  @dexterity to release roll belt  Yes/No ( must document dexterity  here by stating Yes or No here, otherwise this is a restraint and must follow restraint documentation policy.)    DVT prophylaxis:  DVT prophylaxis Med- yes  DVT prophylaxis SCD or GEMINI- N/A     Wounds: (If Applicable)  Wounds- yes  Location Sacrum/bilat feet, under L breast    Active Consults:  IP CONSULT TO NEUROLOGY    Length of Stay:  Expected LOS: 3  Actual LOS: 1  Discharge Plan: yes when stable      Brian Triana RN

## 2024-01-07 NOTE — PROGRESS NOTES
End of Shift Note    Bedside shift change report given to EDMUNDO Torres (oncoming nurse) by Sylvia Renteria RN (offgoing nurse).  Report included the following information Nurse Handoff Report, Intake/Output, and MAR      Shift worked:  nights   Shift summary and any significant changes:     K+ WNL this Am after IV administration of 60meq IV k+, continues to be confused, agitated and physically combative with staff, received prn valium for anxiety X 1 dose per orders, no seizure activity assessed. Continues to require feeding assistance.  BG remained within normal limits this shift.  Ate a snack tonight has been drinking fluids. Otherwise no acute events this shift.       Concerns for physician to address: none   Zone phone for oncoming shift:  [phone]     Patient Information  Mary Cruz  68 y.o.  1/5/2024 12:26 PM by Ronnie Petit MD. Mary Cruz was admitted from Home    Problem List  Patient Active Problem List    Diagnosis Date Noted    Cerebral vascular disease 01/06/2024    Seizures ([facility]) 01/05/2024    Acute alteration in mental status 01/05/2024    Seizure ([facility]) 01/05/2024    Type 2 diabetes mellitus with chronic kidney disease ([facility]) 04/19/2022    Sepsis ([facility]) 01/02/2020    ICH (intracerebral hemorrhage) ([facility]) 10/18/2019    Intracranial hemorrhage ([facility]) 10/18/2019    H/O cerebral aneurysm repair 04/02/2017    Type 2 diabetes mellitus ([facility]) 04/02/2017    Acute lower GI bleeding 04/02/2017    Intellectual delay 04/02/2017    Altered mental status 11/24/2016    Diabetes ([facility]) 11/22/2016    Acute encephalopathy 11/22/2016    Hypertension 11/22/2016     (ventriculoperitoneal) shunt status 11/22/2016     Past Medical History:   Diagnosis Date    Diabetes ([facility])     Hypertension     Memory loss     NSTEMI (non-ST elevated myocardial infarction) ([facility]) 7/6/2022    Seizures ([facility]) 1/5/2024    Stroke ([facility])     cerebral hemorrhage,cerebral anurysm       Core Measures:      Activity:  Activity: In bedBed rest    Cardiac:

## 2024-01-07 NOTE — PLAN OF CARE
Continue POC    Problem: Discharge Planning  Goal: Discharge to home or other facility with appropriate resources  1/7/2024 0156 by Sylvia Renteria RN  Outcome: Progressing  1/6/2024 1305 by Brian Triana RN  Outcome: Progressing     Problem: Pain  Goal: Verbalizes/displays adequate comfort level or baseline comfort level  1/7/2024 0156 by Sylvia Renteria RN  Outcome: Progressing  1/6/2024 1305 by Brian Triana RN  Outcome: Progressing     Problem: Safety - Adult  Goal: Free from fall injury  1/7/2024 0156 by Sylvia Renteria RN  Outcome: Progressing  1/6/2024 1305 by Brian Triana RN  Outcome: Progressing     Problem: Neurosensory - Adult  Goal: Achieves stable or improved neurological status  1/7/2024 0156 by Sylvia Renteria RN  Outcome: Progressing  1/6/2024 1305 by Brian Triana RN  Outcome: Progressing  Goal: Absence of seizures  1/7/2024 0156 by Sylvia Renteria RN  Outcome: Progressing  1/6/2024 1305 by Brian Triana RN  Outcome: Progressing  Goal: Remains free of injury related to seizures activity  1/7/2024 0156 by Sylvia Renteria RN  Outcome: Progressing  1/6/2024 1305 by Brian Triana RN  Outcome: Progressing  Goal: Achieves maximal functionality and self care  1/7/2024 0156 by Sylvia Renteria RN  Outcome: Progressing  1/6/2024 1305 by Brian Triana RN  Outcome: Progressing     Problem: Respiratory - Adult  Goal: Achieves optimal ventilation and oxygenation  1/7/2024 0156 by Sylvia Renteria RN  Outcome: Progressing  1/6/2024 1305 by Brian Triana RN  Outcome: Progressing     Problem: Cardiovascular - Adult  Goal: Maintains optimal cardiac output and hemodynamic stability  1/7/2024 0156 by Sylvia Renteria RN  Outcome: Progressing  Flowsheets (Taken 1/6/2024 2028)  Maintains optimal cardiac output and hemodynamic stability: Monitor blood pressure and heart rate  1/6/2024 1305 by Brian Triana RN  Outcome: Progressing     Problem: Skin/Tissue Integrity - Adult  Goal: Skin integrity remains intact  1/7/2024 0156 by Sylvia Renteria

## 2024-01-07 NOTE — PROGRESS NOTES
Neurology Note    Patient ID:  Mary Cruz  [id number]  68 y.o.  1955      Date of Consultation:  January 7, 2024        Assessment and Plan:    The patient is a pleasant 68-year-old female admitted with new onset seizure.  Her current examination does reveal significant cognitive impairment, generalized weakness with mild asymmetry.    New onset seizures:  Patient does have prior cortical brain injury suggesting a possible focus for seizures  Continue with current dosing of Keppra 1000 mg twice a day  Etiology for seizure includes prior CNS insult.   EEG results have been reviewed  Head CT with no acute abnormality but significant chronic changes in ventricular catheter in place  Brain MRI revealed significant chronic encephalomalacia in the bilateral frontal lobes with ventriculostomy catheter tracks noted.  There is no evidence of an acute stroke.   metabolic derangements improved      Cerebrovascular disease:  Patient does have risk factors for stroke including hypertension, dyslipidemia and diabetes  Continue 81 mg aspirin  Continue aggressive risk factor modification  Hypertension: Aggressive control goal blood pressure less than 140/90  Dyslipidemia: Continue lipid-lowering therapy  Continue aggressive glycemic control    Underlying cognitive Impairment:  Possible underlying vascular dementia  This can be followed in the outpatient neurology clinic with more detailed cognitive testing.      There is no other additional neurology recommendations at this time.  If questions arise, please not hesitate to contact me and I will return to see the patient.  The patient should follow-up in clinic in approximately 4 weeks time after discharge.           Subjective: I feel better       History of Present Illness:   Mary Cruz is a 68 y.o. female with a history of hypertension, diabetes and dyslipidemia who presented to the hospital with acute onset of altered mental status and concern for new onset

## 2024-01-07 NOTE — PROGRESS NOTES
Hospitalist Progress Note    NAME:   Mary Cruz   : 1955   MRN: [id number]     Date/Time: 2024 12:50 PM  Patient PCP: Gibson Triana III, MD    Estimated discharge date: 2 days  Barriers: Clinical improvement      Assessment / Plan:    Suspected seizures  Acute metabolic encephalopathy due to post ictal state  -CT head shows no acute process. No acute process on MRI of the brain.  Start Ativan as needed for seizures.  Check EEG.  Seizure precautions.  Fall precautions.  Start Keppra.  Consult neurology.  Check CK level.  Start IV fluids.     Hypokalemia  -KCl replaced.  Recheck in a.m. tomorrow     Hypertension  Dyslipidemia  -Continue Norvasc, losartan, metoprolol and Lipitor     Diabetes mellitus  -Hold home metformin.  Start insulin sliding scale with blood sugar checks    Hx of Memory Loss  Hx of CVA  Hx of Cerebral Aneurysm  -Details are not clear as she is confused      Medical Decision Making:   I personally reviewed labs: cbc, bmp  I personally reviewed imaging:MRI  I personally reviewed EKG:  Toxic drug monitoring: None  Discussed case with:         Code Status: Full  DVT Prophylaxis: Lovenox  GI Prophylaxis:    Subjective:     Patient much more alert today. Non verbal. Following commands. Will go up on losartan and norvasc      Objective:     VITALS:   Last 24hrs VS reviewed since prior progress note. Most recent are:  Patient Vitals for the past 24 hrs:   BP Temp Temp src Pulse Resp SpO2   24 1121 (!) 140/88 97.3 °F (36.3 °C) -- 64 15 100 %   24 0937 (!) 160/87 -- -- -- -- --   24 0756 (!) 160/87 98.4 °F (36.9 °C) Axillary 59 14 100 %   24 0308 129/78 97.3 °F (36.3 °C) Axillary 65 16 100 %   24 2337 (!) 178/92 -- -- 79 -- --   24 2000 (!) 192/102 97.3 °F (36.3 °C) Oral 70 16 100 %   24 1547 (!) 184/105 97 °F (36.1 °C) -- 70 14 100 %           Intake/Output Summary (Last 24 hours) at 2024 1250  Last data filed at 2024 1803  Gross per

## 2024-01-07 NOTE — PROGRESS NOTES
End of Shift Note    Bedside shift change report given to EDMUNDO Becker (oncoming nurse) by Brian Triana RN (offgoing nurse).  Report included the following information Nurse Handoff Report, Intake/Output, and MAR      Shift worked:  days   Shift summary and any significant changes:     IV continuous fluids order completed during the day. Patient requires assistance with all meals continue with POC glucose checks. Pt required hydralazine 10 mg x1 today due to BP sys >160.      Concerns for physician to address: none   Zone phone for oncoming shift:  [phone]     Patient Information  Mary Cruz  68 y.o.  1/5/2024 12:26 PM by Ronnie Petit MD. Mary Cruz was admitted from Home    Problem List  Patient Active Problem List    Diagnosis Date Noted    Cerebral vascular disease 01/06/2024    Seizures ([facility]) 01/05/2024    Acute alteration in mental status 01/05/2024    Seizure ([facility]) 01/05/2024    Type 2 diabetes mellitus with chronic kidney disease ([facility]) 04/19/2022    Sepsis ([facility]) 01/02/2020    ICH (intracerebral hemorrhage) ([facility]) 10/18/2019    Intracranial hemorrhage ([facility]) 10/18/2019    H/O cerebral aneurysm repair 04/02/2017    Type 2 diabetes mellitus ([facility]) 04/02/2017    Acute lower GI bleeding 04/02/2017    Intellectual delay 04/02/2017    Altered mental status 11/24/2016    Diabetes ([facility]) 11/22/2016    Acute encephalopathy 11/22/2016    Hypertension 11/22/2016     (ventriculoperitoneal) shunt status 11/22/2016     Past Medical History:   Diagnosis Date    Diabetes ([facility])     Hypertension     Memory loss     NSTEMI (non-ST elevated myocardial infarction) ([facility]) 7/6/2022    Seizures ([facility]) 1/5/2024    Stroke ([facility])     cerebral hemorrhage,cerebral anurysm       Core Measures:      Activity:  Activity: In bedBed rest    Cardiac:   Cardiac Monitoring: Yes      Cardiac Rhythm: Sinus rhythm    Access:   Current line(s): PIV      Genitourinary:   Urinary status: voiding       Respiratory:   O2 Device: None (Room air)          GI:     Current diet:  ADULT DIET; Regular/NPO for now         Pain Management:   Patient states pain is manageable on current regimen: N/A    Skin:  Ady Scale Score: 14  Interventions: turn team, specialty bed, increase time out of bed, PT/OT consult, and nutritional support    Patient Safety:  Fall Score: Schuler Total Score: 75  Interventions: bed/chair alarm, assistive devices (walker, cane, etc.), gripper socks, pt to call before getting OOB, and stay with me (per policy)     @Rollbelt  @dexterity to release roll belt  Yes/No ( must document dexterity  here by stating Yes or No here, otherwise this is a restraint and must follow restraint documentation policy.)    DVT prophylaxis:  DVT prophylaxis Med- yes  DVT prophylaxis SCD or GEMINI- N/A     Wounds: (If Applicable)  Wounds- yes  Location Sacrum/bilat feet, under L breast    Active Consults:  IP CONSULT TO NEUROLOGY    Length of Stay:  Expected LOS: 3  Actual LOS: 2  Discharge Plan: yes when stable      Brian Triana RN

## 2024-01-07 NOTE — PROGRESS NOTES
Hospitalist Progress Note    NAME:   Mary Cruz   : 1955   MRN: [id number]     Date/Time: 2024 7:30 PM  Patient PCP: Gibson Triana III, MD    Estimated discharge date: 2 days  Barriers: Clinical improvement      Assessment / Plan:    Suspected seizures  Acute metabolic encephalopathy due to post ictal state  -CT head shows no acute process. No acute process on MRI of the brain.  Start Ativan as needed for seizures.  Check EEG.  Seizure precautions.  Fall precautions.  Start Keppra.  Consult neurology.  Check CK level.  Start IV fluids.     Hypokalemia  -KCl replaced.  Recheck in a.m. tomorrow     Hypertension  Dyslipidemia  -Continue Norvasc, losartan, metoprolol and Lipitor     Diabetes mellitus  -Hold home metformin.  Start insulin sliding scale with blood sugar checks    Hx of Memory Loss  Hx of CVA  Hx of Cerebral Aneurysm  -Details are not clear as she is confused      Medical Decision Making:   I personally reviewed labs: cbc, bmp  I personally reviewed imaging:MRI  I personally reviewed EKG:  Toxic drug monitoring: None  Discussed case with:         Code Status: Full  DVT Prophylaxis: Lovenox  GI Prophylaxis:    Subjective:     Patient comfortably lying in the bed.  Easily arousable.  Patient speech is difficult to comprehend.  Patient not following commands.  MRI brain showed no acute process.  Plan of care discussed with neurology.  If blood pressure continues to remain elevated, will increase the dose of losartan and metoprolol.  Continue sliding scale.  Feeding assist.      Objective:     VITALS:   Last 24hrs VS reviewed since prior progress note. Most recent are:  Patient Vitals for the past 24 hrs:   BP Temp Temp src Pulse Resp SpO2 Height   24 1547 (!) 184/105 97 °F (36.1 °C) -- 70 14 100 % --   24 1154 (!) 149/84 96.8 °F (36 °C) -- 59 14 99 % --   24 1130 -- -- -- -- -- -- 1.753 m (5' 9\")   24 0802 138/75 97.3 °F (36.3 °C) Oral 61 14 97 % --   24

## 2024-01-08 LAB
CHOLEST SERPL-MCNC: 118 MG/DL
EST. AVERAGE GLUCOSE BLD GHB EST-MCNC: 108 MG/DL
GLUCOSE BLD STRIP.AUTO-MCNC: 141 MG/DL (ref 65–117)
GLUCOSE BLD STRIP.AUTO-MCNC: 92 MG/DL (ref 65–117)
GLUCOSE BLD STRIP.AUTO-MCNC: 92 MG/DL (ref 65–117)
HBA1C MFR BLD: 5.4 % (ref 4–5.6)
HDLC SERPL-MCNC: 48 MG/DL
HDLC SERPL: 2.5 (ref 0–5)
LDLC SERPL CALC-MCNC: 53.6 MG/DL (ref 0–100)
SERVICE CMNT-IMP: ABNORMAL
SERVICE CMNT-IMP: NORMAL
SERVICE CMNT-IMP: NORMAL
TRIGL SERPL-MCNC: 82 MG/DL
VLDLC SERPL CALC-MCNC: 16.4 MG/DL

## 2024-01-08 PROCEDURE — 6360000002 HC RX W HCPCS: Performed by: PSYCHIATRY & NEUROLOGY

## 2024-01-08 PROCEDURE — 6370000000 HC RX 637 (ALT 250 FOR IP): Performed by: INTERNAL MEDICINE

## 2024-01-08 PROCEDURE — 80061 LIPID PANEL: CPT

## 2024-01-08 PROCEDURE — 2580000003 HC RX 258: Performed by: INTERNAL MEDICINE

## 2024-01-08 PROCEDURE — 97530 THERAPEUTIC ACTIVITIES: CPT

## 2024-01-08 PROCEDURE — 97166 OT EVAL MOD COMPLEX 45 MIN: CPT

## 2024-01-08 PROCEDURE — 82962 GLUCOSE BLOOD TEST: CPT

## 2024-01-08 PROCEDURE — 1100000003 HC PRIVATE W/ TELEMETRY

## 2024-01-08 PROCEDURE — 6370000000 HC RX 637 (ALT 250 FOR IP): Performed by: STUDENT IN AN ORGANIZED HEALTH CARE EDUCATION/TRAINING PROGRAM

## 2024-01-08 PROCEDURE — 83036 HEMOGLOBIN GLYCOSYLATED A1C: CPT

## 2024-01-08 PROCEDURE — 6360000002 HC RX W HCPCS: Performed by: INTERNAL MEDICINE

## 2024-01-08 PROCEDURE — 36415 COLL VENOUS BLD VENIPUNCTURE: CPT

## 2024-01-08 PROCEDURE — 97535 SELF CARE MNGMENT TRAINING: CPT

## 2024-01-08 PROCEDURE — 97162 PT EVAL MOD COMPLEX 30 MIN: CPT

## 2024-01-08 RX ORDER — LEVETIRACETAM 500 MG/1
1000 TABLET ORAL 2 TIMES DAILY
Status: DISCONTINUED | OUTPATIENT
Start: 2024-01-08 | End: 2024-01-10 | Stop reason: HOSPADM

## 2024-01-08 RX ADMIN — ATORVASTATIN CALCIUM 80 MG: 40 TABLET, FILM COATED ORAL at 20:28

## 2024-01-08 RX ADMIN — AMLODIPINE BESYLATE 10 MG: 5 TABLET ORAL at 10:53

## 2024-01-08 RX ADMIN — SODIUM CHLORIDE, PRESERVATIVE FREE 10 ML: 5 INJECTION INTRAVENOUS at 20:29

## 2024-01-08 RX ADMIN — LOSARTAN POTASSIUM 25 MG: 25 TABLET, FILM COATED ORAL at 10:54

## 2024-01-08 RX ADMIN — ASPIRIN 81 MG: 81 TABLET, COATED ORAL at 10:53

## 2024-01-08 RX ADMIN — Medication: at 20:29

## 2024-01-08 RX ADMIN — ENOXAPARIN SODIUM 40 MG: 100 INJECTION SUBCUTANEOUS at 10:54

## 2024-01-08 RX ADMIN — LEVETIRACETAM 1000 MG: 500 INJECTION, SOLUTION, CONCENTRATE INTRAVENOUS at 00:30

## 2024-01-08 RX ADMIN — METOPROLOL SUCCINATE 50 MG: 50 TABLET, EXTENDED RELEASE ORAL at 10:53

## 2024-01-08 RX ADMIN — LEVETIRACETAM 1000 MG: 500 TABLET, FILM COATED ORAL at 18:53

## 2024-01-08 RX ADMIN — Medication: at 10:54

## 2024-01-08 RX ADMIN — SODIUM CHLORIDE, PRESERVATIVE FREE 10 ML: 5 INJECTION INTRAVENOUS at 10:55

## 2024-01-08 NOTE — PROGRESS NOTES
End of Shift Note    Bedside shift change report given to EDMUNDO Trores (oncoming nurse) by Rosita Villegas RN (offgoing nurse).  Report included the following information Nurse Handoff Report, Intake/Output, and MAR      Shift worked:  NIGHTS   Shift summary and any significant changes:     Meds in apple sauce Patient  holding meds in mouth.due to difficulty swallowing.     Concerns for physician to address: none   Zone phone for oncoming shift:  [phone]     Patient Information  Mary Cruz  68 y.o.  1/5/2024 12:26 PM by Ronnie Petit MD. Mary Cruz was admitted from Home    Problem List  Patient Active Problem List    Diagnosis Date Noted    Cerebral vascular disease 01/06/2024    Seizures ([facility]) 01/05/2024    Acute alteration in mental status 01/05/2024    Seizure ([facility]) 01/05/2024    Type 2 diabetes mellitus with chronic kidney disease ([facility]) 04/19/2022    Sepsis ([facility]) 01/02/2020    ICH (intracerebral hemorrhage) ([facility]) 10/18/2019    Intracranial hemorrhage ([facility]) 10/18/2019    H/O cerebral aneurysm repair 04/02/2017    Type 2 diabetes mellitus ([facility]) 04/02/2017    Acute lower GI bleeding 04/02/2017    Intellectual delay 04/02/2017    Altered mental status 11/24/2016    Diabetes ([facility]) 11/22/2016    Acute encephalopathy 11/22/2016    Hypertension 11/22/2016     (ventriculoperitoneal) shunt status 11/22/2016     Past Medical History:   Diagnosis Date    Diabetes ([facility])     Hypertension     Memory loss     NSTEMI (non-ST elevated myocardial infarction) ([facility]) 7/6/2022    Seizures ([facility]) 1/5/2024    Stroke ([facility])     cerebral hemorrhage,cerebral anurysm       Core Measures:      Activity:  Activity: In bedBed rest    Cardiac:   Cardiac Monitoring: Yes      Cardiac Rhythm: Sinus rhythm    Access:   Current line(s): PIV      Genitourinary:   Urinary status: voiding       Respiratory:   O2 Device: None (Room air)         GI:     Current diet:  ADULT DIET; Regular/NPO for now         Pain Management:   Patient states pain

## 2024-01-08 NOTE — PROGRESS NOTES
End of Shift Note    Bedside shift change report given to EDMUNDO Lucio (oncoming nurse) by Brian Triana RN (offgoing nurse).  Report included the following information Nurse Handoff Report, Intake/Output, and MAR      Shift worked:  7a-4p   Shift summary and any significant changes:     Meds in apple sauce Patient  holding meds in mouth.due to difficulty swallowing. Up to chair with therapy today. Stand pivot transfer with min A x2. IV catheter bent today and was removed. Dr. Nieto notified. Oral keppra ordered. Needs assistance with feeding. Patient does well with one step directions.      Concerns for physician to address: none   Zone phone for oncoming shift:  [phone]     Patient Information  Mary Cruz  68 y.o.  1/5/2024 12:26 PM by Ronnie Petit MD. Mary Cruz was admitted from Home    Problem List  Patient Active Problem List    Diagnosis Date Noted    Cerebral vascular disease 01/06/2024    Seizures ([facility]) 01/05/2024    Acute alteration in mental status 01/05/2024    Seizure ([facility]) 01/05/2024    Type 2 diabetes mellitus with chronic kidney disease ([facility]) 04/19/2022    Sepsis ([facility]) 01/02/2020    ICH (intracerebral hemorrhage) ([facility]) 10/18/2019    Intracranial hemorrhage ([facility]) 10/18/2019    H/O cerebral aneurysm repair 04/02/2017    Type 2 diabetes mellitus ([facility]) 04/02/2017    Acute lower GI bleeding 04/02/2017    Intellectual delay 04/02/2017    Altered mental status 11/24/2016    Diabetes ([facility]) 11/22/2016    Acute encephalopathy 11/22/2016    Hypertension 11/22/2016     (ventriculoperitoneal) shunt status 11/22/2016     Past Medical History:   Diagnosis Date    Diabetes ([facility])     Hypertension     Memory loss     NSTEMI (non-ST elevated myocardial infarction) ([facility]) 7/6/2022    Seizures ([facility]) 1/5/2024    Stroke ([facility])     cerebral hemorrhage,cerebral anurysm       Core Measures:      Activity:  Activity: In bed, Up to chair, Return to chair, Return to bedBed rest    Cardiac:   Cardiac Monitoring: Yes       Cardiac Rhythm: Sinus rhythm    Access:   Current line(s): PIV      Genitourinary:   Urinary status: voiding       Respiratory:   O2 Device: None (Room air)         GI:     Current diet:  ADULT DIET; Regular/NPO for now         Pain Management:   Patient states pain is manageable on current regimen: N/A    Skin:  Ady Scale Score: 17  Interventions: turn team, specialty bed, increase time out of bed, PT/OT consult, and nutritional support    Patient Safety:  Fall Score: Schuler Total Score: 75  Interventions: bed/chair alarm, assistive devices (walker, cane, etc.), gripper socks, pt to call before getting OOB, and stay with me (per policy)     @Rollbelt  @dexterity to release roll belt  Yes/No ( must document dexterity  here by stating Yes or No here, otherwise this is a restraint and must follow restraint documentation policy.)    DVT prophylaxis:  DVT prophylaxis Med- yes  DVT prophylaxis SCD or GEMINI- N/A     Wounds: (If Applicable)  Wounds- yes  Location Sacrum/bilat feet, under L breast    Active Consults:  IP CONSULT TO NEUROLOGY  IP CONSULT TO PHYSICAL THERAPY  IP CONSULT TO OCCUPATIONAL THERAPY    Length of Stay:  Expected LOS: 4  Actual LOS: 3  Discharge Plan: yes when stable      Brian Triana RN

## 2024-01-08 NOTE — PROGRESS NOTES
Hospitalist Progress Note    NAME:   Mary Cruz   : 1955   MRN: [id number]     Date/Time: 2024 1:32 PM  Patient PCP: Gibson Triana III, MD    Estimated discharge date:   Barriers: placement      Assessment / Plan:    Suspected seizures  Acute metabolic encephalopathy due to post ictal state  -CT head shows no acute process. No acute process on MRI of the brain.  Start Ativan as needed for seizures. Continue Keppra.  Consult neurology recommendation noted     Hypokalemia  -KCl replaced.  Recheck in a.m. tomorrow     Hypertension  Dyslipidemia  -Continue Norvasc, losartan, metoprolol and Lipitor     Diabetes mellitus  -Hold home metformin.  Start insulin sliding scale with blood sugar checks    Hx of Memory Loss  Hx of CVA  Hx of Cerebral Aneurysm  -Details are not clear as she is confused      Medical Decision Making:   I personally reviewed labs: cbc, bmp  I personally reviewed imaging:MRI  I personally reviewed EKG:  Toxic drug monitoring: None  Discussed case with: IDR        Code Status: Full  DVT Prophylaxis: Lovenox  GI Prophylaxis:    Subjective:     Patient is very slow to speak.  Patient follows commands.  Neurology signed off.  Patient pending placement.      Objective:     VITALS:   Last 24hrs VS reviewed since prior progress note. Most recent are:  Patient Vitals for the past 24 hrs:   BP Temp Temp src Pulse Resp SpO2   24 1109 138/82 97.5 °F (36.4 °C) Axillary 73 13 100 %   24 1053 (!) 160/94 -- -- -- -- --   24 0747 (!) 160/94 97.3 °F (36.3 °C) -- 65 14 100 %   24 0301 (!) 159/89 98.2 °F (36.8 °C) Oral 63 17 100 %   24 2308 (!) 173/91 97.5 °F (36.4 °C) Oral 74 18 97 %   24 2144 (!) 151/82 97.5 °F (36.4 °C) Oral 79 18 --   245 (!) 151/82 -- -- 79 18 98 %   24 1822 (!) 182/103 -- -- -- -- --   24 1540 (!) 161/93 97.5 °F (36.4 °C) -- 67 15 100 %           Intake/Output Summary (Last 24 hours) at 2024 1332  Last data

## 2024-01-08 NOTE — CARE COORDINATION
Care Management Initial Assessment       RUR: 9% (low RUR)  Readmission? No  1st IM letter given? Yes - Pt access  1st  letter given: No    CM contacted pt's POA/niece and caretaker via phone. CM introduced self and role and completed initial assessment. CM verified demographic and clinical information.     Pt lives with caretakers at [facility] in a 2 level home, 4 [address]. Pt caretaker reports pt needing supervision and verbal cues in ADLs. Pt caretaker denies DME at home. Pt caretaker denies O2/CPAP at home. Pt is supported by their staff. Patient is not an active . Pt reports hx of SNF at [facility].    Pt plans to discharge to SNF and anticipates BLS to assist with transportation.     CM placed referral to [facility], requesting additional choices from family. CM will continue to follow.        01/08/24 5635   Service Assessment   Patient Orientation Other (see comment)   Cognition Dementia / Early Alzheimer's   History Provided By Child/Family  (POA)   Support Systems Family Members   Patient's Healthcare Decision Maker is: Named in Scanned ACP Document   Prior Functional Level Shopping;Housework;Assistance with the following:;Mobility   Current Functional Level Assistance with the following:;Bathing;Dressing;Shopping;Mobility;Housework;Cooking   Can patient return to prior living arrangement No   Ability to make needs known: Good   Family able to assist with home care needs: No   Would you like for me to discuss the discharge plan with any other family members/significant others, and if so, who? No   Financial Resources Medicare   Community Resources Assisted Living   Social/Functional History   Lives With Home care staff   Type of Home Assisted living   Home Layout Two level   Home Access Stairs to enter with rails;Ramped entrance   Entrance Stairs - Number of Steps 4   Receives Help From Personal care attendant   ADL Assistance Needs assistance   Ambulation Assistance

## 2024-01-08 NOTE — PLAN OF CARE
Maximum assistance      ADL Intervention and task modifications:      [facility] \"6 Clicks\"                                                       Daily Activity Inpatient Short Form  How much help from another person does the patient currently need... Total; A Lot A Little None   1.  Putting on and taking off regular lower body clothing? [x]  1 []  2 []  3 []  4   2.  Bathing (including washing, rinsing, drying)? []  1 [x]  2 []  3 []  4   3.  Toileting, which includes using toilet, bedpan or urinal? [] 1 [x]  2 []  3 []  4   4.  Putting on and taking off regular upper body clothing? []  1 []  2 [x]  3 []  4   5.  Taking care of personal grooming such as brushing teeth? []  1 []  2 [x]  3 []  4   6.  Eating meals? []  1 []  2 [x]  3 []  4   © , Trustees of [facility], under license to [employer]. All rights reserved     Score: 14     Interpretation of Tool:  Represents clinically-significant functional categories (i.e. Activities of daily living).    Cutoff score 39.4 (19) correlates to a good likelihood of discharging home versus a facility  Fany Mckeon, Kaila Babb, Jace Hastings, Domonique Trcay, Olman Calvin, Musa Mckeon, AM-PAC “6-Clicks” Functional Assessment Scores Predict Acute Care Hospital Discharge Destination, Physical Therapy, Volume 94, Issue 9, 2014, Pages 5737-4294, [email or web address]    Pain Ratin/10     Activity Tolerance:   Fair     After treatment:   Patient left in no apparent distress sitting up in chair, Call bell within reach, and Bed/ chair alarm activated    COMMUNICATION/EDUCATION:   The patient's plan of care was discussed with: physical therapist and registered nurse    Patient Education  Education Given To: Patient  Education Provided: Role of Therapy;Plan of Care  Education Method: Verbal  Barriers to Learning: Cognition  Education Outcome: Verbalized understanding;Continued education

## 2024-01-08 NOTE — CARE COORDINATION
9021 - CM contacted pt's POA, tereso Cruz. Niece verified demographic and clinical info, but could not speak to pt's daily living. Niece states pt lives in with a caretaker, Rupal Zimmerman at [facility]. CM attempted to call caretaker but was unable to make contact or leave a VM.     [facility] POA is agreeable. CM emailed FOC list to [email or web address] and requested 4-6 options. POA states pt has been to one of the [facility] before. Referral placed.    Full eval to follow, CM will continue to try and make contact with caretaker.     Yahaira Saenz, MSW  Care Management

## 2024-01-08 NOTE — PLAN OF CARE
therapeutic exercises, patient and family training/education, and therapeutic activities    Frequency/Duration: Patient will be followed by physical therapy to address goals, PT Plan of Care: 4 times/week to address goals.    Recommendation for discharge: (in order for the patient to meet his/her long term goals): Therapy up to 5 days/week in Skilled nursing facility    Other factors to consider for discharge: impaired cognition    IF patient discharges home will need the following DME: continuing to assess with progress         SUBJECTIVE:   Patient stated “Tiffany.”    OBJECTIVE DATA SUMMARY:       Past Medical History:   Diagnosis Date    Diabetes ([facility])     Hypertension     Memory loss     NSTEMI (non-ST elevated myocardial infarction) ([facility]) 7/6/2022    Seizures ([facility]) 1/5/2024    Stroke ([facility])     cerebral hemorrhage,cerebral anurysm     Past Surgical History:   Procedure Laterality Date    NEUROLOGICAL SURGERY  2008    shunt placement    ORTHOPEDIC SURGERY  1996    back surgery       Home Situation:       Cognitive/Behavioral Status:  Orientation  Overall Orientation Status: Impaired  Orientation Level: Oriented to person       Skin: open abrasion on R ankle    Edema: none    Hearing:        Vision/Perceptual:                  Strength:    Strength: Generally decreased, functional    Tone & Sensation:   Tone: Normal       Coordination:  Coordination: Within functional limits    Range Of Motion:  AROM: Within functional limits  PROM: Within functional limits    Functional Mobility:  Bed Mobility:     Bed Mobility Training  Bed Mobility Training: Yes  Overall Level of Assistance: Minimum assistance;Contact-guard assistance  Interventions: Verbal cues  Transfers:     Transfer Training  Transfer Training: Yes  Overall Level of Assistance: Contact-guard assistance  Sit to Stand: Contact-guard assistance  Stand to Sit: Contact-guard assistance  Balance:               Balance  Sitting: Intact  Standing:  changing characteristics  AM-PAC  MEDIUM   Based on the above components, the patient evaluation is determined to be of the following complexity level: Medium

## 2024-01-09 LAB
GLUCOSE BLD STRIP.AUTO-MCNC: 103 MG/DL (ref 65–117)
GLUCOSE BLD STRIP.AUTO-MCNC: 134 MG/DL (ref 65–117)
GLUCOSE BLD STRIP.AUTO-MCNC: 190 MG/DL (ref 65–117)
GLUCOSE BLD STRIP.AUTO-MCNC: 79 MG/DL (ref 65–117)
SERVICE CMNT-IMP: ABNORMAL
SERVICE CMNT-IMP: ABNORMAL
SERVICE CMNT-IMP: NORMAL
SERVICE CMNT-IMP: NORMAL

## 2024-01-09 PROCEDURE — 82962 GLUCOSE BLOOD TEST: CPT

## 2024-01-09 PROCEDURE — 1100000003 HC PRIVATE W/ TELEMETRY

## 2024-01-09 PROCEDURE — 6360000002 HC RX W HCPCS: Performed by: INTERNAL MEDICINE

## 2024-01-09 PROCEDURE — 6370000000 HC RX 637 (ALT 250 FOR IP): Performed by: INTERNAL MEDICINE

## 2024-01-09 PROCEDURE — 6370000000 HC RX 637 (ALT 250 FOR IP): Performed by: STUDENT IN AN ORGANIZED HEALTH CARE EDUCATION/TRAINING PROGRAM

## 2024-01-09 PROCEDURE — 2580000003 HC RX 258: Performed by: INTERNAL MEDICINE

## 2024-01-09 RX ADMIN — LOSARTAN POTASSIUM 25 MG: 25 TABLET, FILM COATED ORAL at 08:50

## 2024-01-09 RX ADMIN — ENOXAPARIN SODIUM 40 MG: 100 INJECTION SUBCUTANEOUS at 08:50

## 2024-01-09 RX ADMIN — LEVETIRACETAM 1000 MG: 500 TABLET, FILM COATED ORAL at 17:27

## 2024-01-09 RX ADMIN — Medication: at 08:51

## 2024-01-09 RX ADMIN — Medication: at 20:21

## 2024-01-09 RX ADMIN — ATORVASTATIN CALCIUM 80 MG: 40 TABLET, FILM COATED ORAL at 20:19

## 2024-01-09 RX ADMIN — AMLODIPINE BESYLATE 10 MG: 5 TABLET ORAL at 08:50

## 2024-01-09 RX ADMIN — ASPIRIN 81 MG: 81 TABLET, COATED ORAL at 08:50

## 2024-01-09 RX ADMIN — LEVETIRACETAM 1000 MG: 500 TABLET, FILM COATED ORAL at 05:30

## 2024-01-09 NOTE — PROGRESS NOTES
Bedside shift change report given to EDMUNDO Becker(oncoming nurse) by EDMUNDO Lucio (offgoing nurse).  Report included the following information Nurse Handoff Report, Intake/Output, and MAR        Shift worked:  3-7   Shift summary and any significant changes:          Concerns for physician to address: none   Zone phone for oncoming shift:   [phone]      Patient Information  Mary Cruz  68 y.o.  1/5/2024 12:26 PM by Ronnie Petit MD. Mary Cruz was admitted from Home     Problem List          Patient Active Problem List     Diagnosis Date Noted    Cerebral vascular disease 01/06/2024    Seizures ([facility]) 01/05/2024    Acute alteration in mental status 01/05/2024    Seizure ([facility]) 01/05/2024    Type 2 diabetes mellitus with chronic kidney disease ([facility]) 04/19/2022    Sepsis ([facility]) 01/02/2020    ICH (intracerebral hemorrhage) ([facility]) 10/18/2019    Intracranial hemorrhage ([facility]) 10/18/2019    H/O cerebral aneurysm repair 04/02/2017    Type 2 diabetes mellitus ([facility]) 04/02/2017    Acute lower GI bleeding 04/02/2017    Intellectual delay 04/02/2017    Altered mental status 11/24/2016    Diabetes ([facility]) 11/22/2016    Acute encephalopathy 11/22/2016    Hypertension 11/22/2016     (ventriculoperitoneal) shunt status 11/22/2016      Past Medical History           Past Medical History:   Diagnosis Date    Diabetes ([facility])      Hypertension      Memory loss      NSTEMI (non-ST elevated myocardial infarction) ([facility]) 7/6/2022    Seizures ([facility]) 1/5/2024    Stroke ([facility])       cerebral hemorrhage,cerebral anurysm            Core Measures:        Activity:  Activity: In bed,      Cardiac:   Cardiac Monitoring: no      Cardiac Rhythm:      Access:   Current line(s):none Md aware        Genitourinary:   Urinary status: voiding         Respiratory:   O2 Device: None (Room air)        GI:  Current diet:  ADULT DIET; Regular/NPO for now        Pain Management:   Patient states pain is manageable on current regimen: N/A     Skin:  Ady Scale

## 2024-01-09 NOTE — PLAN OF CARE
Problem: Discharge Planning  Goal: Discharge to home or other facility with appropriate resources  1/8/2024 2126 by Rosita Villegas RN  Outcome: Progressing  1/8/2024 1707 by Brian Triana RN  Outcome: Progressing     Problem: Pain  Goal: Verbalizes/displays adequate comfort level or baseline comfort level  1/8/2024 2126 by Rosita Villegas RN  Outcome: Progressing  1/8/2024 1707 by Brian Triana RN  Outcome: Progressing     Problem: Safety - Adult  Goal: Free from fall injury  1/8/2024 2126 by Rosita Villegas RN  Outcome: Progressing  1/8/2024 1707 by Brian Triana RN  Outcome: Progressing     Problem: Neurosensory - Adult  Goal: Achieves stable or improved neurological status  1/8/2024 2126 by Rosita Villegas RN  Outcome: Progressing  1/8/2024 1707 by Brian Triana RN  Outcome: Progressing  Goal: Absence of seizures  1/8/2024 2126 by Rosita Villegas RN  Outcome: Progressing  1/8/2024 1707 by Brian Triana RN  Outcome: Progressing  Goal: Remains free of injury related to seizures activity  1/8/2024 2126 by Rosita Villegas RN  Outcome: Progressing  1/8/2024 1707 by Brian Triana RN  Outcome: Progressing  Goal: Achieves maximal functionality and self care  1/8/2024 2126 by Rosita Villegas RN  Outcome: Progressing  1/8/2024 1707 by Brian Triana RN  Outcome: Progressing     Problem: Respiratory - Adult  Goal: Achieves optimal ventilation and oxygenation  1/8/2024 2126 by Rosita Villegas RN  Outcome: Progressing  1/8/2024 1707 by Brian Triana RN  Outcome: Progressing     Problem: Cardiovascular - Adult  Goal: Maintains optimal cardiac output and hemodynamic stability  1/8/2024 2126 by Rosita Villegas RN  Outcome: Progressing  1/8/2024 1707 by Brian Triana RN  Outcome: Progressing     Problem: Skin/Tissue Integrity - Adult  Goal: Skin integrity remains intact  1/8/2024 2126 by Rosita Villegas RN  Outcome: Progressing  1/8/2024 1707 by Brian Triana RN  Outcome: Progressing  Goal: Oral mucous

## 2024-01-09 NOTE — CARE COORDINATION
Transition of Care Plan:    RUR: 13%  Prior Level of Functioning: Needing some assistance  Disposition: SNF prior to returning to assisted  If SNF or IPR: Date FOC offered: 01/08/23  Date FOC received: 01/08/23  [facility]  Date authorization started with reference number:   Date authorization received and expires:   Follow up appointments: defer to SNF  DME needed: defer to SNF  Transportation at discharge: BLS needed  IM/IMM Medicare/ letter given: 2nd needed prior to d/c  Is patient a  and connected with [facility]? N/a   If yes, was [place] transfer form completed and [facility] notified? N/a  Caregiver Contact: Alba Cruz; DTR; [phone]  Discharge Caregiver contacted prior to discharge? CM to contact  Care Conference needed? Not at this time  Barriers to discharge: Placement,     0920 - Pt DTR provided additional referrals provided, all referrals pending. Pt will not need auth.     1500 - [facility] have accepted. CM requesting which family would like to pursue. Possible d/c tomorrow.     DELMA Nichols  Care Management

## 2024-01-09 NOTE — PROGRESS NOTES
Hospitalist Progress Note    NAME:   Mary Cruz   : 1955   MRN: [id number]     Date/Time: 2024 1:53 PM  Patient PCP: Gibson Triana III, MD    Estimated discharge date: 1/10  Barriers: placement      Assessment / Plan:    Suspected seizures  Acute metabolic encephalopathy due to post ictal state  -CT head shows no acute process. No acute process on MRI of the brain.  Start Ativan as needed for seizures. Continue Keppra.  Consult neurology recommendation noted     Hypokalemia  -KCl replaced.  Recheck in a.m. tomorrow     Hypertension  Dyslipidemia  -Continue Norvasc, losartan, metoprolol and Lipitor     Diabetes mellitus  -Hold home metformin.  Start insulin sliding scale with blood sugar checks    Hx of Memory Loss  Hx of CVA  Hx of Cerebral Aneurysm  -Details are not clear as she is confused      Medical Decision Making:   I personally reviewed labs:   I personally reviewed imaging:  I personally reviewed EKG:  Toxic drug monitoring: None  Discussed case with: IDR        Code Status: Full  DVT Prophylaxis: Lovenox  GI Prophylaxis:    Subjective:     Patient is very slow to speak.  Patient follows commands.  Neurology signed off.  Patient pending placement.      Objective:     VITALS:   Last 24hrs VS reviewed since prior progress note. Most recent are:  Patient Vitals for the past 24 hrs:   BP Temp Temp src Pulse Resp SpO2   24 0751 130/81 97.3 °F (36.3 °C) -- 58 18 100 %   24 2127 132/73 98.6 °F (37 °C) Oral 61 16 --   24 1929 132/73 98.6 °F (37 °C) -- 61 15 100 %   24 1458 138/84 97.5 °F (36.4 °C) Axillary 68 14 --         No intake or output data in the 24 hours ending 24 1353       I had a face to face encounter and independently examined this patient on 2024, as outlined below:  PHYSICAL EXAM:  General: Alert, cooperative  EENT:  EOMI. Anicteric sclerae.  Resp:  CTA bilaterally, no wheezing or rales.  No accessory muscle use  CV:  Regular  rhythm,  No

## 2024-01-09 NOTE — PROGRESS NOTES
End of Shift Note     Bedside shift change report given to , RN (oncoming nurse) by EDMUNDO Lucio (offgoing nurse).  Report included the following information Nurse Handoff Report, Intake/Output, and MAR        Shift worked:  NIGHTS   Shift summary and any significant changes:      NO ACUTE CHANGES OVERNIGHT  Q2 TURNS.awaiting d/c       Concerns for physician to address: none   Zone phone for oncoming shift:  [phone]      Patient Information  Mary Cruz  68 y.o.  1/5/2024 12:26 PM by Ronnie Petit MD. Mary Cruz was admitted from Home     Problem List       Patient Active Problem List     Diagnosis Date Noted    Cerebral vascular disease 01/06/2024    Seizures ([facility]) 01/05/2024    Acute alteration in mental status 01/05/2024    Seizure ([facility]) 01/05/2024    Type 2 diabetes mellitus with chronic kidney disease ([facility]) 04/19/2022    Sepsis ([facility]) 01/02/2020    ICH (intracerebral hemorrhage) ([facility]) 10/18/2019    Intracranial hemorrhage ([facility]) 10/18/2019    H/O cerebral aneurysm repair 04/02/2017    Type 2 diabetes mellitus ([facility]) 04/02/2017    Acute lower GI bleeding 04/02/2017    Intellectual delay 04/02/2017    Altered mental status 11/24/2016    Diabetes ([facility]) 11/22/2016    Acute encephalopathy 11/22/2016    Hypertension 11/22/2016     (ventriculoperitoneal) shunt status 11/22/2016      Past Medical History        Past Medical History:   Diagnosis Date    Diabetes ([facility])      Hypertension      Memory loss      NSTEMI (non-ST elevated myocardial infarction) ([facility]) 7/6/2022    Seizures ([facility]) 1/5/2024    Stroke ([facility])       cerebral hemorrhage,cerebral anurysm            Core Measures:        Activity:  Activity: In bed, Up to chair, Return to chair, Return to bedBed rest     Cardiac:   Cardiac Monitoring: Yes      Cardiac Rhythm: Sinus rhythm     Access:   Current line(s): PIV        Genitourinary:   Urinary status: voiding         Respiratory:   O2 Device: None (Room air)        GI:  Current diet:  ADULT DIET; Regular/NPO

## 2024-01-09 NOTE — PROGRESS NOTES
End of Shift Note     Bedside shift change report given to ,Khadijah RN (oncoming nurse) by EDMUNDO acevedo (offgoing nurse).  Report included the following information Nurse Handoff Report, Intake/Output, and MAR        Shift worked:  7-3   Shift summary and any significant changes:      NO ACUTE CHANGES   Q2 TURNS.awaiting d/c       Concerns for physician to address: none   Zone phone for oncoming shift:        Patient Information  Mary Cruz  68 y.o.  1/5/2024 12:26 PM by Ronnie Petit MD. Mary Cruz was admitted from Home     Problem List       Patient Active Problem List     Diagnosis Date Noted    Cerebral vascular disease 01/06/2024    Seizures ([facility]) 01/05/2024    Acute alteration in mental status 01/05/2024    Seizure ([facility]) 01/05/2024    Type 2 diabetes mellitus with chronic kidney disease ([facility]) 04/19/2022    Sepsis ([facility]) 01/02/2020    ICH (intracerebral hemorrhage) ([facility]) 10/18/2019    Intracranial hemorrhage ([facility]) 10/18/2019    H/O cerebral aneurysm repair 04/02/2017    Type 2 diabetes mellitus ([facility]) 04/02/2017    Acute lower GI bleeding 04/02/2017    Intellectual delay 04/02/2017    Altered mental status 11/24/2016    Diabetes ([facility]) 11/22/2016    Acute encephalopathy 11/22/2016    Hypertension 11/22/2016     (ventriculoperitoneal) shunt status 11/22/2016      Past Medical History        Past Medical History:   Diagnosis Date    Diabetes ([facility])      Hypertension      Memory loss      NSTEMI (non-ST elevated myocardial infarction) ([facility]) 7/6/2022    Seizures ([facility]) 1/5/2024    Stroke ([facility])       cerebral hemorrhage,cerebral anurysm            Core Measures:        Activity:  Activity: In bed,      Cardiac:   Cardiac Monitoring: no      Cardiac Rhythm:      Access:   Current line(s):none Md aware        Genitourinary:   Urinary status: voiding         Respiratory:   O2 Device: None (Room air)        GI:  Current diet:  ADULT DIET; Regular/NPO for now        Pain Management:   Patient states pain is

## 2024-01-10 VITALS
TEMPERATURE: 97.5 F | OXYGEN SATURATION: 98 % | DIASTOLIC BLOOD PRESSURE: 70 MMHG | SYSTOLIC BLOOD PRESSURE: 102 MMHG | BODY MASS INDEX: 16.98 KG/M2 | WEIGHT: 114.64 LBS | HEIGHT: 69 IN | RESPIRATION RATE: 18 BRPM | HEART RATE: 81 BPM

## 2024-01-10 LAB
GLUCOSE BLD STRIP.AUTO-MCNC: 117 MG/DL (ref 65–117)
GLUCOSE BLD STRIP.AUTO-MCNC: 126 MG/DL (ref 65–117)
SERVICE CMNT-IMP: ABNORMAL
SERVICE CMNT-IMP: NORMAL

## 2024-01-10 PROCEDURE — 6360000002 HC RX W HCPCS: Performed by: INTERNAL MEDICINE

## 2024-01-10 PROCEDURE — 6370000000 HC RX 637 (ALT 250 FOR IP): Performed by: STUDENT IN AN ORGANIZED HEALTH CARE EDUCATION/TRAINING PROGRAM

## 2024-01-10 PROCEDURE — 6370000000 HC RX 637 (ALT 250 FOR IP): Performed by: INTERNAL MEDICINE

## 2024-01-10 PROCEDURE — 82962 GLUCOSE BLOOD TEST: CPT

## 2024-01-10 RX ORDER — LEVETIRACETAM 1000 MG/1
1000 TABLET ORAL 2 TIMES DAILY
Qty: 60 TABLET | Refills: 3 | Status: SHIPPED | OUTPATIENT
Start: 2024-01-10

## 2024-01-10 RX ADMIN — LEVETIRACETAM 1000 MG: 500 TABLET, FILM COATED ORAL at 05:57

## 2024-01-10 RX ADMIN — ASPIRIN 81 MG: 81 TABLET, COATED ORAL at 09:47

## 2024-01-10 RX ADMIN — ENOXAPARIN SODIUM 40 MG: 100 INJECTION SUBCUTANEOUS at 09:48

## 2024-01-10 RX ADMIN — Medication: at 09:48

## 2024-01-10 NOTE — PROGRESS NOTES
End of Shift Note     Bedside shift change report given to EDMUNDO Zeng (oncoming nurse) by EDMUNDO acevedo (offgoing nurse).  Report included the following information Nurse Handoff Report, Intake/Output, and MAR        Shift worked: nights   Shift summary and any significant changes:      NO ACUTE CHANGES   Q2 TURNS.awaiting d/c       Concerns for physician to address: none   Zone phone for oncoming shift:  [phone]      Patient Information  Mary Cruz  68 y.o.  1/5/2024 12:26 PM by Ronnie Petit MD. Mary Cruz was admitted from Home     Problem List       Patient Active Problem List     Diagnosis Date Noted    Cerebral vascular disease 01/06/2024    Seizures ([facility]) 01/05/2024    Acute alteration in mental status 01/05/2024    Seizure ([facility]) 01/05/2024    Type 2 diabetes mellitus with chronic kidney disease ([facility]) 04/19/2022    Sepsis ([facility]) 01/02/2020    ICH (intracerebral hemorrhage) ([facility]) 10/18/2019    Intracranial hemorrhage ([facility]) 10/18/2019    H/O cerebral aneurysm repair 04/02/2017    Type 2 diabetes mellitus ([facility]) 04/02/2017    Acute lower GI bleeding 04/02/2017    Intellectual delay 04/02/2017    Altered mental status 11/24/2016    Diabetes ([facility]) 11/22/2016    Acute encephalopathy 11/22/2016    Hypertension 11/22/2016     (ventriculoperitoneal) shunt status 11/22/2016      Past Medical History        Past Medical History:   Diagnosis Date    Diabetes ([facility])      Hypertension      Memory loss      NSTEMI (non-ST elevated myocardial infarction) ([facility]) 7/6/2022    Seizures ([facility]) 1/5/2024    Stroke ([facility])       cerebral hemorrhage,cerebral anurysm            Core Measures:        Activity:  Activity: In bed,      Cardiac:   Cardiac Monitoring: no      Cardiac Rhythm:      Access:   Current line(s):none Md aware        Genitourinary:   Urinary status: voiding         Respiratory:   O2 Device: None (Room air)        GI:  Current diet:  ADULT DIET; Regular/NPO for now        Pain Management:   Patient states pain is

## 2024-01-10 NOTE — PROGRESS NOTES
.Report called to Michelle at [facility]. Using SBAR. Opportunity for questions offered and understanding verbalized. Patient to be transferred at 1630

## 2024-01-10 NOTE — DISCHARGE SUMMARY
Hospitalist Discharge Summary     Patient ID:  Mary Cruz  [id number]  68 y.o.  1955 1/5/2024    PCP on record: Gibson Triana III, MD    Admit date: 1/5/2024  Discharge date and time: 1/10/2024    DISCHARGE DIAGNOSIS:    Suspected seizures  Acute metabolic encephalopathy due to post ictal state  -CT head shows no acute process. No acute process on MRI of the brain. Continue Keppra.  Consult neurology recommendation noted     Hypokalemia  -resolved     Hypertension  Dyslipidemia  -Continue Norvasc, losartan, and Lipitor  -DC metoprolol     Diabetes mellitus  -Resume metformin on discharge     Hx of Memory Loss  Hx of CVA  Hx of Cerebral Aneurysm    CONSULTATIONS:  IP CONSULT TO NEUROLOGY  IP CONSULT TO PHYSICAL THERAPY  IP CONSULT TO OCCUPATIONAL THERAPY    Excerpted HPI from H&P of Ronnie Petit MD:  Mary Cruz is a 68 y.o.  female with PMHx significant for hypertension, diabetes mellitus, dyslipidemia is coming the hospital chief complaints of altered mental status.  Patient was in her usual state of health until about this afternoon where she was noted to have sudden onset of difficulty speaking, weakness in all extremities and also gaze deviation to the right.  At baseline she can walk independently.  She was also noted to have seizure-like episode in the emergency department.     On arrival to ED, noted to have blood pressure of 167/113.  On labs potassium is 3.2, creatinine 0.74, LFTs are normal.  CBC is within normal limits.  CT head shows stable chronic findings.  We were asked to admit for work up and evaluation of the above problems.     ______________________________________________________________________  DISCHARGE SUMMARY/HOSPITAL COURSE:  for full details see H&P, daily progress notes, labs, consult notes.     Patient admitted to the hospital for altered mental status likely secondary to postictal confusion from seizures.  Patient is started on Keppra 1000 mg twice a

## 2024-01-10 NOTE — PROGRESS NOTES
End of Shift Note     Bedside shift change report given to ,Khadijah WYATT (oncoming nurse) by EDMUNDO acevedo (offgoing nurse).  Report included the following information Nurse Handoff Report, Intake/Output, and MAR        Shift worked:  7-3   Shift summary and any significant changes:      NO ACUTE CHANGES   Q2 TURNS.Transport scheduled for 1630 to [facility]. Report has been called.      Concerns for physician to address: none   Zone phone for oncoming shift:        Patient Information  Mary Cruz  68 y.o.  1/5/2024 12:26 PM by Ronnie Petit MD. Mary Cruz was admitted from Home     Problem List       Patient Active Problem List     Diagnosis Date Noted    Cerebral vascular disease 01/06/2024    Seizures ([facility]) 01/05/2024    Acute alteration in mental status 01/05/2024    Seizure ([facility]) 01/05/2024    Type 2 diabetes mellitus with chronic kidney disease ([facility]) 04/19/2022    Sepsis ([facility]) 01/02/2020    ICH (intracerebral hemorrhage) ([facility]) 10/18/2019    Intracranial hemorrhage ([facility]) 10/18/2019    H/O cerebral aneurysm repair 04/02/2017    Type 2 diabetes mellitus ([facility]) 04/02/2017    Acute lower GI bleeding 04/02/2017    Intellectual delay 04/02/2017    Altered mental status 11/24/2016    Diabetes ([facility]) 11/22/2016    Acute encephalopathy 11/22/2016    Hypertension 11/22/2016     (ventriculoperitoneal) shunt status 11/22/2016      Past Medical History        Past Medical History:   Diagnosis Date    Diabetes ([facility])      Hypertension      Memory loss      NSTEMI (non-ST elevated myocardial infarction) ([facility]) 7/6/2022    Seizures ([facility]) 1/5/2024    Stroke ([facility])       cerebral hemorrhage,cerebral anurysm            Core Measures:        Activity:  Activity: In bed,      Cardiac:   Cardiac Monitoring: no      Cardiac Rhythm:      Access:   Current line(s):none Md aware        Genitourinary:   Urinary status: voiding         Respiratory:   O2 Device: None (Room air)        GI:  Current diet:  ADULT DIET; Regular/NPO for now

## 2024-01-10 NOTE — PLAN OF CARE
Problem: Discharge Planning  Goal: Discharge to home or other facility with appropriate resources  Outcome: Progressing     Problem: Pain  Goal: Verbalizes/displays adequate comfort level or baseline comfort level  1/9/2024 2158 by Rosita Villegas RN  Outcome: Progressing  1/9/2024 1600 by Yola Champagne RN  Outcome: Progressing  1/9/2024 1559 by Yola Champagne RN  Outcome: Progressing     Problem: Safety - Adult  Goal: Free from fall injury  Outcome: Progressing     Problem: Neurosensory - Adult  Goal: Achieves stable or improved neurological status  Outcome: Progressing  Goal: Absence of seizures  Outcome: Progressing  Goal: Remains free of injury related to seizures activity  Outcome: Progressing  Goal: Achieves maximal functionality and self care  Outcome: Progressing     Problem: Respiratory - Adult  Goal: Achieves optimal ventilation and oxygenation  Outcome: Progressing     Problem: Cardiovascular - Adult  Goal: Maintains optimal cardiac output and hemodynamic stability  Outcome: Progressing     Problem: Skin/Tissue Integrity - Adult  Goal: Skin integrity remains intact  Outcome: Progressing  Goal: Oral mucous membranes remain intact  Outcome: Progressing     Problem: Musculoskeletal - Adult  Goal: Return mobility to safest level of function  Outcome: Progressing  Goal: Return ADL status to a safe level of function  Outcome: Progressing     Problem: Gastrointestinal - Adult  Goal: Minimal or absence of nausea and vomiting  Outcome: Progressing  Goal: Maintains adequate nutritional intake  Outcome: Progressing     Problem: Genitourinary - Adult  Goal: Absence of urinary retention  Outcome: Progressing     Problem: Infection - Adult  Goal: Absence of infection at discharge  Outcome: Progressing  Goal: Absence of infection during hospitalization  Outcome: Progressing     Problem: Metabolic/Fluid and Electrolytes - Adult  Goal: Electrolytes maintained within normal limits  Outcome:

## 2024-01-10 NOTE — CARE COORDINATION
SNF - [facility] Room # 330B, call report [phone]. [facility] to transport @ 1630. Clear from CM.     Transition of Care Plan:     RUR: 13%  Prior Level of Functioning: Needing some assistance  Disposition: SNF prior to returning to [facility]  If SNF or IPR: Date FOC offered: 01/08/23  Date FOC received: 01/08/23  Accepting facility: [facility]  Date authorization started with reference number:   Date authorization received and expires:   Follow up appointments: defer to SNF  DME needed: defer to SNF  Transportation at discharge: [facility] 1630  IM/IMM Medicare/ letter given: 2nd given  Is patient a [place] and connected with VA? N/a              If yes, was  transfer form completed and VA notified? N/a  Caregiver Contact: Alba Cruz; MARLENER; [phone]  Discharge Caregiver contacted prior to discharge? CM contacted  Care Conference needed? Not at this time  Barriers to discharge: None at this time    0928 - Family comfortable with [facility]. CM requesting bed availability today. BLS will be needed.     1144 - [facility] cannot accept today, Pt's family is comfortable moving forward with [facility]. Pt will go to room 330B, call report [phone]. BLS to be arranged for after 2p.     1239 - BLS arranged for 1630.     01/10/24 1149   Services At/After Discharge   Transition of Care Consult (CM Consult) SNF   Services At/After Discharge Skilled Nursing Facility (SNF)    Resource Information Provided? No  (N/a)   Mode of Transport at Discharge BLS   Confirm Follow Up Transport Family   Condition of Participation: Discharge Planning   The Plan for Transition of Care is related to the following treatment goals: SNF prior to returning to [facility]   The Patient and/or Patient Representative was provided with a Choice of Provider? Patient   Name of the Patient Representative who was provided with the Choice of Provider and agrees with the Discharge Plan?  Williams SMITH   The Patient and/Or  with transportation  []PEG Care  []Oxygen  []Daily Weights for Heart Failure    Dietary:  []Any diet limitations  []Tube Feedings   []Total Parenteral Management (TPN)    Financial Resources:  []Medicaid Application Completed    []UAI Completed and copy given to pt/family  and copy given to pt/family  []A screening has previously been completed.    []Level II Completed    [x] Private pay individual who will not become   financially eligible for Medicaid within 6 months from admission to a [facility].     [] Individual refused to have screening conducted.     []Medicaid Application Completed    []The screening denied because it was determined individual did not need/did not qualify for nursing facility level of care.  [] Out of state residents seeking direct admission to a VA nursing facility.  [] Individuals who are inpatients of an out of state hospital, or in state or out of state veterans/ hospital and seek direct admission to a VA nursing facility  [] Individuals who are pateints or residents of a state owned/operated facility that is licensed by Department of Behavioral Services (DBHDS) and seek direct admission to VA nursing facility  [] A screening not required for enrollment in Medicaid Hospice services as set out in 12 VAC 30-  [] [facility] ([facility]) staff shall perform screenings of the [facility] clients.    Advanced Care Plan:  []Surrogate Decision Maker of Care  []POA  []Communicated Code Status and copy sent.    Other:         DELMA Nichols  Care Management

## 2024-01-10 NOTE — PROGRESS NOTES
End of Shift Note     Bedside shift change report given to EDMUNDO Zeng, RN (oncoming nurse) by EDMUNDO acevedo (offgoing nurse).  Report included the following information Nurse Handoff Report, Intake/Output, and MAR        Shift worked: nights   Shift summary and any significant changes:      NO ACUTE CHANGES   Q2 TURNS.awaiting d/c       Concerns for physician to address: none   Zone phone for oncoming shift:  [phone]      Patient Information  Mary Cruz  68 y.o.  1/5/2024 12:26 PM by Ronnie Petit MD. Mary Cruz was admitted from Home     Problem List       Patient Active Problem List     Diagnosis Date Noted    Cerebral vascular disease 01/06/2024    Seizures ([facility]) 01/05/2024    Acute alteration in mental status 01/05/2024    Seizure ([facility]) 01/05/2024    Type 2 diabetes mellitus with chronic kidney disease ([facility]) 04/19/2022    Sepsis ([facility]) 01/02/2020    ICH (intracerebral hemorrhage) ([facility]) 10/18/2019    Intracranial hemorrhage ([facility]) 10/18/2019    H/O cerebral aneurysm repair 04/02/2017    Type 2 diabetes mellitus ([facility]) 04/02/2017    Acute lower GI bleeding 04/02/2017    Intellectual delay 04/02/2017    Altered mental status 11/24/2016    Diabetes ([facility]) 11/22/2016    Acute encephalopathy 11/22/2016    Hypertension 11/22/2016     (ventriculoperitoneal) shunt status 11/22/2016      Past Medical History        Past Medical History:   Diagnosis Date    Diabetes ([facility])      Hypertension      Memory loss      NSTEMI (non-ST elevated myocardial infarction) ([facility]) 7/6/2022    Seizures ([facility]) 1/5/2024    Stroke ([facility])       cerebral hemorrhage,cerebral anurysm            Core Measures:        Activity:  Activity: In bed,      Cardiac:   Cardiac Monitoring: no      Cardiac Rhythm:      Access:   Current line(s):none Md aware        Genitourinary:   Urinary status: voiding         Respiratory:   O2 Device: None (Room air)        GI:  Current diet:  ADULT DIET; Regular/NPO for now        Pain Management:   Patient states pain is  manageable on current regimen: N/A     Skin:  Ady Scale Score: 17  Interventions: turn team, specialty bed, increase time out of bed, PT/OT consult, and nutritional support    Patient Safety:  Fall Score: Schuler Total Score: 75  Interventions: bed/chair alarm, assistive devices (walker, cane, etc.), gripper socks, pt to call before getting OOB, and stay with me (per policy)  @Rollbelt  @dexterity to release roll belt  Yes/No ( must document dexterity  here by stating Yes or No here, otherwise this is a restraint and must follow restraint documentation policy.)     DVT prophylaxis:  DVT prophylaxis Med- yes  DVT prophylaxis SCD or GEMINI- N/A      Wounds: (If Applicable)  Wounds- yes  Location Sacrum/bilat feet,      Active Consults:  IP CONSULT TO NEUROLOGY  IP CONSULT TO PHYSICAL THERAPY  IP CONSULT TO OCCUPATIONAL THERAPY     Length of Stay:  Expected LOS: 4  Actual LOS: 3  Discharge Plan: yes when stable

## 2024-01-16 ENCOUNTER — TELEPHONE (OUTPATIENT)
Age: 69
End: 2024-01-16

## 2024-01-16 NOTE — TELEPHONE ENCOUNTER
Spoke with pt's daughter to schedule hospital follow up, she stated that the pt is in rehab and she will call the office when she is discharged.

## 2024-02-13 ENCOUNTER — OFFICE VISIT (OUTPATIENT)
Facility: CLINIC | Age: 69
End: 2024-02-13
Payer: MEDICARE

## 2024-02-13 DIAGNOSIS — M62.81 MUSCLE WEAKNESS: Primary | ICD-10-CM

## 2024-02-13 PROCEDURE — G8484 FLU IMMUNIZE NO ADMIN: HCPCS | Performed by: INTERNAL MEDICINE

## 2024-02-13 PROCEDURE — 99309 SBSQ NF CARE MODERATE MDM 30: CPT | Performed by: INTERNAL MEDICINE

## 2024-02-13 PROCEDURE — 1123F ACP DISCUSS/DSCN MKR DOCD: CPT | Performed by: INTERNAL MEDICINE

## 2024-02-13 NOTE — PROGRESS NOTES
PLACE OF SERVICE:  [facility] [address] [place] [address]       SKILLED VISIT    2/13/2024    Chief Complaint:  No chief complaint on file.      HPI :Patient is a 68-year-old female past medical history of dementia CVA intracranial  hemorrhage aneurysm SP shunt placement diabetes hypertension. Patient lives at  assisted living facility with caretakers requiring supervision. She was brought to the  emergency room with complaints of altered mental status right-sided gaze difficulty  speaking. In the emergency room she had a tonic-clonic seizure. She was admitted  to the hospitalist team she was seen in consultation by neurology and EEG revealed  generalized slowing in the left frontotemporal region suggesting area of cortical  disturbance. She was loaded with IV Keppra. Did not have any further seizures.  MRI and CT of the head were done no evidence of acute intracranial abnormality  significant encephalomalacia in the left ventricular shunt. Patient is debilitated and  weak and admitted here for skilled rehab.  Patient is seen for skilled follow-up.  I checked with staff patient has been working with PT eating better no breakthrough seizures.      PMH:   Dementia  Intracerebral bleed  Cerebral aneurysm  Hypertension  Diabetes  Seizure disorder    Medications: Reviewed in EMR and assessment and plan    Social History / Family History:       reports that she has never smoked. She has never used smokeless tobacco. She reports that she does not drink alcohol and does not use drugs.    No family history on file.     ROS: Review of Systems      Vitals: There were no vitals filed for this visit.        Exam:    Constitutional: No acute distress; not oriented time place looks older than the stated  age  Eyes: Sclera clear, PERRLA;  Ears/Nose/Mouth/Throat: mmm, OP clear, trachea midline;  Cardiovascular: RRR, nml S1 and S2, no rubs, murmurs or gallops, no edema,

## 2024-02-20 ENCOUNTER — OFFICE VISIT (OUTPATIENT)
Facility: CLINIC | Age: 69
End: 2024-02-20
Payer: MEDICARE

## 2024-02-20 DIAGNOSIS — F01.518 VASCULAR DEMENTIA WITH BEHAVIORAL DISTURBANCE (HCC): ICD-10-CM

## 2024-02-20 DIAGNOSIS — R56.9 SEIZURES (HCC): Primary | ICD-10-CM

## 2024-02-20 DIAGNOSIS — I50.22 CHRONIC SYSTOLIC (CONGESTIVE) HEART FAILURE (HCC): ICD-10-CM

## 2024-02-20 PROCEDURE — 1123F ACP DISCUSS/DSCN MKR DOCD: CPT | Performed by: INTERNAL MEDICINE

## 2024-02-20 PROCEDURE — 99309 SBSQ NF CARE MODERATE MDM 30: CPT | Performed by: INTERNAL MEDICINE

## 2024-02-20 PROCEDURE — G8484 FLU IMMUNIZE NO ADMIN: HCPCS | Performed by: INTERNAL MEDICINE

## 2024-02-21 NOTE — PROGRESS NOTES
symmetric, No respiratory distress;  Gastrointestinal: Abdomen soft, NT, ND, no masses, normal bowel sounds;  Neurological: Cranial nerves II-XII grossly intact, no speech or motor deficits not  oriented time place  Skin: No rash, warm and dry;  Musculoskeletal: No erythema swelling or joint tenderness, extremities without  cyanosis, neck supple, ROM intact spine and extremities;  Psychiatric: Not agitated. No insight  Genitourinary: No suprapubic tenderness or flank tenderness;  Heme/Lymph/Immuno: No pallor;      Labs:     Assessment/Plans:     Muscle weakness (generalized) - M62.81 Patient debilitated and weak  Continue to work with skilled nursing and rehab  Recertified for skilled care    Epileptic seizures related to external causes, not intractable, without status epilepticus - G40.509  Patient with new onset of seizure  EEG results reviewed  Started on Keppra 1 g twice daily  Follow-up with neurology as per schedule    ESSENTIAL (PRIMARY) HYPERTENSION - I10 Blood pressure trend reviewed  Increase losartan to 25 mg daily  Continue Norvasc 2.5 mg daily  Follow-up the trend    PERSONAL HISTORY OF TRANSIENT ISCHEMIC ATTACK (TIA), AND CEREBRAL INFARCTION  WITHOUT RESIDUAL DEFICITS - Z86.73 History of CVA intracranial bleed and aneurysm  Lipitor 80 mg daily  Aspirin 81 mg daily    TYPE 2 DIABETES MELLITUS WITHOUT COMPLICATIONS - E11.9  Diet controlled     Matias Bowers MD

## 2024-02-26 ENCOUNTER — OFFICE VISIT (OUTPATIENT)
Facility: CLINIC | Age: 69
End: 2024-02-26
Payer: MEDICARE

## 2024-02-26 DIAGNOSIS — M62.81 MUSCLE WEAKNESS: Primary | ICD-10-CM

## 2024-02-26 PROCEDURE — 1123F ACP DISCUSS/DSCN MKR DOCD: CPT | Performed by: INTERNAL MEDICINE

## 2024-02-26 PROCEDURE — G8484 FLU IMMUNIZE NO ADMIN: HCPCS | Performed by: INTERNAL MEDICINE

## 2024-02-26 PROCEDURE — 99309 SBSQ NF CARE MODERATE MDM 30: CPT | Performed by: INTERNAL MEDICINE

## 2024-02-26 NOTE — PROGRESS NOTES
PLACE OF SERVICE:  [facility] [address] [place] [id number]           2/26/2024    Chief Complaint:    Seizure disorder      HPI :Patient is a 68-year-old female past medical history of dementia CVA intracranial  hemorrhage aneurysm SP shunt placement diabetes hypertension. Patient lives at  assisted living facility with caretakers requiring supervision. She was brought to the  emergency room with complaints of altered mental status right-sided gaze difficulty  speaking. In the emergency room she had a tonic-clonic seizure. She was admitted  to the hospitalist team she was seen in consultation by neurology and EEG revealed  generalized slowing in the left frontotemporal region suggesting area of cortical  disturbance. She was loaded with IV Keppra. Did not have any further seizures.  MRI and CT of the head were done no evidence of acute intracranial abnormality  significant encephalomalacia in the left ventricular shunt. Patient is debilitated and  weak and admitted here for skilled rehab.  Patient is seen for skilled visit.  She is sitting in the chair seems to be at baseline stable over the last 1 week no falls no breakthrough seizures.  Eating okay per staff  PMH:   Dementia  Intracerebral bleed  Cerebral aneurysm  Hypertension  Diabetes  Seizure disorder    Medications: Reviewed in EMR and assessment and plan    Social History / Family History:       reports that she has never smoked. She has never used smokeless tobacco. She reports that she does not drink alcohol and does not use drugs.    No family history on file.     ROS:       Vitals: 138/89 pulse 70 respiration 16 temperature 97        Exam:    Constitutional: No acute distress; not oriented time place looks older than the stated  age  Eyes: Sclera clear, PERRLA;  Ears/Nose/Mouth/Throat: mmm, OP clear, trachea midline;  Cardiovascular: RRR, nml S1 and S2, no rubs, murmurs or gallops, no edema,

## 2024-03-04 ENCOUNTER — OFFICE VISIT (OUTPATIENT)
Facility: CLINIC | Age: 69
End: 2024-03-04
Payer: MEDICARE

## 2024-03-04 DIAGNOSIS — R56.9 SEIZURES (HCC): ICD-10-CM

## 2024-03-04 DIAGNOSIS — I50.22 CHRONIC SYSTOLIC (CONGESTIVE) HEART FAILURE (HCC): ICD-10-CM

## 2024-03-04 DIAGNOSIS — F01.518 VASCULAR DEMENTIA WITH BEHAVIORAL DISTURBANCE (HCC): ICD-10-CM

## 2024-03-04 DIAGNOSIS — M62.81 MUSCLE WEAKNESS: Primary | ICD-10-CM

## 2024-03-04 PROCEDURE — 99309 SBSQ NF CARE MODERATE MDM 30: CPT | Performed by: FAMILY MEDICINE

## 2024-03-04 PROCEDURE — 1123F ACP DISCUSS/DSCN MKR DOCD: CPT | Performed by: FAMILY MEDICINE

## 2024-03-04 PROCEDURE — G8484 FLU IMMUNIZE NO ADMIN: HCPCS | Performed by: FAMILY MEDICINE

## 2024-03-05 NOTE — PROGRESS NOTES
PLACE OF SERVICE:  [facility] [address] [place] [address]       SKILLED VISIT      Chief Complaint: Generalized deconditioning weakness/loss of function-ongoing rehab    HPI  Patient seen today for follow-up.  Reports no acute complaints of pain or discomfort.  No nausea vomiting no fever chills reported.  Per nursing fair appetite and good sleep.  Motivated and working with skilled nursing and rehab.  Therapy notes reviewed.  All medications reviewed today and found to be no other acute concerns.    PMH:   Diabetes, seizures, generalized weakness, aphasia status post stroke, history of seizures, hypertension, dyslipidemia    ROS:   The following system review was negative:  Constitutional; Respiratory; Cardiovascular; Genitourinary; Gastrointestinal; Psychiatric; Ear-Nose-Throat; Musculoskeletal; Neurologic; Endocrine; Hematologic; Skin; Eyes; denies any    Medications: Reviewed in [facility] EMR and assessment /  plan    Social History / Family History: Reviewed-no change from previous       Vitals:   Blood pressure 129/79 temperature 97.6 pulse 69 respiratory rate 18      Exam:  Constitutional: No acute distress;   Eyes: Sclera clear, PERRLA;   Ears/nose/mouth/throat:mmm, OP clear, trachea midline;  Cardiovascular: RRR,nml S1 and S2, no rubs murmurs or gallops, no edema, no cyanosis;   Respiratory: Clear to auscultation, symmetric, no respiratory distress;  Gastrointestinal: Abdomen soft, NT, ND, no masses, normal bowel sounds;  Neurologic: Cranial nerves II through VII grossly intact, no speech or motor deficits A&O in time place and person  Skin: No rash, warm and dry;  Musculoskeletal: No erythema swelling or joint tenderness, extremities without cyanosis, neck supple, ROM intact spine and extremities;  Psychiatric: Not agitated.  Appropriate affect, mood, judgment and insight.  Genitourinary: No suprapubic tenderness or flank tenderness  Heme, lymph, immuno: No pallor;

## 2024-03-06 ENCOUNTER — OFFICE VISIT (OUTPATIENT)
Facility: CLINIC | Age: 69
End: 2024-03-06
Payer: MEDICARE

## 2024-03-06 DIAGNOSIS — R56.9 SEIZURES (HCC): Primary | ICD-10-CM

## 2024-03-06 PROCEDURE — 1123F ACP DISCUSS/DSCN MKR DOCD: CPT | Performed by: INTERNAL MEDICINE

## 2024-03-06 PROCEDURE — 99309 SBSQ NF CARE MODERATE MDM 30: CPT | Performed by: INTERNAL MEDICINE

## 2024-03-06 PROCEDURE — G8484 FLU IMMUNIZE NO ADMIN: HCPCS | Performed by: INTERNAL MEDICINE

## 2024-03-07 NOTE — PROGRESS NOTES
Cranial nerves II-XII grossly intact, no speech or motor deficits not  oriented time place  Skin: No rash, warm and dry;  Musculoskeletal: No erythema swelling or joint tenderness, extremities without  cyanosis, neck supple, ROM intact spine and extremities;  Psychiatric: Not agitated. No insight  Genitourinary: No suprapubic tenderness or flank tenderness;  Heme/Lymph/Immuno: No pallor;      Labs:     Assessment/Plans:     Muscle weakness (generalized) - M62.81 Patient debilitated and weak  Continue to work with skilled nursing and rehab  Recertified for skilled care    Epileptic seizures related to external causes, not intractable, without status epilepticus - G40.509  Patient with new onset of seizure  EEG results reviewed  Started on Keppra 1 g twice daily  Follow-up with neurology as per schedule    ESSENTIAL (PRIMARY) HYPERTENSION - I10 Blood pressure trend reviewed  Increase losartan to 25 mg daily  Continue Norvasc 2.5 mg daily  Follow-up the trend    PERSONAL HISTORY OF TRANSIENT ISCHEMIC ATTACK (TIA), AND CEREBRAL INFARCTION  WITHOUT RESIDUAL DEFICITS - Z86.73 History of CVA intracranial bleed and aneurysm  Lipitor 80 mg daily  Aspirin 81 mg daily    TYPE 2 DIABETES MELLITUS WITHOUT COMPLICATIONS - E11.9  Diet controlled     Matias Bowers MD

## 2024-03-11 ENCOUNTER — OFFICE VISIT (OUTPATIENT)
Facility: CLINIC | Age: 69
End: 2024-03-11
Payer: MEDICARE

## 2024-03-11 DIAGNOSIS — R56.9 SEIZURES (HCC): Primary | ICD-10-CM

## 2024-03-11 DIAGNOSIS — I15.9 SECONDARY HYPERTENSION: ICD-10-CM

## 2024-03-11 DIAGNOSIS — R53.1 GENERALIZED WEAKNESS: ICD-10-CM

## 2024-03-11 DIAGNOSIS — F01.A0 MILD VASCULAR DEMENTIA, UNSPECIFIED WHETHER BEHAVIORAL, PSYCHOTIC, OR MOOD DISTURBANCE OR ANXIETY (HCC): ICD-10-CM

## 2024-03-11 PROCEDURE — G8484 FLU IMMUNIZE NO ADMIN: HCPCS | Performed by: FAMILY MEDICINE

## 2024-03-11 PROCEDURE — 1123F ACP DISCUSS/DSCN MKR DOCD: CPT | Performed by: FAMILY MEDICINE

## 2024-03-11 PROCEDURE — 99309 SBSQ NF CARE MODERATE MDM 30: CPT | Performed by: FAMILY MEDICINE

## 2024-03-11 NOTE — PROGRESS NOTES
suprapubic tenderness or flank tenderness  Heme, lymph, immuno: No pallor;       Labs:        Assessment/Plans:   Generalized deconditioning and weakness/loss of function-continue to work with skilled nursing and rehab.    Seizures-no recent breakthrough seizures reported.  Continue Keppra    Dyslipidemia-continue high-dose atorvastatin    Previous history of stroke.  Continue high-dose atorvastatin.      Philomena Angela MD

## 2025-01-22 NOTE — ED PROVIDER NOTES
[facility] NEUROSCIENCE TELEMETRY  EMERGENCY DEPARTMENT ENCOUNTER       Pt Name: Mary Cruz  MRN: [id number]  Birthdate 1955  Date of evaluation: 1/5/2024  Provider: Rosette Guthrie MD   PCP: Gibson Triana III, MD  Note Started: 12:29 PM EST 1/5/24     CHIEF COMPLAINT       Chief Complaint   Patient presents with    Altered Mental Status     BIBEMS for sudden onset aphasia, R sided gaze deviation, and generalized weakness        HISTORY OF PRESENT ILLNESS: 1 or more elements      History From: EMS, History limited by: AMS     Mary Cruz is a 68 y.o. female who presents with a cc of AMS       Please See MDM for Additional Details of the HPI/PMH  Nursing Notes were all reviewed and agreed with or any disagreements were addressed in the HPI.     REVIEW OF SYSTEMS        Positives and Pertinent negatives as per HPI.    PAST HISTORY     Past Medical History:  Past Medical History:   Diagnosis Date    Diabetes ([facility])     Hypertension     Memory loss     NSTEMI (non-ST elevated myocardial infarction) ([facility]) 7/6/2022    Seizures ([facility]) 1/5/2024    Stroke ([facility])     cerebral hemorrhage,cerebral anurysm       Past Surgical History:  Past Surgical History:   Procedure Laterality Date    NEUROLOGICAL SURGERY  2008    shunt placement    ORTHOPEDIC SURGERY  1996    back surgery       Family History:  No family history on file.    Social History:  Social History     Tobacco Use    Smoking status: Never    Smokeless tobacco: Never   Substance Use Topics    Alcohol use: No    Drug use: No       Allergies:  Allergies   Allergen Reactions    Amoxicillin Other (See Comments)     Aggressive behavior  and  hallucinating and itching       CURRENT MEDICATIONS      Current Discharge Medication List        CONTINUE these medications which have NOT CHANGED    Details   amLODIPine (NORVASC) 2.5 MG tablet ceived the following from [facility]: Outside name: amLODIPine (NORVASC) 2.5 mg tablet      aspirin 81 MG EC tablet  Take 1 tablet by mouth daily      atorvastatin (LIPITOR) 80 MG tablet Take 1 tablet by mouth nightly      losartan (COZAAR) 25 MG tablet Take 0.5 tablets by mouth      metFORMIN (GLUCOPHAGE) 500 MG tablet TAKE 1 TABLET BY MOUTH ONCE DAILY WITH A MEAL      metoprolol succinate (TOPROL XL) 50 MG extended release tablet Take 1 tablet by mouth daily      nitroGLYCERIN (NITROSTAT) 0.4 MG SL tablet Place 1 tablet under the tongue             SCREENINGS               No data recorded         PHYSICAL EXAM      ED Triage Vitals   Enc Vitals Group      BP       Pulse       Resp       Temp       Temp src       SpO2       Weight       Height       Head Circumference       Peak Flow       Pain Score       Pain Loc       Pain Edu?       Excl. in GC?         Physical Exam  Constitutional:       General: She is not in acute distress.     Appearance: Normal appearance. She is not ill-appearing.   HENT:      Head: Normocephalic and atraumatic.   Eyes:      Extraocular Movements: Extraocular movements intact.      Pupils: Pupils are equal, round, and reactive to light.   Cardiovascular:      Rate and Rhythm: Normal rate and regular rhythm.   Pulmonary:      Effort: Pulmonary effort is normal.      Breath sounds: Normal breath sounds.   Abdominal:      General: Abdomen is flat. There is no distension.      Palpations: Abdomen is soft.      Tenderness: There is no abdominal tenderness.   Musculoskeletal:         General: Normal range of motion.      Cervical back: Normal range of motion.   Skin:     General: Skin is warm and dry.   Neurological:      General: No focal deficit present.      Mental Status: She is alert.      Comments: Not following commands or speaking  Right gaze preference   Psychiatric:         Mood and Affect: Mood normal.         Thought Content: Thought content normal.          DIAGNOSTIC RESULTS   LABS:     Recent Results (from the past 24 hour(s))   CBC with Auto Differential    Collection Time: 01/05/24 12:38 PM  177.8

## (undated) DEVICE — CATH 5F 100CM JR40 -- DXTERITY

## (undated) DEVICE — GLIDESHEATH SLENDER STAINLESS STEEL KIT: Brand: GLIDESHEATH SLENDER

## (undated) DEVICE — WASTE KIT - ST MARY: Brand: MEDLINE INDUSTRIES, INC.

## (undated) DEVICE — KIT MFLD ISOLATN NACL CNTRST PRT TBNG SPIK W/ PRSS TRNSDUC

## (undated) DEVICE — KIT MED IMAG CNTRST AGNT W/ IOPAMIDOL REUSE

## (undated) DEVICE — GUIDEWIRE VASC L260CM DIA0.035IN TIP L3MM STD EXCHG PTFE J

## (undated) DEVICE — SPECIAL PROCEDURE DRAPE 32" X 34": Brand: SPECIAL PROCEDURE DRAPE

## (undated) DEVICE — PROVE COVER: Brand: UNBRANDED

## (undated) DEVICE — 3M™ TEGADERM™ TRANSPARENT FILM DRESSING FRAME STYLE, 1626W, 4 IN X 4-3/4 IN (10 CM X 12 CM), 50/CT 4CT/CASE: Brand: 3M™ TEGADERM™

## (undated) DEVICE — PACK PROCEDURE SURG HRT CATH

## (undated) DEVICE — KIT HND CTRL 3 W STPCOCK ROT END 54IN PREM HI PRSS TBNG AT

## (undated) DEVICE — CATH 5F 100CM JL35 -- DXTERITY

## (undated) DEVICE — ANGIOGRAPHY KIT